# Patient Record
Sex: MALE | Race: BLACK OR AFRICAN AMERICAN | Employment: FULL TIME | URBAN - METROPOLITAN AREA
[De-identification: names, ages, dates, MRNs, and addresses within clinical notes are randomized per-mention and may not be internally consistent; named-entity substitution may affect disease eponyms.]

---

## 2021-05-07 ENCOUNTER — APPOINTMENT (OUTPATIENT)
Dept: GENERAL RADIOLOGY | Age: 56
End: 2021-05-07
Payer: MEDICAID

## 2021-05-07 ENCOUNTER — APPOINTMENT (OUTPATIENT)
Dept: CT IMAGING | Age: 56
End: 2021-05-07
Payer: MEDICAID

## 2021-05-07 ENCOUNTER — HOSPITAL ENCOUNTER (OUTPATIENT)
Age: 56
Setting detail: OBSERVATION
Discharge: HOME | End: 2021-05-09
Attending: EMERGENCY MEDICINE | Admitting: FAMILY MEDICINE
Payer: MEDICAID

## 2021-05-07 DIAGNOSIS — R55 NEAR SYNCOPE: ICD-10-CM

## 2021-05-07 DIAGNOSIS — R42 DIZZINESS: Primary | ICD-10-CM

## 2021-05-07 DIAGNOSIS — R26.2 DIFFICULTY WALKING: ICD-10-CM

## 2021-05-07 DIAGNOSIS — R42 LIGHTHEADEDNESS: ICD-10-CM

## 2021-05-07 LAB
ANION GAP SERPL CALCULATED.3IONS-SCNC: 12 MMOL/L (ref 7–16)
ANISOCYTOSIS: ABNORMAL
BASOPHILS ABSOLUTE: 0.07 E9/L (ref 0–0.2)
BASOPHILS RELATIVE PERCENT: 0.9 % (ref 0–2)
BUN BLDV-MCNC: 8 MG/DL (ref 6–20)
CALCIUM SERPL-MCNC: 9 MG/DL (ref 8.6–10.2)
CHLORIDE BLD-SCNC: 103 MMOL/L (ref 98–107)
CHP ED QC CHECK: YES
CO2: 22 MMOL/L (ref 22–29)
CREAT SERPL-MCNC: 1 MG/DL (ref 0.7–1.2)
EKG ATRIAL RATE: 97 BPM
EKG P AXIS: 27 DEGREES
EKG P-R INTERVAL: 130 MS
EKG Q-T INTERVAL: 406 MS
EKG QRS DURATION: 96 MS
EKG QTC CALCULATION (BAZETT): 515 MS
EKG R AXIS: -47 DEGREES
EKG T AXIS: 71 DEGREES
EKG VENTRICULAR RATE: 97 BPM
EOSINOPHILS ABSOLUTE: 0 E9/L (ref 0.05–0.5)
EOSINOPHILS RELATIVE PERCENT: 0.7 % (ref 0–6)
GFR AFRICAN AMERICAN: >60
GFR NON-AFRICAN AMERICAN: >60 ML/MIN/1.73
GLUCOSE BLD-MCNC: 112 MG/DL
GLUCOSE BLD-MCNC: 125 MG/DL (ref 74–99)
HCT VFR BLD CALC: 42.7 % (ref 37–54)
HEMOGLOBIN: 15 G/DL (ref 12.5–16.5)
LYMPHOCYTES ABSOLUTE: 0.51 E9/L (ref 1.5–4)
LYMPHOCYTES RELATIVE PERCENT: 6.9 % (ref 20–42)
MCH RBC QN AUTO: 31.3 PG (ref 26–35)
MCHC RBC AUTO-ENTMCNC: 35.1 % (ref 32–34.5)
MCV RBC AUTO: 89 FL (ref 80–99.9)
METER GLUCOSE: 112 MG/DL (ref 74–99)
MONOCYTES ABSOLUTE: 0.36 E9/L (ref 0.1–0.95)
MONOCYTES RELATIVE PERCENT: 5.2 % (ref 2–12)
NEUTROPHILS ABSOLUTE: 6.35 E9/L (ref 1.8–7.3)
NEUTROPHILS RELATIVE PERCENT: 87.1 % (ref 43–80)
NUCLEATED RED BLOOD CELLS: 0.9 /100 WBC
PDW BLD-RTO: 13.5 FL (ref 11.5–15)
PLATELET # BLD: 136 E9/L (ref 130–450)
PMV BLD AUTO: 9.7 FL (ref 7–12)
POTASSIUM REFLEX MAGNESIUM: 4.3 MMOL/L (ref 3.5–5)
RBC # BLD: 4.8 E12/L (ref 3.8–5.8)
SODIUM BLD-SCNC: 137 MMOL/L (ref 132–146)
TROPONIN: <0.01 NG/ML (ref 0–0.03)
WBC # BLD: 7.3 E9/L (ref 4.5–11.5)

## 2021-05-07 PROCEDURE — 71046 X-RAY EXAM CHEST 2 VIEWS: CPT

## 2021-05-07 PROCEDURE — G0378 HOSPITAL OBSERVATION PER HR: HCPCS

## 2021-05-07 PROCEDURE — 2580000003 HC RX 258: Performed by: STUDENT IN AN ORGANIZED HEALTH CARE EDUCATION/TRAINING PROGRAM

## 2021-05-07 PROCEDURE — 80048 BASIC METABOLIC PNL TOTAL CA: CPT

## 2021-05-07 PROCEDURE — 93010 ELECTROCARDIOGRAM REPORT: CPT | Performed by: INTERNAL MEDICINE

## 2021-05-07 PROCEDURE — 93005 ELECTROCARDIOGRAM TRACING: CPT | Performed by: STUDENT IN AN ORGANIZED HEALTH CARE EDUCATION/TRAINING PROGRAM

## 2021-05-07 PROCEDURE — 99285 EMERGENCY DEPT VISIT HI MDM: CPT

## 2021-05-07 PROCEDURE — 85025 COMPLETE CBC W/AUTO DIFF WBC: CPT

## 2021-05-07 PROCEDURE — 82962 GLUCOSE BLOOD TEST: CPT

## 2021-05-07 PROCEDURE — 84484 ASSAY OF TROPONIN QUANT: CPT

## 2021-05-07 PROCEDURE — 70450 CT HEAD/BRAIN W/O DYE: CPT

## 2021-05-07 PROCEDURE — 2580000003 HC RX 258: Performed by: FAMILY MEDICINE

## 2021-05-07 RX ORDER — SODIUM CHLORIDE 0.9 % (FLUSH) 0.9 %
5-40 SYRINGE (ML) INJECTION EVERY 12 HOURS SCHEDULED
Status: DISCONTINUED | OUTPATIENT
Start: 2021-05-07 | End: 2021-05-09 | Stop reason: HOSPADM

## 2021-05-07 RX ORDER — ONDANSETRON 2 MG/ML
4 INJECTION INTRAMUSCULAR; INTRAVENOUS EVERY 6 HOURS PRN
Status: DISCONTINUED | OUTPATIENT
Start: 2021-05-07 | End: 2021-05-09 | Stop reason: HOSPADM

## 2021-05-07 RX ORDER — ASPIRIN 300 MG/1
300 SUPPOSITORY RECTAL DAILY
Status: DISCONTINUED | OUTPATIENT
Start: 2021-05-08 | End: 2021-05-08

## 2021-05-07 RX ORDER — ACETAMINOPHEN 650 MG/1
650 SUPPOSITORY RECTAL EVERY 6 HOURS PRN
Status: DISCONTINUED | OUTPATIENT
Start: 2021-05-07 | End: 2021-05-09 | Stop reason: HOSPADM

## 2021-05-07 RX ORDER — SODIUM CHLORIDE 0.9 % (FLUSH) 0.9 %
5-40 SYRINGE (ML) INJECTION EVERY 12 HOURS SCHEDULED
Status: DISCONTINUED | OUTPATIENT
Start: 2021-05-08 | End: 2021-05-09 | Stop reason: HOSPADM

## 2021-05-07 RX ORDER — SODIUM CHLORIDE 9 MG/ML
25 INJECTION, SOLUTION INTRAVENOUS PRN
Status: DISCONTINUED | OUTPATIENT
Start: 2021-05-07 | End: 2021-05-09 | Stop reason: HOSPADM

## 2021-05-07 RX ORDER — PROMETHAZINE HYDROCHLORIDE 25 MG/1
12.5 TABLET ORAL EVERY 6 HOURS PRN
Status: DISCONTINUED | OUTPATIENT
Start: 2021-05-07 | End: 2021-05-09 | Stop reason: HOSPADM

## 2021-05-07 RX ORDER — ASPIRIN 81 MG/1
81 TABLET ORAL DAILY
Status: DISCONTINUED | OUTPATIENT
Start: 2021-05-08 | End: 2021-05-08

## 2021-05-07 RX ORDER — ACETAMINOPHEN 325 MG/1
650 TABLET ORAL EVERY 6 HOURS PRN
Status: DISCONTINUED | OUTPATIENT
Start: 2021-05-07 | End: 2021-05-09 | Stop reason: HOSPADM

## 2021-05-07 RX ORDER — ONDANSETRON 2 MG/ML
4 INJECTION INTRAMUSCULAR; INTRAVENOUS EVERY 6 HOURS PRN
Status: DISCONTINUED | OUTPATIENT
Start: 2021-05-07 | End: 2021-05-07 | Stop reason: ALTCHOICE

## 2021-05-07 RX ORDER — 0.9 % SODIUM CHLORIDE 0.9 %
1000 INTRAVENOUS SOLUTION INTRAVENOUS ONCE
Status: COMPLETED | OUTPATIENT
Start: 2021-05-07 | End: 2021-05-07

## 2021-05-07 RX ORDER — SODIUM CHLORIDE 0.9 % (FLUSH) 0.9 %
5-40 SYRINGE (ML) INJECTION PRN
Status: DISCONTINUED | OUTPATIENT
Start: 2021-05-07 | End: 2021-05-09 | Stop reason: HOSPADM

## 2021-05-07 RX ORDER — PROMETHAZINE HYDROCHLORIDE 25 MG/1
12.5 TABLET ORAL EVERY 6 HOURS PRN
Status: DISCONTINUED | OUTPATIENT
Start: 2021-05-07 | End: 2021-05-07 | Stop reason: ALTCHOICE

## 2021-05-07 RX ORDER — POLYETHYLENE GLYCOL 3350 17 G/17G
17 POWDER, FOR SOLUTION ORAL DAILY PRN
Status: DISCONTINUED | OUTPATIENT
Start: 2021-05-07 | End: 2021-05-09 | Stop reason: HOSPADM

## 2021-05-07 RX ADMIN — Medication 10 ML: at 21:24

## 2021-05-07 RX ADMIN — SODIUM CHLORIDE 1000 ML: 9 INJECTION, SOLUTION INTRAVENOUS at 13:50

## 2021-05-07 ASSESSMENT — PAIN SCALES - GENERAL: PAINLEVEL_OUTOF10: 0

## 2021-05-07 NOTE — ED PROVIDER NOTES
known allergies. I have reviewed the past medical history, past surgical history, social history, and family history    ---------------------------------------------------PHYSICAL EXAM--------------------------------------    Constitutional/General: Alert and oriented x3  Head: Normocephalic and atraumatic  Eyes:  EOMI, sclera non icteric  Mouth: Oropharynx clear, handling secretions, no trismus, no asymmetry of the posterior oropharynx or uvular edema  Neck: Supple, full ROM, no stridor, no meningeal signs  Respiratory: Lungs clear to auscultation bilaterally, no wheezes, rales, or rhonchi. Not in respiratory distress  Cardiovascular:  Regular rate. Regular rhythm. No murmurs, no aortic murmurs, no gallops, or rubs. Chest: No chest wall tenderness  Gastrointestinal:  Abdomen Soft, Non tender, Non distended. No rebound, guarding, or rigidity. No pulsatile masses. Musculoskeletal: Moves all extremities x 4. Warm and well perfused, no clubbing, no cyanosis, no edema. Capillary refill <3 seconds  Skin: skin warm and dry. No rashes. Neurologic: GCS 15, no focal deficits, symmetric strength 5/5 in the upper and lower extremities bilaterally, no ataxia, sensation intact and equal in the bilateral upper and lower extremities. Psychiatric: Normal Affect          -------------------------------------------------- RESULTS -------------------------------------------------  I have personally reviewed all laboratory and imaging results for this patient. Results are listed below.      LABS: (Lab results interpreted by me)  Results for orders placed or performed during the hospital encounter of 05/07/21   CBC auto differential   Result Value Ref Range    WBC 7.3 4.5 - 11.5 E9/L    RBC 4.80 3.80 - 5.80 E12/L    Hemoglobin 15.0 12.5 - 16.5 g/dL    Hematocrit 42.7 37.0 - 54.0 %    MCV 89.0 80.0 - 99.9 fL    MCH 31.3 26.0 - 35.0 pg    MCHC 35.1 (H) 32.0 - 34.5 %    RDW 13.5 11.5 - 15.0 fL    Platelets 984 493 - 034 E9/L    MPV 9.7 7.0 - 12.0 fL    Neutrophils % 87.1 (H) 43.0 - 80.0 %    Lymphocytes % 6.9 (L) 20.0 - 42.0 %    Monocytes % 5.2 2.0 - 12.0 %    Eosinophils % 0.7 0.0 - 6.0 %    Basophils % 0.9 0.0 - 2.0 %    Neutrophils Absolute 6.35 1.80 - 7.30 E9/L    Lymphocytes Absolute 0.51 (L) 1.50 - 4.00 E9/L    Monocytes Absolute 0.36 0.10 - 0.95 E9/L    Eosinophils Absolute 0.00 (L) 0.05 - 0.50 E9/L    Basophils Absolute 0.07 0.00 - 0.20 E9/L    nRBC 0.9 /100 WBC    Anisocytosis 2+    Basic Metabolic Panel w/ Reflex to MG   Result Value Ref Range    Sodium 137 132 - 146 mmol/L    Potassium reflex Magnesium 4.3 3.5 - 5.0 mmol/L    Chloride 103 98 - 107 mmol/L    CO2 22 22 - 29 mmol/L    Anion Gap 12 7 - 16 mmol/L    Glucose 125 (H) 74 - 99 mg/dL    BUN 8 6 - 20 mg/dL    CREATININE 1.0 0.7 - 1.2 mg/dL    GFR Non-African American >60 >=60 mL/min/1.73    GFR African American >60     Calcium 9.0 8.6 - 10.2 mg/dL   Troponin   Result Value Ref Range    Troponin <0.01 0.00 - 0.03 ng/mL   CBC   Result Value Ref Range    WBC 7.2 4.5 - 11.5 E9/L    RBC 4.71 3.80 - 5.80 E12/L    Hemoglobin 14.8 12.5 - 16.5 g/dL    Hematocrit 42.9 37.0 - 54.0 %    MCV 91.1 80.0 - 99.9 fL    MCH 31.4 26.0 - 35.0 pg    MCHC 34.5 32.0 - 34.5 %    RDW 13.5 11.5 - 15.0 fL    Platelets 271 081 - 156 E9/L    MPV 9.7 7.0 - 12.0 fL   Hemoglobin A1c   Result Value Ref Range    Hemoglobin A1C 5.7 (H) 4.0 - 5.6 %   Lipid panel - fasting   Result Value Ref Range    Cholesterol, Total 197 0 - 199 mg/dL    Triglycerides 91 0 - 149 mg/dL    HDL 48 >40 mg/dL    LDL Calculated 131 (H) 0 - 99 mg/dL    VLDL Cholesterol Calculated 18 mg/dL   Comprehensive Metabolic Panel w/ Reflex to MG   Result Value Ref Range    Sodium 139 132 - 146 mmol/L    Potassium reflex Magnesium 4.0 3.5 - 5.0 mmol/L    Chloride 105 98 - 107 mmol/L    CO2 23 22 - 29 mmol/L    Anion Gap 11 7 - 16 mmol/L    Glucose 133 (H) 74 - 99 mg/dL    BUN 9 6 - 20 mg/dL    CREATININE 1.0 0.7 - 1.2 mg/dL    GFR Non- American >60 >=60 mL/min/1.73    GFR African American >60     Calcium 9.0 8.6 - 10.2 mg/dL    Total Protein 8.0 6.4 - 8.3 g/dL    Albumin 3.5 3.5 - 5.2 g/dL    Total Bilirubin 3.1 (H) 0.0 - 1.2 mg/dL    Alkaline Phosphatase 112 40 - 129 U/L    ALT 47 (H) 0 - 40 U/L    AST 72 (H) 0 - 39 U/L   Troponin   Result Value Ref Range    Troponin <0.01 0.00 - 0.03 ng/mL   POCT glucose   Result Value Ref Range    Glucose 112 mg/dL    QC OK? yes    POCT Glucose   Result Value Ref Range    Meter Glucose 112 (H) 74 - 99 mg/dL   EKG 12 Lead   Result Value Ref Range    Ventricular Rate 97 BPM    Atrial Rate 97 BPM    P-R Interval 130 ms    QRS Duration 96 ms    Q-T Interval 406 ms    QTc Calculation (Bazett) 515 ms    P Axis 27 degrees    R Axis -47 degrees    T Axis 71 degrees   EKG 12 Lead   Result Value Ref Range    Ventricular Rate 86 BPM    Atrial Rate 86 BPM    P-R Interval 132 ms    QRS Duration 90 ms    Q-T Interval 432 ms    QTc Calculation (Bazett) 516 ms    P Axis 29 degrees    R Axis -17 degrees    T Axis -22 degrees   ,       RADIOLOGY:  Interpreted by Radiologist unless otherwise specified  US CAROTID ARTERY BILATERAL   Final Result   Atherosclerotic disease. No hemodynamically significant stenosis is   identified   Estimated stenosis by NASCET criteria in the proximal right carotid   artery is between 0% and 49%. Estimated stenosis by NASCET criteria in the proximal left carotid   artery is between 0% and 49%. CT HEAD WO CONTRAST   Final Result   No acute intracranial abnormality. Chronic maxillary and sphenoid sinusitis. XR CHEST (2 VW)   Final Result   No acute process. EKG Interpretation  Interpreted by Rick Adamson    EKG: This EKG is signed and interpreted by me.     Rate: 94  Rhythm: Sinus  Interpretation: Normal sinus rhythm, normal axis, LVH, prolonged QT, QTC is 537, nonspecific ST changes throughout  Comparison: stable as compared to patient's most recent EKG ------------------------- NURSING NOTES AND VITALS REVIEWED ---------------------------   The nursing notes within the ED encounter and vital signs as below have been reviewed by myself  BP (!) 159/88   Pulse 85   Temp 97.8 °F (36.6 °C) (Temporal)   Resp 18   Ht 6' 2\" (1.88 m)   Wt 261 lb 6.4 oz (118.6 kg)   SpO2 97%   BMI 33.56 kg/m²     Oxygen Saturation Interpretation: 97 % on room. Normal    The patients available past medical records and past encounters were reviewed. ------------------------------ ED COURSE/MEDICAL DECISION MAKING----------------------  Medications   0.9 % sodium chloride bolus (0 mLs Intravenous Stopped 5/7/21 1357)           The cardiac monitor revealed NSR with a heart rate in the 80s as interpreted by me. The cardiac monitor was ordered secondary to the patient's syncope and to monitor the patient for dysrhythmia. CPT [id number]           Medical Decision Making:     The patient was seen and evaluated by the Attending Emergency Medicine Physician Dr. Mirtha Vázquez. The patient is a 64year-old-male who presents to the ED complaining of dizziness. He is hemodynamically stable, non-toxic, and in no acute distress. Orthostatics negative. CT head negative. CXR negative. Prolonged QT on EKG, but no evidence of STEMI. Troponin negative. Labs are reassuring. However, when ambulating the patient states he feels lightheaded and dizzy. He was treated with IVF without any relief. Consulted with hospitalist who accepted for admission. Discussed results and plan with patient and agreed on admission. Re-Evaluations:  ED Course as of May 10 1054   Fri May 07, 2021   1744 Patient unable to ambulate due to increased weakness and lightheadedness. [KG]   [id number] Consulted with Dr. Herminio Martin, hospitalist, who accepted for admission.      [KG]      ED Course User Index  [KG] Tip Negus, DO           This patient's ED course included: a personal history and physicial examination, re-evaluation prior to disposition, multiple bedside re-evaluations, IV medications, cardiac monitoring, continuous pulse oximetry and complex medical decision making and emergency management    This patient has remained hemodynamically stable during their ED course. Consultations:  Spoke with Dr. Yoli Yan (Medicine). Discussed case. They will admit this patient. Counseling: The emergency provider has spoken with the patient and discussed todays results, in addition to providing specific details for the plan of care and counseling regarding the diagnosis and prognosis. Questions are answered at this time and they are agreeable with the plan.       --------------------------------- IMPRESSION AND DISPOSITION ---------------------------------    IMPRESSION  1. Dizziness    2. Lightheadedness    3. Near syncope    4. Difficulty walking        DISPOSITION  Disposition: Admit to telemetry  Patient condition is good        NOTE: This report was transcribed using voice recognition software.  Every effort was made to ensure accuracy; however, inadvertent computerized transcription errors may be present        Carmine Cooney DO  Resident  05/10/21 [id number]

## 2021-05-08 ENCOUNTER — APPOINTMENT (OUTPATIENT)
Dept: ULTRASOUND IMAGING | Age: 56
End: 2021-05-08
Payer: MEDICAID

## 2021-05-08 LAB
ALBUMIN SERPL-MCNC: 3.5 G/DL (ref 3.5–5.2)
ALP BLD-CCNC: 112 U/L (ref 40–129)
ALT SERPL-CCNC: 47 U/L (ref 0–40)
ANION GAP SERPL CALCULATED.3IONS-SCNC: 11 MMOL/L (ref 7–16)
AST SERPL-CCNC: 72 U/L (ref 0–39)
BILIRUB SERPL-MCNC: 3.1 MG/DL (ref 0–1.2)
BUN BLDV-MCNC: 9 MG/DL (ref 6–20)
CALCIUM SERPL-MCNC: 9 MG/DL (ref 8.6–10.2)
CHLORIDE BLD-SCNC: 105 MMOL/L (ref 98–107)
CHOLESTEROL, TOTAL: 197 MG/DL (ref 0–199)
CO2: 23 MMOL/L (ref 22–29)
CREAT SERPL-MCNC: 1 MG/DL (ref 0.7–1.2)
EKG ATRIAL RATE: 86 BPM
EKG P AXIS: 29 DEGREES
EKG P-R INTERVAL: 132 MS
EKG Q-T INTERVAL: 432 MS
EKG QRS DURATION: 90 MS
EKG QTC CALCULATION (BAZETT): 516 MS
EKG R AXIS: -17 DEGREES
EKG T AXIS: -22 DEGREES
EKG VENTRICULAR RATE: 86 BPM
GFR AFRICAN AMERICAN: >60
GFR NON-AFRICAN AMERICAN: >60 ML/MIN/1.73
GLUCOSE BLD-MCNC: 133 MG/DL (ref 74–99)
HBA1C MFR BLD: 5.7 % (ref 4–5.6)
HCT VFR BLD CALC: 42.9 % (ref 37–54)
HDLC SERPL-MCNC: 48 MG/DL
HEMOGLOBIN: 14.8 G/DL (ref 12.5–16.5)
LDL CHOLESTEROL CALCULATED: 131 MG/DL (ref 0–99)
MCH RBC QN AUTO: 31.4 PG (ref 26–35)
MCHC RBC AUTO-ENTMCNC: 34.5 % (ref 32–34.5)
MCV RBC AUTO: 91.1 FL (ref 80–99.9)
PDW BLD-RTO: 13.5 FL (ref 11.5–15)
PLATELET # BLD: 137 E9/L (ref 130–450)
PMV BLD AUTO: 9.7 FL (ref 7–12)
POTASSIUM REFLEX MAGNESIUM: 4 MMOL/L (ref 3.5–5)
RBC # BLD: 4.71 E12/L (ref 3.8–5.8)
SODIUM BLD-SCNC: 139 MMOL/L (ref 132–146)
TOTAL PROTEIN: 8 G/DL (ref 6.4–8.3)
TRIGL SERPL-MCNC: 91 MG/DL (ref 0–149)
TROPONIN: <0.01 NG/ML (ref 0–0.03)
VLDLC SERPL CALC-MCNC: 18 MG/DL
WBC # BLD: 7.2 E9/L (ref 4.5–11.5)

## 2021-05-08 PROCEDURE — 92523 SPEECH SOUND LANG COMPREHEN: CPT

## 2021-05-08 PROCEDURE — 96372 THER/PROPH/DIAG INJ SC/IM: CPT

## 2021-05-08 PROCEDURE — G0378 HOSPITAL OBSERVATION PER HR: HCPCS

## 2021-05-08 PROCEDURE — 80061 LIPID PANEL: CPT

## 2021-05-08 PROCEDURE — 6370000000 HC RX 637 (ALT 250 FOR IP): Performed by: FAMILY MEDICINE

## 2021-05-08 PROCEDURE — 2580000003 HC RX 258: Performed by: EMERGENCY MEDICINE

## 2021-05-08 PROCEDURE — 36415 COLL VENOUS BLD VENIPUNCTURE: CPT

## 2021-05-08 PROCEDURE — APPSS180 APP SPLIT SHARED TIME > 60 MINUTES: Performed by: NURSE PRACTITIONER

## 2021-05-08 PROCEDURE — 93880 EXTRACRANIAL BILAT STUDY: CPT | Performed by: RADIOLOGY

## 2021-05-08 PROCEDURE — 83036 HEMOGLOBIN GLYCOSYLATED A1C: CPT

## 2021-05-08 PROCEDURE — 99244 OFF/OP CNSLTJ NEW/EST MOD 40: CPT | Performed by: INTERNAL MEDICINE

## 2021-05-08 PROCEDURE — 84484 ASSAY OF TROPONIN QUANT: CPT

## 2021-05-08 PROCEDURE — 85027 COMPLETE CBC AUTOMATED: CPT

## 2021-05-08 PROCEDURE — 99221 1ST HOSP IP/OBS SF/LOW 40: CPT | Performed by: PSYCHIATRY & NEUROLOGY

## 2021-05-08 PROCEDURE — 93880 EXTRACRANIAL BILAT STUDY: CPT

## 2021-05-08 PROCEDURE — 80053 COMPREHEN METABOLIC PANEL: CPT

## 2021-05-08 PROCEDURE — 93010 ELECTROCARDIOGRAM REPORT: CPT | Performed by: INTERNAL MEDICINE

## 2021-05-08 PROCEDURE — 6370000000 HC RX 637 (ALT 250 FOR IP): Performed by: NURSE PRACTITIONER

## 2021-05-08 PROCEDURE — 97161 PT EVAL LOW COMPLEX 20 MIN: CPT

## 2021-05-08 PROCEDURE — 6360000002 HC RX W HCPCS: Performed by: FAMILY MEDICINE

## 2021-05-08 PROCEDURE — 93005 ELECTROCARDIOGRAM TRACING: CPT | Performed by: EMERGENCY MEDICINE

## 2021-05-08 RX ORDER — AMLODIPINE BESYLATE 5 MG/1
5 TABLET ORAL DAILY
Status: DISCONTINUED | OUTPATIENT
Start: 2021-05-08 | End: 2021-05-09

## 2021-05-08 RX ADMIN — SODIUM CHLORIDE, PRESERVATIVE FREE 10 ML: 5 INJECTION INTRAVENOUS at 08:50

## 2021-05-08 RX ADMIN — ENOXAPARIN SODIUM 40 MG: 40 INJECTION SUBCUTANEOUS at 08:48

## 2021-05-08 RX ADMIN — SODIUM CHLORIDE, PRESERVATIVE FREE 10 ML: 5 INJECTION INTRAVENOUS at 21:01

## 2021-05-08 RX ADMIN — AMLODIPINE BESYLATE 5 MG: 5 TABLET ORAL at 14:38

## 2021-05-08 RX ADMIN — ASPIRIN 81 MG: 81 TABLET, COATED ORAL at 08:48

## 2021-05-08 NOTE — CONSULTS
Keya Lima is a 64 y.o. male       Chief Complaint   Patient presents with    Dizziness     felt dizzy/lightheaded since this morning, denies fall, or syncopal episode       HPI:  80-year-old man with history of alcohol use presents with recurrent episodes of lightheadedness upon standing. He denies vertigo. No loss of consciousness. Vital signs of been stable. He does notice these episodes happen more after he has been drinking. No history of seizures or stroke. HPI  Prior to Visit Medications    Not on File     Social History     Tobacco Use    Smoking status: Not on file   Substance Use Topics    Alcohol use: Not on file    Drug use: Not on file     History reviewed. No pertinent family history. History reviewed. No pertinent surgical history. History reviewed. No pertinent past medical history. Review of Systems    As stated above all others negative         Objective:   BP (!) 154/95   Pulse 98   Temp 96.8 °F (36 °C)   Resp 22   Ht 6' 2\" (1.88 m)   Wt 290 lb (131.5 kg)   SpO2 98%   BMI 37.23 kg/m²     Physical Exam  HENT:      Head: Atraumatic. Mouth/Throat:      Mouth: Mucous membranes are moist.      Pharynx: Oropharynx is clear. Eyes:      General: Lids are normal.      Extraocular Movements: Extraocular movements intact. Conjunctiva/sclera: Conjunctivae normal.      Pupils: Pupils are equal, round, and reactive to light. Cardiovascular:      Pulses: Normal pulses. Heart sounds: Normal heart sounds. Pulmonary:      Breath sounds: Normal breath sounds. Musculoskeletal: Normal range of motion. Skin:     General: Skin is warm and dry. Neurological:      General: No focal deficit present. Mental Status: He is alert. Deep Tendon Reflexes: Strength normal.      Reflex Scores:       Tricep reflexes are 1+ on the right side and 1+ on the left side. Bicep reflexes are 1+ on the right side and 1+ on the left side.        Patellar reflexes are 1+ on the right side and 1+ on the left side. Achilles reflexes are 1+ on the right side and 1+ on the left side. Psychiatric:         Mood and Affect: Mood normal.         Speech: Speech normal.         Behavior: Behavior normal.                 Neurological Exam  Mental Status  Alert. Oriented to person, place, time and situation. Speech is normal. Language is fluent with no aphasia. Cranial Nerves  CN II: Visual fields full to confrontation. CN III, IV, VI: Extraocular movements intact bilaterally. Normal lids and orbits bilaterally. Pupils equal round and reactive to light bilaterally. CN V: Facial sensation is normal.  CN VII: Full and symmetric facial movement. CN VIII: Hearing is normal.  CN IX, X: Palate elevates symmetrically  CN XI: Shoulder shrug strength is normal.  CN XII: Tongue midline without atrophy or fasciculations. Motor  Normal muscle bulk throughout. Normal muscle tone. Strength is 5/5 throughout all four extremities. Sensory  Light touch is normal in upper and lower extremities. Pinprick is normal in upper and lower extremities.      Reflexes                                           Right                      Left  Biceps                                 1+                         1+  Triceps                                1+                         1+  Patellar                                1+                         1+  Achilles                                1+                         1+    Coordination  Right: Finger-to-nose normal. Heel-to-shin normal.  Left: Finger-to-nose normal. Heel-to-shin normal.      Laboratory/Radiology:     CBC with Differential:    Lab Results   Component Value Date    WBC 7.2 05/08/2021    RBC 4.71 05/08/2021    HGB 14.8 05/08/2021    HCT 42.9 05/08/2021     05/08/2021    MCV 91.1 05/08/2021    MCH 31.4 05/08/2021    MCHC 34.5 05/08/2021    RDW 13.5 05/08/2021    NRBC 0.9 05/07/2021    LYMPHOPCT 6.9 05/07/2021    MONOPCT 5.2 05/07/2021 BASOPCT 0.9 05/07/2021    MONOSABS 0.36 05/07/2021    LYMPHSABS 0.51 05/07/2021    EOSABS 0.00 05/07/2021    BASOSABS 0.07 05/07/2021     CMP:    Lab Results   Component Value Date     05/08/2021    K 4.0 05/08/2021     05/08/2021    CO2 23 05/08/2021    BUN 9 05/08/2021    CREATININE 1.0 05/08/2021    GFRAA >60 05/08/2021    LABGLOM >60 05/08/2021    GLUCOSE 133 05/08/2021    GLUCOSE 110 01/07/2011    PROT 8.0 05/08/2021    LABALBU 3.5 05/08/2021    LABALBU 4.1 01/07/2011    CALCIUM 9.0 05/08/2021    BILITOT 3.1 05/08/2021    ALKPHOS 112 05/08/2021    AST 72 05/08/2021    ALT 47 05/08/2021     Last 3 Troponin:    Lab Results   Component Value Date    TROPONINI <0.01 05/08/2021    TROPONINI <0.01 05/07/2021    TROPONINI <0.01 01/07/2011    TROPONINI <0.01 01/07/2011    TROPONINI <0.01 01/06/2011     HgBA1c:    Lab Results   Component Value Date    LABA1C 5.7 05/08/2021     FLP:    Lab Results   Component Value Date    TRIG 91 05/08/2021    HDL 48 05/08/2021    LDLCALC 131 05/08/2021    LABVLDL 18 05/08/2021       CT head:  Ct head negative  I independently reviewed the labs and imaging studies at today's appointment. Assessment:     Presyncopal episodes with orthostatic component. Plan:     No further work-up from neurology standpoint in-house. Follow-up orthostatic vital signs  Please call with further questions can follow-up with neurology as needed.     Dada Lemus  3:14 PM  5/8/2021

## 2021-05-08 NOTE — CONSULTS
Inpatient Cardiology Consultation      Reason for Consult:  Dizziness    Consulting Physician: Dr. Payam Delgado    Requesting Physician:  Dr. Monika Almanza    Date of Consultation: 5/8/2021    HISTORY OF PRESENT ILLNESS:   Mr. Lena Ornelas is a 64year old  male who is previously not known to [facility]) Cardiology Physicians in [facility]. His medical history includes HTN, HLD, medical noncompliance, remote tobacco abuse, ETOH, obesity, and reported mild cognitive deficit. Mr. Lena Ornelas presented to [facility] ED on 05/07/2021 with complaints of dizziness and chest pain. He states that prior to presentation, for \"a while\" he has been having intermittent midsternal chest \"pinching\". He states that this last several seconds and at random, not related to exertion. He states that infrequently for years he has ENG. He denies orthopnea and PND. He states that over the past week he has been having dizziness and lightheadedness with change of position. He states that he summoned EMS and upon their arrival and his standing from a sitting position on 05/07/2021 he fell. He denies injury or LOC. Of note, he states that over the past week he has had poor PO intake in addition to consuming 12- 24 ounce beers a day \"when I'm off\". Upon arrival to the ED his VS were 95.9-99-/91-96% on RA. EKG SR with LVH with nonspecific ST-T wave abnormalities. CT of the head with chronic maxillary and sphenoid sinusitis. CXR unremarkable. Troponin <0.02. He received a one liter NS bolus and was admitted to a telemetry monitored unit. Echocardiogram and a Neurology consult was ordered. Cardiology was consulted by Dr. Monika Almanza for management of dizziness. Of note, Mr. Lena Ornelas states that he has not taken any medication in the past 2 years. Please note: past medical records were reviewed per electronic medical record (EMR) - see detailed reports under Past Medical/ Surgical History.    Past Medical and Surgical History: 1. Lexiscan MPS 01/07/2011: Nonischemic. No WMA. The ejection fraction visually looks in the normal range perhaps 55% to 60%. 2. Morbid Obesity   3. Dyslipidemia  4. HTN  5. Medical noncompliance  6. Remote tobacco abuse  7. ETOH  8. Mild Cognitive Deficits         Medications Prior to admit:  Prior to Admission medications    Not on File       Current Medications:    Current Facility-Administered Medications: sodium chloride flush 0.9 % injection 5-40 mL, 5-40 mL, Intravenous, 2 times per day  sodium chloride flush 0.9 % injection 5-40 mL, 5-40 mL, Intravenous, PRN  0.9 % sodium chloride infusion, 25 mL, Intravenous, PRN  polyethylene glycol (GLYCOLAX) packet 17 g, 17 g, Oral, Daily PRN  enoxaparin (LOVENOX) injection 40 mg, 40 mg, Subcutaneous, Daily  aspirin EC tablet 81 mg, 81 mg, Oral, Daily **OR** aspirin suppository 300 mg, 300 mg, Rectal, Daily  trimethobenzamide (TIGAN) injection 200 mg, 200 mg, Intramuscular, Q6H PRN  sodium chloride flush 0.9 % injection 5-40 mL, 5-40 mL, Intravenous, 2 times per day  sodium chloride flush 0.9 % injection 5-40 mL, 5-40 mL, Intravenous, PRN  0.9 % sodium chloride infusion, 25 mL, Intravenous, PRN  promethazine (PHENERGAN) tablet 12.5 mg, 12.5 mg, Oral, Q6H PRN **OR** ondansetron (ZOFRAN) injection 4 mg, 4 mg, Intravenous, Q6H PRN  acetaminophen (TYLENOL) tablet 650 mg, 650 mg, Oral, Q6H PRN **OR** acetaminophen (TYLENOL) suppository 650 mg, 650 mg, Rectal, Q6H PRN  bisacodyl (DULCOLAX) EC tablet 5 mg, 5 mg, Oral, Daily PRN    Allergies:  Patient has no known allergies. Social History:    Quit smoking years ago. Prior to that smoked 1.5ppd for 10 years. Drinks alcohol daily. States that he consumes 12-24 ounce beers on the days he is off and drinks beer also on working days \"not as much\". Denies illicit drug use. Drinks one cup of coffee and soda a day. Family History:   He grew up in foster care.  He states that his birth mother had \"heart problems\", specifics are unclear as she  2 years after he found her. He does not know the medical history of his birth father or 2 brothers. REVIEW OF SYSTEMS:     · Constitutional: Denies fevers, chills, night sweats, and fatigue  · HEENT: Denies headaches, nose bleeds, and blurred vision,oral pain, abscess or lesion. · Musculoskeletal: Denies falls, pain to BLE with ambulation and edema to BLE. · Neurological: Complains of dizziness and lightheadedness, denies numbness and tingling  · Cardiovascular: Complains of chest pain. Denies palpitations, and feelings of heart racing. · Respiratory: Denies orthopnea and PND  · Gastrointestinal: Denies heartburn, nausea/vomiting, diarrhea and constipation, black/bloody, and tarry stools. · Genitourinary: Denies dysuria and hematuria  · Hematologic: Denies excessive bruising or bleeding  · Lymphatic: Denies lumps and bumps to neck, axilla, breast, and groin  · Endocrine: Denies excessive thirst. Denies intolerance to hot and cold  · Psychiatric: Complains of anxiety and depression. PHYSICAL EXAM:   BP (!) 154/95   Pulse 98   Temp 96.8 °F (36 °C)   Resp 22   Ht 6' 2\" (1.88 m)   Wt 290 lb (131.5 kg)   SpO2 98%   BMI 37.23 kg/m²   CONST:  Well developed, obese, middle aged male who appears stated age. Awake, alert, cooperative, no apparent distress  HEENT:   Head- Normocephalic, atraumatic   Eyes- Conjunctivae pink, anicteric  Throat- Oral mucosa pink and moist  Neck-  No stridor, trachea midline, no jugular venous distention. No adenopathy   CHEST: Chest symmetrical and non-tender to palpation. No accessory muscle use or intercostal retractions  RESPIRATORY: Lung sounds - clear throughout fields   CARDIOVASCULAR:     No carotid bruit  Heart Inspection- shows no noted pulsations  Heart Palpation- no heaves or thrills; PMI is non-displaced   Heart Ausculation- Regular rate and rhythm, no murmur. No s3, s4 or rub   PV: No lower extremity edema. No varicosities. Pedal pulses palpable, no clubbing or cyanosis   ABDOMEN: Soft, obese, non-tender to light palpation. Bowel sounds present. No palpable masses no organomegaly; no abdominal bruit  MS: Good muscle strength and tone. No atrophy or abnormal movements. : Deferred  SKIN: Warm and dry no statis dermatitis or ulcers   NEURO / PSYCH: Oriented to person, place and time. Speech clear and appropriate. Follows all commands. Pleasant affect     DATA:    ECG: As above  Tele strips: SR  Diagnostic:  Labs:   CBC:   Recent Labs     05/07/21  1354 05/08/21  0712   WBC 7.3 7.2   HGB 15.0 14.8   HCT 42.7 42.9    137     BMP:   Recent Labs     05/07/21  1354 05/08/21  0712    139   K 4.3 4.0   CO2 22 23   BUN 8 9   CREATININE 1.0 1.0   LABGLOM >60 >60   CALCIUM 9.0 9.0   HgA1c:   Lab Results   Component Value Date    LABA1C 5.7 (H) 05/08/2021   CARDIAC ENZYMES:  Recent Labs     05/07/21  1354 05/08/21  0712   TROPONINI <0.01 <0.01     FASTING LIPID PANEL:  Lab Results   Component Value Date    CHOL 197 05/08/2021    HDL 48 05/08/2021    LDLCALC 131 05/08/2021    TRIG 91 05/08/2021     LIVER PROFILE:  Recent Labs     05/08/21 0712   AST 72*   ALT 47*   LABALBU 3.5     05/07/2021 CXR:   The ejection fraction visually looks in the normal range perhaps 55% to 60%. 05/07/2021 CT Head:   No acute intracranial abnormality. Chronic maxillary and sphenoid sinusitis    IMPRESSION:   1. Noncardiac chest pain with no acute EKG changes and Troponin negative x 2  2. \"Dizziness\" without syncope due to orthostasis, heavy alcohol intake (12x24-ounce beers a day)  3. Obesity  4. HTN: Not well controlled  5. HLD, noncompliant with statin  6. Medical noncompliance  7. Heavy alcohol use as above  8. Remote tobacco abuse   9. Elevated LFTs      PLAN:  1. Monitor BP/HR; Start Norvasc 5mg daily given his hypertension and LVH on EKG. 2. Neurology consult per Primary Service  3.  Cancel echocardiogram-->May be done as an outpatient  4. Check urine tox screen   5. Discontinue ASA-not indicated  6. Consider statin, as LFTs allow  7. Recommend ETOH cessation   8. Follow up with Cardiology as an outpatient (office will call with appointment time)  9. Cardiology will sign off. Please call if needed. 10. Above as discussed with Dr. Rajesh Christian    Electronically signed by WINTER Hilton CNP on 5/8/2021 at 9:52 AM     PHYSICIAN ADDENDUM:  I independently interviewed and examined the patient. I have reviewed the above documentation completed by the JESSE. Please see my additional contributions to the HPI, physical exam, and assessment / medical decision making. I independently reviewed the HPI, ROS, PMH, PSH, [address], SH, and medications independently and agree with above documentation.     I discussed the assessment and plan with the patient/family at the bedside as well as the bedside nurse and the primary team.    Edi Hudson MD, [facility] [place]  Interventional Cardiology/Structural Heart Disease  Office: [phone]

## 2021-05-08 NOTE — PROGRESS NOTES
SPEECH/LANGUAGE PATHOLOGY  SPEECH/LANGUAGE/COGNITIVE EVALUATION      PATIENT NAME:  Vernon Potter      :  1965          TODAY'S DATE:  2021 [facility]OM:  [facility]      SPEECH PATHOLOGY DIAGNOSIS:    Mild cognitive deficits    PLAN OF CARE:     Speech Pathology intervention is recommended 3-5 times per week for LOS or when goals are met with emphasis on the following: To improve all areas of memory for functional activities to 75% with min cues  To improve orientation to date 100% of the time with use of external memory aids               MOTOR SPEECH       Oral Peripheral Examination   Oral motor strength and coordination were within functional limits    Parameters of Speech Production  Respiration:  Within normal limits  Articulation:  Within normal limits  Resonance:  Within normal limits  Quality:   Within normal limits  Pitch: Within normal limits  Intensity: Within normal limits  Fluency:  Within normal limits  Prosody Within normal limits      RECEPTIVE LANGUAGE    Comprehension of Yes/No Questions:  Within normal limits    Process  Simple Verbal Commands: Within normal limits  Process Intermediate Verbal Commands: Within normal limits  Process Complex Verbal Commands: Within normal limits     Comprehension of Conversation: Within normal limits       EXPRESSIVE LANGUAGE     Serials: Within normal limits    Imitation:  Words   Within normal limits  Sentences Within normal limits    Naming:  (Modality used: verbal)  Confrontation Naming  Within normal limits  Functional Description  Within normal limits}  Response Naming: Within normal limits    Conversation:   Within normal limits    COGNITION     Attention/Orientation  Attention: Sustained attention   Orientation:  Oriented to Person, Place, Reason for hospitalization  Memory   Biographical:  recalled Address, Birthdate and Family  Delayed recall:  recalled 0/3 words    Organization/Problem Solving/Reasoning   Verbal Sequencing: Functional  Problem solving (verbal): Functional     CLINICAL OBSERVATIONS NOTED DURING THE EVALUATION  Cueing was required                  Prognosis for improvements is fair +  This plan will be re-evaluated and revised in 1 week  if warranted. Patient stated goals: Agreed with above  Treatment goals discussed with Patient   The Patient understand(s) the diagnosis, prognosis and plan of care       CPT code:    31232  eval speech sound lang comprehension      The admitting diagnosis and active problem list, as listed below have been reviewed prior to initiation of this evaluation.      ADMITTING DIAGNOSIS: Lightheadedness [R42]     ACTIVE PROBLEM LIST:   Patient Active Problem List   Diagnosis    Lightheadedness

## 2021-05-08 NOTE — PROGRESS NOTES
Hospitalist Progress Note      PCP: No primary care provider on file. Date of Admission: 5/7/2021    Chief Complaint: [facility] Course: ** near syncope, for echo and carotid doppler, Neuro and card eval,   US of caroids unremarkable, he is othrostatic, in discussing his diet, he drinks primarily beer at home, rarely water, and this confirmed by his brother. Will recommend peer recovery program    Subjective: concerned about dizziness      Medications:  Reviewed    Infusion Medications    sodium chloride      sodium chloride       Scheduled Medications    amLODIPine  5 mg Oral Daily    sodium chloride flush  5-40 mL Intravenous 2 times per day    enoxaparin  40 mg Subcutaneous Daily    sodium chloride flush  5-40 mL Intravenous 2 times per day     PRN Meds: sodium chloride flush, sodium chloride, polyethylene glycol, trimethobenzamide, sodium chloride flush, sodium chloride, promethazine **OR** ondansetron, acetaminophen **OR** acetaminophen, bisacodyl      Intake/Output Summary (Last 24 hours) at 5/8/2021 1516  Last data filed at 5/8/2021 1406  Gross per 24 hour   Intake 480 ml   Output --   Net 480 ml       Exam:    BP (!) 154/95   Pulse 98   Temp 96.8 °F (36 °C)   Resp 22   Ht 6' 2\" (1.88 m)   Wt 290 lb (131.5 kg)   SpO2 98%   BMI 37.23 kg/m²         Gen: well developed  HEENT: NC/AT, moist mucous membranes, no oropharyngeal erythema or exudate  Neck: supple, trachea midline, no anterior cervical or SC LAD  Heart:  Normal s1/s2, RRR, no murmurs, gallops, or rubs. Lungs:  *cta bilaterally, *  Abd: bowel sounds present, soft, nontender, nondistended, no masses  Extrem:  No clubbing, cyanosis,  no** edema  Skin: no rashes or lesions  Psych: A & O x3  Neuro: grossly intact, moves all four extremities.     Capillary Refill: Brisk,< 3 seconds   Peripheral Pulses: +2 palpable, equal bilaterally                   Labs:   Recent Labs     05/07/21  1354 05/08/21  0712   WBC 7.3 7.2   HGB 15.0 14.8   HCT 42.7 42.9    137     Recent Labs     05/07/21  1354 05/08/21  0712    139   K 4.3 4.0    105   CO2 22 23   BUN 8 9   CREATININE 1.0 1.0   CALCIUM 9.0 9.0     Recent Labs     05/08/21  0712   AST 72*   ALT 47*   BILITOT 3.1*   ALKPHOS 112     No results for input(s): INR in the last 72 hours. Recent Labs     05/07/21  1354 05/08/21  0712   TROPONINI <0.01 <0.01     Recent Labs     05/08/21 0712   AST 72*   ALT 47*   BILITOT 3.1*   ALKPHOS 112     No results for input(s): LACTA in the last 72 hours.   No results found for: Jacquelyn June  No results found for: AMMONIA    Assessment:    Active Hospital Problems    Diagnosis Date Noted    Lightheadedness [R42] 05/07/2021   near syncope  HTN  HLD( liver enzymes mildly elevated)  prediabetes  Plan:  *CONT NORVAS   US OF CAROTIDS  ECHO  Orthostatics,  Dietary consult     DVT Prophylaxis: LOVENOX  Diet: DIET GENERAL;  Code Status: Full Code    PT/OT Eval Status: *ORDERED    Dispo - HOME      Electronically signed by Mariano Melendez DO on 5/8/2021 at 3:16 PM [facility]

## 2021-05-08 NOTE — H&P
INTERNAL MEDICINE HISTORY AND PHYSICAL EXAM     CHIEF COMPLAINT:   Chief Complaint   Patient presents with    Dizziness     felt dizzy/lightheaded since this morning, denies fall, or syncopal episode        HISTORY OF PRESENTING ILLNESS:   \64 years old male patient who presented to the emergency department the chief complaints of dizziness, patient reports that a day before presentation he was dizzy and he felt lightheaded to the extent that he felt that he was about to fall, did not lose consciousness no syncopal episode reported, he ignored this episode but then on the day of presentation he again had more severe symptoms, patient decided to come to emergency department. Patient remained stable hemodynamically, patient had initial lab work-up done which showed no major abnormalities on basic lab work-up on imaging, no acute abnormality on CT head patient did have chronic maxillary and sphenoid sinusitis, x-ray chest showing no acute process. Patient denies any numbness tingling any neurological significant weakness. Patient denies chest pain shortness of breath. PAST MEDICAL HISTORY  History reviewed. No pertinent past medical history. PAST SURGICAL HISTORY  History reviewed. No pertinent surgical history. FAMILY HISTORY  History reviewed. No pertinent family history. SOCIAL HISTORY         MEDICATIONS   No medications prior to admission.   Current Facility-Administered Medications   Medication Dose Route Frequency Provider Last Rate Last Admin    sodium chloride flush 0.9 % injection 5-40 mL  5-40 mL Intravenous 2 times per day Mathieu Luria, DO   10 mL at 05/07/21 2124    sodium chloride flush 0.9 % injection 5-40 mL  5-40 mL Intravenous PRN Mathieu Luria, DO        0.9 % sodium chloride infusion  25 mL Intravenous PRN Mathieu Luria, DO        polyethylene glycol (GLYCOLAX) packet 17 g  17 g Oral Daily PRN Mathieu Luria, DO        [START ON 5/8/2021] enoxaparin (LOVENOX) injection 40 mg  40 mg Subcutaneous Daily Zannie Brazoria, DO        [START ON 5/8/2021] aspirin EC tablet 81 mg  81 mg Oral Daily Zannie Alto, DO        Or    [START ON 5/8/2021] aspirin suppository 300 mg  300 mg Rectal Daily Zannie Alto, DO        trimethobenzamide Jony Malini) injection 200 mg  200 mg Intramuscular Q6H PRN Zannie Brazoria, DO         Prior to Admission medications    Not on File       ALLERGIES   Patient has no known allergies. REVIEW OF SYSTEMS:  12 point review of system was done in detail with the patient and is negative except as above in HPI.     PHYSICAL EXAM:  VS: BP (!) 151/70   Pulse 102   Temp 97.6 °F (36.4 °C) (Temporal)   Resp 18   Ht 6' 2\" (1.88 m)   Wt 290 lb (131.5 kg)   SpO2 97%   BMI 37.23 kg/m²   General Appearance: alert and oriented to person, place and time, in no acute distress  HEENT: normocephalic and atraumatic, pupils equal, round, and reactive to light, Ssupple and non-tender without mass, no thyromegaly   Cardiovascular: normal rate, regular rhythm, normal S1 and S2, no murmurs, rubs, clicks, or gallops, distal pulses intact, no carotid bruits, no JVD  Pulmonary/Chest: clear to auscultation bilaterally- no wheezes, rales or rhonchi, normal air movement, no respiratory distress  Abdomen: soft, non-tender, non-distended, normal bowel sounds, no masses   Extremities: no cyanosis, clubbing or edema, pulse   Musculoskeletal: normal range of motion, no joint swelling, deformity or tenderness  Neurological: alert, oriented, normal speech, no focal findings or movement disorder noted  Skin: warm and dry, no rash or erythema    LABS:  Recent Results (from the past 24 hour(s))   EKG 12 Lead    Collection Time: 05/07/21  1:40 PM   Result Value Ref Range    Ventricular Rate 97 BPM    Atrial Rate 97 BPM    P-R Interval 130 ms    QRS Duration 96 ms    Q-T Interval 406 ms    QTc Calculation (Bazett) 515 ms    P Axis 27 degrees    R Axis -47 degrees    T Axis 71 degrees   POCT Glucose    Collection Time: 05/07/21  1:42 PM   Result Value Ref Range    Meter Glucose 112 (H) 74 - 99 mg/dL   POCT glucose    Collection Time: 05/07/21  1:44 PM   Result Value Ref Range    Glucose 112 mg/dL    QC OK? yes    CBC auto differential    Collection Time: 05/07/21  1:54 PM   Result Value Ref Range    WBC 7.3 4.5 - 11.5 E9/L    RBC 4.80 3.80 - 5.80 E12/L    Hemoglobin 15.0 12.5 - 16.5 g/dL    Hematocrit 42.7 37.0 - 54.0 %    MCV 89.0 80.0 - 99.9 fL    MCH 31.3 26.0 - 35.0 pg    MCHC 35.1 (H) 32.0 - 34.5 %    RDW 13.5 11.5 - 15.0 fL    Platelets 005 691 - 535 E9/L    MPV 9.7 7.0 - 12.0 fL    Neutrophils % 87.1 (H) 43.0 - 80.0 %    Lymphocytes % 6.9 (L) 20.0 - 42.0 %    Monocytes % 5.2 2.0 - 12.0 %    Eosinophils % 0.7 0.0 - 6.0 %    Basophils % 0.9 0.0 - 2.0 %    Neutrophils Absolute 6.35 1.80 - 7.30 E9/L    Lymphocytes Absolute 0.51 (L) 1.50 - 4.00 E9/L    Monocytes Absolute 0.36 0.10 - 0.95 E9/L    Eosinophils Absolute 0.00 (L) 0.05 - 0.50 E9/L    Basophils Absolute 0.07 0.00 - 0.20 E9/L    nRBC 0.9 /100 WBC    Anisocytosis 2+    Basic Metabolic Panel w/ Reflex to MG    Collection Time: 05/07/21  1:54 PM   Result Value Ref Range    Sodium 137 132 - 146 mmol/L    Potassium reflex Magnesium 4.3 3.5 - 5.0 mmol/L    Chloride 103 98 - 107 mmol/L    CO2 22 22 - 29 mmol/L    Anion Gap 12 7 - 16 mmol/L    Glucose 125 (H) 74 - 99 mg/dL    BUN 8 6 - 20 mg/dL    CREATININE 1.0 0.7 - 1.2 mg/dL    GFR Non-African American >60 >=60 mL/min/1.73    GFR African American >60     Calcium 9.0 8.6 - 10.2 mg/dL   Troponin    Collection Time: 05/07/21  1:54 PM   Result Value Ref Range    Troponin <0.01 0.00 - 0.03 ng/mL       RADIOLOGY:  Xr Chest (2 Vw)    Result Date: 5/7/2021  EXAMINATION: TWO XRAY VIEWS OF THE CHEST 5/7/2021 3:13 pm COMPARISON: None.  HISTORY: ORDERING SYSTEM PROVIDED HISTORY: chest pain TECHNOLOGIST PROVIDED HISTORY: Reason for exam:->chest pain What reading provider will be dictating not have any more symptoms. No numbness no tingling, no chest pain or shortness of breath  2. Abnormal EKG, questionable anterior chest leads ST wave elevation, troponin negative, patient denies any chest pain or shortness of breath    Plan  · Exact etiology unclear, history not consistent with benign positional vertigo  · We will check carotid ultrasound and echocardiogram  · Neurology was consulted on admission  · Continue cardiac telemetry, repeat EKG and check troponin  · We will consult cardiology for evaluation    DVT prophylaxis: Subcutaneous heparin  CODE STATUS: Patient is a full code  Discharge plan: Patient can be discharged next 3 to 4 days to home with and without home health.,  Pending clinical improvement, pending work-up and clearance by consulting services. Please note that over 35 minutes was spent in evaluating the patient, review of records and results, discussion with staff/family, etc.    Leslie Rubin MD  [facility]   [phone]    NOTE: This report was transcribed using voice recognition software. Every effort was made to ensure accuracy; however, inadvertent computerized transcription errors may be present.

## 2021-05-08 NOTE — PROGRESS NOTES
Physical Therapy Initial Assessment     Name: Maribell Levi  : 1965  MRN: [id number]    Referring Provider:  Shereen Vasquez DO    Date of Service: 2021    Evaluating PT:  Rishi aGrcia DPT  [id number]    Room #:  3258/6816-X  Diagnosis:  Lightheadedness  Precautions:  Falls   Equipment Needs:  None anticipated     SUBJECTIVE:    Pt lives alone in a 1 story home with 3 stairs to enter and 1 rail. Bed is on 1st floor and bath is on 1st floor. Pt ambulated with no AD PTA. OBJECTIVE:   Initial Evaluation  Date: 2021 Treatment Short Term/ Long Term   Goals   AM-PAC 6 Clicks 69/15     Was pt agreeable to Eval/treatment? YES     Does pt have pain?  No complaints of pain      Bed Mobility  Rolling: NT  Supine to sit: NT  Sit to supine: NT  Scooting: NT  Rolling: independent   Supine to sit: independent   Sit to supine: independent   Scooting: independent    Transfers Sit to stand: CGA  Stand to sit: CGA  Stand pivot: CGA  Sit to stand: independent   Stand to sit: independent   Stand pivot: independent    Ambulation    30 feet with no AD and CGA  >150 feet with no AD at independent level    Stair negotiation: ascended and descended  TBA   3 steps with 1 rail modified independent level    ROM BUE:  WFL   BLE:  WFL     Strength BUE:  5/5   BLE:  5/5      Balance Sitting EOB:  Independent   Dynamic Standing:  CGA   Sitting EOB:  Independent   Dynamic Standing:  Independent      Pt is A & O x 3  Sensation:  Pt denies numbness and tingling to extremities    Vitals:  Not Tested during evaluation   Blood Pressure at rest  Blood Pressure post session    Heart Rate at rest Heart Rate post session    SPO2 at rest  SPO2 post session      Therapeutic Exercises:  NA- eval only     Patient education  Pt educated on role of PT while pt is in the hospital     Patient response to education:   Pt verbalized understanding Pt demonstrated skill Pt requires further education in this area   Yes  NA Yes ASSESSMENT:    Comments:  Pt reports that he is still having lightheadedness both while sitting and while standing/ ambulating. He states, \"every time I come to the hospital I feel dizzy\". Pt rates dizziness as \"10/10\". Pt was able to ambulate to door and back with no AD and CGA with no significant episodes of LOB. He does report that his legs feel \"weak and shaky\" during ambulation. Pt remained sitting up at edge of bed as found prior to eval with all needs met and call button in reach. Treatment:  Patient practiced and was instructed in the following treatment:     NA    Pt's/ family goals   1. Decreased dizziness and return home at independent level. Patient and or family understand(s) diagnosis, prognosis, and plan of care. Yes     PLAN:    PT care will be provided in accordance with the objectives noted above. Exercises and functional mobility practice will be used as well as appropriate assistive devices or modalities to obtain goals. Patient and family education will also be administered as needed. Frequency of treatments: 2-5x/week x 1-2 weeks. Time in  0850   Time out  0902     Total Treatment Time  12 minutes     Evaluation Time includes thorough review of current medical information, gathering information on past medical history/social history and prior level of function, completion of standardized testing/informal observation of tasks, assessment of data and education on plan of care and goals.     CPT codes:  [x] Low Complexity PT evaluation 93076  [] Moderate Complexity PT evaluation 74350  [] High Complexity PT evaluation 01564  [] PT Re-evaluation 99412  [] Gait training 14093 0 minutes  [] Manual therapy 96951 0 minutes  [] Therapeutic activities 60167 0 minutes  [] Therapeutic exercises 25534 0 minutes  [] Neuromuscular reeducation 78479 0 minutes     Hank Segura DPT  [id number]

## 2021-05-09 ENCOUNTER — HOSPITAL ENCOUNTER (EMERGENCY)
Age: 56
Discharge: HOME | DRG: 463 | End: 2021-05-09
Attending: EMERGENCY MEDICINE
Payer: MEDICAID

## 2021-05-09 ENCOUNTER — APPOINTMENT (OUTPATIENT)
Dept: GENERAL RADIOLOGY | Age: 56
DRG: 463 | End: 2021-05-09
Payer: MEDICAID

## 2021-05-09 VITALS
RESPIRATION RATE: 18 BRPM | WEIGHT: 261.4 LBS | SYSTOLIC BLOOD PRESSURE: 159 MMHG | HEART RATE: 85 BPM | TEMPERATURE: 97.8 F | DIASTOLIC BLOOD PRESSURE: 88 MMHG | BODY MASS INDEX: 33.55 KG/M2 | OXYGEN SATURATION: 97 % | HEIGHT: 74 IN

## 2021-05-09 VITALS
WEIGHT: 270 LBS | HEIGHT: 74 IN | HEART RATE: 100 BPM | DIASTOLIC BLOOD PRESSURE: 116 MMHG | BODY MASS INDEX: 34.65 KG/M2 | RESPIRATION RATE: 16 BRPM | SYSTOLIC BLOOD PRESSURE: 178 MMHG | TEMPERATURE: 96.2 F | OXYGEN SATURATION: 96 %

## 2021-05-09 DIAGNOSIS — R42 DIZZINESS: Primary | ICD-10-CM

## 2021-05-09 LAB
ANION GAP SERPL CALCULATED.3IONS-SCNC: 13 MMOL/L (ref 7–16)
BASOPHILS ABSOLUTE: 0.04 E9/L (ref 0–0.2)
BASOPHILS RELATIVE PERCENT: 0.5 % (ref 0–2)
BUN BLDV-MCNC: 9 MG/DL (ref 6–20)
CALCIUM SERPL-MCNC: 9.1 MG/DL (ref 8.6–10.2)
CHLORIDE BLD-SCNC: 99 MMOL/L (ref 98–107)
CO2: 23 MMOL/L (ref 22–29)
CREAT SERPL-MCNC: 1 MG/DL (ref 0.7–1.2)
EOSINOPHILS ABSOLUTE: 0.13 E9/L (ref 0.05–0.5)
EOSINOPHILS RELATIVE PERCENT: 1.6 % (ref 0–6)
GFR AFRICAN AMERICAN: >60
GFR NON-AFRICAN AMERICAN: >60 ML/MIN/1.73
GLUCOSE BLD-MCNC: 122 MG/DL (ref 74–99)
GLUCOSE BLD-MCNC: 131 MG/DL
HCT VFR BLD CALC: 42.9 % (ref 37–54)
HEMOGLOBIN: 14.5 G/DL (ref 12.5–16.5)
IMMATURE GRANULOCYTES #: 0.04 E9/L
IMMATURE GRANULOCYTES %: 0.5 % (ref 0–5)
LYMPHOCYTES ABSOLUTE: 0.76 E9/L (ref 1.5–4)
LYMPHOCYTES RELATIVE PERCENT: 9.1 % (ref 20–42)
MAGNESIUM: 2.2 MG/DL (ref 1.6–2.6)
MCH RBC QN AUTO: 31 PG (ref 26–35)
MCHC RBC AUTO-ENTMCNC: 33.8 % (ref 32–34.5)
MCV RBC AUTO: 91.7 FL (ref 80–99.9)
METER GLUCOSE: 131 MG/DL (ref 74–99)
MONOCYTES ABSOLUTE: 0.93 E9/L (ref 0.1–0.95)
MONOCYTES RELATIVE PERCENT: 11.2 % (ref 2–12)
NEUTROPHILS ABSOLUTE: 6.44 E9/L (ref 1.8–7.3)
NEUTROPHILS RELATIVE PERCENT: 77.1 % (ref 43–80)
PDW BLD-RTO: 13.6 FL (ref 11.5–15)
PLATELET # BLD: 144 E9/L (ref 130–450)
PMV BLD AUTO: 10.1 FL (ref 7–12)
POTASSIUM SERPL-SCNC: 3.9 MMOL/L (ref 3.5–5)
RBC # BLD: 4.68 E12/L (ref 3.8–5.8)
SODIUM BLD-SCNC: 135 MMOL/L (ref 132–146)
TROPONIN: <0.01 NG/ML (ref 0–0.03)
WBC # BLD: 8.3 E9/L (ref 4.5–11.5)

## 2021-05-09 PROCEDURE — 2580000003 HC RX 258: Performed by: EMERGENCY MEDICINE

## 2021-05-09 PROCEDURE — 85025 COMPLETE CBC W/AUTO DIFF WBC: CPT

## 2021-05-09 PROCEDURE — 6370000000 HC RX 637 (ALT 250 FOR IP): Performed by: INTERNAL MEDICINE

## 2021-05-09 PROCEDURE — 84484 ASSAY OF TROPONIN QUANT: CPT

## 2021-05-09 PROCEDURE — G0378 HOSPITAL OBSERVATION PER HR: HCPCS

## 2021-05-09 PROCEDURE — 93005 ELECTROCARDIOGRAM TRACING: CPT | Performed by: NURSE PRACTITIONER

## 2021-05-09 PROCEDURE — 82962 GLUCOSE BLOOD TEST: CPT

## 2021-05-09 PROCEDURE — 80048 BASIC METABOLIC PNL TOTAL CA: CPT

## 2021-05-09 PROCEDURE — 71046 X-RAY EXAM CHEST 2 VIEWS: CPT

## 2021-05-09 PROCEDURE — 99283 EMERGENCY DEPT VISIT LOW MDM: CPT

## 2021-05-09 PROCEDURE — 83735 ASSAY OF MAGNESIUM: CPT

## 2021-05-09 RX ORDER — AMLODIPINE BESYLATE 10 MG/1
10 TABLET ORAL DAILY
Qty: 30 TABLET | Refills: 3 | Status: SHIPPED | OUTPATIENT
Start: 2021-05-09 | End: 2021-05-12

## 2021-05-09 RX ORDER — AMLODIPINE BESYLATE 10 MG/1
10 TABLET ORAL DAILY
Status: DISCONTINUED | OUTPATIENT
Start: 2021-05-09 | End: 2021-05-09 | Stop reason: HOSPADM

## 2021-05-09 RX ORDER — 0.9 % SODIUM CHLORIDE 0.9 %
1000 INTRAVENOUS SOLUTION INTRAVENOUS ONCE
Status: COMPLETED | OUTPATIENT
Start: 2021-05-09 | End: 2021-05-09

## 2021-05-09 RX ORDER — AMLODIPINE BESYLATE 10 MG/1
10 TABLET ORAL DAILY
Qty: 30 TABLET | Refills: 3 | Status: SHIPPED | OUTPATIENT
Start: 2021-05-09 | End: 2021-05-09

## 2021-05-09 RX ADMIN — SODIUM CHLORIDE 1000 ML: 9 INJECTION, SOLUTION INTRAVENOUS at 16:00

## 2021-05-09 RX ADMIN — AMLODIPINE BESYLATE 10 MG: 10 TABLET ORAL at 09:04

## 2021-05-09 SDOH — HEALTH STABILITY: MENTAL HEALTH: HOW OFTEN DO YOU HAVE A DRINK CONTAINING ALCOHOL?: NEVER

## 2021-05-09 NOTE — ED NOTES
Unnamed male on the phone called the ED and stated, Roberta Jolly, I am looking for Purplu". The patient was not on our ED track board. Explained to the unnamed male on the phone that he is not on our board but I can look up the patient's chart to find out what happened. The patient was marked discharged. The unnamed male immediately starting yelling at this RN about losing the patient. Explained to the unnamed male that he is not lost but I can only see what I can from the computer. The unnamed male stated that the patient can not walk and he is confused and if he is discharged it means he is lost. Explained to the unnamed male on the phone that it does not mean he is lost but I can only tell him the information that is in front of me until I can get more information for him. He then states, \" I love lawsuits, so it is up to you whether or not there is one\". Told the unnamed male that if he wanted to file a lawsuit that it was his choice however it was unnecessary since not all information was found out without speaking to his nurse. The unnamed male continue to yell at this RN about losing the patient until was told to hold on while he answered another phone call. This RN hung up the phone and immediately spoke to the RN taking care of the patient who stated that the patient is in room 38 waiting for his ride to pick him up.       Charly Monaco RN  05/09/21 6285

## 2021-05-09 NOTE — PROGRESS NOTES
Hospitalist Progress Note      PCP: No primary care provider on file. Date of Admission: 5/7/2021    Chief Complaint: *dizzomess     Hospital Course: ** near syncope, for echo and carotid doppler, Neuro and card eval,   US of caroids unremarkable, he is othrostatic, in discussing his diet, he drinks primarily beer at home, rarely water, and this confirmed by his brother. Will recommend peer recovery program** to dc today        Subjective: ** needs a PMD      Medications:  Reviewed    Infusion Medications   Scheduled Medications   PRN Meds:       Intake/Output Summary (Last 24 hours) at 5/9/2021 1436  Last data filed at 5/9/2021 1104  Gross per 24 hour   Intake 420 ml   Output --   Net 420 ml       Exam:    BP (!) 159/88   Pulse 85   Temp 97.8 °F (36.6 °C) (Temporal)   Resp 18   Ht 6' 2\" (1.88 m)   Wt 261 lb 6.4 oz (118.6 kg)   SpO2 97%   BMI 33.56 kg/m²         Gen: well developed  HEENT: NC/AT, moist mucous membranes,   Neck: supple, trachea midline, no anterior cervical or SC LAD  Heart:  Normal s1/s2, RRR, no murmurs, gallops, or rubs. Lungs:  *cta bilaterally, *  Abd: bowel sounds present, soft, nontender, nondistended, no masses  Extrem:  No clubbing, cyanosis,  no** edema  Skin: no rashes or lesions  Psych: A & O x3  Neuro: grossly intact, moves all four extremities. Capillary Refill: Brisk,< 3 seconds   Peripheral Pulses: +2 palpable, equal bilaterally               Labs:   Recent Labs     05/07/21  1354 05/08/21  0712   WBC 7.3 7.2   HGB 15.0 14.8   HCT 42.7 42.9    137     Recent Labs     05/07/21  1354 05/08/21  0712    139   K 4.3 4.0    105   CO2 22 23   BUN 8 9   CREATININE 1.0 1.0   CALCIUM 9.0 9.0     Recent Labs     05/08/21  0712   AST 72*   ALT 47*   BILITOT 3.1*   ALKPHOS 112     No results for input(s): INR in the last 72 hours.   Recent Labs     05/07/21  1354 05/08/21  0712   TROPONINI <0.01 <0.01     Recent Labs     05/08/21  0712   AST 72*   ALT 47*

## 2021-05-09 NOTE — ED NOTES
[address] CONTACT ASSESSMENT NOTE        Department of Emergency Medicine            ED  First Provider Note            5/9/21  2:24 PM EDT    Chief Complaint: Dizziness (Was just discharged from the hospital, was getting out of the car and got lightheaded after standing up. )      History of Present Illness:    Natasha Tavarez is a 64 y.o. male who presents to the emergency department for dizziness and feeling \"lightheaded after I stood up. \"  Patient was discharged from the hospital today and physically off the property. Focused Screening Exam:  Constitutional:  Alert, appears stated age and is in no distress. Aspirations easy nonlabored. Skin pink warm and dry. Strong radial pulse. *ALLERGIES*     Patient has no known allergies.      ED Triage Vitals   BP Temp Temp src Pulse Resp SpO2 Height Weight   05/09/21 1417 05/09/21 1412 -- 05/09/21 1412 05/09/21 1422 05/09/21 1412 05/09/21 1417 05/09/21 1417   (!) 178/116 96.2 °F (35.7 °C)  100 16 96 % 6' 2\" (1.88 m) 270 lb (122.5 kg)        Initial Plan of Care:  Initiate Treatment-Testing, Proceed toTreatment Area When Bed Available for ED Attending/MLP to Continue Care    -----------------END OF FIRST PROVIDER CONTACT ASSESSMENT NOTE--------------  Electronically signed by WINTER James CNP   DD: 5/9/21       WINTER James CNP  05/09/21 1425

## 2021-05-09 NOTE — ED PROVIDER NOTES
Department of Emergency Medicine   ED  Provider Note  Admit Date/RoomTime: 5/9/2021  3:22 PM  ED Room: [facility]          History of Present Illness:  5/9/21, Time: 4:54 PM EDT  Chief Complaint   Patient presents with    Dizziness     Was just discharged from the hospital, was getting out of the car and got lightheaded after standing up. Barrett Cervantes is a 64 y.o. male presenting to the ED for dizziness. Patient states he was getting out of his car and when he stood up, became very lightheaded, dizzy, he did not pass out. Came on gradually, nothing made it better or worse, there is no associated pain. Denies any head injury or loss of conscious, he is on no anticoagulation. He had resolved after he rested for a bit. He states he was just discharged in the hospital this morning for the same thing. He said he was in the hospital for about 2 days. He is not sure what they diagnosed him with. He states he supposed to follow-up. He currently has no symptoms or complaints. Denies any chest pain, shortness of breath, cough, sputum, change in bowel or bladder, fever, chills, cough, back pain, or any other symptoms or complaints. Review of Systems:   Pertinent positives and negatives are stated within HPI, all other systems reviewed and are negative.        --------------------------------------------- PAST HISTORY ---------------------------------------------  Past Medical History:  has a past medical history of CAD (coronary artery disease). Past Surgical History:  has no past surgical history on file. Social History:  reports that he has never smoked. He has never used smokeless tobacco. He reports previous drug use. He reports that he does not drink alcohol. Family History: family history is not on file. . Unless otherwise noted, family history is non contributory    The patients home medications have been reviewed.     Allergies: Patient has no known allergies. ---------------------------------------------------PHYSICAL EXAM--------------------------------------    Constitutional/General: Alert and oriented x3  Head: Normocephalic and atraumatic  Eyes: PERRL, EOMI, sclera non icteric  Mouth: Oropharynx clear, handling secretions, no trismus, no asymmetry of the posterior oropharynx or uvular edema  Neck: Supple, full ROM, no stridor, no meningeal signs  Respiratory: Lungs clear to auscultation bilaterally, no wheezes, rales, or rhonchi. Not in respiratory distress  Cardiovascular:  Regular rate. Regular rhythm. 2+ distal pulses. Equal extremity pulses. Chest: No chest wall tenderness  GI:  Abdomen Soft, Non tender, Non distended. No rebound, guarding, or rigidity. No pulsatile masses. Musculoskeletal: Moves all extremities x 4. Warm and well perfused, no clubbing, cyanosis, or edema. Capillary refill <3 seconds  Integument: skin warm and dry. No rashes. Neurologic: GCS 15, no focal deficits, symmetric strength 5/5 in the upper and lower extremities bilaterally  Psychiatric: Normal Affect          -------------------------------------------------- RESULTS -------------------------------------------------  I have personally reviewed all laboratory and imaging results for this patient. Results are listed below.      LABS: (Lab results interpreted by me)  Results for orders placed or performed during the hospital encounter of 44/80/19   Basic Metabolic Panel   Result Value Ref Range    Sodium 135 132 - 146 mmol/L    Potassium 3.9 3.5 - 5.0 mmol/L    Chloride 99 98 - 107 mmol/L    CO2 23 22 - 29 mmol/L    Anion Gap 13 7 - 16 mmol/L    Glucose 122 (H) 74 - 99 mg/dL    BUN 9 6 - 20 mg/dL    CREATININE 1.0 0.7 - 1.2 mg/dL    GFR Non-African American >60 >=60 mL/min/1.73    GFR African American >60     Calcium 9.1 8.6 - 10.2 mg/dL   Magnesium   Result Value Ref Range    Magnesium 2.2 1.6 - 2.6 mg/dL   CBC Auto Differential   Result Value Ref Range    WBC 8.3 4.5 - 11.5 E9/L    RBC 4.68 3.80 - 5.80 E12/L    Hemoglobin 14.5 12.5 - 16.5 g/dL    Hematocrit 42.9 37.0 - 54.0 %    MCV 91.7 80.0 - 99.9 fL    MCH 31.0 26.0 - 35.0 pg    MCHC 33.8 32.0 - 34.5 %    RDW 13.6 11.5 - 15.0 fL    Platelets 200 347 - 219 E9/L    MPV 10.1 7.0 - 12.0 fL    Neutrophils % 77.1 43.0 - 80.0 %    Immature Granulocytes % 0.5 0.0 - 5.0 %    Lymphocytes % 9.1 (L) 20.0 - 42.0 %    Monocytes % 11.2 2.0 - 12.0 %    Eosinophils % 1.6 0.0 - 6.0 %    Basophils % 0.5 0.0 - 2.0 %    Neutrophils Absolute 6.44 1.80 - 7.30 E9/L    Immature Granulocytes # 0.04 E9/L    Lymphocytes Absolute 0.76 (L) 1.50 - 4.00 E9/L    Monocytes Absolute 0.93 0.10 - 0.95 E9/L    Eosinophils Absolute 0.13 0.05 - 0.50 E9/L    Basophils Absolute 0.04 0.00 - 0.20 E9/L   Troponin   Result Value Ref Range    Troponin <0.01 0.00 - 0.03 ng/mL   POCT glucose   Result Value Ref Range    Glucose 131 mg/dL   POCT Glucose   Result Value Ref Range    Meter Glucose 131 (H) 74 - 99 mg/dL   EKG 12 Lead   Result Value Ref Range    Ventricular Rate 94 BPM    Atrial Rate 94 BPM    P-R Interval 140 ms    QRS Duration 88 ms    Q-T Interval 430 ms    QTc Calculation (Bazett) 537 ms    P Axis 34 degrees    R Axis -14 degrees    T Axis 11 degrees   ,       RADIOLOGY:  Interpreted by Radiologist unless otherwise specified  XR CHEST (2 VW)   Final Result   No acute process. EKG Interpretation  Interpreted by emergency department physician, Dr. Nagi Dutton, rate 94, no STEMI        ------------------------- NURSING NOTES AND VITALS REVIEWED ---------------------------   The nursing notes within the ED encounter and vital signs as below have been reviewed by myself  BP (!) 178/116   Pulse 100   Temp 96.2 °F (35.7 °C)   Resp 16   Ht 6' 2\" (1.88 m)   Wt 270 lb (122.5 kg)   SpO2 96%   BMI 34.67 kg/m²     Oxygen Saturation Interpretation: Normal    The patients available past medical records and past encounters were reviewed. ------------------------------ ED COURSE/MEDICAL DECISION MAKING----------------------  Medications   0.9 % sodium chloride bolus (0 mLs Intravenous Stopped 5/9/21 1702)           The cardiac monitor revealed sinus with a heart rate in the 90s as interpreted by me. The cardiac monitor was ordered secondary to the patient's lightheaded and to monitor the patient for dysrhythmia. CPT 74766         Medical Decision Making:    Labs and imaging reviewed. Reevaluation, patient's resting comfortably. No symptoms or complaints. Imaging department without problem. Chart review shows the patient has had extensive work-up, including a cardiology and neurology consult while in the hospital yesterday. [place] the symptoms are secondary to orthostatic hypotension. Given this, did not feel that further emergent evaluation was indicated. However, I was called to bedside prior to discharge as the patient's friend was concerned about his discharge. He felt that we are discharging the patient because he does not have insurance, and he did not receive appropriate care. I explained to the patient that insurance is irrelevant, and the patient had an extensive work-up including ultrasound of his carotids, cardiology evaluation, and neurology evaluation in the hospital yesterday. He also had repeat labs which are unremarkable along with a repeat EKG which was unchanged from his previous at this time. He will need follow-up as initially told this morning upon his discharge. Patient voices understanding, patient was discharged. Counseling: The emergency provider has spoken with the patient and discussed todays results, in addition to providing specific details for the plan of care and counseling regarding the diagnosis and prognosis.   Questions are answered at this time and they are agreeable with the plan.       --------------------------------- IMPRESSION AND DISPOSITION

## 2021-05-09 NOTE — DISCHARGE INSTR - DIET
 Good nutrition is important when healing from an illness, injury, or surgery. Follow any nutrition recommendations given to you during your hospital stay.  If you were given an oral nutrition supplement while in the hospital, continue to take this supplement at home. You can take it with meals, in-between meals, and/or before bedtime. These supplements can be purchased at most local grocery stores, pharmacies, and chain super-stores.  If you have any questions about your diet or nutrition, call the hospital and ask for the dietitian. Foods with carbohydrates make your blood glucose level go up. Learning how to count carbohydrates can help you control your blood glucose levels. First, identify the foods you eat that contain carbohydrates. Then, using the Foods with Carbohydrates chart, determine about how much carbohydrates are in your meals and snacks. Make sure you are eating foods with fiber, protein, and healthy fat along with your carbohydrate foods. Foods with Carbohydrates  The following table shows carbohydrate foods that have about 15 grams of carbohydrate each. Using measuring cups, spoons, or a food scale when you first begin learning about carbohydrate counting can help you learn about the portion sizes you typically eat.       The following foods have 15 grams carbohydrate each:    Grains    1 slice bread (1 ounce)  1 small tortilla (6-inch size)  ¼ large bagel (1 ounce)  1/3 cup pasta or rice (cooked)  ½ hamburger or hot dog bun (¾ ounce)  ½ cup cooked cereal  ½ to ¾ cup ready-to-eat cereal  2 taco shells (5-inch size)  Fruit    1 small fresh fruit (¾ to 1 cup)  ½ medium banana  17 small grapes (3 ounces)  1 cup melon or berries  ½ cup canned or frozen fruit  2 tablespoons dried fruit (blueberries, cherries, cranberries, raisins)  ½ cup unsweetened fruit juice  Starchy Vegetables    ½ cup cooked beans, peas, corn, potatoes/sweet potatoes  ¼ large baked potato (3 ounces)  1 cup acorn or butternut squash  Snack Foods    3 to 6 crackers  8 potato chips or 13 tortilla chips (¾ ounce to 1 ounce)  3 cups popped popcorn  Dairy    3/4 cup (6 ounces) nonfat plain yogurt, or yogurt with sugar-free sweetener  1 cup milk  1 cup plain rice, soy, coconut or flavored almond milk  Sweets and Desserts    ½ cup ice cream or frozen yogurt  1 tablespoon jam, jelly, pancake syrup, table sugar, or honey  2 tablespoons light pancake syrup  1 inch square of frosted cake or 2 inch square of unfrosted cake  2 small cookies (2/3 ounce each) or ¼ large cookie  Sometimes youll have to estimate carbohydrate amounts if you dont know the exact recipe. One cup of mixed foods like soups can have 1 to 2 carbohydrate servings, while some casseroles might have 2 or more servings of carbohydrate. Foods that have less than 20 calories in each serving can be counted as free foods. Count 1 cup raw vegetables, or ½ cup cooked non-starchy vegetables as free foods. If you eat 3 or more servings at one meal, then count them as 1 carbohydrate serving. Foods without Carbohydrates  Not all foods contain carbohydrates. Meat, some dairy, fats, non-starchy vegetables, and many beverages dont contain carbohydrate. So when you count carbohydrates, you can generally exclude chicken, pork, beef, fish, seafood, eggs, tofu, cheese, butter, sour cream, avocado, nuts, seeds, olives, mayonnaise, water, black coffee, unsweetened tea, and zero-calorie drinks. Vegetables with no or low carbohydrate include green beans, cauliflower, tomatoes, and onions. How much carbohydrate should I eat at each meal?  Carbohydrate counting can help you plan your meals and manage your weight. Following are some starting points for carbohydrate intake at each meal. Work with your registered dietitian nutritionist to find the best range that works for your blood glucose and weight.          To Lose Weight    To Maintain Weight    Women    2 - 3 carb servings    3 carbohydrate are in a food you want to eat. Dont forget: sometimes serving sizes on the label arent the same as how much food you are going to eat, so you may need to calculate how much carbohydrate is in the food you are serving yourself.       Carbohydrate Counting for People with Diabetes Sample 1-Day Menu   Breakfast  ¾ cup yogurt, low fat, low sugar (1 carbohydrate serving)  ½ cup cereal, ready-to-eat, unsweetened (1 carbohydrate serving)  1 cup strawberries (1 carbohydrate serving)  ¼ cup almonds (½ carbohydrate serving)  Lunch  1, 5 ounce can chunk light tuna  2 ounces cheese, low fat cheddar  6 whole wheat crackers (1 carbohydrate serving)  1 small apple (1½ carbohydrate servings)  ½ cup carrots (½ carbohydrate serving)  ½ cup snap peas  1 cup 1% milk (1 carbohydrate serving)  Evening Meal  Stir beverly made with: 3 ounces chicken  1 cup brown rice (3 carbohydrate servings)  ½ cup broccoli (½ carbohydrate serving)  ½ cup green beans  ¼ cup onions  1 tablespoon olive oil  2 tablespoons teriyaki sauce (½ carbohydrate serving)  Evening Snack  1 extra small banana (1 carbohydrate serving)  1 tablespoon peanut butter  Daily Sum  Nutrient Unit Value  Macronutrients  Energy kcal 1723  Energy kJ 7221  Protein g 121  Total lipid (fat) g 58  Carbohydrate, by difference g 195  Fiber, total dietary g 30  Sugars, total g 80  Minerals  Calcium, Ca mg 1757  Iron, Fe mg 22  Sodium, Na mg 1679  Vitamins  Vitamin C, total ascorbic acid mg 227  Vitamin A, IU IU 17284  Vitamin D   Lipids  Fatty acids, total saturated g 12  Fatty acids, total monounsaturated g 31  Fatty acids, total polyunsaturated g 12  Cholesterol mg 169  Carbohydrate Counting for People with Diabetes Vegan Sample 1-Day Menu   Breakfast  1 cup cooked oatmeal (2 carbohydrate servings)  ½ cup blueberries (1 carbohydrate serving)  2 tablespoons flaxseeds  1 cup soymilk fortified with calcium and vitamin D  1 cup coffee  Lunch  2 slices whole wheat bread (2 carbohydrate servings)  ½ cup baked tofu  ¼ cup lettuce  2 slices tomato  2 slices avocado  ½ cup baby carrots (½ carbohydrate serving)  1 orange (1 carbohydrate serving)  1 cup soymilk fortified with calcium and vitamin D  Evening Meal  Burrito made with: 1 6-inch corn tortilla (1 carbohydrate serving)  1 cup refried vegetarian beans (2 carbohydrate servings)  ¼ cup chopped tomatoes  ¼ cup lettuce  ¼ cup salsa  1/3 cup brown rice (1 carbohydrate serving)  1 tablespoon olive oil for rice  ½ cup zucchini  Evening Snack  6 small whole grain crackers (1 carbohydrate serving)  2 apricots (½ carbohydrate serving)  ¼ cup unsalted peanuts (½ carbohydrate serving)  Daily Sum  Nutrient Unit Value  Macronutrients  Energy kcal 1679  Energy kJ 7019  Protein g 74  Total lipid (fat) g 68  Carbohydrate, by difference g 212  Fiber, total dietary g 47  Sugars, total g 43  Minerals  Calcium, Ca mg 1219  Iron, Fe mg 17  Sodium, Na mg 2285  Vitamins  Vitamin C, total ascorbic acid mg 115  Vitamin A, IU IU 92639  Vitamin D   Lipids  Fatty acids, total saturated g 10  Fatty acids, total monounsaturated g 30  Fatty acids, total polyunsaturated g 22  Cholesterol mg 0  Carbohydrate Counting for People with Diabetes Vegetarian (Lacto-Ovo) Sample 1-Day Menu   Breakfast  1 cup cooked oatmeal (2 carbohydrate servings)  ½ cup blueberries (1 carbohydrate serving)  2 tablespoons flaxseeds  1 egg  1 cup 1% milk (1 carbohydrate serving)  1 cup coffee  Lunch  2 slices whole wheat bread (2 carbohydrate servings)  2 ounces low-fat cheese  ¼ cup lettuce  2 slices tomato  2 slices avocado  ½ cup baby carrots (½ carbohydrate serving)  1 orange (1 carbohydrate serving)  1 cup unsweetened tea  Evening Meal  Burrito made with: 1 6-inch corn tortilla (1 carbohydrate serving)  ½ cup refried vegetarian beans (1 carbohydrate serving)  ¼ cup tomatoes  ¼ cup lettuce  ¼ cup salsa  1/3 cup brown rice (1 carbohydrate serving)  1 tablespoon olive oil for rice  ½ cup zucchini  1 cup 1% milk (1 carbohydrate serving)  Evening Snack  6 small whole grain crackers (1 carbohydrate serving)  2 apricots (½ carbohydrate serving)  ¼ cup unsalted peanuts (½ carbohydrate serving)  Daily Sum  Nutrient Unit Value  Macronutrients  Energy kcal 1716  Energy kJ 7180  Protein g 78  Total lipid (fat) g 69  Carbohydrate, by difference g 210  Fiber, total dietary g 38  Sugars, total g 66  Minerals  Calcium, Ca mg 1207  Iron, Fe mg 12  Sodium, Na mg 2375  Vitamins  Vitamin C, total ascorbic acid mg 115  Vitamin A, IU IU 48177  Vitamin D   Lipids  Fatty acids, total saturated g 16  Fatty acids, total monounsaturated g 31  Fatty acids, total polyunsaturated g 16  Cholesterol mg 205

## 2021-05-10 LAB
EKG ATRIAL RATE: 94 BPM
EKG P AXIS: 34 DEGREES
EKG P-R INTERVAL: 140 MS
EKG Q-T INTERVAL: 430 MS
EKG QRS DURATION: 88 MS
EKG QTC CALCULATION (BAZETT): 537 MS
EKG R AXIS: -14 DEGREES
EKG T AXIS: 11 DEGREES
EKG VENTRICULAR RATE: 94 BPM

## 2021-05-10 PROCEDURE — 93010 ELECTROCARDIOGRAM REPORT: CPT | Performed by: INTERNAL MEDICINE

## 2021-05-10 NOTE — DISCHARGE SUMMARY
Hospital Medicine Discharge Summary    Patient: Brenda Tsai     Gender: male  : 1965   Age: 64 y.o. MRN: [id number]    Code Status:  Full code    Primary Care Provider: No primary care provider on file. Admit Date: 2021   Discharge Date: 2021      Admitting Physician: Mian Lakhani DO  Discharge Physician: Kulwinder Lira DO     Discharge Diagnoses: Active Hospital Problems    Diagnosis Date Noted    Lightheadedness [R42] 2021       Hospital Course:   Dizziness was the presentation * near syncope, for echo and carotid doppler, Neuro and card eval,   US of caroids unremarkable, he is othrostatic, in discussing his diet, he drinks primarily beer at home, rarely water, and this confirmed by his brother. Will recommend peer recovery program** to [facility] today    Disposition:  Home    Exam:     BP (!) 159/88   Pulse 85   Temp 97.8 °F (36.6 °C) (Temporal)   Resp 18   Ht 6' 2\" (1.88 m)   Wt 261 lb 6.4 oz (118.6 kg)   SpO2 97%   BMI 33.56 kg/m²     General appearance:  No apparent distress, appears stated age and cooperative. HEENT:  Normal cephalic, atraumatic without obvious deformity. Pupils equal, round, and reactive to light. Extra ocular muscles intact. Conjunctivae/corneas clear. Neck: Supple, with full range of motion. No jugular venous distention. Trachea midline. Respiratory:  Normal respiratory effort. Clear to auscultation, bilaterally without Rales/Wheezes/Rhonchi. Cardiovascular:  Regular rate and rhythm with normal S1/S2 without murmurs, rubs or gallops. Abdomen: Soft, non-tender, non-distended with normal bowel sounds. Musculoskeletal:  No clubbing, cyanosis or edema bilaterally. Full range of motion without deformity. Skin: Skin color, texture, turgor normal.  No rashes or lesions. Neurologic:  Neurovascularly intact without any focal sensory/motor deficits.  Cranial nerves: II-XII intact, grossly non-focal.  Psychiatric:  Alert and oriented, thought content appropriate, normal insight    Consults:     IP CONSULT TO HOSPITALIST  IP CONSULT TO NEUROLOGY  IP CONSULT TO CARDIOLOGY  IP CONSULT TO DIETITIAN    Labs: For convenience and continuity at follow-up the following most recent labs are provided:    Lab Results   Component Value Date    WBC 8.3 05/09/2021    HGB 14.5 05/09/2021    HCT 42.9 05/09/2021    MCV 91.7 05/09/2021     05/09/2021     05/09/2021    K 3.9 05/09/2021    K 4.0 05/08/2021    CL 99 05/09/2021    CO2 23 05/09/2021    BUN 9 05/09/2021    CREATININE 1.0 05/09/2021    CALCIUM 9.1 05/09/2021    ALKPHOS 112 05/08/2021    ALT 47 05/08/2021    AST 72 05/08/2021    BILITOT 3.1 05/08/2021    LABALBU 3.5 05/08/2021    LABALBU 4.1 01/07/2011    [address] 131 05/08/2021    TRIG 91 05/08/2021     No results found for: INR    Radiology:  US CAROTID ARTERY BILATERAL   Final Result   Atherosclerotic disease. No hemodynamically significant stenosis is   identified   Estimated stenosis by NASCET criteria in the proximal right carotid   artery is between 0% and 49%. Estimated stenosis by NASCET criteria in the proximal left carotid   artery is between 0% and 49%. CT HEAD WO CONTRAST   Final Result   No acute intracranial abnormality. Chronic maxillary and sphenoid sinusitis. XR CHEST (2 VW)   Final Result   No acute process.              Discharge Medications:   Discharge Medication List as of 5/9/2021  9:20 AM        Discharge Medication List as of 5/9/2021  9:20 AM      CONTINUE these medications which have CHANGED    Details   amLODIPine (NORVASC) 10 MG tablet Take 1 tablet by mouth daily, Disp-30 tablet, R-3Normal           Discharge Medication List as of 5/9/2021  9:20 AM        Discharge Medication List as of 5/9/2021  9:20 AM          Follow-up appointments:  1 week    Provider Follow-up:    pmd    Condition at Discharge:  Stable    The patient was seen and examined on day of discharge and this discharge summary is in conjunction with any daily progress note from day of discharge. Time Spent on discharge is 45 minutes  in the examination, evaluation, counseling and review of medications and discharge plan. Signed:    MK Culver   5/10/2021      Thank you No primary care provider on file. for the opportunity to be involved in this patient's care.  If you have any questions or concerns please feel free to contact me at [phone]

## 2021-05-11 ENCOUNTER — APPOINTMENT (OUTPATIENT)
Dept: GENERAL RADIOLOGY | Age: 56
DRG: 463 | End: 2021-05-11
Payer: MEDICAID

## 2021-05-11 ENCOUNTER — APPOINTMENT (OUTPATIENT)
Dept: CT IMAGING | Age: 56
DRG: 463 | End: 2021-05-11
Payer: MEDICAID

## 2021-05-11 ENCOUNTER — HOSPITAL ENCOUNTER (INPATIENT)
Age: 56
LOS: 3 days | Discharge: HOME | DRG: 463 | End: 2021-05-14
Attending: EMERGENCY MEDICINE | Admitting: INTERNAL MEDICINE
Payer: MEDICAID

## 2021-05-11 DIAGNOSIS — R42 LIGHTHEADEDNESS: ICD-10-CM

## 2021-05-11 DIAGNOSIS — R41.0 ACUTE DELIRIUM: Primary | ICD-10-CM

## 2021-05-11 DIAGNOSIS — R62.7 FAILURE TO THRIVE IN ADULT: ICD-10-CM

## 2021-05-11 PROBLEM — N30.01 ACUTE CYSTITIS WITH HEMATURIA: Status: ACTIVE | Noted: 2021-05-11

## 2021-05-11 LAB
ACETAMINOPHEN LEVEL: <5 MCG/ML (ref 10–30)
ALBUMIN SERPL-MCNC: 3.5 G/DL (ref 3.5–5.2)
ALP BLD-CCNC: 92 U/L (ref 40–129)
ALT SERPL-CCNC: 42 U/L (ref 0–40)
AMMONIA: 32 UMOL/L (ref 16–60)
AMPHETAMINE SCREEN, URINE: NOT DETECTED
ANION GAP SERPL CALCULATED.3IONS-SCNC: 12 MMOL/L (ref 7–16)
AST SERPL-CCNC: 62 U/L (ref 0–39)
BACTERIA: ABNORMAL /HPF
BARBITURATE SCREEN URINE: NOT DETECTED
BASOPHILS ABSOLUTE: 0.02 E9/L (ref 0–0.2)
BASOPHILS RELATIVE PERCENT: 0.3 % (ref 0–2)
BENZODIAZEPINE SCREEN, URINE: NOT DETECTED
BILIRUB SERPL-MCNC: 2.6 MG/DL (ref 0–1.2)
BILIRUBIN URINE: ABNORMAL
BLOOD, URINE: ABNORMAL
BUN BLDV-MCNC: 8 MG/DL (ref 6–20)
CALCIUM SERPL-MCNC: 9.1 MG/DL (ref 8.6–10.2)
CANNABINOID SCREEN URINE: NOT DETECTED
CHLORIDE BLD-SCNC: 102 MMOL/L (ref 98–107)
CLARITY: CLEAR
CO2: 22 MMOL/L (ref 22–29)
COCAINE METABOLITE SCREEN URINE: NOT DETECTED
COLOR: ABNORMAL
CREAT SERPL-MCNC: 0.9 MG/DL (ref 0.7–1.2)
EOSINOPHILS ABSOLUTE: 0.08 E9/L (ref 0.05–0.5)
EOSINOPHILS RELATIVE PERCENT: 1.3 % (ref 0–6)
EPITHELIAL CELLS, UA: ABNORMAL /HPF
ETHANOL: <10 MG/DL (ref 0–0.08)
FENTANYL SCREEN, URINE: NOT DETECTED
GFR AFRICAN AMERICAN: >60
GFR NON-AFRICAN AMERICAN: >60 ML/MIN/1.73
GLUCOSE BLD-MCNC: 106 MG/DL (ref 74–99)
GLUCOSE BLD-MCNC: 133 MG/DL
GLUCOSE URINE: NEGATIVE MG/DL
HCT VFR BLD CALC: 39.3 % (ref 37–54)
HEMOGLOBIN: 13.6 G/DL (ref 12.5–16.5)
IMMATURE GRANULOCYTES #: 0.03 E9/L
IMMATURE GRANULOCYTES %: 0.5 % (ref 0–5)
KETONES, URINE: ABNORMAL MG/DL
LEUKOCYTE ESTERASE, URINE: ABNORMAL
LYMPHOCYTES ABSOLUTE: 0.46 E9/L (ref 1.5–4)
LYMPHOCYTES RELATIVE PERCENT: 7.4 % (ref 20–42)
Lab: NORMAL
MCH RBC QN AUTO: 31.2 PG (ref 26–35)
MCHC RBC AUTO-ENTMCNC: 34.6 % (ref 32–34.5)
MCV RBC AUTO: 90.1 FL (ref 80–99.9)
METHADONE SCREEN, URINE: NOT DETECTED
MONOCYTES ABSOLUTE: 0.74 E9/L (ref 0.1–0.95)
MONOCYTES RELATIVE PERCENT: 11.9 % (ref 2–12)
NEUTROPHILS ABSOLUTE: 4.88 E9/L (ref 1.8–7.3)
NEUTROPHILS RELATIVE PERCENT: 78.6 % (ref 43–80)
NITRITE, URINE: NEGATIVE
OPIATE SCREEN URINE: NOT DETECTED
OXYCODONE URINE: NOT DETECTED
PDW BLD-RTO: 13.2 FL (ref 11.5–15)
PH UA: 5.5 (ref 5–9)
PHENCYCLIDINE SCREEN URINE: NOT DETECTED
PLATELET # BLD: 143 E9/L (ref 130–450)
PMV BLD AUTO: 10 FL (ref 7–12)
POTASSIUM REFLEX MAGNESIUM: 4.1 MMOL/L (ref 3.5–5)
PROTEIN UA: NEGATIVE MG/DL
RBC # BLD: 4.36 E12/L (ref 3.8–5.8)
RBC # BLD: NORMAL 10*6/UL
RBC UA: ABNORMAL /HPF (ref 0–2)
SALICYLATE, SERUM: <0.3 MG/DL (ref 0–30)
SODIUM BLD-SCNC: 136 MMOL/L (ref 132–146)
SPECIFIC GRAVITY UA: 1.02 (ref 1–1.03)
TOTAL PROTEIN: 7.4 G/DL (ref 6.4–8.3)
TRICYCLIC ANTIDEPRESSANTS SCREEN SERUM: NEGATIVE NG/ML
TROPONIN: <0.01 NG/ML (ref 0–0.03)
UROBILINOGEN, URINE: >=8 E.U./DL
WBC # BLD: 6.2 E9/L (ref 4.5–11.5)
WBC UA: ABNORMAL /HPF (ref 0–5)

## 2021-05-11 PROCEDURE — 2060000000 HC ICU INTERMEDIATE R&B

## 2021-05-11 PROCEDURE — 93005 ELECTROCARDIOGRAM TRACING: CPT | Performed by: STUDENT IN AN ORGANIZED HEALTH CARE EDUCATION/TRAINING PROGRAM

## 2021-05-11 PROCEDURE — 71045 X-RAY EXAM CHEST 1 VIEW: CPT

## 2021-05-11 PROCEDURE — 82140 ASSAY OF AMMONIA: CPT

## 2021-05-11 PROCEDURE — 70450 CT HEAD/BRAIN W/O DYE: CPT

## 2021-05-11 PROCEDURE — 82077 ASSAY SPEC XCP UR&BREATH IA: CPT

## 2021-05-11 PROCEDURE — 80179 DRUG ASSAY SALICYLATE: CPT

## 2021-05-11 PROCEDURE — 85025 COMPLETE CBC W/AUTO DIFF WBC: CPT

## 2021-05-11 PROCEDURE — 80143 DRUG ASSAY ACETAMINOPHEN: CPT

## 2021-05-11 PROCEDURE — 84484 ASSAY OF TROPONIN QUANT: CPT

## 2021-05-11 PROCEDURE — 81001 URINALYSIS AUTO W/SCOPE: CPT

## 2021-05-11 PROCEDURE — 80053 COMPREHEN METABOLIC PANEL: CPT

## 2021-05-11 PROCEDURE — 99285 EMERGENCY DEPT VISIT HI MDM: CPT

## 2021-05-11 PROCEDURE — 2580000003 HC RX 258: Performed by: STUDENT IN AN ORGANIZED HEALTH CARE EDUCATION/TRAINING PROGRAM

## 2021-05-11 PROCEDURE — 80307 DRUG TEST PRSMV CHEM ANLYZR: CPT

## 2021-05-11 RX ORDER — 0.9 % SODIUM CHLORIDE 0.9 %
1000 INTRAVENOUS SOLUTION INTRAVENOUS ONCE
Status: COMPLETED | OUTPATIENT
Start: 2021-05-11 | End: 2021-05-11

## 2021-05-11 RX ADMIN — SODIUM CHLORIDE 1000 ML: 9 INJECTION, SOLUTION INTRAVENOUS at 19:07

## 2021-05-12 ENCOUNTER — APPOINTMENT (OUTPATIENT)
Dept: NUCLEAR MEDICINE | Age: 56
DRG: 463 | End: 2021-05-12
Payer: MEDICAID

## 2021-05-12 LAB
ANION GAP SERPL CALCULATED.3IONS-SCNC: 10 MMOL/L (ref 7–16)
BUN BLDV-MCNC: 8 MG/DL (ref 6–20)
CALCIUM SERPL-MCNC: 8.9 MG/DL (ref 8.6–10.2)
CHLORIDE BLD-SCNC: 104 MMOL/L (ref 98–107)
CO2: 24 MMOL/L (ref 22–29)
CREAT SERPL-MCNC: 0.9 MG/DL (ref 0.7–1.2)
EKG ATRIAL RATE: 82 BPM
EKG P AXIS: 38 DEGREES
EKG P-R INTERVAL: 136 MS
EKG Q-T INTERVAL: 408 MS
EKG QRS DURATION: 88 MS
EKG QTC CALCULATION (BAZETT): 476 MS
EKG R AXIS: -53 DEGREES
EKG T AXIS: 71 DEGREES
EKG VENTRICULAR RATE: 82 BPM
GFR AFRICAN AMERICAN: >60
GFR NON-AFRICAN AMERICAN: >60 ML/MIN/1.73
GLUCOSE BLD-MCNC: 123 MG/DL (ref 74–99)
HCT VFR BLD CALC: 39.7 % (ref 37–54)
HEMOGLOBIN: 13.6 G/DL (ref 12.5–16.5)
LV EF: 43 %
LVEF MODALITY: NORMAL
MCH RBC QN AUTO: 30.8 PG (ref 26–35)
MCHC RBC AUTO-ENTMCNC: 34.3 % (ref 32–34.5)
MCV RBC AUTO: 89.8 FL (ref 80–99.9)
PDW BLD-RTO: 13.3 FL (ref 11.5–15)
PLATELET # BLD: 135 E9/L (ref 130–450)
PMV BLD AUTO: 10.1 FL (ref 7–12)
POTASSIUM REFLEX MAGNESIUM: 4 MMOL/L (ref 3.5–5)
RBC # BLD: 4.42 E12/L (ref 3.8–5.8)
SODIUM BLD-SCNC: 138 MMOL/L (ref 132–146)
TROPONIN: <0.01 NG/ML (ref 0–0.03)
WBC # BLD: 6.3 E9/L (ref 4.5–11.5)

## 2021-05-12 PROCEDURE — 93018 CV STRESS TEST I&R ONLY: CPT | Performed by: INTERNAL MEDICINE

## 2021-05-12 PROCEDURE — 6360000002 HC RX W HCPCS: Performed by: INTERNAL MEDICINE

## 2021-05-12 PROCEDURE — 85027 COMPLETE CBC AUTOMATED: CPT

## 2021-05-12 PROCEDURE — 2580000003 HC RX 258: Performed by: INTERNAL MEDICINE

## 2021-05-12 PROCEDURE — 93010 ELECTROCARDIOGRAM REPORT: CPT | Performed by: INTERNAL MEDICINE

## 2021-05-12 PROCEDURE — 84484 ASSAY OF TROPONIN QUANT: CPT

## 2021-05-12 PROCEDURE — 78452 HT MUSCLE IMAGE SPECT MULT: CPT

## 2021-05-12 PROCEDURE — 80048 BASIC METABOLIC PNL TOTAL CA: CPT

## 2021-05-12 PROCEDURE — 6360000002 HC RX W HCPCS: Performed by: STUDENT IN AN ORGANIZED HEALTH CARE EDUCATION/TRAINING PROGRAM

## 2021-05-12 PROCEDURE — 2060000000 HC ICU INTERMEDIATE R&B

## 2021-05-12 PROCEDURE — 87088 URINE BACTERIA CULTURE: CPT

## 2021-05-12 PROCEDURE — A9500 TC99M SESTAMIBI: HCPCS | Performed by: RADIOLOGY

## 2021-05-12 PROCEDURE — 97166 OT EVAL MOD COMPLEX 45 MIN: CPT

## 2021-05-12 PROCEDURE — 78452 HT MUSCLE IMAGE SPECT MULT: CPT | Performed by: INTERNAL MEDICINE

## 2021-05-12 PROCEDURE — 93016 CV STRESS TEST SUPVJ ONLY: CPT | Performed by: INTERNAL MEDICINE

## 2021-05-12 PROCEDURE — 93017 CV STRESS TEST TRACING ONLY: CPT

## 2021-05-12 PROCEDURE — 3430000000 HC RX DIAGNOSTIC RADIOPHARMACEUTICAL: Performed by: RADIOLOGY

## 2021-05-12 PROCEDURE — 2580000003 HC RX 258: Performed by: STUDENT IN AN ORGANIZED HEALTH CARE EDUCATION/TRAINING PROGRAM

## 2021-05-12 PROCEDURE — APPSS60 APP SPLIT SHARED TIME 46-60 MINUTES: Performed by: PHYSICIAN ASSISTANT

## 2021-05-12 RX ORDER — SODIUM CHLORIDE 0.9 % (FLUSH) 0.9 %
5-40 SYRINGE (ML) INJECTION PRN
Status: DISCONTINUED | OUTPATIENT
Start: 2021-05-12 | End: 2021-05-14 | Stop reason: HOSPADM

## 2021-05-12 RX ORDER — SODIUM CHLORIDE 9 MG/ML
25 INJECTION, SOLUTION INTRAVENOUS PRN
Status: DISCONTINUED | OUTPATIENT
Start: 2021-05-12 | End: 2021-05-14 | Stop reason: HOSPADM

## 2021-05-12 RX ORDER — POTASSIUM CHLORIDE 20 MEQ/1
40 TABLET, EXTENDED RELEASE ORAL PRN
Status: DISCONTINUED | OUTPATIENT
Start: 2021-05-12 | End: 2021-05-14 | Stop reason: HOSPADM

## 2021-05-12 RX ORDER — MAGNESIUM SULFATE IN WATER 40 MG/ML
2000 INJECTION, SOLUTION INTRAVENOUS PRN
Status: DISCONTINUED | OUTPATIENT
Start: 2021-05-12 | End: 2021-05-14 | Stop reason: HOSPADM

## 2021-05-12 RX ORDER — POTASSIUM CHLORIDE 7.45 MG/ML
10 INJECTION INTRAVENOUS PRN
Status: DISCONTINUED | OUTPATIENT
Start: 2021-05-12 | End: 2021-05-14 | Stop reason: HOSPADM

## 2021-05-12 RX ORDER — SODIUM CHLORIDE 0.9 % (FLUSH) 0.9 %
5-40 SYRINGE (ML) INJECTION EVERY 12 HOURS SCHEDULED
Status: DISCONTINUED | OUTPATIENT
Start: 2021-05-12 | End: 2021-05-14 | Stop reason: HOSPADM

## 2021-05-12 RX ORDER — SODIUM CHLORIDE 9 MG/ML
INJECTION, SOLUTION INTRAVENOUS CONTINUOUS
Status: DISCONTINUED | OUTPATIENT
Start: 2021-05-12 | End: 2021-05-14 | Stop reason: HOSPADM

## 2021-05-12 RX ORDER — ACETAMINOPHEN 650 MG/1
650 SUPPOSITORY RECTAL EVERY 6 HOURS PRN
Status: DISCONTINUED | OUTPATIENT
Start: 2021-05-12 | End: 2021-05-14 | Stop reason: HOSPADM

## 2021-05-12 RX ORDER — ONDANSETRON 2 MG/ML
4 INJECTION INTRAMUSCULAR; INTRAVENOUS EVERY 6 HOURS PRN
Status: DISCONTINUED | OUTPATIENT
Start: 2021-05-12 | End: 2021-05-14 | Stop reason: HOSPADM

## 2021-05-12 RX ORDER — PROMETHAZINE HYDROCHLORIDE 25 MG/1
12.5 TABLET ORAL EVERY 6 HOURS PRN
Status: DISCONTINUED | OUTPATIENT
Start: 2021-05-12 | End: 2021-05-14 | Stop reason: HOSPADM

## 2021-05-12 RX ORDER — ACETAMINOPHEN 325 MG/1
650 TABLET ORAL EVERY 6 HOURS PRN
Status: DISCONTINUED | OUTPATIENT
Start: 2021-05-12 | End: 2021-05-14 | Stop reason: HOSPADM

## 2021-05-12 RX ORDER — AMLODIPINE BESYLATE 10 MG/1
10 TABLET ORAL DAILY
Status: DISCONTINUED | OUTPATIENT
Start: 2021-05-12 | End: 2021-05-14 | Stop reason: HOSPADM

## 2021-05-12 RX ORDER — POLYETHYLENE GLYCOL 3350 17 G/17G
17 POWDER, FOR SOLUTION ORAL DAILY PRN
Status: DISCONTINUED | OUTPATIENT
Start: 2021-05-12 | End: 2021-05-14 | Stop reason: HOSPADM

## 2021-05-12 RX ADMIN — Medication 10 ML: at 08:40

## 2021-05-12 RX ADMIN — Medication 10 ML: at 21:19

## 2021-05-12 RX ADMIN — SODIUM CHLORIDE: 9 INJECTION, SOLUTION INTRAVENOUS at 06:03

## 2021-05-12 RX ADMIN — Medication 35 MILLICURIE: at 12:11

## 2021-05-12 RX ADMIN — CEFTRIAXONE 1000 MG: 1 INJECTION, POWDER, FOR SOLUTION INTRAMUSCULAR; INTRAVENOUS at 00:21

## 2021-05-12 RX ADMIN — Medication 12 MILLICURIE: at 10:14

## 2021-05-12 RX ADMIN — REGADENOSON 0.4 MG: 0.08 INJECTION, SOLUTION INTRAVENOUS at 11:37

## 2021-05-12 RX ADMIN — ENOXAPARIN SODIUM 40 MG: 40 INJECTION SUBCUTANEOUS at 21:19

## 2021-05-12 ASSESSMENT — PAIN SCALES - GENERAL
PAINLEVEL_OUTOF10: 0
PAINLEVEL_OUTOF10: 0

## 2021-05-12 NOTE — ED PROVIDER NOTES
PAST HISTORY ---------------------------------------------  Past Medical History:  has a past medical history of CAD (coronary artery disease). Past Surgical History:  has no past surgical history on file. Social History:  reports that he has never smoked. He has never used smokeless tobacco. He reports previous drug use. He reports that he does not drink alcohol. Family History: family history is not on file. . Unless otherwise noted, family history is non contributory    The patients home medications have been reviewed. Allergies: Patient has no known allergies. I have reviewed the past medical history, past surgical history, social history, and family history    ---------------------------------------------------PHYSICAL EXAM--------------------------------------    Constitutional/General: Alert and oriented x3  Head: Normocephalic and atraumatic  Eyes:  EOMI, sclera non icteric  Mouth: Oropharynx clear, handling secretions, no trismus, no asymmetry of the posterior oropharynx or uvular edema  Neck: Supple, full ROM, no stridor, no meningeal signs  Respiratory: Lungs clear to auscultation bilaterally, no wheezes, rales, or rhonchi. Not in respiratory distress  Cardiovascular:  Regular rate. Regular rhythm. No murmurs, no aortic murmurs, no gallops, or rubs. 2+ distal pulses. Equal extremity pulses. Chest: No chest wall tenderness  Gastrointestinal:  Abdomen Soft, Non tender, Non distended. No rebound, guarding, or rigidity. No pulsatile masses. Musculoskeletal: Moves all extremities x 4. Warm and well perfused, no clubbing, no cyanosis, no edema. Capillary refill <3 seconds  Skin: skin warm and dry. No rashes.    Neurologic: GCS 15, no focal deficits, symmetric strength 5/5 in the upper and lower extremities bilaterally  Psychiatric: Normal Affect    -------------------------------------------------- RESULTS -------------------------------------------------  I have personally reviewed all laboratory and imaging results for this patient. Results are listed below.      LABS: (Lab results interpreted by me)  Results for orders placed or performed during the hospital encounter of 05/11/21   CBC auto differential   Result Value Ref Range    WBC 6.2 4.5 - 11.5 E9/L    RBC 4.36 3.80 - 5.80 E12/L    Hemoglobin 13.6 12.5 - 16.5 g/dL    Hematocrit 39.3 37.0 - 54.0 %    MCV 90.1 80.0 - 99.9 fL    MCH 31.2 26.0 - 35.0 pg    MCHC 34.6 (H) 32.0 - 34.5 %    RDW 13.2 11.5 - 15.0 fL    Platelets 077 983 - 766 E9/L    MPV 10.0 7.0 - 12.0 fL    Neutrophils % 78.6 43.0 - 80.0 %    Immature Granulocytes % 0.5 0.0 - 5.0 %    Lymphocytes % 7.4 (L) 20.0 - 42.0 %    Monocytes % 11.9 2.0 - 12.0 %    Eosinophils % 1.3 0.0 - 6.0 %    Basophils % 0.3 0.0 - 2.0 %    Neutrophils Absolute 4.88 1.80 - 7.30 E9/L    Immature Granulocytes # 0.03 E9/L    Lymphocytes Absolute 0.46 (L) 1.50 - 4.00 E9/L    Monocytes Absolute 0.74 0.10 - 0.95 E9/L    Eosinophils Absolute 0.08 0.05 - 0.50 E9/L    Basophils Absolute 0.02 0.00 - 0.20 E9/L    RBC Morphology Normal    Comprehensive Metabolic Panel w/ Reflex to MG   Result Value Ref Range    Sodium 136 132 - 146 mmol/L    Potassium reflex Magnesium 4.1 3.5 - 5.0 mmol/L    Chloride 102 98 - 107 mmol/L    CO2 22 22 - 29 mmol/L    Anion Gap 12 7 - 16 mmol/L    Glucose 106 (H) 74 - 99 mg/dL    BUN 8 6 - 20 mg/dL    CREATININE 0.9 0.7 - 1.2 mg/dL    GFR Non-African American >60 >=60 mL/min/1.73    GFR African American >60     Calcium 9.1 8.6 - 10.2 mg/dL    Total Protein 7.4 6.4 - 8.3 g/dL    Albumin 3.5 3.5 - 5.2 g/dL    Total Bilirubin 2.6 (H) 0.0 - 1.2 mg/dL    Alkaline Phosphatase 92 40 - 129 U/L    ALT 42 (H) 0 - 40 U/L    AST 62 (H) 0 - 39 U/L   Troponin   Result Value Ref Range    Troponin <0.01 0.00 - 0.03 ng/mL   Urinalysis with Microscopic   Result Value Ref Range    Color, UA DARK YELLOW (A) Straw/Yellow    Clarity, UA Clear Clear    Glucose, Ur Negative Negative mg/dL    Bilirubin Urine Interpretation  Interpreted by Emerson Monroe    EKG: This EKG is signed and interpreted by me. Rate: 82  Rhythm: Sinus  Interpretation: Normal sinus rhythm, normal axis, no acute ST elevations or depressions, intervals are within normal limits, QTC is 476  Comparison: stable as compared to patient's most recent EKG     ------------------------- NURSING NOTES AND VITALS REVIEWED ---------------------------   The nursing notes within the ED encounter and vital signs as below have been reviewed by myself  /83   Pulse 91   Temp 98 °F (36.7 °C)   Resp 20   Ht 6' 2\" (1.88 m)   Wt 270 lb (122.5 kg)   SpO2 96%   BMI 34.67 kg/m²     Oxygen Saturation Interpretation: 96 % on room air. Normal    The patients available past medical records and past encounters were reviewed. ------------------------------ ED COURSE/MEDICAL DECISION MAKING----------------------  Medications   0.9 % sodium chloride bolus (0 mLs Intravenous Stopped 5/11/21 2021)   cefTRIAXone (ROCEPHIN) 1,000 mg in sterile water 10 mL IV syringe (0 mg Intravenous Stopped 5/12/21 0029)           The cardiac monitor revealed NSr with a heart rate in the 90s as interpreted by me. The cardiac monitor was ordered secondary to the patient's altered mental status and to monitor the patient for dysrhythmia. CPT 41718           Medical Decision Making:     The patient was seen and evaluated by the Attending Emergency Medicine Physician Dr. Caesar An. The patient is a 49-year-old male presents to the emergency department complaining of altered mental status. Patient is hemodynamically stable, nontoxic appearance, in no acute distress. Labs are within normal limits, drug screens were negative, troponin negative, EKG is reassuring. Unable to obtain orthostatics because the patient is not able to stand. He states that he feels too weak to stand up on his own. He was treated with IV fluids.   Urinalysis shows small leukocytes and questionable urinary tract infection with small leukocytes and many bacteria. Culture was sent but did treat with Rocephin initially. Consulted with hospitalist who accepted the patient for admission. Discussed results and plan of care with patient and family at bedside they verbalized understanding and agreement to treatment plan admission. Re-Evaluations:  ED Course as of May 12 0258   Tue May 11, 2021   2242 Consulted with Dr. Angelito Otero, hospitalist, who accepted for admission. [KG]      ED Course User Index  [KG] Alison Alexander DO     This patient's ED course included: a personal history and physicial examination, re-evaluation prior to disposition, multiple bedside re-evaluations, IV medications, cardiac monitoring, continuous pulse oximetry and complex medical decision making and emergency management    This patient has remained hemodynamically stable during their ED course. Consultations:  Spoke with Dr. Angelito Otero (Medicine). Discussed case. They will admit this patient. Counseling: The emergency provider has spoken with the patient and discussed todays results, in addition to providing specific details for the plan of care and counseling regarding the diagnosis and prognosis. Questions are answered at this time and they are agreeable with the plan.       --------------------------------- IMPRESSION AND DISPOSITION ---------------------------------    IMPRESSION  1. Acute delirium    2. Lightheadedness    3. Failure to thrive in adult        DISPOSITION  Disposition: Admit to telemetry  Patient condition is stable        NOTE: This report was transcribed using voice recognition software.  Every effort was made to ensure accuracy; however, inadvertent computerized transcription errors may be present        Alison Alexander DO  Resident  05/14/21 5124

## 2021-05-12 NOTE — ED NOTES
Pt refusing to stand for orthostatic Bps, Dr. Carline Jordan notified     Jones Chin RN  05/11/21 3041

## 2021-05-12 NOTE — CARE COORDINATION
Social Work/ Discharge Planning:     Pt presents to the ED secondary to altered mental status with chest pains and light headedness    SW met with Pt while he was laying in bed, aware and answering questions. Pt states he lives in a 2nd story apartment, alone with approximately 6-8 stairs to enter. Pt reports that he is currently employed and he does currently drive. Pt states that he has no DME at home and doesn't need any. Pt reports that he does not smoke or use drugs but does drink around 3 beers every other day. Pt does not have a PCP, SW offered to help get Pt established and Pt agreed. Pt states he does have medical  insurance but he can't find the card. SW spoke with Pts brother Katia [address] [phone]) on the phone, who states that the Pt has frequently repeated questions and scenarios such as calling off work several times in the same day, or forgetting to [employer]. Pts brother reports that it is intermittent, he says that one minuet he is normal then he forgets the conversation and will start it over. Pts brother reports that the Pt is close to losing his job, and being evicted. Pts brother also reports that the Pt drinks on average 5 tall boys (16-24oz can of beer) daily and will start drinking first thing in the morning some days. The Pts brother also reports that their mother and grandmother had drinking problems. Pts brother states that he does not think it is safe for his brother to return home alone. SW spoke with Pts brother in person, Pt was away at stress test, Pts brother is concerned about missing insurance information and is going to attempt to find the card/information. Pts brother also states he thinks he can talk the Pt into getting help outside his home, he states the Pt has told him he \"doesn't want to go home,its a mess and the same thing every day there\".        MARIYA/HARMEET to follow up with Pts and Pts brother, Pt will need established with a PCP and verification of his insurance.      JOHN Dixon Student  Reviewed by EDWARD Solares

## 2021-05-12 NOTE — PROGRESS NOTES
Occupational Therapy  OCCUPATIONAL THERAPY INITIAL EVALUATION      Date:2021  Patient Name: Ivan Ni  MRN: [id number]  : 1965  Room: [address] [place] [address]    Referring Provider: Con Denny MD    Evaluating OT: Marvin Mackay OTR/L #[id number]    AM-PAC Daily Activity Raw Score:     Recommended Adaptive Equipment: TBD     Diagnosis: Acute cystitis with hematuria [N30.01]    Patient presented to ED for lightheadedness and chest pain    Surgery/Procedure:  Stress test   Pertinent Medical History: CAD      Precautions:  Falls, bed alarm, cognition     Home Living: Pt is a questionable historian. Pt reports he lives alone in a 2nd story apartment w/ 4 +1 JAKE(B handrails); bed/bathroom on main floor   Bathroom setup: tub/shower  Equipment owned: none    Prior Level of Function: Independent with ADLs , Independent with IADLs; ambulated w/ no AD  Driving: Yes  Occupation: works at [employer]    Pain Level: Pt c/o upset stomach this session; educated pt on pain management    Cognition: A&O: (oriented to self, place, year and month with cues); Follows 1 step directions w/ increased cueing for comprehension/initiation of task. Pt requires encouragement to participate. Memory:  fair    Sequencing:  fair -   Problem solving: poor   Judgement/safety: poor      Functional Assessment:   Initial Eval Status  Date: 21 Treatment Status  Date: STGs/LTGs  Treatment frequency: 1-4x/wk   Feeding Independent       Grooming Minimal Assist (seated for safety)   supervision   UB Dressing Minimal Assist   Supervision    LB Dressing Minimal Assist to don/doff socks  Supervision    Bathing Minimal Assist (simulated)  Supervision    Toileting Minimal Assist (simulated)  Supervision    Bed Mobility  Supine to sit: Stand by Assist   Sit to supine: Stand by Assist   Supine to sit:  Independent   Sit to supine: Independent    Functional Transfers CGA    Supervision    Functional Mobility CGA w/ no AD (short during completion of ADL/functional transfers/mobility tasks. Pt demonstrating fair- understanding of education/techniques, requiring additional training / education. Pt would benefit from continued skilled OT to increase functional independence and quality of life.     Eval Complexity: Low    Assessment of current deficits   Functional mobility [x]  ADLs [x] Strength [x]  Cognition [x]  Functional transfers  [x] IADLs [x] Safety Awareness [x]  Endurance [x]  Fine Motor Coordination [] Balance [x] Vision/perception [] Sensation []   Gross Motor Coordination [] ROM [] Delirium [x]                  Motor Control []    Plan of Care: 1-3 days/week for 1-2 weeks PRN   Instruction/training on adapted ADL techniques and AE recommendations to increase functional independence within precautions  Training on energy conservation strategies/techniques to improve independence/tolerance for self-care routine  Functional transfer/mobility training/DME recommendations for increased independence, safety, and fall prevention  Patient/Family education to increase follow through with safety techniques and functional independence  Recommendation of environmental modifications for increased safety with functional transfers/mobility and ADLs  Cognitive retraining/development of therapeutic activities to improve problem solving, judgement, memory, and attention for increased safety/participation in ADL/IADL tasks  Sensory re-education to improve body/limb awareness, maintain/improve skin integrity, and improve hand/UE motor function  Visual-perceptual training to improve environmental scanning, visual attention/focus, and oculomotor skills for increased safety/independence with functional transfers/mobility and ADLs  Splinting/positioning for increased function, prevention of contractures, and improve skin integrity  Therapeutic exercise to improve motor endurance, ROM, and functional strength for ADLs/functional transfers  Therapeutic activities to facilitate/challenge dynamic balance, stand tolerance, fine motor dexterity/in-hand manipulation for increased independence with ADLs  Neuro-muscular re-education: facilitation of righting/equilibrium reactions, midline orientation, scapular stability/mobility, normalization of muscle tone, and facilitation of volitional active controled movement  Delirium prevention/treatment  Positioning to improve functional independence      Rehab Potential:  Good for established goals     Patient / Family Goal: not stated      Patient and/or family were instructed on functional diagnosis, prognosis/goals and OT plan of care. Demonstrated fair- understanding. Time In: 2:35  Time Out: 2:50  Total Treatment Time: 15 minutes    Min Units   OT Eval Low 14127       OT Eval Medium 97166  x 1   OT Eval High 99351       OT Re-Eval [id number]       Therapeutic Ex 57042       Therapeutic Activities 51083       ADL/Self Care 11230       Orthotic Management 16596       Neuro Re-Ed 47947       Non-Billable Time          Evaluation Time includes thorough review of current medical information, gathering information on past medical history/social history and prior level of function, completion of standardized testing/informal observation of tasks, assessment of data and education on plan of care and goals.     Saintclair Poles, OTR/L #[id number]

## 2021-05-12 NOTE — PROGRESS NOTES
Hospitalist Progress Note      PCP: No primary care provider on file. Date of Admission: 5/11/2021    Chief Complaint:LIGHT HEADED    Hospital Course: 64 y.o. male Hx CAD who presented with chest pain and lightheadness. He has been evaluated in ER for this prior and was admitted this month for same complaints. He states he panics when he has chest pain. He describes it as pinch like pain. He denies accompanying dyspnea. No cough or fever. Patient's brother has been concerned that he cannot take care of himself. He lives alone. Brother has been concerned about confusion. Patient is not able to ambulate at bedside due to lightheadedness. In ER, patient was normotensive. Lab work was only pertinent for mild elevated LFTs, ethanol negative. Urine drug tox screen was negative. UA suggest UTI. Chest x-ray was nonacute. CT head nonacute. Patient is admitted for PT/OT and and evaluation of lightheadedness.     Subjective: Patient was unable to remember that he had a stress test today    Nuclear stress test showed fixed defect in the inferior wall suggestive of a prior MI    Medications:  Reviewed    Infusion Medications    sodium chloride      sodium chloride 75 mL/hr at 05/12/21 0603     Scheduled Medications    amLODIPine  10 mg Oral Daily    sodium chloride flush  5-40 mL Intravenous 2 times per day    enoxaparin  40 mg Subcutaneous Daily    [START ON 5/13/2021] cefTRIAXone (ROCEPHIN) IV  1,000 mg Intravenous Q24H     PRN Meds: sodium chloride flush, sodium chloride, promethazine **OR** ondansetron, polyethylene glycol, acetaminophen **OR** acetaminophen, perflutren lipid microspheres, potassium chloride **OR** potassium alternative oral replacement **OR** potassium chloride, magnesium sulfate    No intake or output data in the 24 hours ending 05/12/21 1956    Exam:    BP (!) 155/94   Pulse 86   Temp 97.8 °F (36.6 °C) (Oral)   Resp 16   Ht 6' 2\" (1.88 m)   Wt 270 lb (122.5 kg)   SpO2 97%   BMI 34.67 kg/m²     General appearance: No apparent distress, appears stated age and cooperative. HEENT: Pupils equal, round, and reactive to light. Conjunctivae/corneas clear. Neck: Supple, with full range of motion. No jugular venous distention. Trachea midline. Respiratory:  Normal respiratory effort. Clear to auscultation, bilaterally without Rales/Wheezes/Rhonchi. Cardiovascular: Regular rate and rhythm with normal S1/S2 without murmurs, rubs or gallops. Abdomen: Soft, non-tender, non-distended with normal bowel sounds. Musculoskeletal: No clubbing, cyanosis or edema bilaterally. Full range of motion without deformity. Skin: Skin color, texture, turgor normal.  No rashes or lesions. Neurologic:  Neurovascularly intact without any focal sensory/motor deficits. Cranial nerves: II-XII intact, grossly non-focal.  Psychiatric: Alert and oriented, thought content appropriate, normal insight    Labs:   Recent Labs     05/11/21 1827 05/12/21  0538   WBC 6.2 6.3   HGB 13.6 13.6   HCT 39.3 39.7    135     Recent Labs     05/11/21 1827 05/12/21  0538    138   K 4.1 4.0    104   CO2 22 24   BUN 8 8   CREATININE 0.9 0.9   CALCIUM 9.1 8.9     Recent Labs     05/11/21 1827   AST 62*   ALT 42*   BILITOT 2.6*   ALKPHOS 92     No results for input(s): INR in the last 72 hours. Recent Labs     05/11/21 1827 05/12/21  0538   TROPONINI <0.01 <0.01       Assessment/Plan:Dizziness  Abnormal cardiac nuclear stress test  Elevated liver enzymes with history of alcohol abuse    Active Hospital Problems    Diagnosis Date Noted    Acute cystitis with hematuria [N30.01] 05/11/2021     Repeat LFTs tomorrow  Hepatitis C and B screening  2D echo in a.m. Cardiology follow-up    DVT Prophylaxis: Lovenox   diet: DIET CARDIAC;   Diet NPO, After Midnight  Code Status: Full Code    PT/OT Eval Status: As needed    Dispo -pending  Maury Herring MD

## 2021-05-12 NOTE — CONSULTS
Inpatient Cardiology Consultation      Reason for Consult: chest pain and lightheadness    Consulting Physician: Norman Mann MD    Requesting Physician:  Juan Raza MD    Date of Consultation: 5/12/2021    HISTORY OF PRESENT ILLNESS OF Matthew Norwood located in  room 14B/14B-14:     Matthew Norwood is a 64 y.o. male  unknown to [facility] cardiology   Has past medical history of HTN, HLD, MARLEE (does not use CPAP since seen it comfortable), obesity. He presented to ED of [facility] on 5/11/2021 at about 11: 19 p.m. after an episode of chest pain. Upon admission his troponin x3 were negative. During the exam: patient was resting comfortable without any signs of distress; was cooperative;   presenting no complaint at the moment of exam;  denied CP, shortness of breath, palpitations, lightheadedness. Yesterday when he was sitting he had an episode of left-sided chest stabbing for about couple seconds which repeated within a short interval.  There was no radiation, the intensity was 4/10. There was a self-preservation episode. He decided to come to check out at hospital.  He had before such episodes which mostly happen when he is sitting or laying. Few years ago he had the chest pain work-up but he does not remember the date or the place. He denies having any acute coronary episode or coronary interventions. Please note: past medical records were reviewed per electronic medical record (EMR) - see detailed reports under Past Medical/ Surgical History. Past Medical History:    Past Medical History:   Diagnosis Date    CAD (coronary artery disease)        Past Surgical History:    History reviewed. No pertinent surgical history.     Medications Prior to admit:  Prior to Admission medications    Not on File       Current Medications:    Current Facility-Administered Medications: amLODIPine (NORVASC) tablet 10 mg, 10 mg, Oral, Daily  sodium chloride flush 0.9 % injection 5-40 mL, 5-40 Hematologic:Denies history of easy bruising, prolonged bleeding,  of blood clots in legs  Lymphatic: Denies lumps and bumps to neck, axilla, breast, and groin  Endocrine: Denies excessive thirst. Denies intolerance to heat or cold  Musculoskeletal: Denies falls, pain to BLE with ambulation and edema to BLE. Psychiatric: Denies anxiety and depression. PHYSICAL EXAM:   /89   Pulse 85   Temp 98.2 °F (36.8 °C) (Temporal)   Resp 16   Ht 6' 2\" (1.88 m)   Wt 270 lb (122.5 kg)   SpO2 94%   BMI 34.67 kg/m²   CONST: Obese. Awake, alert, cooperative, no apparent distress  HEENT:   Head- Normocephalic, atraumatic   Eyes- Conjunctivae pink, anicteric  Throat- Oral mucosa pink and moist  Neck-  No stridor, trachea midline, no jugular venous distention. No adenopathy   CHEST: Chest symmetrical and non-tender to palpation. No accessory muscle use or intercostal retractions  RESPIRATORY:  Lung sounds - clear throughout fields;  CARDIOVASCULAR:     No carotid bruits  Heart Inspection- shows no noted pulsations  Heart Ausculation- Regular rate and rhythm, no murmur. No s3, s4 or rub   PV: No lower extremity edema. No varicosities. Pedal pulses palpable, no clubbing or cyanosis   ABDOMEN: Soft, non-tender to light palpation. Bowel sounds present. MS: Moves all extremities. Good muscle strength and tone. No atrophy or abnormal movements. : Deferred  SKIN: Warm and dry,  no stasis dermatitis or ulcers on legs  NEURO / PSYCH: Oriented to person, place and time. Speech clear and appropriate. Follows all commands.  Pleasant affect     DATA:    ECG reviewed with Dr. Sarah Mercado strips: SR  Diagnostic:      Ct Head Wo Contrast    Result Date: 5/11/2021  EXAMINATION: CT OF THE HEAD WITHOUT CONTRAST  5/11/2021 7:37 pm TECHNIQUE: CT of the head was performed without the administration of intravenous contrast. Dose modulation, iterative reconstruction, and/or weight based adjustment of the mA/kV was utilized to reduce the radiation dose to as low as reasonably achievable. COMPARISON: 05/07/2021 HISTORY: ORDERING SYSTEM PROVIDED HISTORY: altered TECHNOLOGIST PROVIDED HISTORY: Has a \"code stroke\" or \"stroke alert\" been called? ->No Reason for exam:->altered Decision Support Exception - unselect if not a suspected or confirmed emergency medical condition->Emergency Medical Condition (MA) What reading provider will be dictating this exam?->CRC FINDINGS: BRAIN/VENTRICLES: There is no acute intracranial hemorrhage, mass effect or midline shift. No abnormal extra-axial fluid collection. The gray-white differentiation is maintained without evidence of an acute infarct. There is no evidence of hydrocephalus. ORBITS: The visualized portion of the orbits demonstrate no acute abnormality. SINUSES: The visualized paranasal sinuses and mastoid air cells demonstrate no acute abnormality. SOFT TISSUES/SKULL:  No acute abnormality of the visualized skull or soft tissues. No acute intracranial abnormality. Xr Chest Portable    Result Date: 5/11/2021  EXAMINATION: ONE XRAY VIEW OF THE CHEST 5/11/2021 6:13 pm COMPARISON: May 9 HISTORY: ORDERING SYSTEM PROVIDED HISTORY: Shortness of breath TECHNOLOGIST PROVIDED HISTORY: Reason for exam:->Shortness of breath What reading provider will be dictating this exam?->CRC FINDINGS: The lungs are without acute focal process. There is no effusion or pneumothorax. The cardiomediastinal silhouette is without acute process. The osseous structures are without acute process. No acute process. Xr Chest (2 Vw)    Result Date: 5/9/2021  EXAMINATION: TWO XRAY VIEWS OF THE CHEST 5/9/2021 3:15 pm COMPARISON: None. HISTORY: ORDERING SYSTEM PROVIDED HISTORY: dizziness TECHNOLOGIST PROVIDED HISTORY: If in ER room at the time of exam, OK to change to portable 1 view Reason for exam:->dizziness What reading provider will be dictating this exam?->CRC FINDINGS: The lungs are without acute focal process.   There is no effusion or pneumothorax. The cardiomediastinal silhouette is without acute process. The osseous structures are without acute process. No acute process. Xr Chest (2 Vw)    Result Date: 5/7/2021  EXAMINATION: TWO XRAY VIEWS OF THE CHEST 5/7/2021 3:13 pm COMPARISON: None. HISTORY: ORDERING SYSTEM PROVIDED HISTORY: chest pain TECHNOLOGIST PROVIDED HISTORY: Reason for exam:->chest pain What reading provider will be dictating this exam?->CRC FINDINGS: The lungs are without acute focal process. There is no effusion or pneumothorax. The cardiomediastinal silhouette is without acute process. The osseous structures are without acute process. No acute process. Ct Head Wo Contrast    Result Date: 5/11/2021  EXAMINATION: CT OF THE HEAD WITHOUT CONTRAST  5/11/2021 7:37 pm TECHNIQUE: CT of the head was performed without the administration of intravenous contrast. Dose modulation, iterative reconstruction, and/or weight based adjustment of the mA/kV was utilized to reduce the radiation dose to as low as reasonably achievable. COMPARISON: 05/07/2021 HISTORY: ORDERING SYSTEM PROVIDED HISTORY: altered TECHNOLOGIST PROVIDED HISTORY: Has a \"code stroke\" or \"stroke alert\" been called? ->No Reason for exam:->altered Decision Support Exception - unselect if not a suspected or confirmed emergency medical condition->Emergency Medical Condition (MA) What reading provider will be dictating this exam?->CRC FINDINGS: BRAIN/VENTRICLES: There is no acute intracranial hemorrhage, mass effect or midline shift. No abnormal extra-axial fluid collection. The gray-white differentiation is maintained without evidence of an acute infarct. There is no evidence of hydrocephalus. ORBITS: The visualized portion of the orbits demonstrate no acute abnormality. SINUSES: The visualized paranasal sinuses and mastoid air cells demonstrate no acute abnormality.  SOFT TISSUES/SKULL:  No acute abnormality of the visualized skull or soft tissues. No acute intracranial abnormality. Ct Head Wo Contrast    Result Date: 5/7/2021  EXAMINATION: CT OF THE HEAD WITHOUT CONTRAST  5/7/2021 3:18 pm TECHNIQUE: CT of the head was performed without the administration of intravenous contrast. Dose modulation, iterative reconstruction, and/or weight based adjustment of the mA/kV was utilized to reduce the radiation dose to as low as reasonably achievable. COMPARISON: None. HISTORY: ORDERING SYSTEM PROVIDED HISTORY: lightheaded near syncope TECHNOLOGIST PROVIDED HISTORY: Has a \"code stroke\" or \"stroke alert\" been called? ->No Reason for exam:->lightheaded near syncope Decision Support Exception - unselect if not a suspected or confirmed emergency medical condition->Emergency Medical Condition (MA) What reading provider will be dictating this exam?->CRC FINDINGS: BRAIN/VENTRICLES: There is no acute intracranial hemorrhage, mass effect or midline shift. No abnormal extra-axial fluid collection. The gray-white differentiation is maintained without evidence of an acute infarct. There is no evidence of hydrocephalus. ORBITS: The visualized portion of the orbits demonstrate no acute abnormality. SINUSES: The visualized paranasal sinuses and mastoid air cells demonstrate no acute abnormality. Mucosal thickening in the maxillary sinuses bilaterally and left sphenoid sinus. SOFT TISSUES/SKULL:  No acute abnormality of the visualized skull or soft tissues. No acute intracranial abnormality. Chronic maxillary and sphenoid sinusitis. Xr Chest Portable    Result Date: 5/11/2021  EXAMINATION: ONE XRAY VIEW OF THE CHEST 5/11/2021 6:13 pm COMPARISON: May 9 HISTORY: ORDERING SYSTEM PROVIDED HISTORY: Shortness of breath TECHNOLOGIST PROVIDED HISTORY: Reason for exam:->Shortness of breath What reading provider will be dictating this exam?->CRC FINDINGS: The lungs are without acute focal process. There is no effusion or pneumothorax.  The cardiomediastinal silhouette

## 2021-05-12 NOTE — PROCEDURES
[facility] Nuclear Stress Test:    Cardiologist: Dr. Rola Voss EKG: NSR, LAFB, LVH, prolonged QT interval, anterior MI age undetermined, abnormal EKG. Indications for study: Chest pain    1. No chest pain  2. No new arrhythmias  3. No EKG changes suggestive of stress induced ischemia  4. Nuclear images pending    Susy Lopez MD., [facility] [place]    [facility]) Cardiology

## 2021-05-12 NOTE — H&P
Hospital Medicine History & Physical      PCP: No primary care provider on file. Date of Admission: 5/11/2021    Date of Service: Pt seen/examined on 5/11/2021 and Admitted to Inpatient with expected LOS greater than two midnights due to medical therapy. Chief Complaint: Lightheadedness and chest pain      History Of Present Illness:      64 y.o. male Hx CAD who presented with chest pain and lightheadness. He has been evaluated in ER for this prior and was admitted this month for same complaints. He states he panics when he has chest pain. He describes it as pinch like pain. He denies accompanying dyspnea. No cough or fever. Patient's brother has been concerned that he cannot take care of himself. He lives alone. Brother has been concerned about confusion. Patient is not able to ambulate at bedside due to lightheadedness. In ER, patient was normotensive. Lab work was only pertinent for mild elevated LFTs, ethanol negative. Urine drug tox screen was negative. UA suggest UTI. Chest x-ray was nonacute. CT head nonacute. Patient is admitted for PT/OT and and evaluation of lightheadedness. Past Medical History:          Diagnosis Date    CAD (coronary artery disease)        Past Surgical History:      History reviewed. No pertinent surgical history. Medications Prior to Admission:      Prior to Admission medications    Medication Sig Start Date End Date Taking? Authorizing Provider   amLODIPine (NORVASC) 10 MG tablet Take 1 tablet by mouth daily 5/9/21   Nitish Lara DO       Allergies:  Patient has no known allergies. Social History:      The patient currently lives at home alone    TOBACCO:   reports that he has never smoked. He has never used smokeless tobacco.  ETOH:   reports no history of alcohol use.   E-Cigarettes/Vaping Use     Questions Responses    E-Cigarette/Vaping Use     Start Date     Passive Exposure     Quit Date     Counseling Given     Comments             Family History:      Reviewed in detail and negative for DM, CAD, Cancer, CVA. Positive as follows:    History reviewed. No pertinent family history. REVIEW OF SYSTEMS:   Pertinent positives as noted in the HPI. All other systems reviewed and negative. PHYSICAL EXAM PERFORMED:    /83   Pulse 91   Temp 98 °F (36.7 °C)   Resp 20   Ht 6' 2\" (1.88 m)   Wt 270 lb (122.5 kg)   SpO2 96%   BMI 34.67 kg/m²     General appearance:  No apparent distress, appears stated age and cooperative. HEENT:  Normal cephalic, atraumatic without obvious deformity. Pupils equal, round, and reactive to light. Extra ocular muscles intact. Conjunctivae/corneas clear. Neck: Supple, with full range of motion. No jugular venous distention. Trachea midline. Respiratory:  Normal respiratory effort. Clear to auscultation, bilaterally without Rales/Wheezes/Rhonchi. Cardiovascular:  Regular rate and rhythm with normal S1/S2 without murmurs, rubs or gallops. Abdomen: Soft, non-tender, non-distended with normal bowel sounds. Musculoskeletal:  No clubbing, cyanosis or edema bilaterally. Full range of motion without deformity. Skin: Skin color, texture, turgor normal.  No rashes or lesions. Neurologic:  Neurovascularly intact without any focal sensory/motor deficits. Cranial nerves: II-XII intact, grossly non-focal.  Psychiatric:  Alert and oriented, thought content appropriate, normal insight    Labs:     Recent Labs     05/09/21  1437 05/11/21 1827   WBC 8.3 6.2   HGB 14.5 13.6   HCT 42.9 39.3    143     Recent Labs     05/09/21  1437 05/11/21  1827    136   K 3.9 4.1   CL 99 102   CO2 23 22   BUN 9 8   CREATININE 1.0 0.9   CALCIUM 9.1 9.1     Recent Labs     05/11/21  1827   AST 62*   ALT 42*   BILITOT 2.6*   ALKPHOS 92     No results for input(s): INR in the last 72 hours.   Recent Labs     05/09/21  1437 05/11/21 1827   TROPONINI <0.01 <0.01       Urinalysis:      Lab Results   Component Value Date    NITRU

## 2021-05-12 NOTE — ED NOTES
Lab to add on trop at this time.  Ordered per request of nuclear med       Nico Ramsay RN  05/12/21 0745

## 2021-05-13 LAB
AMMONIA: 25 UMOL/L (ref 16–60)
HAV IGM SER IA-ACNC: NORMAL
HEPATITIS B CORE IGM ANTIBODY: NORMAL
HEPATITIS B SURFACE ANTIGEN INTERPRETATION: NORMAL
HEPATITIS C ANTIBODY INTERPRETATION: NORMAL
PROCALCITONIN: 0.32 NG/ML (ref 0–0.08)

## 2021-05-13 PROCEDURE — 6370000000 HC RX 637 (ALT 250 FOR IP): Performed by: INTERNAL MEDICINE

## 2021-05-13 PROCEDURE — 99231 SBSQ HOSP IP/OBS SF/LOW 25: CPT | Performed by: INTERNAL MEDICINE

## 2021-05-13 PROCEDURE — 6360000002 HC RX W HCPCS: Performed by: INTERNAL MEDICINE

## 2021-05-13 PROCEDURE — 2060000000 HC ICU INTERMEDIATE R&B

## 2021-05-13 PROCEDURE — 82140 ASSAY OF AMMONIA: CPT

## 2021-05-13 PROCEDURE — 84145 PROCALCITONIN (PCT): CPT

## 2021-05-13 PROCEDURE — 97530 THERAPEUTIC ACTIVITIES: CPT

## 2021-05-13 PROCEDURE — 36415 COLL VENOUS BLD VENIPUNCTURE: CPT

## 2021-05-13 PROCEDURE — 97162 PT EVAL MOD COMPLEX 30 MIN: CPT

## 2021-05-13 PROCEDURE — 2580000003 HC RX 258: Performed by: INTERNAL MEDICINE

## 2021-05-13 PROCEDURE — 80074 ACUTE HEPATITIS PANEL: CPT

## 2021-05-13 RX ORDER — SODIUM CHLORIDE 0.9 % (FLUSH) 0.9 %
5-40 SYRINGE (ML) INJECTION PRN
Status: DISCONTINUED | OUTPATIENT
Start: 2021-05-13 | End: 2021-05-14 | Stop reason: HOSPADM

## 2021-05-13 RX ORDER — SODIUM CHLORIDE 9 MG/ML
25 INJECTION, SOLUTION INTRAVENOUS PRN
Status: DISCONTINUED | OUTPATIENT
Start: 2021-05-13 | End: 2021-05-14 | Stop reason: HOSPADM

## 2021-05-13 RX ORDER — LORAZEPAM 2 MG/ML
4 INJECTION INTRAMUSCULAR
Status: DISCONTINUED | OUTPATIENT
Start: 2021-05-13 | End: 2021-05-14 | Stop reason: HOSPADM

## 2021-05-13 RX ORDER — LORAZEPAM 1 MG/1
4 TABLET ORAL
Status: DISCONTINUED | OUTPATIENT
Start: 2021-05-13 | End: 2021-05-14 | Stop reason: HOSPADM

## 2021-05-13 RX ORDER — LORAZEPAM 1 MG/1
3 TABLET ORAL
Status: DISCONTINUED | OUTPATIENT
Start: 2021-05-13 | End: 2021-05-14 | Stop reason: HOSPADM

## 2021-05-13 RX ORDER — LORAZEPAM 1 MG/1
1 TABLET ORAL
Status: DISCONTINUED | OUTPATIENT
Start: 2021-05-13 | End: 2021-05-14 | Stop reason: HOSPADM

## 2021-05-13 RX ORDER — LORAZEPAM 2 MG/ML
2 INJECTION INTRAMUSCULAR
Status: DISCONTINUED | OUTPATIENT
Start: 2021-05-13 | End: 2021-05-14 | Stop reason: HOSPADM

## 2021-05-13 RX ORDER — GAUZE BANDAGE 2" X 2"
100 BANDAGE TOPICAL DAILY
Status: DISCONTINUED | OUTPATIENT
Start: 2021-05-13 | End: 2021-05-14 | Stop reason: HOSPADM

## 2021-05-13 RX ORDER — LORAZEPAM 2 MG/ML
3 INJECTION INTRAMUSCULAR
Status: DISCONTINUED | OUTPATIENT
Start: 2021-05-13 | End: 2021-05-14 | Stop reason: HOSPADM

## 2021-05-13 RX ORDER — LORAZEPAM 2 MG/ML
1 INJECTION INTRAMUSCULAR
Status: DISCONTINUED | OUTPATIENT
Start: 2021-05-13 | End: 2021-05-14 | Stop reason: HOSPADM

## 2021-05-13 RX ORDER — SODIUM CHLORIDE 0.9 % (FLUSH) 0.9 %
5-40 SYRINGE (ML) INJECTION EVERY 12 HOURS SCHEDULED
Status: DISCONTINUED | OUTPATIENT
Start: 2021-05-13 | End: 2021-05-14 | Stop reason: HOSPADM

## 2021-05-13 RX ORDER — LORAZEPAM 1 MG/1
2 TABLET ORAL
Status: DISCONTINUED | OUTPATIENT
Start: 2021-05-13 | End: 2021-05-14 | Stop reason: HOSPADM

## 2021-05-13 RX ADMIN — SODIUM CHLORIDE: 9 INJECTION, SOLUTION INTRAVENOUS at 08:10

## 2021-05-13 RX ADMIN — ENOXAPARIN SODIUM 40 MG: 40 INJECTION SUBCUTANEOUS at 09:54

## 2021-05-13 RX ADMIN — CEFTRIAXONE 1000 MG: 1 INJECTION, POWDER, FOR SOLUTION INTRAMUSCULAR; INTRAVENOUS at 00:07

## 2021-05-13 RX ADMIN — Medication 100 MG: at 16:11

## 2021-05-13 RX ADMIN — AMLODIPINE BESYLATE 10 MG: 10 TABLET ORAL at 09:54

## 2021-05-13 ASSESSMENT — PAIN SCALES - GENERAL
PAINLEVEL_OUTOF10: 0

## 2021-05-13 NOTE — PROGRESS NOTES
Physical Therapy    Physical Therapy Initial Assessment     Name: Johan Hernández  : 1965  MRN: [id number]    Referring Provider:  No Friedman MD    Date of Service: 2021    Evaluating PT:  Frank Campo PT, DPT [id number]     Room #:  5370/0165-Q  Diagnosis:  Acute cystitis with hematuria   PMHx/PSHx:  CAD  Procedure/Surgery:  Cardiac stress test   Precautions:  Falls  Equipment Needs:  TBD    SUBJECTIVE:    Pt reports that he lives alone in a 2nd floor apartment with 2 steps and no rail to enter. Reports ambulating with no device and independence. OBJECTIVE:   Initial Evaluation  Date: 21 Treatment Short Term/ Long Term   Goals   AM-PAC 6 Clicks 84/40     Was pt agreeable to Eval/treatment? Yes     Does pt have pain? No c/o pain      Bed Mobility  Rolling: SBA  Supine to sit: SBA  Sit to supine: NT  Scooting: SBA  Modified Independent     Transfers Sit to stand: Min A  Stand to sit: Min A  Stand pivot: Min A no device  Modified Independent     Ambulation    30 feet with no AD Min A  >200 feet with AAD Modified Independent     Stair negotiation: ascended and descended  NT  >4 steps with 1 rail Modified Independent     ROM BUE:  Per OT  BLE:  WFL     Strength BUE:  Per OT  BLE:       Balance Sitting EOB:  SBA  Dynamic Standing:  Min A  Sitting EOB:  Independent   Dynamic Standing:  Modified Independent       Pt is A & O x 4  Sensation:  Pt denies numbness and tingling to extremities  Edema:  Unremarkable    Vitals:  HR 95 bpm;  SpO2 96%    Therapeutic Exercises:     BLE ROM    Patient education  Pt educated on safety during functional mobility, PT role     Patient response to education:   Pt verbalized understanding Pt demonstrated skill Pt requires further education in this area   Yes somewhat  Yes with cues  yes     ASSESSMENT:    Comments:  Pt received supine and agreeable to PT evaluation. Vitals monitored during session.   Pt is pleasant but cognition appears impaired at times. Pt dodges questions and masks with humor. Requires a lot of encouragement d/t light headedness. Assisted with donning pants at EOB. Pt c/o ongoing lt headedness but was able to ambulate. Balance was poor and pt was reaching for environment to steady himself. He declined using [employer] stating Aliya Yuan would be cheating. \"  Pt sat at EOB after ambulating. Encouraged to stand and transfer to chair to sit up OOB for a little. Pt agreeable. Pt left seated in chair. with call button in reach, lines attached, and needs met. Treatment:  Patient practiced and was instructed in the following treatment:     Bed mobility training - pt given verbal and tactile cues to facilitate proper sequencing and safety during rolling and supine>sit as well as provided with physical assistance to complete task    STS and pivot transfer training - pt educated on proper hand and foot placement, safety and sequencing, and use of no device to safely complete sit<>stand and pivot transfers with hands on assistance to complete task safely     Gait training- pt was given verbal and tactile cues to facilitate safety/balance during ambulation as well as provided with physical assistance to complete task. Pt's/ family goals   1. Home     Patient and or family understand(s) diagnosis, prognosis, and plan of care. yes    PLAN:    Current Treatment Recommendations     [x] Strengthening     [x] ROM   [x] Balance Training   [x] Endurance Training   [x] Transfer Training   [x] Gait Training   [x] Stair Training   [x] Positioning   [x] Safety and Education Training   [x] Patient/Caregiver Education   [x] HEP  [] Other     Frequency of treatments: 2-5x/week x 1-2 weeks.     Time in  0830  Time out  0900    Total Treatment Time  15 minutes     Evaluation Time includes thorough review of current medical information, gathering information on past medical history/social history and prior level of function, completion of standardized

## 2021-05-13 NOTE — PLAN OF CARE
Problem: Coping:  Goal: Ability to remain calm will improve  Description: Ability to remain calm will improve  Outcome: Met This Shift  Goal: Family's ability to cope with current situation will improve  Description: Family's ability to cope with current situation will improve  Outcome: Met This Shift     Problem: Safety:  Goal: Ability to remain free from injury will improve  Description: Ability to remain free from injury will improve  Outcome: Met This Shift     Problem: Self-Care:  Goal: Ability to participate in self-care as condition permits will improve  Description: Ability to participate in self-care as condition permits will improve  Outcome: Met This Shift     Problem: Health Behavior:  Goal: Ability to identify and utilize available support systems will improve  Description: Ability to identify and utilize available support systems will improve  Outcome: Met This Shift     Problem: Discharge Planning:  Goal: Discharged to appropriate level of care  Description: Discharged to appropriate level of care  Outcome: Met This Shift     Problem: Fluid Volume - Deficit:  Goal: Absence of fluid volume deficit signs and symptoms  Description: Absence of fluid volume deficit signs and symptoms  Outcome: Met This Shift     Problem: Nutrition Deficit:  Goal: Ability to achieve adequate nutritional intake will improve  Description: Ability to achieve adequate nutritional intake will improve  Outcome: Met This Shift     Problem: Sleep Pattern Disturbance:  Goal: Appears well-rested  Description: Appears well-rested  Outcome: Met This Shift     Problem: Violence - Risk of, Self/Other-Directed:  Goal: Knowledge of developmental care interventions  Description: Absence of violence  Outcome: Met This Shift

## 2021-05-13 NOTE — PROGRESS NOTES
Hospitalist Progress Note      PCP: No primary care provider on file. Date of Admission: 5/11/2021    Chief Complaint:LIGHT HEADED    Hospital Course: 64 y.o. male Hx CAD who presented with chest pain and lightheadness. He has been evaluated in ER for this prior and was admitted this month for same complaints. He states he panics when he has chest pain. He describes it as pinch like pain. He denies accompanying dyspnea. No cough or fever. Patient's brother has been concerned that he cannot take care of himself. He lives alone. Brother has been concerned about confusion. Patient is not able to ambulate at bedside due to lightheadedness. In ER, patient was normotensive. Lab work was only pertinent for mild elevated LFTs, ethanol negative. Urine drug tox screen was negative. UA suggest UTI. Chest x-ray was nonacute. CT head nonacute. Patient is admitted for PT/OT and and evaluation of lightheadedness.     Subjective: seems a little confused  No chest pain    Medications:  Reviewed    Infusion Medications    sodium chloride      sodium chloride 75 mL/hr at 05/13/21 0810     Scheduled Medications    amLODIPine  10 mg Oral Daily    sodium chloride flush  5-40 mL Intravenous 2 times per day    enoxaparin  40 mg Subcutaneous Daily    cefTRIAXone (ROCEPHIN) IV  1,000 mg Intravenous Q24H     PRN Meds: sodium chloride flush, sodium chloride, promethazine **OR** ondansetron, polyethylene glycol, acetaminophen **OR** acetaminophen, perflutren lipid microspheres, potassium chloride **OR** potassium alternative oral replacement **OR** potassium chloride, magnesium sulfate      Intake/Output Summary (Last 24 hours) at 5/13/2021 1119  Last data filed at 5/13/2021 0536  Gross per 24 hour   Intake 743 ml   Output --   Net 743 ml       Exam:    BP (!) 145/92   Pulse 88   Temp 97.5 °F (36.4 °C) (Oral)   Resp 16   Ht 6' 2\" (1.88 m)   Wt 270 lb (122.5 kg)   SpO2 97%   BMI 34.67 kg/m²     General appearance: No apparent distress, appears stated age and cooperative. HEENT: Pupils equal, round, and reactive to light. Conjunctivae/corneas clear. Neck: Supple, with full range of motion. No jugular venous distention. Trachea midline. Respiratory:  Normal respiratory effort. Clear to auscultation, bilaterally without Rales/Wheezes/Rhonchi. Cardiovascular: Regular rate and rhythm with normal S1/S2 without murmurs, rubs or gallops. Abdomen: Soft, non-tender, non-distended with normal bowel sounds. Musculoskeletal: No clubbing, cyanosis or edema bilaterally. Full range of motion without deformity. Skin: Skin color, texture, turgor normal.  No rashes or lesions. Neurologic:  Neurovascularly intact without any focal sensory/motor deficits. Cranial nerves: II-XII intact, grossly non-focal.  Psychiatric: Alert and oriented, thought content appropriate, normal insight    Labs:   Recent Labs     05/11/21 1827 05/12/21  0538   WBC 6.2 6.3   HGB 13.6 13.6   HCT 39.3 39.7    135     Recent Labs     05/11/21 1827 05/12/21  0538    138   K 4.1 4.0    104   CO2 22 24   BUN 8 8   CREATININE 0.9 0.9   CALCIUM 9.1 8.9     Recent Labs     05/11/21 1827   AST 62*   ALT 42*   BILITOT 2.6*   ALKPHOS 92     No results for input(s): INR in the last 72 hours. Recent Labs     05/11/21 1827 05/12/21  0538   TROPONINI <0.01 <0.01       Assessment/Plan:Dizziness  Abnormal cardiac nuclear stress test  Elevated liver enzymes with history of alcohol abuse  Confusion ?etiology   ? etoh withdrawal      Stress -fixed defect  No prior history of CAD  Ct head NAP  Thiamine  CIWA  Await urine culture  Pending  Recheck lfts    Active Hospital Problems    Diagnosis Date Noted    Acute cystitis with hematuria [N30.01] 05/11/2021         DVT Prophylaxis: Lovenox   diet: Diet NPO, After Midnight  Code Status: Full Code    PT/OT Eval Status: As needed    Dispo -pending  Alyx Ramirez MD

## 2021-05-13 NOTE — CARE COORDINATION
Spoke with patient. He tells me he lives home alone, independent, does not use assistive devices in the home. I discussed with him that his brother is concerned about him returning home and does not feel it is safe. Patient stated, \"I don't know why you would talk to him about my life, he doesn't have a say. \" Asked patient to participate with therapy so we could discuss with brother that he is strong enough to return home. Offered alcohol treatment and patient stated, \"I don't need that. \" Evasive to answering most questions. He plans to return home, has a family physician but can't remember the name. He states that he does not have have insurance. [facility] attempted to screen patient on 5/11 and he refused to participate in screening. Will not be eligible for help with meds at discharge. Will have to call after discharge to see if he is HFA eligible since he refused screening. Discussed transport home and patient tells me he will call a friend. For questions I can be reached at [phone].  Dee Johnson [place]

## 2021-05-13 NOTE — PROGRESS NOTES
Occupational Therapy  OT SESSION ATTEMPT     Date:2021  Patient Name: Vernon Potter  MRN: [id number]  : 1965  Room: [address] [place], [place] [address]     Attempted OT session this date:    [] unavailable due to other medical staff currently with pt   [] on hold per nursing staff   [] on hold per nursing staff secondary to lab / radiology results    [x] politely declined treatment this date due to not having energy this date. Patient sitting up in the chair refusing to participate in any ADLs or transfers. [] off unit    [] Other:     Will reattempt OT session at a later time.       Meeta Cortés 46, [address]

## 2021-05-13 NOTE — PROGRESS NOTES
Patient is seen in follow-up for chest pain    Subjective:     Mr. Selvin Dockery feels okay, denies any chest pain or shortness of breath  Sitting up in chair no apparent distress    ROS:  CONSTITUTIONAL:  negative for  fevers, chills  HEENT:  negative for earaches, nasal congestion and epistaxis  RESPIRATORY:  negative for  dry cough, cough with sputum,wheezing and hemoptysis  GASTROINTESTINAL:  negative for nausea, vomiting  MUSCULOSKELETAL:  negative for  myalgias, arthralgias  NEUROLOGICAL:  negative for visual disturbance, dysphagia    Medication side effects: none    Scheduled Meds:   amLODIPine  10 mg Oral Daily    sodium chloride flush  5-40 mL Intravenous 2 times per day    enoxaparin  40 mg Subcutaneous Daily    cefTRIAXone (ROCEPHIN) IV  1,000 mg Intravenous Q24H     Continuous Infusions:   sodium chloride      sodium chloride 75 mL/hr at 05/13/21 0810     PRN Meds:sodium chloride flush, sodium chloride, promethazine **OR** ondansetron, polyethylene glycol, acetaminophen **OR** acetaminophen, perflutren lipid microspheres, potassium chloride **OR** potassium alternative oral replacement **OR** potassium chloride, magnesium sulfate    I/O last 3 completed shifts: In: 885 [I.V.:743]  Out: -   No intake/output data recorded.       Objective:      Physical Exam:   BP (!) 145/92   Pulse 88   Temp 97.5 °F (36.4 °C) (Oral)   Resp 16   Ht 6' 2\" (1.88 m)   Wt 270 lb (122.5 kg)   SpO2 97%   BMI 34.67 kg/m²   CONSTITUTIONAL:  awake, alert, cooperative, no apparent distress, and appears stated age  HEAD:  normocepalic, without obvious abnormality, atraumatic  NECK:  Supple, symmetrical, trachea midline, no adenopathy, thyroid symmetric, not enlarged and no tenderness, skin normal  LUNGS:  No increased work of breathing, No accessory muscle use or intercostal retractions, good air exchange, clear to auscultation bilaterally, no crackles or wheezing  CARDIOVASCULAR:  Normal apical impulse, regular rate and rhythm, normal S1 and S2, no S3 or S4, and no murmur noted, no edema, no JVD, no carotid bruit. ABDOMEN:  Soft, nontender, no masses, no hepatomegaly, no splenomegaly, BS+  MUSCULOSKELETAL:  No clubbing no cyanosis. there is no redness, warmth, or swelling of the joints  full range of motion noted  NEUROLOGIC:  Alert, awake,oriented x3  SKIN:  no bruising or bleeding, normal skin color, texture, turgor and no redness, warmth, or swelling      Cardiographics  I personally reviewed the telemetry monitor strips with the following interpretation: Sinus rhythm    Echocardiogram: not done    Imaging  NM Cardiac Stress Test Nuclear Imaging   Final Result   1. ECG during the infusion did not change. 2.  The myocardial perfusion imaging was abnormal.     3.  The abnormality was a small area of fixed perfusion defect   involving the mid to basal inferior wall suggestive of prior infarct. 4.  Overall left ventricular systolic function was abnormal with   regional wall motion abnormalities. 5.  No transient ischemic dilatation. 6.  Intermediate risk general pharmacologic stress test.      Thank you for sending your patient to this [employer]. Lynne Lan MD, [facility], [address] [place] [address] cardiology. CT HEAD WO CONTRAST   Final Result   No acute intracranial abnormality. XR CHEST PORTABLE   Final Result   No acute process. Lab Review   Lab Results   Component Value Date     05/12/2021    K 4.0 05/12/2021     05/12/2021    CO2 24 05/12/2021    BUN 8 05/12/2021    CREATININE 0.9 05/12/2021    GLUCOSE 123 05/12/2021    GLUCOSE 110 01/07/2011    CALCIUM 8.9 05/12/2021     Lab Results   Component Value Date    WBC 6.3 05/12/2021    HGB 13.6 05/12/2021    HCT 39.7 05/12/2021    MCV 89.8 05/12/2021     05/12/2021     I have personally reviewed the laboratory, cardiac diagnostic and radiographic testing as outlined above:    Assessment:     1. Chest pain: Atypical, last for a few seconds, sharp in nature, negative cardiac enzymes, no EKG changes, stress test is negative for reversible defect,  2. Hypertension: Controlled  3. Hyperlipidemia      Recommendations:     1. Continue current treatment  2. Can be discharged from cardiac standpoint    Discussed with patient  Discussed with nursing staff  Electronically signed by Carol Ann Burden MD on 5/13/2021 at 10:49 AM  NOTE: This report was transcribed using voice recognition software.  Every effort was made to ensure accuracy; however, inadvertent computerized transcription errors may be present

## 2021-05-14 VITALS
HEIGHT: 74 IN | OXYGEN SATURATION: 96 % | RESPIRATION RATE: 20 BRPM | WEIGHT: 270 LBS | DIASTOLIC BLOOD PRESSURE: 96 MMHG | HEART RATE: 91 BPM | TEMPERATURE: 97.8 F | SYSTOLIC BLOOD PRESSURE: 146 MMHG | BODY MASS INDEX: 34.65 KG/M2

## 2021-05-14 PROBLEM — R07.9 CHEST PAIN: Status: ACTIVE | Noted: 2021-05-14

## 2021-05-14 LAB
ALBUMIN SERPL-MCNC: 3.5 G/DL (ref 3.5–5.2)
ALP BLD-CCNC: 93 U/L (ref 40–129)
ALT SERPL-CCNC: 44 U/L (ref 0–40)
ANION GAP SERPL CALCULATED.3IONS-SCNC: 13 MMOL/L (ref 7–16)
AST SERPL-CCNC: 45 U/L (ref 0–39)
BILIRUB SERPL-MCNC: 1.6 MG/DL (ref 0–1.2)
BUN BLDV-MCNC: 7 MG/DL (ref 6–20)
CALCIUM SERPL-MCNC: 8.7 MG/DL (ref 8.6–10.2)
CHLORIDE BLD-SCNC: 100 MMOL/L (ref 98–107)
CO2: 23 MMOL/L (ref 22–29)
CREAT SERPL-MCNC: 0.9 MG/DL (ref 0.7–1.2)
GFR AFRICAN AMERICAN: >60
GFR NON-AFRICAN AMERICAN: >60 ML/MIN/1.73
GLUCOSE BLD-MCNC: 113 MG/DL (ref 74–99)
POTASSIUM SERPL-SCNC: 3.7 MMOL/L (ref 3.5–5)
SODIUM BLD-SCNC: 136 MMOL/L (ref 132–146)
TOTAL PROTEIN: 7.2 G/DL (ref 6.4–8.3)
URINE CULTURE, ROUTINE: NORMAL

## 2021-05-14 PROCEDURE — 2580000003 HC RX 258: Performed by: INTERNAL MEDICINE

## 2021-05-14 PROCEDURE — 6370000000 HC RX 637 (ALT 250 FOR IP): Performed by: INTERNAL MEDICINE

## 2021-05-14 PROCEDURE — 36415 COLL VENOUS BLD VENIPUNCTURE: CPT

## 2021-05-14 PROCEDURE — 6360000002 HC RX W HCPCS: Performed by: INTERNAL MEDICINE

## 2021-05-14 PROCEDURE — 80053 COMPREHEN METABOLIC PANEL: CPT

## 2021-05-14 RX ORDER — AMLODIPINE BESYLATE 10 MG/1
10 TABLET ORAL DAILY
Qty: 30 TABLET | Refills: 1 | Status: ON HOLD | OUTPATIENT
Start: 2021-05-14 | End: 2021-10-16 | Stop reason: HOSPADM

## 2021-05-14 RX ORDER — THIAMINE MONONITRATE (VIT B1) 100 MG
100 TABLET ORAL DAILY
Qty: 90 TABLET | Refills: 0 | Status: SHIPPED | OUTPATIENT
Start: 2021-05-15

## 2021-05-14 RX ADMIN — ENOXAPARIN SODIUM 40 MG: 40 INJECTION SUBCUTANEOUS at 10:03

## 2021-05-14 RX ADMIN — AMLODIPINE BESYLATE 10 MG: 10 TABLET ORAL at 10:04

## 2021-05-14 RX ADMIN — Medication 100 MG: at 10:03

## 2021-05-14 RX ADMIN — CEFTRIAXONE 1000 MG: 1 INJECTION, POWDER, FOR SOLUTION INTRAMUSCULAR; INTRAVENOUS at 02:12

## 2021-05-14 ASSESSMENT — PAIN SCALES - GENERAL
PAINLEVEL_OUTOF10: 0

## 2021-05-14 NOTE — PROGRESS NOTES
CLINICAL PHARMACY NOTE: MEDS TO BEDS    Total # of Prescriptions Filled: 2   The following medications were delivered to the patient:  · Amlodipine besylate 10 mg  · Vitamin B-1 100 mg    Additional Documentation:

## 2021-05-14 NOTE — DISCHARGE SUMMARY
Hospitalist Discharge Summary    Patient ID: Mechele Dose   Patient : 1965  Patient's PCP: No primary care provider on file. Admit Date: 2021   Admitting Physician: Lani Egan MD    Discharge Date:  2021   Discharge Physician: Suzanne Mckeon MD   Discharge Condition: Stable  Discharge Disposition: [facility] course in brief:56 y.o. male Hx CAD who presented with chest pain and lightheadness. He has been evaluated in ER for this prior and was admitted this month for same complaints. He states he panics when he has chest pain. He describes it as pinch like pain. He denies accompanying dyspnea. No cough or fever. Patient's brother has been concerned that he cannot take care of himself. He lives alone. Brother has been concerned about confusion. Patient is not able to ambulate at bedside due to lightheadedness. In ER, patient was normotensive. Lab work was only pertinent for mild elevated LFTs, ethanol negative. Urine drug tox screen was negative. UA suggest UTI. Chest x-ray was nonacute. CT head nonacute. Patient is admitted for PT/OT and and evaluation of lightheadedness. .  Patient was put on telemetry bed. Cardiac enzymes negative stress test with nuclear imaging done showed fixed area of defect cardiology consult requested. 2D echo ordered  Patient remained chest pain-free. UTI was treated with IV ceftriaxone , urine culture showed mixed gram-positive organisms  PT evaluated the patient and and followed him. Patient seems to be forgetful he declined options provided to him by social service refused to have any family input  Patient refused to enroll in an alcohol rehab program stated that he was not needed. Patient was discharged in a stable condition to remaining prescribed medications to have a 2D echo scheduled as an outpatient.   Patient to follow-up as an outpatient with the PCP        Consults:   IP CONSULT TO HOSPITALIST  IP CONSULT TO CARDIOLOGY  IP CONSULT TO SOCIAL WORK  IP CONSULT TO SOCIAL WORK    Discharge Diagnoses:  Lightheadedness  Chest pain  Hypertension  UTI resolved        Discharge Instructions / Follow up:    Future Appointments   Date Time Provider Makeda Cisnerosi   7/26/2021  9:00 AM Otilia Galindo MD [facility]       Continued appropriate risk factor modification of blood pressure, diabetes and serum lipids will remain essential to reducing risk of future atherosclerotic development    Activity: activity as tolerated    Significant labs:  CBC:   Recent Labs     05/11/21 1827 05/12/21  0538   WBC 6.2 6.3   RBC 4.36 4.42   HGB 13.6 13.6   HCT 39.3 39.7   MCV 90.1 89.8   RDW 13.2 13.3    135     BMP:   Recent Labs     05/11/21 1827 05/12/21  0538 05/14/21  0435    138 136   K 4.1 4.0 3.7    104 100   CO2 22 24 23   BUN 8 8 7   CREATININE 0.9 0.9 0.9     LFT:  Recent Labs     05/11/21 1827 05/14/21  0435   PROT 7.4 7.2   ALKPHOS 92 93   ALT 42* 44*   AST 62* 45*   BILITOT 2.6* 1.6*     PT/INR: No results for input(s): INR, APTT in the last 72 hours. BNP: No results for input(s): BNP in the last 72 hours. Hgb A1C:   Lab Results   Component Value Date    LABA1C 5.7 (H) 05/08/2021     Folate and B12: No results found for: Nataliia Aponte, No results found for: FOLATE  Thyroid Studies:   Lab Results   Component Value Date    TSH 1.333 01/07/2011       Urinalysis:    Lab Results   Component Value Date    NITRU Negative 05/11/2021    WBCUA 5-10 05/11/2021    BACTERIA MANY 05/11/2021    RBCUA 2-5 05/11/2021    BLOODU SMALL 05/11/2021    SPECGRAV 1.020 05/11/2021    GLUCOSEU Negative 05/11/2021       Imaging:  Xr Chest (2 Vw)    Result Date: 5/9/2021  EXAMINATION: TWO XRAY VIEWS OF THE CHEST 5/9/2021 3:15 pm COMPARISON: None.  HISTORY: ORDERING SYSTEM PROVIDED HISTORY: dizziness TECHNOLOGIST PROVIDED HISTORY: If in ER room at the time of exam, OK to change to portable 1 view Reason for exam:->dizziness What reading provider will be dictating this exam?->CRC FINDINGS: The lungs are without acute focal process. There is no effusion or pneumothorax. The cardiomediastinal silhouette is without acute process. The osseous structures are without acute process. No acute process. Xr Chest (2 Vw)    Result Date: 5/7/2021  EXAMINATION: TWO XRAY VIEWS OF THE CHEST 5/7/2021 3:13 pm COMPARISON: None. HISTORY: ORDERING SYSTEM PROVIDED HISTORY: chest pain TECHNOLOGIST PROVIDED HISTORY: Reason for exam:->chest pain What reading provider will be dictating this exam?->CRC FINDINGS: The lungs are without acute focal process. There is no effusion or pneumothorax. The cardiomediastinal silhouette is without acute process. The osseous structures are without acute process. No acute process. Ct Head Wo Contrast    Result Date: 5/11/2021  EXAMINATION: CT OF THE HEAD WITHOUT CONTRAST  5/11/2021 7:37 pm TECHNIQUE: CT of the head was performed without the administration of intravenous contrast. Dose modulation, iterative reconstruction, and/or weight based adjustment of the mA/kV was utilized to reduce the radiation dose to as low as reasonably achievable. COMPARISON: 05/07/2021 HISTORY: ORDERING SYSTEM PROVIDED HISTORY: altered TECHNOLOGIST PROVIDED HISTORY: Has a \"code stroke\" or \"stroke alert\" been called? ->No Reason for exam:->altered Decision Support Exception - unselect if not a suspected or confirmed emergency medical condition->Emergency Medical Condition (MA) What reading provider will be dictating this exam?->CRC FINDINGS: BRAIN/VENTRICLES: There is no acute intracranial hemorrhage, mass effect or midline shift. No abnormal extra-axial fluid collection. The gray-white differentiation is maintained without evidence of an acute infarct. There is no evidence of hydrocephalus. ORBITS: The visualized portion of the orbits demonstrate no acute abnormality. SINUSES: The visualized paranasal sinuses and mastoid air cells demonstrate no acute abnormality.  SOFT TISSUES/SKULL:  No acute abnormality of the visualized skull or soft tissues. No acute intracranial abnormality. Ct Head Wo Contrast    Result Date: 5/7/2021  EXAMINATION: CT OF THE HEAD WITHOUT CONTRAST  5/7/2021 3:18 pm TECHNIQUE: CT of the head was performed without the administration of intravenous contrast. Dose modulation, iterative reconstruction, and/or weight based adjustment of the mA/kV was utilized to reduce the radiation dose to as low as reasonably achievable. COMPARISON: None. HISTORY: ORDERING SYSTEM PROVIDED HISTORY: lightheaded near syncope TECHNOLOGIST PROVIDED HISTORY: Has a \"code stroke\" or \"stroke alert\" been called? ->No Reason for exam:->lightheaded near syncope Decision Support Exception - unselect if not a suspected or confirmed emergency medical condition->Emergency Medical Condition (MA) What reading provider will be dictating this exam?->CRC FINDINGS: BRAIN/VENTRICLES: There is no acute intracranial hemorrhage, mass effect or midline shift. No abnormal extra-axial fluid collection. The gray-white differentiation is maintained without evidence of an acute infarct. There is no evidence of hydrocephalus. ORBITS: The visualized portion of the orbits demonstrate no acute abnormality. SINUSES: The visualized paranasal sinuses and mastoid air cells demonstrate no acute abnormality. Mucosal thickening in the maxillary sinuses bilaterally and left sphenoid sinus. SOFT TISSUES/SKULL:  No acute abnormality of the visualized skull or soft tissues. No acute intracranial abnormality. Chronic maxillary and sphenoid sinusitis. Xr Chest Portable    Result Date: 5/11/2021  EXAMINATION: ONE XRAY VIEW OF THE CHEST 5/11/2021 6:13 pm COMPARISON: May 9 HISTORY: ORDERING SYSTEM PROVIDED HISTORY: Shortness of breath TECHNOLOGIST PROVIDED HISTORY: Reason for exam:->Shortness of breath What reading provider will be dictating this exam?->CRC FINDINGS: The lungs are without acute focal process. There is no effusion or pneumothorax. The cardiomediastinal silhouette is without acute process. The osseous structures are without acute process. No acute process. Nm Cardiac Stress Test Nuclear Imaging    Result Date: 5/12/2021  Indication:  Chest Pain Clinical History:   Patient has known history of coronary artery disease. Procedure:  Pharmacologic stress testing was performed with Regadenoson 0.4 mg for 15 seconds. IMAGING: Myocardial perfusion imaging was performed at rest 30-35 minutes following the intravenous injection of 12 mCi of (Tc-Sestamibi) followed by 10 ml of Normal Saline. As per infusion protocol, the patient was injected intravenously with 35 mCi of (Tc-Sestamibi) followed by 10 ml of Normal Saline. Gated post-stress tomographic imaging was performed 20-25 minutes after stress. FINDINGS: The overall quality of the study was good. Left ventricular cavity size was noted to be normal. Rotational analog analysis demonstrated no patient motion or extracardiac activity. There is soft tissue diaphragmatic attenuation artifact. The gated SPECT stress imaging in the short, vertical long, and horizontal long axis demonstrated abnormal tracer distribution in the myocardium. A  mild defect was present in the mid to basal inferior wall(s) that was  small sized by quantification with hypokinesis in the basal inferior wall suggestive of prior infarct. The resting images show no change. Gated SPECT left ventricular ejection fraction was calculated to be 43% with moderate global hypokinesis TID ratio 0.94.     1.  ECG during the infusion did not change. 2.  The myocardial perfusion imaging was abnormal.  3.  The abnormality was a small area of fixed perfusion defect involving the mid to basal inferior wall suggestive of prior infarct. 4.  Overall left ventricular systolic function was abnormal with regional wall motion abnormalities. 5.  No transient ischemic dilatation.  6.  Intermediate risk general pharmacologic stress test. Thank you for sending your patient to this [employer]. Jolynn Arguello MD, [facility], [address] cardiology. Us Carotid Artery Bilateral    Result Date: 2021  Patient MRN:  [id number] : 1965 Age: 64 years Gender: Male Order Date:  2021 10:24 AM EXAM: US CAROTID ARTERY BILATERAL NUMBER OF IMAGES:  48 INDICATION:  Dizziness Dizziness What reading provider will be dictating this exam?->[facility] COMPARISON: None FINDINGS: Velocities are within normal limits ICA\CCA ratios are  within normal limits The right vertebral artery doppler images demonstrate  antegrade flow. The left vertebral artery doppler images demonstrate  antegrade flow. Grey scale images demonstrate mild plaque identified in the right and left carotid arteries. Atherosclerotic disease. No hemodynamically significant stenosis is identified Estimated stenosis by NASCET criteria in the proximal right carotid artery is between 0% and 49%. Estimated stenosis by NASCET criteria in the proximal left carotid artery is between 0% and 49%. Discharge Medications:      Medication List      START taking these medications    vitamin B-1 100 MG tablet  Commonly known as: THIAMINE  Take 1 tablet by mouth daily  Start taking on:  May 15, 2021        CONTINUE taking these medications    amLODIPine 10 MG tablet  Commonly known as: NORVASC  Take 1 tablet by mouth daily           Where to Get Your Medications      These medications were sent to [facility] [address] [address]    Phone: [phone]   · amLODIPine 10 MG tablet  · vitamin B-1 100 MG tablet         Time Spent on discharge is more than 45 minutes in the examination, evaluation, counseling and review of medications and discharge plan.    +++++++++++++++++++++++++++++++++++++++++++++++++  Monika Sen MD  [facility] Phys[facility] - [address] [place], [place]  +++++++++++++++++++++++++++++++++++++++++++++++++  NOTE: This report was transcribed using voice recognition software. Every effort was made to ensure accuracy; however, inadvertent computerized transcription errors may be present.

## 2021-05-27 ENCOUNTER — APPOINTMENT (OUTPATIENT)
Dept: CT IMAGING | Age: 56
End: 2021-05-27
Payer: MEDICAID

## 2021-05-27 ENCOUNTER — APPOINTMENT (OUTPATIENT)
Dept: GENERAL RADIOLOGY | Age: 56
End: 2021-05-27
Payer: MEDICAID

## 2021-05-27 ENCOUNTER — HOSPITAL ENCOUNTER (EMERGENCY)
Age: 56
Discharge: HOME | End: 2021-05-27
Attending: EMERGENCY MEDICINE
Payer: MEDICAID

## 2021-05-27 VITALS
BODY MASS INDEX: 34.65 KG/M2 | SYSTOLIC BLOOD PRESSURE: 134 MMHG | DIASTOLIC BLOOD PRESSURE: 91 MMHG | TEMPERATURE: 97.1 F | RESPIRATION RATE: 20 BRPM | WEIGHT: 270 LBS | OXYGEN SATURATION: 99 % | HEIGHT: 74 IN | HEART RATE: 86 BPM

## 2021-05-27 DIAGNOSIS — Z00.8 PATIENT NEEDS PSYCHIATRIC HOLD FOR EVALUATION: Primary | ICD-10-CM

## 2021-05-27 LAB
ACETAMINOPHEN LEVEL: <5 MCG/ML (ref 10–30)
ALBUMIN SERPL-MCNC: 4 G/DL (ref 3.5–5.2)
ALP BLD-CCNC: 97 U/L (ref 40–129)
ALT SERPL-CCNC: 28 U/L (ref 0–40)
AMPHETAMINE SCREEN, URINE: NOT DETECTED
ANION GAP SERPL CALCULATED.3IONS-SCNC: 13 MMOL/L (ref 7–16)
AST SERPL-CCNC: 28 U/L (ref 0–39)
BACTERIA: ABNORMAL /HPF
BARBITURATE SCREEN URINE: NOT DETECTED
BASOPHILS ABSOLUTE: 0.07 E9/L (ref 0–0.2)
BASOPHILS RELATIVE PERCENT: 0.9 % (ref 0–2)
BENZODIAZEPINE SCREEN, URINE: NOT DETECTED
BILIRUB SERPL-MCNC: 1.7 MG/DL (ref 0–1.2)
BILIRUBIN URINE: NEGATIVE
BLOOD, URINE: NEGATIVE
BUN BLDV-MCNC: 8 MG/DL (ref 6–20)
CALCIUM SERPL-MCNC: 9.5 MG/DL (ref 8.6–10.2)
CANNABINOID SCREEN URINE: NOT DETECTED
CHLORIDE BLD-SCNC: 102 MMOL/L (ref 98–107)
CLARITY: CLEAR
CO2: 25 MMOL/L (ref 22–29)
COCAINE METABOLITE SCREEN URINE: NOT DETECTED
COLOR: YELLOW
CREAT SERPL-MCNC: 1.3 MG/DL (ref 0.7–1.2)
EKG ATRIAL RATE: 82 BPM
EKG P AXIS: 32 DEGREES
EKG P-R INTERVAL: 134 MS
EKG Q-T INTERVAL: 420 MS
EKG QRS DURATION: 102 MS
EKG QTC CALCULATION (BAZETT): 490 MS
EKG R AXIS: -43 DEGREES
EKG T AXIS: 77 DEGREES
EKG VENTRICULAR RATE: 82 BPM
EOSINOPHILS ABSOLUTE: 0.07 E9/L (ref 0.05–0.5)
EOSINOPHILS RELATIVE PERCENT: 0.9 % (ref 0–6)
ETHANOL: <10 MG/DL (ref 0–0.08)
FENTANYL SCREEN, URINE: NOT DETECTED
GFR AFRICAN AMERICAN: >60
GFR NON-AFRICAN AMERICAN: >60 ML/MIN/1.73
GLUCOSE BLD-MCNC: 118 MG/DL (ref 74–99)
GLUCOSE URINE: NEGATIVE MG/DL
HCT VFR BLD CALC: 40.9 % (ref 37–54)
HEMOGLOBIN: 14 G/DL (ref 12.5–16.5)
HYALINE CASTS: ABNORMAL /LPF (ref 0–2)
KETONES, URINE: ABNORMAL MG/DL
LEUKOCYTE ESTERASE, URINE: ABNORMAL
LYMPHOCYTES ABSOLUTE: 0.41 E9/L (ref 1.5–4)
LYMPHOCYTES RELATIVE PERCENT: 5.3 % (ref 20–42)
Lab: NORMAL
MCH RBC QN AUTO: 30 PG (ref 26–35)
MCHC RBC AUTO-ENTMCNC: 34.2 % (ref 32–34.5)
MCV RBC AUTO: 87.6 FL (ref 80–99.9)
METHADONE SCREEN, URINE: NOT DETECTED
MONOCYTES ABSOLUTE: 0.33 E9/L (ref 0.1–0.95)
MONOCYTES RELATIVE PERCENT: 4.4 % (ref 2–12)
NEUTROPHILS ABSOLUTE: 7.3 E9/L (ref 1.8–7.3)
NEUTROPHILS RELATIVE PERCENT: 88.6 % (ref 43–80)
NITRITE, URINE: NEGATIVE
OPIATE SCREEN URINE: NOT DETECTED
OXYCODONE URINE: NOT DETECTED
PDW BLD-RTO: 12.9 FL (ref 11.5–15)
PH UA: 5.5 (ref 5–9)
PHENCYCLIDINE SCREEN URINE: NOT DETECTED
PLATELET # BLD: 199 E9/L (ref 130–450)
PMV BLD AUTO: 9.9 FL (ref 7–12)
POIKILOCYTES: ABNORMAL
POLYCHROMASIA: ABNORMAL
POTASSIUM SERPL-SCNC: 3.8 MMOL/L (ref 3.5–5)
PROTEIN UA: NEGATIVE MG/DL
RBC # BLD: 4.67 E12/L (ref 3.8–5.8)
RBC UA: ABNORMAL /HPF (ref 0–2)
SALICYLATE, SERUM: <0.3 MG/DL (ref 0–30)
SODIUM BLD-SCNC: 140 MMOL/L (ref 132–146)
SPECIFIC GRAVITY UA: 1.02 (ref 1–1.03)
TEAR DROP CELLS: ABNORMAL
TOTAL PROTEIN: 8.1 G/DL (ref 6.4–8.3)
TRICYCLIC ANTIDEPRESSANTS SCREEN SERUM: NEGATIVE NG/ML
TROPONIN, HIGH SENSITIVITY: 25 NG/L (ref 0–11)
TROPONIN, HIGH SENSITIVITY: 26 NG/L (ref 0–11)
UROBILINOGEN, URINE: 2 E.U./DL
WBC # BLD: 8.2 E9/L (ref 4.5–11.5)
WBC UA: ABNORMAL /HPF (ref 0–5)

## 2021-05-27 PROCEDURE — 85025 COMPLETE CBC W/AUTO DIFF WBC: CPT

## 2021-05-27 PROCEDURE — 82077 ASSAY SPEC XCP UR&BREATH IA: CPT

## 2021-05-27 PROCEDURE — 93010 ELECTROCARDIOGRAM REPORT: CPT | Performed by: INTERNAL MEDICINE

## 2021-05-27 PROCEDURE — 80307 DRUG TEST PRSMV CHEM ANLYZR: CPT

## 2021-05-27 PROCEDURE — 99284 EMERGENCY DEPT VISIT MOD MDM: CPT

## 2021-05-27 PROCEDURE — 93005 ELECTROCARDIOGRAM TRACING: CPT | Performed by: EMERGENCY MEDICINE

## 2021-05-27 PROCEDURE — 80143 DRUG ASSAY ACETAMINOPHEN: CPT

## 2021-05-27 PROCEDURE — 80179 DRUG ASSAY SALICYLATE: CPT

## 2021-05-27 PROCEDURE — 71045 X-RAY EXAM CHEST 1 VIEW: CPT

## 2021-05-27 PROCEDURE — 81001 URINALYSIS AUTO W/SCOPE: CPT

## 2021-05-27 PROCEDURE — 80053 COMPREHEN METABOLIC PANEL: CPT

## 2021-05-27 PROCEDURE — 84484 ASSAY OF TROPONIN QUANT: CPT

## 2021-05-27 PROCEDURE — 70450 CT HEAD/BRAIN W/O DYE: CPT

## 2021-05-27 NOTE — ED PROVIDER NOTES
HPI:  5/27/21, Time: 2:57 AM EDT         Juan Manuel Gambino is a 64 y.o. male presenting to the ED for psychiatric evaluation, beginning 1 day ago. The complaint has been persistent, moderate in severity, and worsened by nothing. Patient was brought in by EMS. Per report from police patient was calling because someone was at his door. When police arrived there was no one in his door. Patient reports there was some peaking through his people. Patient was confused per report from EMS. Patient here is awake alert oriented to person and place but not to time. Patient reporting no chest pain no abdominal pain no vomiting or diarrhea. Patient reporting no homicidal suicidal thoughts he reports no fever chills or cough. There is no headache. There is no history of trauma or injury. ROS:   Pertinent positives and negatives are stated within HPI, all other systems reviewed and are negative.  --------------------------------------------- PAST HISTORY ---------------------------------------------  Past Medical History:  has a past medical history of CAD (coronary artery disease). Past Surgical History:  has no past surgical history on file. Social History:  reports that he has never smoked. He has never used smokeless tobacco. He reports previous drug use. He reports that he does not drink alcohol. Family History: family history is not on file. The patients home medications have been reviewed. Allergies: Patient has no known allergies.     ---------------------------------------------------PHYSICAL EXAM--------------------------------------    Constitutional/General: Alert and oriented to person and place but not time, well appearing, non toxic in NAD  Head: Normocephalic and atraumatic  Eyes: PERRL, EOMI  Mouth: Oropharynx clear, handling secretions, no trismus  Neck: Supple, full ROM, non tender to palpation in the midline, no stridor, no crepitus, no meningeal signs  Pulmonary: Lungs clear to Normal    The patients available past medical records and past encounters were reviewed. ------------------------------ ED COURSE/MEDICAL DECISION MAKING----------------------  Medications - No data to display          Medical Decision Making:   Patient presenting here because of thinking there is people looking into his room/house. Patient has been here before and was recently admitted to the hospital. Patient labs noted reviewed as well as EKG as well as CT head. Plan will be for  to evaluate. Re-Evaluations:             Re-evaluation. Patients symptoms show no change  Patient reevaluated no distress vital signs stable. Consultations:                 Critical Care: This patient's ED course included: a personal history and physicial eaxmination    This patient has been closely monitored during their ED course. Counseling: The emergency provider has spoken with the patient and discussed todays results, in addition to providing specific details for the plan of care and counseling regarding the diagnosis and prognosis. Questions are answered at this time and they are agreeable with the plan.       --------------------------------- IMPRESSION AND DISPOSITION ---------------------------------    IMPRESSION  1. Patient needs psychiatric hold for evaluation        DISPOSITION  Disposition:  to evaluate  Patient condition is stable        NOTE: This report was transcribed using voice recognition software.  Every effort was made to ensure accuracy; however, inadvertent computerized transcription errors may be present          Steffi Fernando MD  05/27/21 0872       Steffi Fernando MD  05/27/21 7876

## 2021-05-27 NOTE — ED NOTES
SW attempted to assess pt. Pt is sleeping at this time and would not wake up for assessment. SW will attempt again later.       Galileo Camara  05/27/21 4023

## 2021-05-27 NOTE — ED NOTES
Pt sts,\"I saw my new neighbor looking in my window and was at my door. \"      Georgi Win RN  05/27/21 6820

## 2021-05-27 NOTE — ED NOTES
Emergency Department [facility] AN [facility]FFILI[facility] OF [facility] Biopsychosocial Assessment Note    Chief Complaint: Pt is a 64year old male that presents to the ED with paranoia due to his neighbor \"looking in his peep hole on his door. When police arrived pt was not answering questions appropriately. \"    MSE: Pt is alert and oriented x4. Pt's mood is anxious, affect is congruent. Eye contact is average, speech is clear, rate and volume is normal. Pt is calm and cooperative during assessment. Pt was sleeping before assessment was complete so he was groggy. Pt denies SI/HI AVH. Insight and judgement is fair. Clinical Summary/History: Pt reports that he was brought to the ED due to chest pain and lightheadedness. Pt was at his apartment with friends and he is unsure how the ambulance got to his house, he doesn't remember if he called 911 or if his friends called for him. When confronted about his neighbor he stated that his neighbor was standing outside of his apartment door, and he was unsure why. Pt lives in apartment alone. Pt's main support is his friends and his family. Pt denies a mental health history. Pt has no previous psychiatric admissions. Pt's sleep has been minimal because of has sleep apena and he reports sleeping in 2 hour intervals and waking up periodically. Pt stated that his appetite is average, \"not always hungry, eats when he feels hungry. \" Pt stated that he drinks alcohol every now and then, but this is not a problem for him. Per previous documentation on 5/12/2021 reported by pt's brother,  \"Pt drinks on average 5 tall boys (16-24oz can of beer) daily and will start drinking first thing in the morning some days. \" Pt's current labs show no alcohol in pt's sytem. Pt denies the use of drugs. Gender  [x] Male [] Female [] Transgender  [] Other    Sexual Orientation    [x] Heterosexual [] Homosexual [] Bisexual [] Other    Suicidal Behavioral: CSSR-S Complete.   [] Reports:    [] Past [] Present   [x] Denies    Homicidal/ Violent Behavior  [] Reports:   [] Past [] Present   [x] Denies     Hallucinations/Delusions   [] Reports:   [x] Denies     Substance Use/Alcohol Use/Addiction: SBIRT Screen Complete. [x] Reports: \"some alcohol every now and then\"  [] Denies     Trauma History  [] Reports:  [x] Denies     Collateral Information:    none collected at this time. Level of Care/Disposition Plan  [x] Home:   [] Outpatient Provider:   [] Crisis Unit:   [] Inpatient Psychiatric Unit:  [] Other:     Pt is not presenting with psychiatric symptoms that meet criteria for inpatient admission.       Phil Urena  05/27/21 1002

## 2021-06-04 ENCOUNTER — HOSPITAL ENCOUNTER (INPATIENT)
Age: 56
LOS: 8 days | Discharge: HOME | DRG: 751 | End: 2021-06-15
Attending: EMERGENCY MEDICINE | Admitting: PSYCHIATRY & NEUROLOGY
Payer: MEDICARE

## 2021-06-04 ENCOUNTER — APPOINTMENT (OUTPATIENT)
Dept: GENERAL RADIOLOGY | Age: 56
DRG: 751 | End: 2021-06-04
Payer: MEDICARE

## 2021-06-04 DIAGNOSIS — F22 PARANOIA (HCC): Primary | ICD-10-CM

## 2021-06-04 LAB
ACETAMINOPHEN LEVEL: <5 MCG/ML (ref 10–30)
ALBUMIN SERPL-MCNC: 4.2 G/DL (ref 3.5–5.2)
ALP BLD-CCNC: 95 U/L (ref 40–129)
ALT SERPL-CCNC: 24 U/L (ref 0–40)
AMPHETAMINE SCREEN, URINE: NOT DETECTED
ANION GAP SERPL CALCULATED.3IONS-SCNC: 14 MMOL/L (ref 7–16)
AST SERPL-CCNC: 28 U/L (ref 0–39)
BACTERIA: ABNORMAL /HPF
BARBITURATE SCREEN URINE: NOT DETECTED
BASOPHILS ABSOLUTE: 0.04 E9/L (ref 0–0.2)
BASOPHILS RELATIVE PERCENT: 0.4 % (ref 0–2)
BENZODIAZEPINE SCREEN, URINE: NOT DETECTED
BILIRUB SERPL-MCNC: 1.9 MG/DL (ref 0–1.2)
BILIRUBIN URINE: ABNORMAL
BLOOD, URINE: NEGATIVE
BUN BLDV-MCNC: 9 MG/DL (ref 6–20)
CALCIUM SERPL-MCNC: 9.8 MG/DL (ref 8.6–10.2)
CANNABINOID SCREEN URINE: NOT DETECTED
CHLORIDE BLD-SCNC: 104 MMOL/L (ref 98–107)
CLARITY: CLEAR
CO2: 23 MMOL/L (ref 22–29)
COCAINE METABOLITE SCREEN URINE: NOT DETECTED
COLOR: YELLOW
CREAT SERPL-MCNC: 1.5 MG/DL (ref 0.7–1.2)
CRYSTALS, UA: ABNORMAL /HPF
EKG ATRIAL RATE: 88 BPM
EKG P AXIS: 23 DEGREES
EKG P-R INTERVAL: 130 MS
EKG Q-T INTERVAL: 434 MS
EKG QRS DURATION: 102 MS
EKG QTC CALCULATION (BAZETT): 525 MS
EKG R AXIS: -44 DEGREES
EKG T AXIS: 75 DEGREES
EKG VENTRICULAR RATE: 88 BPM
EOSINOPHILS ABSOLUTE: 0.02 E9/L (ref 0.05–0.5)
EOSINOPHILS RELATIVE PERCENT: 0.2 % (ref 0–6)
ETHANOL: <10 MG/DL (ref 0–0.08)
FENTANYL SCREEN, URINE: NOT DETECTED
GFR AFRICAN AMERICAN: 59
GFR NON-AFRICAN AMERICAN: 59 ML/MIN/1.73
GLUCOSE BLD-MCNC: 155 MG/DL (ref 74–99)
GLUCOSE URINE: NEGATIVE MG/DL
HCT VFR BLD CALC: 43.3 % (ref 37–54)
HEMOGLOBIN: 14.9 G/DL (ref 12.5–16.5)
IMMATURE GRANULOCYTES #: 0.07 E9/L
IMMATURE GRANULOCYTES %: 0.7 % (ref 0–5)
KETONES, URINE: NEGATIVE MG/DL
LEUKOCYTE ESTERASE, URINE: ABNORMAL
LIPASE: 49 U/L (ref 13–60)
LYMPHOCYTES ABSOLUTE: 0.7 E9/L (ref 1.5–4)
LYMPHOCYTES RELATIVE PERCENT: 7.1 % (ref 20–42)
Lab: NORMAL
MCH RBC QN AUTO: 29.9 PG (ref 26–35)
MCHC RBC AUTO-ENTMCNC: 34.4 % (ref 32–34.5)
MCV RBC AUTO: 86.9 FL (ref 80–99.9)
METHADONE SCREEN, URINE: NOT DETECTED
MONOCYTES ABSOLUTE: 0.83 E9/L (ref 0.1–0.95)
MONOCYTES RELATIVE PERCENT: 8.4 % (ref 2–12)
NEUTROPHILS ABSOLUTE: 8.21 E9/L (ref 1.8–7.3)
NEUTROPHILS RELATIVE PERCENT: 83.2 % (ref 43–80)
NITRITE, URINE: NEGATIVE
OPIATE SCREEN URINE: NOT DETECTED
OXYCODONE URINE: NOT DETECTED
PDW BLD-RTO: 13.2 FL (ref 11.5–15)
PH UA: 5.5 (ref 5–9)
PHENCYCLIDINE SCREEN URINE: NOT DETECTED
PLATELET # BLD: 179 E9/L (ref 130–450)
PMV BLD AUTO: 9.7 FL (ref 7–12)
POTASSIUM REFLEX MAGNESIUM: 3.7 MMOL/L (ref 3.5–5)
PROTEIN UA: NEGATIVE MG/DL
RBC # BLD: 4.98 E12/L (ref 3.8–5.8)
RBC UA: ABNORMAL /HPF (ref 0–2)
SALICYLATE, SERUM: <0.3 MG/DL (ref 0–30)
SODIUM BLD-SCNC: 141 MMOL/L (ref 132–146)
SPECIFIC GRAVITY UA: 1.02 (ref 1–1.03)
TOTAL PROTEIN: 8.4 G/DL (ref 6.4–8.3)
TRICYCLIC ANTIDEPRESSANTS SCREEN SERUM: NEGATIVE NG/ML
TROPONIN, HIGH SENSITIVITY: 31 NG/L (ref 0–11)
TROPONIN, HIGH SENSITIVITY: 36 NG/L (ref 0–11)
UROBILINOGEN, URINE: 4 E.U./DL
WBC # BLD: 9.9 E9/L (ref 4.5–11.5)
WBC UA: ABNORMAL /HPF (ref 0–5)

## 2021-06-04 PROCEDURE — 99285 EMERGENCY DEPT VISIT HI MDM: CPT

## 2021-06-04 PROCEDURE — 83690 ASSAY OF LIPASE: CPT

## 2021-06-04 PROCEDURE — 80307 DRUG TEST PRSMV CHEM ANLYZR: CPT

## 2021-06-04 PROCEDURE — 80179 DRUG ASSAY SALICYLATE: CPT

## 2021-06-04 PROCEDURE — 93005 ELECTROCARDIOGRAM TRACING: CPT | Performed by: STUDENT IN AN ORGANIZED HEALTH CARE EDUCATION/TRAINING PROGRAM

## 2021-06-04 PROCEDURE — 2580000003 HC RX 258: Performed by: STUDENT IN AN ORGANIZED HEALTH CARE EDUCATION/TRAINING PROGRAM

## 2021-06-04 PROCEDURE — 81001 URINALYSIS AUTO W/SCOPE: CPT

## 2021-06-04 PROCEDURE — 80143 DRUG ASSAY ACETAMINOPHEN: CPT

## 2021-06-04 PROCEDURE — 80053 COMPREHEN METABOLIC PANEL: CPT

## 2021-06-04 PROCEDURE — 71045 X-RAY EXAM CHEST 1 VIEW: CPT

## 2021-06-04 PROCEDURE — 84484 ASSAY OF TROPONIN QUANT: CPT

## 2021-06-04 PROCEDURE — 85025 COMPLETE CBC W/AUTO DIFF WBC: CPT

## 2021-06-04 PROCEDURE — 82077 ASSAY SPEC XCP UR&BREATH IA: CPT

## 2021-06-04 RX ORDER — 0.9 % SODIUM CHLORIDE 0.9 %
1000 INTRAVENOUS SOLUTION INTRAVENOUS ONCE
Status: COMPLETED | OUTPATIENT
Start: 2021-06-04 | End: 2021-06-04

## 2021-06-04 RX ADMIN — SODIUM CHLORIDE 1000 ML: 9 INJECTION, SOLUTION INTRAVENOUS at 07:20

## 2021-06-04 ASSESSMENT — ENCOUNTER SYMPTOMS
DIARRHEA: 0
SHORTNESS OF BREATH: 0
CHEST TIGHTNESS: 0
CONSTIPATION: 0
RHINORRHEA: 0
NAUSEA: 0
COUGH: 0
EYE REDNESS: 0
PHOTOPHOBIA: 0
VOMITING: 0
EYE PAIN: 0
SORE THROAT: 0
TROUBLE SWALLOWING: 0
ABDOMINAL PAIN: 0
WHEEZING: 0
BACK PAIN: 0
APNEA: 0

## 2021-06-04 NOTE — ED PROVIDER NOTES
[address]      Pt Name: Cortes Man  MRN: [id number]  [place] 1965  Date of evaluation: 6/4/2021      CHIEF COMPLAINT       Chief Complaint   Patient presents with    Other     pt called PD because he thought someone was \"peeping through his peephole\". PD wanted pt evaluated because when they arrived, pt was \"sweaty\". pt has no complaints at this time        HPI  Cortes Man is a 64 y.o. male with history of hypertension who presents to the emergency department by EMS after please were concerned. Clinic please were called because patient thought \"someone was beating through his people\". Police apparently arrived and thought that the patient looked diaphoretic. He then called EMS. Patient has no other complaints. He denies any lightheadedness, headache, chest pain, shortness of breath, fevers or chills. Denies any anxiety, SI or HI. He regularly uses alcohol per chart review. Symptoms are intermittent, mild, nothing makes them better or worse. Except as noted above the remainder of the review of systems was reviewed and negative. Review of Systems   Constitutional: Positive for diaphoresis. Negative for activity change, chills, fatigue and fever. HENT: Negative for rhinorrhea, sore throat and trouble swallowing. Eyes: Negative for photophobia, pain and redness. Respiratory: Negative for apnea, cough, chest tightness, shortness of breath and wheezing. Cardiovascular: Negative for chest pain, palpitations and leg swelling. Gastrointestinal: Negative for abdominal pain, constipation, diarrhea, nausea and vomiting. Endocrine: Negative for polyuria. Genitourinary: Negative for difficulty urinating and dysuria. Musculoskeletal: Negative for back pain, neck pain and neck stiffness. Skin: Negative for pallor and rash.    Neurological: Negative for dizziness, syncope, weakness, presents to the emergency department after concerns of people \"looking in side of his people\". EMS brought patient after they have been called. In the emergency department the patient is awake and alert, hemodynamically stable, afebrile and in no respiratory distress. Somewhat anxious however no hallucinations actively no SI or HI. Review of chart shows multiple similar presentations. Lab work shows mild elevation creatinine 1.5 fluids were administered like secondary to mild dehydration. Initial troponin indeterminate however delta improving EKG showed no ischemic changes patient not having chest pain and less likely acute cardiac etiology of her symptoms. Alcohol and drug screen were negative. Electrolytes were normal.  Social work consult placed and patient medically cleared for further evaluation. ED Course as of Jun 07 0955 Fri Jun 04, 2021   9875 Patient is medically clear.     [LM]      ED Course User Index  [LM] Cleo Sher DO       --------------------------------------------- PAST HISTORY ---------------------------------------------  Past Medical History:  has a past medical history of CAD (coronary artery disease). Past Surgical History:  has no past surgical history on file. Social History:  reports that he has never smoked. He has never used smokeless tobacco. He reports previous drug use. He reports that he does not drink alcohol. Family History: family history is not on file. The patients home medications have been reviewed. Allergies: Patient has no known allergies.     -------------------------------------------------- RESULTS -------------------------------------------------    LABS:  Results for orders placed or performed during the hospital encounter of 06/04/21   COVID-19 & Influenza Combo    Specimen: Nasopharyngeal Swab   Result Value Ref Range    SARS-CoV-2 RNA, RT PCR NOT DETECTED NOT DETECTED    INFLUENZA A NOT DETECTED NOT DETECTED    INFLUENZA B NOT DETECTED NOT DETECTED   CBC Auto Differential   Result Value Ref Range    WBC 9.9 4.5 - 11.5 E9/L    RBC 4.98 3.80 - 5.80 E12/L    Hemoglobin 14.9 12.5 - 16.5 g/dL    Hematocrit 43.3 37.0 - 54.0 %    MCV 86.9 80.0 - 99.9 fL    MCH 29.9 26.0 - 35.0 pg    MCHC 34.4 32.0 - 34.5 %    RDW 13.2 11.5 - 15.0 fL    Platelets 836 346 - 417 E9/L    MPV 9.7 7.0 - 12.0 fL    Neutrophils % 83.2 (H) 43.0 - 80.0 %    Immature Granulocytes % 0.7 0.0 - 5.0 %    Lymphocytes % 7.1 (L) 20.0 - 42.0 %    Monocytes % 8.4 2.0 - 12.0 %    Eosinophils % 0.2 0.0 - 6.0 %    Basophils % 0.4 0.0 - 2.0 %    Neutrophils Absolute 8.21 (H) 1.80 - 7.30 E9/L    Immature Granulocytes # 0.07 E9/L    Lymphocytes Absolute 0.70 (L) 1.50 - 4.00 E9/L    Monocytes Absolute 0.83 0.10 - 0.95 E9/L    Eosinophils Absolute 0.02 (L) 0.05 - 0.50 E9/L    Basophils Absolute 0.04 0.00 - 0.20 E9/L   Comprehensive Metabolic Panel w/ Reflex to MG   Result Value Ref Range    Sodium 141 132 - 146 mmol/L    Potassium reflex Magnesium 3.7 3.5 - 5.0 mmol/L    Chloride 104 98 - 107 mmol/L    CO2 23 22 - 29 mmol/L    Anion Gap 14 7 - 16 mmol/L    Glucose 155 (H) 74 - 99 mg/dL    BUN 9 6 - 20 mg/dL    CREATININE 1.5 (H) 0.7 - 1.2 mg/dL    GFR Non-African American 59 >=60 mL/min/1.73    GFR African American 59     Calcium 9.8 8.6 - 10.2 mg/dL    Total Protein 8.4 (H) 6.4 - 8.3 g/dL    Albumin 4.2 3.5 - 5.2 g/dL    Total Bilirubin 1.9 (H) 0.0 - 1.2 mg/dL    Alkaline Phosphatase 95 40 - 129 U/L    ALT 24 0 - 40 U/L    AST 28 0 - 39 U/L   Lipase   Result Value Ref Range    Lipase 49 13 - 60 U/L   Troponin   Result Value Ref Range    Troponin, High Sensitivity 36 (H) 0 - 11 ng/L   Urinalysis, reflex to microscopic   Result Value Ref Range    Color, UA Yellow Straw/Yellow    Clarity, UA Clear Clear    Glucose, Ur Negative Negative mg/dL    Bilirubin Urine SMALL (A) Negative    Ketones, Urine Negative Negative mg/dL    Specific Gravity, UA 1.020 1.005 - 1.030    Blood, Urine Negative Negative    pH, UA 5.5 5.0 - 9.0    Protein, UA Negative Negative mg/dL    Urobilinogen, Urine 4.0 (A) <2.0 E.U./dL    Nitrite, Urine Negative Negative    Leukocyte Esterase, Urine SMALL (A) Negative   URINE DRUG SCREEN   Result Value Ref Range    Amphetamine Screen, Urine NOT DETECTED Negative <1000 ng/mL    Barbiturate Screen, Ur NOT DETECTED Negative < 200 ng/mL    Benzodiazepine Screen, Urine NOT DETECTED Negative < 200 ng/mL    Cannabinoid Scrn, Ur NOT DETECTED Negative < 50ng/mL    Cocaine Metabolite Screen, Urine NOT DETECTED Negative < 300 ng/mL    Opiate Scrn, Ur NOT DETECTED Negative < 300ng/mL    PCP Screen, Urine NOT DETECTED Negative < 25 ng/mL    Methadone Screen, Urine NOT DETECTED Negative <300 ng/mL    Oxycodone Urine NOT DETECTED Negative <100 ng/mL    FENTANYL SCREEN, URINE NOT DETECTED Negative <1 ng/mL    Drug Screen Comment: see below    Serum Drug Screen   Result Value Ref Range    Ethanol Lvl <10 mg/dL    Acetaminophen Level <5.0 (L) 10.0 - 06.8 mcg/mL    Salicylate, Serum <5.5 0.0 - 30.0 mg/dL    TCA Scrn NEGATIVE Cutoff:300 ng/mL   Troponin   Result Value Ref Range    Troponin, High Sensitivity 31 (H) 0 - 11 ng/L   Microscopic Urinalysis   Result Value Ref Range    WBC, UA 5-10 (A) 0 - 5 /HPF    RBC, UA NONE 0 - 2 /HPF    Bacteria, UA MODERATE (A) None Seen /HPF    Crystals, UA Few (A) None Seen /HPF   EKG 12 Lead   Result Value Ref Range    Ventricular Rate 88 BPM    Atrial Rate 88 BPM    P-R Interval 130 ms    QRS Duration 102 ms    Q-T Interval 434 ms    QTc Calculation (Bazett) 525 ms    P Axis 23 degrees    R Axis -44 degrees    T Axis 75 degrees   EKG 12 Lead   Result Value Ref Range    Ventricular Rate 91 BPM    Atrial Rate 91 BPM    P-R Interval 130 ms    QRS Duration 98 ms    Q-T Interval 424 ms    QTc Calculation (Bazett) 521 ms    P Axis 36 degrees    R Axis -42 degrees    T Axis 73 degrees   EKG 12 Lead   Result Value Ref Range    Ventricular Rate 87 BPM    Atrial Rate 87 BPM P-R Interval 136 ms    QRS Duration 100 ms    Q-T Interval 414 ms    QTc Calculation (Bazett) 498 ms    P Axis 34 degrees    R Axis 104 degrees    T Axis 0 degrees       RADIOLOGY:  XR CHEST PORTABLE   Final Result      There is no acute abnormality seen. ------------------------- NURSING NOTES AND VITALS REVIEWED ---------------------------  Date / Time Roomed:  6/4/2021  5:50 AM  ED Bed Assignment:  [facility]    The nursing notes within the ED encounter and vital signs as below have been reviewed. No data found. Oxygen Saturation Interpretation: Normal    ------------------------------------------ PROGRESS NOTES ------------------------------------------    Counseling:  I have spoken with the patient and discussed todays results, in addition to providing specific details for the plan of care and counseling regarding the diagnosis and prognosis. Their questions are answered at this time and they are agreeable with the plan of admission.    --------------------------------- ADDITIONAL PROVIDER NOTES ---------------------------------    This patient has remained hemodynamically stable during their ED course. Diagnosis:  1. Paranoia ([facility] [address]        Disposition:  Patient's disposition: Admit to mental health unit - medically cleared for admission  Patient's condition is stable.          Molly Garvin,   Resident  06/07/21 5099

## 2021-06-04 NOTE — ED PROVIDER NOTES
ATTENDING PROVIDER ATTESTATION:     Tigist Steiner presented to the emergency department for evaluation of Other (pt called PD because he thought someone was \"peeping through his peephole\". PD wanted pt evaluated because when they arrived, pt was \"sweaty\". pt has no complaints at this time)   and was initially evaluated by the Medical Resident. See Original ED Note for H&P and ED course above. I have reviewed and discussed the case, including pertinent history (medical, surgical, family and social) and exam findings with the Medical Resident assigned to Tigist Steiner. I have personally performed and/or participated in the history, exam, medical decision making, and procedures and agree with all pertinent clinical information and any additional changes or corrections are noted below in my assessment and plan. I have discussed this patient in detail with the resident, and provided the instruction and education,       I have reviewed my findings and recommendations with the assigned Medical Resident, Tigist Steiner and members of family present at the time of disposition. I have performed a history and physical examination of this patient and directly supervised the resident caring for this patient      History of Present Illness:    Presents to the ED for concerns for someone peering in his peephole in his door, beginning yesterday. The complaint has been intermittent, mild in severity, and worsened by nothing. Patient reports that although yesterday his neighbors were appearing through the peephole in his door. He says that this made him feel uncomfortable. He said that the police were there and they wanted him to get checked out because he looks sweaty. He denies any complaints. He denies any pain. No chest pain or shortness of breath. He denies abdominal pain. No suicidal or homicidal thoughts. He says he feels fine otherwise.         Review of Systems:   A complete review of systems was performed and pertinent positives and negatives are stated within HPI, all other systems reviewed and are negative.    --------------------------------------------- PAST HISTORY ---------------------------------------------  Past Medical History:  has a past medical history of CAD (coronary artery disease). Past Surgical History: denies history    Social History:  reports that he has never smoked. He has never used smokeless tobacco. He reports previous drug use. He reports that he does not drink alcohol. Family History: family history is not on file. Unless otherwise noted, family history is non contributory    The patients home medications have been reviewed. Allergies: Patient has no known allergies. Physical Exam:  Constitutional/General: Alert and oriented x3  Head: Normocephalic and atraumatic  Eyes: PERRL, EOMI, sclera non icteric  ENT: Oropharynx clear, handling secretions  Neck: Supple, full ROM, no stridor, no meningeal signs  Respiratory: Lungs clear to auscultation bilaterally, no wheezes, rales, or rhonchi. Not in respiratory distress  Cardiovascular:  Regular rate. Regular rhythm. No murmurs, no gallops, no rubs. 2+ distal pulses. Equal extremity pulses. GI:  Abdomen Soft, Non tender, Non distended. No rebound, guarding, or rigidity. No pulsatile masses. Musculoskeletal: Moves all extremities x 4. Warm and well perfused,  no clubbing, no cyanosis, no edema. Palpable peripheral pulses  Integument: skin warm and dry. No rashes. Neurologic: GCS 15, no focal deficits  Psychiatric: Normal Affect      I directly supervised any procedures performed by the resident and was present for the procedure including all critical portions of the procedure             I, Dr. Marliss Hamman, am the primary provider of record    My Medical Decision Making:         Paranoia  Medically stable for BHI evaluation  No SI or HI        1.  [facility])           Melissa Hawk MD  06/04/21 3110

## 2021-06-05 LAB
INFLUENZA A: NOT DETECTED
INFLUENZA B: NOT DETECTED
SARS-COV-2 RNA, RT PCR: NOT DETECTED

## 2021-06-05 PROCEDURE — 87636 SARSCOV2 & INF A&B AMP PRB: CPT

## 2021-06-05 PROCEDURE — 93005 ELECTROCARDIOGRAM TRACING: CPT | Performed by: EMERGENCY MEDICINE

## 2021-06-05 NOTE — ED NOTES
Per Bonnie Cabezas, previous SW, ED Doc is ok with Pt remaining here for the duration of the night and being reassessed in the morning.     ED Doc is requesting Pt to be moved back to [facility] AN [facility] as soon as a bed is available in section G    Charge RN made aware     Rosella Crigler, BARBIE, LSW  06/04/21 7661

## 2021-06-05 NOTE — ED NOTES
Pt resting comfortably. Calm. Pt on phone at this time.  Per MARIYA notes, pt awaiting dispo in AM     Eric Mercado RN  06/05/21 0104

## 2021-06-05 NOTE — ED NOTES
Patient resting comfortably in room at this time. Drink and warm blanket provided.       Zach Baldwin RN  06/04/21 3869

## 2021-06-05 NOTE — ED NOTES
Pt has no complaints other than being \"a little hungry\". VS obtained. Pt denies SI/HI and is calm and cooperative.  Pt awaiting dispo from social work     [facility]  06/05/21 8974

## 2021-06-05 NOTE — ED NOTES
Pt observed talking to self at times remains pleasent cont to deny si/hi/ hallucintions     Maryellen Fox RN  06/05/21 2033

## 2021-06-05 NOTE — ED NOTES
Received return phone call from Herbert. Patient case reviewed with the doctor. He expressed concerns regarding patient prolonged QTC, Requested repeat EKG and possible consult from cardiologist for recommendation on how to administer psychotropic medications.       Kennedy Boateng, MSW, LSW  06/05/21 0863

## 2021-06-05 NOTE — ED NOTES
Patient is a [employer]. No beds anticipated on [facility] 7S/7W today.  SW placed phone call to [employer], obtained bed day approval for referral to [facility], [place], [place]  06/05/21 9632

## 2021-06-05 NOTE — ED NOTES
Emergency Department [facility] Biopsychosocial Assessment Note    Chief Complaint: pt called PD because he thought someone was \"peeping through his peephole\".  PD wanted pt evaluated because when they arrived, pt was \"sweaty\".  pt has no complaints at this time    MSE:  Pt is stable, shared good eye contact, has clear speech, thoughts are lucid, has good insight and judgement, alert, oriented x's 3. Clinical Summary/History:   I met with pt, who reports that he came to the er with chest pains and feeling dizzy. He went on about his medical complaints. I asked pt about his experience with the peephole. He explained that for the past 3 days, he has seen shadows near his door and feels as if someone is looking in his peephole. He said that he has not looked out the door to see anyone in particular, but he \"knows who it is\". Pt said that he called the police a few times regarding this matter and has spoken to the landlord. Pt wants to return home, but he rather go home during the daytime, so that he can deal with this matter then. Pt asked to stay in the ER for the evening. Pt denies SI, HI and denies hallucinations. He denies having a MH hx and has no previous admissions. Pt works at [employer]. He plans to move if this problem continues. Pt wants to return home. However, I would recommend a quick re-assessment before discharging to make sure that he remains stable. Gender  [x] Male [] Female [] Transgender  [] Other    Sexual Orientation    [x] Heterosexual [] Homosexual [] Bisexual [] Other    Suicidal Behavioral: CSSR-S Complete. [] Reports:    [] Past [] Present   [x] Denies    Homicidal/ Violent Behavior  [] Reports:   [] Past [] Present   [x] Denies     Hallucinations/Delusions   [] Reports:   [x] Denies     Substance Use/Alcohol Use/Addiction: SBIRT Screen Complete.    [] Reports:   [x] Denies     Trauma History  [] Reports:  [x] Denies     Collateral Information:       Level of Care/Disposition Plan  [x] Home:   [] Outpatient Provider:   [] Crisis Unit:   [] Inpatient Psychiatric Unit:  [] Other:        Rigo Spears, EDWARD  06/04/21 3563

## 2021-06-05 NOTE — ED NOTES
Emergency Department [facility] OF [facility] Biopsychosocial Assessment Note    Chief Complaint:     Patient is a 64year old, male presenting to ED for increased paranoia and A/V hallucinations. Patient has been seen in the ED for the same chief complaint in the last 30 days. Patient has called the police several times after \"seeing people looking inside of his peephole\". In this visit and previous visits, patient did not know why he was brought to the emergency department. Patient admitted to seeing shadows and had reported to the midnight SW that he has been hearing things open and close. Patient has a documented hx of family concerns with patient hx of alcohol abuse. Patient has reported that he does not drink regularly - in this presentation and the past presentation, patient's alcohol level and UDS were both negative. Patient reportedly has been living in an unkept environment and not fully caring for himself. Per patient brother, the patient's home is not in good condition, and he does not buy groceries/food; or care for himself. Patient coworkers reportedly has been concerned about patient's increased confusion - repetitiveness and forgetful. Patient has been residing alone since the passing of his wife last year. No reports of suicidal or homicidal ideations. MSE: Patient is alert & oriented. Patient mood is in control, affect anxious. Patient thought process contains paranoia, speech clear. Patient denies A/V hallucinations - however this is extremely questionable due to multiple presentations regarding seeing shadows and people outside of his door. Patient observed to be internally stimulated and talking to unseen others at times. Patient laughing inappropriately at times. Clinical Summary/History:     Patient has no mental health hx, no current/past mental health treatment, and no known hx of psychiatric hospitalizations.     Patient reports that he does not sleep well and wakes up multiple times throughout the night in reported 2 hour increments, appetite is ok. No reports of hopelessness/helplessness. No hx of suicide attempts or self injurious behaviors. No reports of substance abuse. Hx of alcohol consumption every 3 days. Gender  [x] Male [] Female [] Transgender  [] Other    Sexual Orientation    [x] Heterosexual [] Homosexual [] Bisexual [] Other    Suicidal Behavioral: CSSR-S Complete. [] Reports:    [] Past [] Present   [x] Denies    Homicidal/ Violent Behavior  [] Reports:   [] Past [] Present   [x] Denies     Hallucinations/Delusions   [] Reports:   [x] Denies (See chief complaint)    Substance Use/Alcohol Use/Addiction: SBIRT Screen Complete. [x] Reports: Hx of alcohol abuse  [] Denies     Trauma History  [] Reports:  [x] Denies     Collateral Information:       Level of Care/Disposition Plan  [] Home:   [] Outpatient Provider:   [] Crisis Unit:   [x] Inpatient Psychiatric Unit:  [] Other:     Due increasing concerns of possible untreated psychosis. SW will pursue inpatient admission for safety/stabilization.      BARBIE Saleh, EDWARD  06/05/21 39 BARBIE Wells, EDWARD  06/05/21 9114

## 2021-06-05 NOTE — ED NOTES
SW placed phone call to [facility] [phone] and spoke with central nursing intake [place]. Patient case currently under review for admission.      Yue Reese, MSW, LSW  06/05/21 5478

## 2021-06-05 NOTE — ED NOTES
This rn called to verify with [facility] pt's meds however per [facility] pt has filled there since 2006.   Pt then tells this rn that [facility] [place] always fills hs prescriptions reports that he believes the use some pharmacy by the [facility] yin Bautista RN  06/05/21 2006

## 2021-06-05 NOTE — ED NOTES
Faxed clinical information to [facility] [phone]. Will send COVID result shortly.      BARBIE Nails, LSW  06/05/21 6885

## 2021-06-06 LAB
EKG ATRIAL RATE: 87 BPM
EKG ATRIAL RATE: 91 BPM
EKG P AXIS: 34 DEGREES
EKG P AXIS: 36 DEGREES
EKG P-R INTERVAL: 130 MS
EKG P-R INTERVAL: 136 MS
EKG Q-T INTERVAL: 414 MS
EKG Q-T INTERVAL: 424 MS
EKG QRS DURATION: 100 MS
EKG QRS DURATION: 98 MS
EKG QTC CALCULATION (BAZETT): 498 MS
EKG QTC CALCULATION (BAZETT): 521 MS
EKG R AXIS: -42 DEGREES
EKG R AXIS: 104 DEGREES
EKG T AXIS: 0 DEGREES
EKG T AXIS: 73 DEGREES
EKG VENTRICULAR RATE: 87 BPM
EKG VENTRICULAR RATE: 91 BPM

## 2021-06-06 PROCEDURE — 93010 ELECTROCARDIOGRAM REPORT: CPT | Performed by: INTERNAL MEDICINE

## 2021-06-06 NOTE — ED NOTES
MARIYA called [facility] to determine if pt is accepted, MARIYA [facility].       Rosenda Eller  06/05/21 2041

## 2021-06-07 PROBLEM — F23 ACUTE PSYCHOSIS (HCC): Status: ACTIVE | Noted: 2021-06-07

## 2021-06-07 PROCEDURE — 1240000000 HC EMOTIONAL WELLNESS R&B

## 2021-06-07 RX ORDER — ACETAMINOPHEN 325 MG/1
650 TABLET ORAL EVERY 6 HOURS PRN
Status: DISCONTINUED | OUTPATIENT
Start: 2021-06-07 | End: 2021-06-15 | Stop reason: HOSPADM

## 2021-06-07 RX ORDER — MAGNESIUM HYDROXIDE/ALUMINUM HYDROXICE/SIMETHICONE 120; 1200; 1200 MG/30ML; MG/30ML; MG/30ML
30 SUSPENSION ORAL PRN
Status: DISCONTINUED | OUTPATIENT
Start: 2021-06-07 | End: 2021-06-15 | Stop reason: HOSPADM

## 2021-06-07 RX ORDER — LORAZEPAM 2 MG/ML
2 INJECTION INTRAMUSCULAR EVERY 6 HOURS PRN
Status: DISCONTINUED | OUTPATIENT
Start: 2021-06-07 | End: 2021-06-15 | Stop reason: HOSPADM

## 2021-06-07 ASSESSMENT — SLEEP AND FATIGUE QUESTIONNAIRES
DIFFICULTY STAYING ASLEEP: YES
DO YOU USE A SLEEP AID: YES
RESTFUL SLEEP: NO
SLEEP PATTERN: DIFFICULTY FALLING ASLEEP;DISTURBED/INTERRUPTED SLEEP;RESTLESSNESS
DO YOU HAVE DIFFICULTY SLEEPING: YES
AVERAGE NUMBER OF SLEEP HOURS: 4
DIFFICULTY ARISING: NO
DIFFICULTY FALLING ASLEEP: YES

## 2021-06-07 ASSESSMENT — LIFESTYLE VARIABLES: HISTORY_ALCOHOL_USE: YES

## 2021-06-07 ASSESSMENT — PAIN SCALES - GENERAL: PAINLEVEL_OUTOF10: 0

## 2021-06-07 NOTE — ED NOTES
Attempt made to call nurse to nurse to [address] . States they are unable to take report at this time due to shift change.  Will attempt again      Fahad Stapleton, CELESTINE  06/07/21 2087

## 2021-06-07 NOTE — ED NOTES
Patient case reviewed with NP Tony Arana. Patient has been accepted for admission to [address] [place] [address] by Dr. Franco Ann. Patient to go to room #7304B. Called admitting and notified Tyler Gorman.     N2N: Ext [phone]     BARBIE Roblero, [facility]  06/07/21 Mallika 5454, BARBIE, [place]  06/07/21 1559

## 2021-06-07 NOTE — PLAN OF CARE
Problem: Altered Mood, Psychotic Behavior:  Goal: Able to demonstrate trust by eating, participating in treatment and following staff's direction  Description: Able to demonstrate trust by eating, participating in treatment and following staff's direction  Outcome: Met This Shift  Goal: Absence of self-harm  Description: Absence of self-harm  Outcome: Met This Shift  Goal: Ability to interact with others will improve  Description: Ability to interact with others will improve  Outcome: Met This Shift     Problem: Substance Abuse:  Goal: Absence of drug withdrawal signs and symptoms  Description: Absence of drug withdrawal signs and symptoms  Outcome: Met This Shift     Problem: Altered Mood, Psychotic Behavior:  Goal: Able to verbalize reality based thinking  Description: Able to verbalize reality based thinking  Outcome: Not Met This Shift

## 2021-06-07 NOTE — ED NOTES
Received notification from [facility], patient is a [employer]. SW to obtain bed day approval from [address]. MARIYA placed phone call [facility] [phone] and left vm with clinician Sethcarlton Zepeda., requesting call back for bed day approval for [facility] or [facility]. Waiting on return call.      Mary Moya, BARBIE, LSW  06/07/21 7349

## 2021-06-07 NOTE — ED NOTES
Spoke with Nasreen Bentley for patient to be admitted to [facility]  06/07/21 0100

## 2021-06-07 NOTE — ED NOTES
Received phone call from [employer] [facility]., obtained bed day approval for patient to be referred to [facility] or [facility], whichever has bed availability first.     MARIYA faxed clinical information [facility], [facility], [facility]  06/07/21 8844

## 2021-06-07 NOTE — ED NOTES
MARIYA placed phone call to [facility] [phone] and spoke with nursing supervisor Charlotte, there are only 3 beds anticipated today and that have 3 patients in their ER. Unable to accept the referral at this time. MARIYA placed phone call to [facility] [phone] and spoke with central nursing intake Pamela Maher. No beds anticipated today. MARIYA placed phone call to [facility] and spoke with Stephanie OLIVERA, obtained bed day approval for admission to [address] [place] [address].      BARBIE Kaur, LSW  06/07/21 1156

## 2021-06-07 NOTE — PROGRESS NOTES
[facility]  Admission Note     [facility]. Patient is alert and oriented x4 but is forgetful. He reports that for a few days prior to admission his neighbor has been looking into his peephole. He states \"I could see my neighbor walking by and looking into the peephole because I could see his shadow. \" He reports that he had told his  about this. He states that he called the police to tell them what his neighbor was doing and they brought him into the ED. He denies SI, HI, and hallucinations. He denies a history of trauma or abuse. He reports drinking alcohol \"almost daily\" to help with sleep. He states that he drinks up to 5 cans of 24oz malt liquor. He denies history of alcohol withdrawal or inpatient or outpatient treatment. He denies drug use. Patient is pleasant and cooperative with admission. Admission Type:   Admission Type: Involuntary    Reason for admission:  Reason for Admission: \"My neighbor was doing some weird stuff. He was peeping through my peephole. \"    PATIENT STRENGTHS:  Strengths: Communication, Employment, Social Skills, Positive Support    Patient Strengths and Limitations:  Limitations: Tendency to isolate self    Addictive Behavior:   Addictive Behavior  In the past 3 months, have you felt or has someone told you that you have a problem with:  : None  Do you have a history of Chemical Use?: No  Do you have a history of Alcohol Use?: Yes  Do you have a history of Street Drug Abuse?: No  Histroy of Prescripton Drug Abuse?: No    Medical Problems:   Past Medical History:   Diagnosis Date    CAD (coronary artery disease)     Hypertension     Sleep apnea        Status EXAM:  Status and Exam  Normal: No  Facial Expression: Exaggerated  Affect: Congruent  Level of Consciousness: Alert  Mood:Normal: No  Mood: Labile  Motor Activity:Normal: Yes  Interview Behavior: Cooperative  Preception: [place] to Person, [place] to Time, [place] to Place, [place] to Situation  Attention:Normal: No  Attention: Distractible  Thought Processes: Flt.of Ideas  Thought Content:Normal: No  Thought Content: Paranoia, Preoccupations  Hallucinations: None  Delusions: Yes  Delusions: Persecution  Memory:Normal: No  Memory: Poor Recent, Poor Remote  Insight and Judgment: No  Insight and Judgment: Poor Judgment, Poor Insight  Present Suicidal Ideation: No  Present Homicidal Ideation: No    Tobacco Screening:  Practical Counseling, on admission, salomón X, if applicable and completed (first 3 are required if patient doesn't refuse):            ( )  Recognizing danger situations (included triggers and roadblocks)                    ( )  Coping skills (new ways to manage stress, exercise, relaxation techniques, changing routine, distraction)                                                           ( )  Basic information about quitting (benefits of quitting, techniques in how to quit, available resources  ( ) Referral for counseling faxed to [facility]                                           ( ) Patient refused counseling  (X) Patient has not smoked in the last 30 days    Metabolic Screening:    Lab Results   Component Value Date    LABA1C 5.7 (H) 05/08/2021       Lab Results   Component Value Date    CHOL 197 05/08/2021     Lab Results   Component Value Date    TRIG 91 05/08/2021     Lab Results   Component Value Date    HDL 48 05/08/2021     No components found for: [facility] [place]  Lab Results   Component Value Date    LABVLDL 18 05/08/2021         Body mass index is 37.23 kg/m².     BP Readings from Last 2 Encounters:   06/07/21 99/69   05/27/21 (!) 134/91           Pt admitted with followings belongings:  Dentures: None  Vision - Corrective Lenses: Glasses  Hearing Aid: None  Jewelry: None  Body Piercings Removed: N/A  Clothing: Pants, Socks, Shirt, Footwear (,HOODIE, , PANTS ,SHIRT,SOCKS, ,PHONE ,,Rudine Meredith)  Were All Patient Medications Collected?: Not Applicable  Other Valuables: Money (Comment) (130 DOLLARS,,, I.D. [employer] CARD)     Valuables sent home with n/a. Valuables placed in safe in security envelope, number:  [id number]. Patient's home medications were n/a. Patient oriented to surroundings and program expectations and copy of patient rights given. Received admission packet:  yes. Consents reviewed, signed yes. Refused n/a. Patient verbalize understanding:  yes.     Patient education on precautions: yes                   Sylvester Rodriguez RN

## 2021-06-08 PROBLEM — F09 COGNITIVE DISORDER: Status: ACTIVE | Noted: 2021-06-08

## 2021-06-08 LAB
ALBUMIN SERPL-MCNC: 3.7 G/DL (ref 3.5–5.2)
ALP BLD-CCNC: 78 U/L (ref 40–129)
ALT SERPL-CCNC: 19 U/L (ref 0–40)
ANION GAP SERPL CALCULATED.3IONS-SCNC: 10 MMOL/L (ref 7–16)
AST SERPL-CCNC: 22 U/L (ref 0–39)
BILIRUB SERPL-MCNC: 2.2 MG/DL (ref 0–1.2)
BUN BLDV-MCNC: 9 MG/DL (ref 6–20)
CALCIUM SERPL-MCNC: 9.8 MG/DL (ref 8.6–10.2)
CHLORIDE BLD-SCNC: 105 MMOL/L (ref 98–107)
CO2: 27 MMOL/L (ref 22–29)
CREAT SERPL-MCNC: 1 MG/DL (ref 0.7–1.2)
EKG ATRIAL RATE: 88 BPM
EKG P AXIS: 26 DEGREES
EKG P-R INTERVAL: 134 MS
EKG Q-T INTERVAL: 404 MS
EKG QRS DURATION: 90 MS
EKG QTC CALCULATION (BAZETT): 488 MS
EKG R AXIS: -39 DEGREES
EKG T AXIS: 56 DEGREES
EKG VENTRICULAR RATE: 88 BPM
GFR AFRICAN AMERICAN: >60
GFR NON-AFRICAN AMERICAN: >60 ML/MIN/1.73
GLUCOSE BLD-MCNC: 115 MG/DL (ref 74–99)
POTASSIUM SERPL-SCNC: 4.5 MMOL/L (ref 3.5–5)
SODIUM BLD-SCNC: 142 MMOL/L (ref 132–146)
TOTAL PROTEIN: 7.7 G/DL (ref 6.4–8.3)

## 2021-06-08 PROCEDURE — 6370000000 HC RX 637 (ALT 250 FOR IP): Performed by: NURSE PRACTITIONER

## 2021-06-08 PROCEDURE — 36415 COLL VENOUS BLD VENIPUNCTURE: CPT

## 2021-06-08 PROCEDURE — 80053 COMPREHEN METABOLIC PANEL: CPT

## 2021-06-08 PROCEDURE — 1240000000 HC EMOTIONAL WELLNESS R&B

## 2021-06-08 PROCEDURE — 99222 1ST HOSP IP/OBS MODERATE 55: CPT | Performed by: NURSE PRACTITIONER

## 2021-06-08 PROCEDURE — 93005 ELECTROCARDIOGRAM TRACING: CPT | Performed by: NURSE PRACTITIONER

## 2021-06-08 PROCEDURE — 93010 ELECTROCARDIOGRAM REPORT: CPT | Performed by: INTERNAL MEDICINE

## 2021-06-08 RX ORDER — GAUZE BANDAGE 2" X 2"
100 BANDAGE TOPICAL DAILY
Status: DISCONTINUED | OUTPATIENT
Start: 2021-06-08 | End: 2021-06-08 | Stop reason: SDUPTHER

## 2021-06-08 RX ORDER — CHLORDIAZEPOXIDE HYDROCHLORIDE 25 MG/1
25 CAPSULE, GELATIN COATED ORAL EVERY 6 HOURS PRN
Status: DISCONTINUED | OUTPATIENT
Start: 2021-06-08 | End: 2021-06-15 | Stop reason: HOSPADM

## 2021-06-08 RX ORDER — FOLIC ACID 1 MG/1
1 TABLET ORAL DAILY
Status: DISCONTINUED | OUTPATIENT
Start: 2021-06-08 | End: 2021-06-15 | Stop reason: HOSPADM

## 2021-06-08 RX ORDER — DIVALPROEX SODIUM 250 MG/1
250 TABLET, DELAYED RELEASE ORAL EVERY 12 HOURS SCHEDULED
Status: DISCONTINUED | OUTPATIENT
Start: 2021-06-08 | End: 2021-06-15 | Stop reason: HOSPADM

## 2021-06-08 RX ORDER — AMLODIPINE BESYLATE 10 MG/1
10 TABLET ORAL DAILY
Status: DISCONTINUED | OUTPATIENT
Start: 2021-06-08 | End: 2021-06-15 | Stop reason: HOSPADM

## 2021-06-08 RX ORDER — LANOLIN ALCOHOL/MO/W.PET/CERES
6 CREAM (GRAM) TOPICAL DAILY
Status: DISCONTINUED | OUTPATIENT
Start: 2021-06-08 | End: 2021-06-15 | Stop reason: HOSPADM

## 2021-06-08 RX ORDER — GAUZE BANDAGE 2" X 2"
100 BANDAGE TOPICAL DAILY
Status: DISCONTINUED | OUTPATIENT
Start: 2021-06-08 | End: 2021-06-15 | Stop reason: HOSPADM

## 2021-06-08 RX ADMIN — Medication 100 MG: at 13:39

## 2021-06-08 RX ADMIN — AMLODIPINE BESYLATE 10 MG: 10 TABLET ORAL at 12:11

## 2021-06-08 RX ADMIN — FOLIC ACID 1 MG: 1 TABLET ORAL at 09:38

## 2021-06-08 RX ADMIN — DIVALPROEX SODIUM 250 MG: 250 TABLET, DELAYED RELEASE ORAL at 21:03

## 2021-06-08 RX ADMIN — Medication 6 MG: at 17:44

## 2021-06-08 ASSESSMENT — LIFESTYLE VARIABLES: HISTORY_ALCOHOL_USE: NO

## 2021-06-08 ASSESSMENT — SLEEP AND FATIGUE QUESTIONNAIRES
DIFFICULTY ARISING: NO
DIFFICULTY FALLING ASLEEP: YES
DO YOU USE A SLEEP AID: YES
RESTFUL SLEEP: NO
DIFFICULTY STAYING ASLEEP: YES
DO YOU HAVE DIFFICULTY SLEEPING: YES
SLEEP PATTERN: DIFFICULTY FALLING ASLEEP
AVERAGE NUMBER OF SLEEP HOURS: 4

## 2021-06-08 ASSESSMENT — PAIN SCALES - GENERAL: PAINLEVEL_OUTOF10: 0

## 2021-06-08 NOTE — CARE COORDINATION
Biopsychosocial Assessment Note    Social work met with patient to complete the biopsychosocial assessment and CSSR-S. Mental Status Exam: Pt alert and oriented x 4. Pt was sarcastic, but cooperative throughout assessment. Pt's thought process was disorganized, speech was clear. Chief Complaint: \"pt called PD because he thought someone was \"peeping through his peephole\".  PD wanted pt evaluated because when they arrived, pt was \"sweaty\".  pt has no complaints at this time\"    Patient Report: Pt states that this is his first psychiatric admission. Pt states that his neighbor has been looking through his peep hole. Pt reported this to the police, which was when he was taken to the hospital. Pt denied suicide attempt history. Pt is currently denying SI/ HI/ hallucinations/ delusions. Pt denied substance use. Pt denied trauma history. Gender  [x] Male [] Female [] Transgender  [] Other    Sexual Orientation    [x] Heterosexual [] Homosexual [] Bisexual [] Other    Suicidal Ideation  [] Past [] Present [x] Denies     Homicidal Ideation  [] Past [] Present [x] Denies     Hallucinations/Delusions (Specify type)  [] Reports [x] Denies     Substance Use/Alcohol Use/Addiction  [] Reports [x] Denies     Tobacco Use (within the last 6 months)  [x] Reports [] Denies     Trauma History  [] Reports [x] Denies     Collateral Contact (ALBERTO signed)  Name:   Relationship:  Number:     Collateral Information: Pt states he is unsure, he will have to get numbers out of his phone.        Access to Weapons per Collateral Contact: [] Reports [x] Denies       Follow up provider preference: Not currently active      Plan for discharge  Location (where do they plan on discharging to?): Home    Transportation (who will pick them up at discharge?) Patient    Medications (will they have money for copays at discharge?): Patient

## 2021-06-08 NOTE — PLAN OF CARE
[facility]  Initial Interdisciplinary Treatment Plan NOTE    Review Date & Time: 6/8/2021 0900    Patient was in treatment team    Admission Type:   Admission Type: Involuntary    Reason for admission:  Reason for Admission: \"My neighbor was doing some weird stuff. He was peeping through my peephole. \"      Estimated Length of Stay Update:  3-5 days  Estimated Discharge Date Update: 6/11/2021    PATIENT STRENGTHS:  Patient Strengths Strengths: Communication, Employment, Social Skills  Patient Strengths and Limitations:Limitations: Multiple barriers to leisure interests, Difficulty problem solving/relies on others to help solve problems  Addictive Behavior:Addictive Behavior  In the past 3 months, have you felt or has someone told you that you have a problem with:  : None  Do you have a history of Chemical Use?: No  Do you have a history of Alcohol Use?: No  Do you have a history of Street Drug Abuse?: No  Histroy of Prescripton Drug Abuse?: No  Medical Problems:  Past Medical History:   Diagnosis Date    CAD (coronary artery disease)     Hypertension     Sleep apnea        EDUCATION:   Learner Progress Toward Treatment Goals: Reviewed results and recommendations of this team    Method: Small group    Outcome: Verbalized understanding    PATIENT GOALS: No goal    PLAN/TREATMENT RECOMMENDATIONS UPDATE: Encourage group participation and continue to provide emotional support    GOALS UPDATE:   Time frame for Short-Term Goals: 1-3 days    Serafin Mike RN

## 2021-06-08 NOTE — FLOWSHEET NOTE
Spiritual Support Group Note    Number of Participants in Group:                        Time:     Goal: Relief from isolation and loneliness             Iacha Sharing             Self-understanding and gain insight              Acceptance and belonging            Recognize they are not alone                Socialization             Empowerment       Encouragement    Topic:  [x] Spiritual Wellness and Self Care                  [] Hope                     [] Connecting with Divine/Others        [] Thankfulness and Gratitude               []  Meaningfulness and Purpose               [] Forgiveness               [] Peace               [] Connect to [employer]      [] Other    Participation Level:   [x] Active Listener   [] Minimal   [] Monopolizing   [x] Interactive   [] No Participation   []  Other:     Attention:   [x] Alert   [] Distractible   [] Drowsy   [] Poor   [] Other:    Manner:   [x] Cooperative   [] Suspicious   [] Withdrawn   [] Guarded   [] Irritable   [] Inhospitable   [] Other:     Others Comments from Group:

## 2021-06-08 NOTE — H&P
Department of Psychiatry  History and Physical - Adult     CHIEF COMPLAINT: \"People were looking through the people. \"  Patient was seen after discussing with the treatment team and reviewing the chart    CIRCUMSTANCES OF ADMISSION:    Vernon Potter is a 64 y.o. male with history of hypertension who presents to the emergency department by EMS after policee were called. The patient called the police because patient thought \"someone was peeping through his peephole\". Police apparently arrived and thought that the patient looked diaphoretic. He then called EMS. Patient has no other complaints. He denies any lightheadedness, headache, chest pain, shortness of breath, fevers or chills. Denies any anxiety, SI or HI. He regularly uses alcohol per chart review. Symptoms are intermittent, mild, nothing makes them better or worse. HISTORY OF PRESENT ILLNESS:      The patient is a 64 y.o. , employed, , lives alone -American male with significant past history of hypertension. Who has no past psychiatric treatment or inpatient psychiatric hospitalizations presented to [facility] emergency department via EMS after  the patient called the police because patient thought \"someone was peeping through his peephole\". Police apparently arrived and thought that the patient looked diaphoretic. He then called EMS. Patient has no other complaints. He denies any lightheadedness, headache, chest pain, shortness of breath, fevers or chills. Denies any anxiety, SI or HI. He regularly uses alcohol per chart review. UDS in ED was negative, EtOH in ED was negative, QTC was 525 on admission 6 /4/21 and with repeat EKG is resolving down to 498. He was medically cleared in the emergency department and admitted to [facility] for psychiatric evaluation and stabilization. Upon assessment today the patient is polite and appropriate during assessment, he does have underlying anxiety.   He is guarded and somewhat suspicious during assessment. The patient presents with incongruence, and on additional assessment today his presentation is entirely different than initial presentation. He presents as somewhat childlike, he laughs and smiles inappropriately he is forgetful. He is unable to tell me the month, he is unable to tell me what year it is. He does not know who the president is. 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 He owns his own apartment, and states that he drinks 5, 24 ounce beers nightly after work. He states that he began drinking around the age of 13. He does not endorse any other illicit substance abuse. Patient denies auditory or visual hallucinations however he does seem somewhat paranoid and guarded. Circumstances surrounding his hospitalization include the perception of paranoia when the police were called twice to his home when he was reporting that people were looking through the people of his apartment door. He states that it is the same person each time and that the individual lives down the hallway from him. On the second call the police called EMS and brought the patient to the emergency department for evaluation due to concerns of his mental status and that he was diaphoretic at the time. He states that he does not have a mental health history, that he has never been hospitalized for psychiatric reasons. He states that his only health problem is hypertension. He did have a hospital admission in May 2021 for delirium which is related to a UTI. He states that he has no children, he states that he was  twice and has been  both times he was born and raised in [place] [address], and raised in foster care. Denies physical sexual abuse history however endorses emotional abuse from his foster family. He is devoid of suicidal or homicidal ideation intent or plan.     Past psychiatric history: Patient denies that he has a psychiatric history, states that he has never had psychiatric treatment or any inpatient psychiatric hospitalizations. Family psychiatric history: Does not endorse any family psychiatric history he states that he was in foster care throughout his life, and does not know his bio family. Legal history: Denies legal history    Substance abuse history:  Patient denies illicit substance abuse however endorses using alcohol since the age of 13. He states that now he drinks 5, 24 ounce cans of beer nightly after work. He states that he has never gone through any withdrawals or DTs it is questionable if he is ever subsided from alcohol use. He states that he is used nearly daily for the last 5 years. States that he did try marijuana during his teenage years. Personal family social history:  Patient states that he grew up in foster care had the same foster care family throughout his childhood. He states that he grew up in the [facility]. 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 He has been  and  2 times. He currently lives by himself. He has an apartment. He has no children. 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 past Medical History:        Diagnosis Date    CAD (coronary artery disease)     Hypertension     Sleep apnea        Medications Prior to Admission:   Medications Prior to Admission: thiamine mononitrate (THIAMINE) 100 MG tablet, Take 1 tablet by mouth daily  amLODIPine (NORVASC) 10 MG tablet, Take 1 tablet by mouth daily    Past Surgical History:    History reviewed. No pertinent surgical history. Allergies:   Patient has no known allergies. Family History  History reviewed. No pertinent family history. EXAMINATION:    REVIEW OF SYSTEMS:    ROS:  [x] All negative/unchanged except if checked.  Explain positive(checked items) below:  [] Constitutional  [] Eyes  [] Ear/Nose/Mouth/Throat  [] Respiratory  [] CV  [] GI  []   [] Musculoskeletal  [] Skin/Breast  [] Neurological  [] Endocrine  [] Heme/Lymph  [] Allergic/Immunologic    Explanation:     Vitals:  BP (!) 145/92   Pulse 84   Temp 98.3 °F (36.8 °C) (Temporal)   Resp 16   Ht 6' 2\" (1.88 m)   Wt 290 lb (131.5 kg)   SpO2 95%   BMI 37.23 kg/m²      Physical Examination:   Head: x  Atraumatic: x normocephalic  Skin and Mucosa        Moist x  Dry   Pale  x Normal   Neck:  Thyroid  Palpable   x  Not palpable   venus distention   adenopathy   Chest: x Clear   Rhonchi     Wheezing   CV:  xS1   xS2    xNo murmer   Abdomen:  x  Soft    Tender    Viceromegaly   Extremities:  x No Edema     Edema     Cranial Nerves Examination:   CN II:   xPupils are reactive to light  Pupils are non reactive to light  CN III, IV, VI:  xNo eye deviation    No diplopia or ptosis   CN V:    xFacial Sensation is intact     Facial Sensation is not intact   CN IIIV:   x Hearing is normal to rubbing fingers   CN IX, X:     xNormal gag reflex and phonation   CN XI:   xShoulder shrug and neck rotation is normal  CNXII:    xTongue is midline no deviation or atrophy    Mental Status Examination:    Mental status examination reveals a 66-year-old  male who is somewhat disheveled in appearance, and appears considerably older than stated age, is polite and appropriate for assessment, although somewhat paranoid and guarded. Psychomotor reveals no retardation or agitation, there is no abnormal posture he does appear to have some abnormal involuntary movements that do not seem to be causing him any distress. Speech is irregular in rate volume and tone, he is able to answer questions with relevance and there does seem to be a delayed latency of response. Eye contact is evasive during assessment. Mood is \"good. \"  Affect is relaxed, euthymic and congruent with stated mood with underlying anxiousness.   Thought process is concrete, pleasantly confused. Thought content is unclear at this time however he does seem to be somewhat internally stimulated and paranoid. He is devoid of suicidal or homicidal ideation intent or plan. Memory seems impaired through general conversation. Cognitive function appears to be below baseline due to illness. Insight judgment impulse control are poor. He is alert to self and place however not alert to date year or time. 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 No results for input(s): NA, K, CL, CO2, BUN, CREATININE, GLUCOSE in the last 72 hours. No results for input(s): BILITOT, ALKPHOS, AST, ALT in the last 72 hours. Lab Results   Component Value Date    LABAMPH NOT DETECTED 06/04/2021    BARBSCNU NOT DETECTED 06/04/2021    LABBENZ NOT DETECTED 06/04/2021    LABMETH NOT DETECTED 06/04/2021    OPIATESCREENURINE NOT DETECTED 06/04/2021    PHENCYCLIDINESCREENURINE NOT DETECTED 06/04/2021    ETOH <10 06/04/2021     Lab Results   Component Value Date    TSH 1.333 01/07/2011     No results found for: LITHIUM  No results found for: VALPROATE, CBMZ  No results found for: LITHIUM, VALPROATE      Radiology XR CHEST (2 VW)    Result Date: 5/9/2021  EXAMINATION: TWO XRAY VIEWS OF THE CHEST 5/9/2021 3:15 pm COMPARISON: None. HISTORY: ORDERING SYSTEM PROVIDED HISTORY: dizziness TECHNOLOGIST PROVIDED HISTORY: If in ER room at the time of exam, OK to change to portable 1 view Reason for exam:->dizziness What reading provider will be dictating this exam?->CRC FINDINGS: The lungs are without acute focal process. There is no effusion or pneumothorax. The cardiomediastinal silhouette is without acute process. The osseous structures are without acute process. No acute process.      CT HEAD WO CONTRAST    Result Date: 5/27/2021  EXAMINATION: CT OF THE HEAD WITHOUT CONTRAST  5/27/2021 3:11 am TECHNIQUE: CT of the head was performed without the administration of intravenous contrast. Dose modulation, iterative reconstruction, and/or weight based adjustment of the mA/kV was utilized to reduce the radiation dose to as low as reasonably achievable. COMPARISON: 05/11/2021 HISTORY: ORDERING SYSTEM PROVIDED HISTORY: Evaluate intracranial abnormality TECHNOLOGIST PROVIDED HISTORY: Has a \"code stroke\" or \"stroke alert\" been called? ->No Reason for exam:->Evaluate intracranial abnormality Decision Support Exception - unselect if not a suspected or confirmed emergency medical condition->Emergency Medical Condition (MA) What reading provider will be dictating this exam?->CRC Recent history of altered mental status. No other provided history. FINDINGS: BRAIN/VENTRICLES: There is no acute intracranial hemorrhage, mass effect or midline shift. No abnormal extra-axial fluid collection. The gray-white differentiation is maintained without evidence of an acute infarct. There is no evidence of hydrocephalus. Large calcifications of the anterior falx are unchanged. ORBITS: The visualized portion of the orbits demonstrate no acute abnormality. SINUSES: Tiny retention cyst or polyp in the right maxillary sinus. Apparent old fracture deformities of the left maxillary sinus with moderate opacification and hyperostosis unchanged from previous. Minimal mucosal thickening in the left ethmoid sinus. SOFT TISSUES/SKULL:  No acute abnormality of the visualized skull or soft tissues. There are numerous metallic foreign bodies again identified lateral to the inferior right temporal bone, temporomandibular joint and zygomatic arch likely due to previous shrapnel injury. No acute intracranial abnormality or significant change.      CT HEAD WO CONTRAST    Result Date: 5/11/2021  EXAMINATION: CT OF THE HEAD WITHOUT CONTRAST  5/11/2021 7:37 pm TECHNIQUE: CT of the head was performed without the administration of intravenous contrast. Dose modulation, iterative reconstruction, and/or weight based adjustment of the mA/kV was utilized to reduce the radiation dose to as low as reasonably achievable. COMPARISON: 05/07/2021 HISTORY: ORDERING SYSTEM PROVIDED HISTORY: altered TECHNOLOGIST PROVIDED HISTORY: Has a \"code stroke\" or \"stroke alert\" been called? ->No Reason for exam:->altered Decision Support Exception - unselect if not a suspected or confirmed emergency medical condition->Emergency Medical Condition (MA) What reading provider will be dictating this exam?->CRC FINDINGS: BRAIN/VENTRICLES: There is no acute intracranial hemorrhage, mass effect or midline shift. No abnormal extra-axial fluid collection. The gray-white differentiation is maintained without evidence of an acute infarct. There is no evidence of hydrocephalus. ORBITS: The visualized portion of the orbits demonstrate no acute abnormality. SINUSES: The visualized paranasal sinuses and mastoid air cells demonstrate no acute abnormality. SOFT TISSUES/SKULL:  No acute abnormality of the visualized skull or soft tissues. No acute intracranial abnormality. XR CHEST PORTABLE    Result Date: 6/4/2021  EXAMINATION: ONE XRAY VIEW OF THE CHEST 6/4/2021 6:17 am COMPARISON: 05/27/2021 HISTORY: ORDERING SYSTEM PROVIDED HISTORY: weakness TECHNOLOGIST PROVIDED HISTORY: Reason for exam:->weakness What reading provider will be dictating this exam?->CRC FINDINGS: Heart size is normal. Pulmonary vasculature is not congested. Mediastinal and hilar contours are acceptable. The lungs are clear. The pleural spaces are clear. There is no pneumothorax. There is no acute abnormality seen.      XR CHEST PORTABLE    Result Date: 5/27/2021  EXAMINATION: ONE XRAY VIEW OF THE CHEST 5/27/2021 3:52 am COMPARISON: 05/11/2021 HISTORY: ORDERING SYSTEM PROVIDED HISTORY: ams TECHNOLOGIST PROVIDED HISTORY: Reason for exam:->ams What reading provider will be dictating this exam?->CRC FINDINGS: Heart size is normal. Degenerative changes are scattered in the spine. No pneumothorax. No distinct consolidation or effusion. No acute process or significant change. PA and lateral views would be useful for further assessment, if symptoms persist.     XR CHEST PORTABLE    Result Date: 5/11/2021  EXAMINATION: ONE XRAY VIEW OF THE CHEST 5/11/2021 6:13 pm COMPARISON: May 9 HISTORY: ORDERING SYSTEM PROVIDED HISTORY: Shortness of breath TECHNOLOGIST PROVIDED HISTORY: Reason for exam:->Shortness of breath What reading provider will be dictating this exam?->CRC FINDINGS: The lungs are without acute focal process. There is no effusion or pneumothorax. The cardiomediastinal silhouette is without acute process. The osseous structures are without acute process. No acute process. NM Cardiac Stress Test Nuclear Imaging    Result Date: 5/12/2021  Indication:  Chest Pain Clinical History:   Patient has known history of coronary artery disease. Procedure:  Pharmacologic stress testing was performed with Regadenoson 0.4 mg for 15 seconds. IMAGING: Myocardial perfusion imaging was performed at rest 30-35 minutes following the intravenous injection of 12 mCi of (Tc-Sestamibi) followed by 10 ml of Normal Saline. As per infusion protocol, the patient was injected intravenously with 35 mCi of (Tc-Sestamibi) followed by 10 ml of Normal Saline. Gated post-stress tomographic imaging was performed 20-25 minutes after stress. FINDINGS: The overall quality of the study was good. Left ventricular cavity size was noted to be normal. Rotational analog analysis demonstrated no patient motion or extracardiac activity. There is soft tissue diaphragmatic attenuation artifact. The gated SPECT stress imaging in the short, vertical long, and horizontal long axis demonstrated abnormal tracer distribution in the myocardium.  A  mild defect was present in the mid to basal inferior wall(s) that was  small sized by quantification with hypokinesis in the basal inferior wall suggestive of prior infarct. The resting images show no change. Gated SPECT left ventricular ejection fraction was calculated to be 43% with moderate global hypokinesis TID ratio 0.94.     1.  ECG during the infusion did not change. 2.  The myocardial perfusion imaging was abnormal.  3.  The abnormality was a small area of fixed perfusion defect involving the mid to basal inferior wall suggestive of prior infarct. 4.  Overall left ventricular systolic function was abnormal with regional wall motion abnormalities. 5.  No transient ischemic dilatation. 6.  Intermediate risk general pharmacologic stress test. Thank you for sending your patient to this [employer]. Sherman Rich MD, [facility] [place] [address] cardiology. TREATMENT PLAN:  The patient's diagnosis, treatment plan, medication management were formulated after patient was seen directly by the attending physician and myself and all relevant documentation was reviewed. Risk Management: Based on the diagnosis and assessment biopsychosocial treatment model was presented to the patient and was given the opportunity to ask any question. The patient was agreeable to the plan and all the patient's questions were answered to the patient's satisfaction. I discussed with the patient the risk, benefit, alternative and common side effects for the proposed medication treatment. The patient is consenting to this treatment. The patient was referred to outpatient/inpatient substance abuse rehabilitation programming. He was educated multiple times during the hospitalization that if he chooses to continue to use drugs or alcohol, he may potentially act out impulsively, resulting in serious harm to self or others, even though unintentional.  He was also educated that mental health treatment cannot be optimized with ongoing use of drugs. He demonstrated understanding has the capacity to understand that.       Collateral Information:  Will obtain collateral information from the family or friends. Will obtain medical records as appropriate from out patient providers  Will consult the hospitalist for a physical exam to rule out any co-morbid physical condition. Home medication Reconciled   Continued as indicated    New Medications started during this admission :    Obtain MoCA  Begin Depakote 250 mg twice daily for mood stabilization and to reduce confusion  Melatonin 6 mg daily at 1800 to reset sleep-wake cycle and reduce confusion  Will consider the use of an antipsychotic once a MoCA score is obtained      Prn Haldol 5mg and Vistaril 50mg q6hr for extreme agitation. Trazodone as ordered for insomnia  Vistaril as ordered for anxiety      Psychotherapy:   Encourage participation in milieu and group therapy  Individual therapy as needed      NOTE: This report was transcribed using voice recognition software. Every effort was made to ensure accuracy; however, inadvertent computerized transcription errors may be present. Behavioral Services  Medicare Certification Upon Admission    I certify that this patient's inpatient psychiatric hospital admission is medically necessary for:    [x] (1) Treatment which could reasonably be expected to improve this patient's condition,       [x] (2) Or for diagnostic study;     AND     [x](2) The inpatient psychiatric services are provided while the individual is under the care of a physician and are included in the individualized plan of care.     Estimated length of stay/service 5 to 7 days based on stability    Plan for post-hospital care follow-up with OP provider    Electronically signed by WINTER Taylor CNP on 6/8/2021 at 12:29 PM

## 2021-06-08 NOTE — BH NOTE
Pt denies SI HI and hallucinations. Pt is childlike and refers to himself in the third person and also states \"who is Nitesh Lara left. Andres isn;t here right now\". Pt is forgetful as he came out to speak with the doctor and forgot what room he was in. Pt is otherwise pleasant and cooperative. Pt is eating provided meals. Med consult for BP was made. No voiced depression or anxiety. No voiced paranoia. No aggression or behaviors. We will continue to provide support and comfort for the patient.

## 2021-06-08 NOTE — PROGRESS NOTES
Hospitalist Progress Note      Chart reviewed. Consult placed for HTN management. Recommend resuming patients home amlodipine. Full consult not warranted at this time. Please reconsult if patient remains hypertensive despite resumption of home medications.  Non-billable consultation.     +++++++++++++++++++++++++++++++++++++++++++++++++  Jennifer Proctor,  Roma Khan [address]

## 2021-06-08 NOTE — GROUP NOTE
Group Therapy Note    Date: 6/8/2021    Group Start Time: 1500  Group End Time: 9091  Group Topic: Cognitive Skills    [facility] 7[facility] I    TYESHA Newsome        Group Therapy Note    Attendees: 9         Patient's Goal:  Pt will be able to discuss importance of belonging. Pt will be able to discuss ways to increase connectedness and need to forgive self and others. Notes: Pt active participant in class discussion. Status After Intervention:  Improved    Participation Level:  Active Listener and Interactive    Participation Quality:Appropriate, Attentive, Sharing, and Supportive    Speech:  Normal    Thought Process/Content: Logical      Affective Functioning: Blunted       Mood: depressed      Level of consciousness: Alert and oriented x 4, attentive      Response to Learning: Able to verbalize current knowledge/experience, Able to verbalize new learning, and Progressing to goal      Endings: None Reported    Modes of Intervention: Education, Support, Socialization and Exploration      Discipline Responsible: /Counselor      Signature:  TYESHA Newsome

## 2021-06-08 NOTE — GROUP NOTE
Group Therapy Note    Date: 6/7/2021    Group Start Time: 2000  Group End Time: 7666  Group Topic: Relaxation    [facility]W ACUTE [place], RN        Group Therapy Note    Attendees: 8/12         Signature:  Carlos Mejia RN

## 2021-06-09 PROCEDURE — 6370000000 HC RX 637 (ALT 250 FOR IP): Performed by: NURSE PRACTITIONER

## 2021-06-09 PROCEDURE — 99231 SBSQ HOSP IP/OBS SF/LOW 25: CPT | Performed by: NURSE PRACTITIONER

## 2021-06-09 PROCEDURE — 1240000000 HC EMOTIONAL WELLNESS R&B

## 2021-06-09 RX ADMIN — AMLODIPINE BESYLATE 10 MG: 10 TABLET ORAL at 10:58

## 2021-06-09 RX ADMIN — FOLIC ACID 1 MG: 1 TABLET ORAL at 10:59

## 2021-06-09 RX ADMIN — DIVALPROEX SODIUM 250 MG: 250 TABLET, DELAYED RELEASE ORAL at 10:59

## 2021-06-09 RX ADMIN — Medication 100 MG: at 10:59

## 2021-06-09 RX ADMIN — Medication 6 MG: at 18:08

## 2021-06-09 ASSESSMENT — PAIN SCALES - GENERAL
PAINLEVEL_OUTOF10: 0
PAINLEVEL_OUTOF10: 0

## 2021-06-09 NOTE — PLAN OF CARE
Problem: Altered Mood, Psychotic Behavior:  Goal: Able to demonstrate trust by eating, participating in treatment and following staff's direction  Description: Able to demonstrate trust by eating, participating in treatment and following staff's direction  Outcome: Ongoing  Goal: Able to verbalize decrease in frequency and intensity of hallucinations  Description: Able to verbalize decrease in frequency and intensity of hallucinations  Outcome: Ongoing  Goal: Able to verbalize reality based thinking  Description: Able to verbalize reality based thinking  Outcome: Ongoing  Goal: Absence of self-harm  Description: Absence of self-harm  Outcome: Ongoing  Goal: Ability to achieve adequate nutritional intake will improve  Description: Ability to achieve adequate nutritional intake will improve  Outcome: Ongoing  Goal: Ability to interact with others will improve  Description: Ability to interact with others will improve  Outcome: Ongoing  Goal: Compliance with prescribed medication regimen will improve  Description: Compliance with prescribed medication regimen will improve  Outcome: Ongoing     Problem: Substance Abuse:  Goal: Absence of drug withdrawal signs and symptoms  Description: Absence of drug withdrawal signs and symptoms  Outcome: Ongoing     Patient's affect is brightened. Patient jokes often with staff and peers. Patient denies suicidal ideations, homicidal ideations, and hallucinations. He does not make any overt statement of paranoia. He is medication compliant and in control of his behavior.

## 2021-06-09 NOTE — GROUP NOTE
Group Therapy Note    Date: 6/9/2021    Group Start Time: 1400  Group End Time: 1430  Group Topic: Cognitive Skills    [facility]    BARBIE Lozano LSW        Group Therapy Note    Attendees: 9         Patient's Goal:  Pt will be able to verbalize understanding of self- care, and the importance it has on both mental and physical health. Notes:  Pt made connections and participated in group    Status After Intervention:  Improved    Participation Level:  Active Listener    Participation Quality: Appropriate, Attentive, Sharing and Supportive      Speech:  normal      Thought Process/Content: Logical      Affective Functioning: Congruent      Mood: depressed      Level of consciousness:  Alert and Oriented x4      Response to Learning: Able to verbalize current knowledge/experience      Endings: None Reported    Modes of Intervention: Education, Support, Socialization, Exploration, Clarifying, Problem-solving and Activity      Discipline Responsible: /Counselor      Signature:  BARBIE Lozano LSW

## 2021-06-09 NOTE — PROGRESS NOTES
BEHAVIORAL HEALTH FOLLOW-UP NOTE     6/9/2021     Patient was seen and examined in person, Chart reviewed   Patient's case discussed with staff/team    Chief Complaint: \"I am doing okay. \"    Interim History:     Patient is observed up on the unit. He is demonstrating a good sense of humor. He is somewhat childlike in his demeanor. He is taking his medications, he is participating in group and unit activities. There are no behavioral disturbances. He denies SI/HI intent or plan. Denies AVH. States that he is not paranoid. Noticed on assessment today that the patient is very jaundice. Will repeat CMP and ammonia level. Patient has long history of ETOH dependence, he additionally has a prolonged QTc caution used with pharmacologic selection. QTc is trending down repeat still prolonged at 488. MoCA 19/30 indicating mild cognitive impairment. Appetite:   [x] Normal/Unchanged  [] Increased  [] Decreased      Sleep:       [x] Normal/Unchanged  [] Fair       [] Poor              Energy:    [x] Normal/Unchanged  [] Increased  [] Decreased        SI [] Present  [x] Absent    HI  []Present  [x] Absent     Aggression:  [] yes  [x] no    Patient is [x] able  [] unable to CONTRACT FOR SAFETY     PAST MEDICAL/PSYCHIATRIC HISTORY:   Past Medical History:   Diagnosis Date    CAD (coronary artery disease)     Hypertension     Sleep apnea        FAMILY/SOCIAL HISTORY:  History reviewed. No pertinent family history. Social History     Socioeconomic History    Marital status: Legally      Spouse name: Not on file    Number of children: Not on file    Years of education: Not on file    Highest education level: Not on file   Occupational History    Not on file   Tobacco Use    Smoking status: Former Smoker    Smokeless tobacco: Never Used   Vaping Use    Vaping Use: Never used   Substance and Sexual Activity    Alcohol use:  Yes     Alcohol/week: 5.0 standard drinks     Types: 5 Cans of beer per week    Drug use: Not Currently    Sexual activity: Not Currently   Other Topics Concern    Not on file   Social History Narrative    Not on file     Social Determinants of Health     Financial Resource Strain:     Difficulty of Paying Living Expenses:    Food Insecurity:     Worried About Running Out of Food in the Last Year:     [address] in the Last Year:    Transportation Needs:     Lack of Transportation (Medical):  Lack of Transportation (Non-Medical):    Physical Activity:     Days of Exercise per Week:     Minutes of Exercise per Session:    Stress:     Feeling of Stress :    Social Connections:     Frequency of Communication with Friends and Family:     Frequency of Social Gatherings with Friends and Family:     Attends Mandae[employer] Services:     Active Member of Clubs or Organizations:     Attends Club or Organization Meetings:     Marital Status:    Intimate Partner Violence:     Fear of Current or Ex-Partner:     Emotionally Abused:     Physically Abused:     Sexually Abused:            ROS:  [x] All negative/unchanged except if checked.  Explain positive(checked items) below:  [] Constitutional  [] Eyes  [] Ear/Nose/Mouth/Throat  [] Respiratory  [] CV  [] GI  []   [] Musculoskeletal  [] Skin/Breast  [] Neurological  [] Endocrine  [] Heme/Lymph  [] Allergic/Immunologic    Explanation:     MEDICATIONS:    Current Facility-Administered Medications:     chlordiazePOXIDE (LIBRIUM) capsule 25 mg, 25 mg, Oral, Q6H PRN, Merrick Hightower, APRN - CNP    folic acid (FOLVITE) tablet 1 mg, 1 mg, Oral, Daily, Everrett Campbell, APRN - CNP, 1 mg at 06/09/21 1059    thiamine mononitrate tablet 100 mg, 100 mg, Oral, Daily, Benjamin Nickel, APRN - NP, 100 mg at 06/09/21 1059    amLODIPine (NORVASC) tablet 10 mg, 10 mg, Oral, Daily, Benjamin Nickel, APRN - NP, 10 mg at 06/09/21 1058    divalproex (DEPAKOTE) DR tablet 250 mg, 250 mg, Oral, 2 times per day, Merrick Hightower APRN - CNP, 250 mg at 06/09/21 1059    melatonin tablet 6 mg, 6 mg, Oral, Daily, Jeaneth Dobelény, APRN - CNP, 6 mg at 06/08/21 1744    acetaminophen (TYLENOL) tablet 650 mg, 650 mg, Oral, Q6H PRN, Jeaneth Doheny, APRN - CNP    magnesium hydroxide (MILK OF MAGNESIA) 400 MG/5ML suspension 30 mL, 30 mL, Oral, Daily PRN, Jeaneth Dobelény, APRN - CNP    aluminum & magnesium hydroxide-simethicone (MAALOX) 200-200-20 MG/5ML suspension 30 mL, 30 mL, Oral, PRN, Jeaneth Doheny, APRN - CNP    LORazepam (ATIVAN) injection 2 mg, 2 mg, Intramuscular, Q6H PRN, Jeaneth Dobelény, APRN - CNP      Examination:  BP (!) 124/92   Pulse 93   Temp 98.1 °F (36.7 °C) (Temporal)   Resp 18   Ht 6' 2\" (1.88 m)   Wt 290 lb (131.5 kg)   SpO2 96%   BMI 37.23 kg/m²   Gait - steady  Medication side effects(SE):   Mental Status Examination:    Level of consciousness:  within normal limits   Appearance:  fair grooming and fair hygiene  Behavior/Motor:  no abnormalities noted  Attitude toward examiner:  cooperative  Speech:  normal rate and normal volume   Mood: euthymic  Affect:  mood congruent  Thought processes:  [employer]  Thought content: The patient is devoid of suicidal or homicidal ideation intent or plan. Devoid of auditory or visual hallucinations or other perceptual disturbances, there are no overt or covert signs of psychosis or paranoia. There are no neurovegetative signs of depression. Cognition:  oriented to person, place, and time   Concentration intact  Insight poor   Judgement fair     ASSESSMENT:   Patient symptoms are:  [] Well controlled  [] Improving  [] Worsening  [x] No change      Diagnosis:   Principal Problem:    [facility]  Active Problems:    Cognitive disorder  Resolved Problems:    * No resolved hospital problems. *      LABS:    No results for input(s): WBC, HGB, PLT in the last 72 hours.   Recent Labs     06/08/21  1214      K 4.5      CO2 27   BUN 9   CREATININE 1.0   GLUCOSE 115*     Recent Labs     06/08/21  1214 BILITOT 2.2*   ALKPHOS 78   AST 22   ALT 19     Lab Results   Component Value Date    LABAMPH NOT DETECTED 06/04/2021    BARBSCNU NOT DETECTED 06/04/2021    LABBENZ NOT DETECTED 06/04/2021    LABMETH NOT DETECTED 06/04/2021    OPIATESCREENURINE NOT DETECTED 06/04/2021    PHENCYCLIDINESCREENURINE NOT DETECTED 06/04/2021    ETOH <10 06/04/2021     Lab Results   Component Value Date    TSH 1.333 01/07/2011     No results found for: LITHIUM  No results found for: VALPROATE, CBMZ        Treatment Plan:  The patient's diagnosis, treatment plan, medication management were formulated after patient was seen directly by the attending physician and myself and all relevant documentation was reviewed. Reviewed current Medications with the patient. Risk, benefit, side effects, possible outcomes of the medication and alternatives discussed with the patient and the patient demonstrated understanding. The patient was also educated that the outcome of treatment will depend on the medication compliance as directed by the prescribers along with regular follow-up, compliance with the labs and other work-up, as clinically indicated. Obtain CMP  Obtain an ammonia level        Collateral information: Followed by social work   CD evaluation  Encourage patient to attend group and other milieu activities.   Discharge planning discussed with the patient and treatment team.    PSYCHOTHERAPY/COUNSELING:  [x] Therapeutic interview  [x] Supportive  [] CBT  [] Ongoing  [] Other    [x] Patient continues to need, on a daily basis, active treatment furnished directly by or requiring the supervision of inpatient psychiatric personnel      Anticipated Length of stay: 3 - 5 days based on stability            Electronically signed by Ronney Dance, APRN - CNP on 6/9/2021 at 3:02 PM

## 2021-06-09 NOTE — PROGRESS NOTES
Occupational Therapy    OT order received. Chart reviewed. Per speaking with RN, pt completes ADLs/functional mobility/transfers Mod I, demo'ing good balance/safety awareness. Pt demonstrates no OT needs at this time. Please re-consult if there is a change in status.      Marcelino Jones, [address] [place] [address], OTR/L [id number]

## 2021-06-09 NOTE — PLAN OF CARE
Problem: Altered Mood, Psychotic Behavior:  Goal: Able to demonstrate trust by eating, participating in treatment and following staff's direction  Description: Able to demonstrate trust by eating, participating in treatment and following staff's direction  Outcome: Met This Shift     Problem: Altered Mood, Psychotic Behavior:  Goal: Ability to interact with others will improve  Description: Ability to interact with others will improve  Outcome: Met This Shift     Problem: Altered Mood, Psychotic Behavior:  Goal: Absence of self-harm  Description: Absence of self-harm  6/8/2021 2150 by Alton Cullen RN  Outcome: Met This Shift    Patient denies SI/HI, hallucinations, anxiety, and depression. Patient is bright, social, pleasant, and cooperative with peers and staff. Patient can be forgetful at times. Patient takes prescribed medications without issue. Will continue to offer support and comfort to patient.

## 2021-06-09 NOTE — CARE COORDINATION
Pt continues to be sarcastic. Pt can be confused at times. Pt is medication compliant. Pt is attending groups. Pt's sleep is good, pt slept 7.5 hours last night. Pt will return home where he resides alone at discharge. Pt unable to give this  phone numbers to contact family or friends at this time. Appointments scheduled at [address] [facility].  Discharge date remains unknown per treatment team.

## 2021-06-10 LAB
ALBUMIN SERPL-MCNC: 3.8 G/DL (ref 3.5–5.2)
ALP BLD-CCNC: 79 U/L (ref 40–129)
ALT SERPL-CCNC: 18 U/L (ref 0–40)
AMMONIA: 50 UMOL/L (ref 16–60)
ANION GAP SERPL CALCULATED.3IONS-SCNC: 9 MMOL/L (ref 7–16)
AST SERPL-CCNC: 18 U/L (ref 0–39)
BILIRUB SERPL-MCNC: 2 MG/DL (ref 0–1.2)
BUN BLDV-MCNC: 9 MG/DL (ref 6–20)
CALCIUM SERPL-MCNC: 9.2 MG/DL (ref 8.6–10.2)
CHLORIDE BLD-SCNC: 104 MMOL/L (ref 98–107)
CO2: 28 MMOL/L (ref 22–29)
CREAT SERPL-MCNC: 1 MG/DL (ref 0.7–1.2)
GFR AFRICAN AMERICAN: >60
GFR NON-AFRICAN AMERICAN: >60 ML/MIN/1.73
GLUCOSE BLD-MCNC: 112 MG/DL (ref 74–99)
POTASSIUM SERPL-SCNC: 4.3 MMOL/L (ref 3.5–5)
SODIUM BLD-SCNC: 141 MMOL/L (ref 132–146)
TOTAL PROTEIN: 7.4 G/DL (ref 6.4–8.3)

## 2021-06-10 PROCEDURE — 6370000000 HC RX 637 (ALT 250 FOR IP): Performed by: NURSE PRACTITIONER

## 2021-06-10 PROCEDURE — 99231 SBSQ HOSP IP/OBS SF/LOW 25: CPT | Performed by: NURSE PRACTITIONER

## 2021-06-10 PROCEDURE — 36415 COLL VENOUS BLD VENIPUNCTURE: CPT

## 2021-06-10 PROCEDURE — 82140 ASSAY OF AMMONIA: CPT

## 2021-06-10 PROCEDURE — 1240000000 HC EMOTIONAL WELLNESS R&B

## 2021-06-10 PROCEDURE — 80053 COMPREHEN METABOLIC PANEL: CPT

## 2021-06-10 RX ADMIN — Medication 100 MG: at 08:52

## 2021-06-10 RX ADMIN — Medication 6 MG: at 17:14

## 2021-06-10 RX ADMIN — DIVALPROEX SODIUM 250 MG: 250 TABLET, DELAYED RELEASE ORAL at 21:19

## 2021-06-10 RX ADMIN — DIVALPROEX SODIUM 250 MG: 250 TABLET, DELAYED RELEASE ORAL at 08:52

## 2021-06-10 RX ADMIN — FOLIC ACID 1 MG: 1 TABLET ORAL at 08:53

## 2021-06-10 RX ADMIN — AMLODIPINE BESYLATE 10 MG: 10 TABLET ORAL at 08:52

## 2021-06-10 ASSESSMENT — PAIN SCALES - GENERAL
PAINLEVEL_OUTOF10: 0
PAINLEVEL_OUTOF10: 0

## 2021-06-10 NOTE — CARE COORDINATION
Rhea contacted Cheikh Kenny in therapy (ext [phone]) and notified her that this pt required a PT eval so that this  can complete a PASRR. PT orders are in.

## 2021-06-10 NOTE — PLAN OF CARE
Problem: Altered Mood, Psychotic Behavior:  Goal: Able to demonstrate trust by eating, participating in treatment and following staff's direction  Description: Able to demonstrate trust by eating, participating in treatment and following staff's direction  6/9/2021 2020 by Talat Irwin RN  Outcome: Met This Shift     Problem: Altered Mood, Psychotic Behavior:  Goal: Absence of self-harm  Description: Absence of self-harm  6/9/2021 2020 by Talat Irwin RN  Outcome: Met This Shift     Problem: Altered Mood, Psychotic Behavior:  Goal: Ability to achieve adequate nutritional intake will improve  Description: Ability to achieve adequate nutritional intake will improve  6/9/2021 2020 by Talat Irwin RN  Outcome: Met This Shift     Problem: Altered Mood, Psychotic Behavior:  Goal: Ability to interact with others will improve  Description: Ability to interact with others will improve  6/9/2021 2020 by Talat Irwin RN  Outcome: Met This Shift     Problem: Altered Mood, Psychotic Behavior:  Goal: Compliance with prescribed medication regimen will improve  Description: Compliance with prescribed medication regimen will improve  6/9/2021 2020 by Talat Irwin RN  Outcome: Met This Shift    Patient denies SI/HI, hallucinations, anxiety, and depression. Patient is bright, pleasant, and cooperative. Patient is often sarcastic and jokes with staff and peers. Patient is isolative to room. Patient takes prescribed medications without issue. Will continue to offer support and comfort to patient.

## 2021-06-10 NOTE — PROGRESS NOTES
Physical Therapy    PT orders received and case discussed with RN. Per nursing pt has been able to amb unit independently without issues and no skilled needs required. PT will discontinue at this time. PT screen complete. Thank you.     Gilma Deutsch, PT, DPT  [id number]

## 2021-06-10 NOTE — PROGRESS NOTES
Group Therapy Note    Patient attended goals group and stated daily goal as to relax my mind. Group Therapy Note    Date: 6/10/2021  Start Time: 10:00  End Time:  10:30  Number of Participants: 12    Type of Group: Psychoeducation    Wellness Binder Information  Module Name:  Coping Skills  Session Number:  NA    Patient's Goal: To id positive coping skills to use on a daily basis. Notes: Attended group and was an active listener. Status After Intervention:  Improved    Participation Level:  Active Listener    Participation Quality: Attentive      Speech:  hesitant      Thought Process/Content: Linear      Affective Functioning: Flat      Mood: depressed      Level of consciousness:  Alert      Response to Learning: Progressing to goal      Endings: None Reported    Modes of Intervention: Education      Discipline Responsible: Psychoeducational Specialist      Signature:  EDWARD Rockwell

## 2021-06-10 NOTE — PROGRESS NOTES
BEHAVIORAL HEALTH FOLLOW-UP NOTE     6/10/2021     Patient was seen and examined in person, Chart reviewed   Patient's case discussed with staff/team    Chief Complaint: \"I am doing okay. \"    Interim History:     Patient is observed up on the unit. He is demonstrating a good sense of humor. He is somewhat childlike in his demeanor. He is taking his medications, he is participating in group and unit activities. There are no behavioral disturbances. He denies SI/HI intent or plan. Denies AVH. States that he is not paranoid. Noticed on assessment today that the patient is very jaundice. Will repeat CMP and ammonia level. Patient has long history of ETOH dependence, he additionally has a prolonged QTc caution used with pharmacologic selection. QTc is trending down repeat still prolonged at 488. MoCA 19/30 indicating mild cognitive impairment. Appetite:   [x] Normal/Unchanged  [] Increased  [] Decreased      Sleep:       [x] Normal/Unchanged  [] Fair       [] Poor              Energy:    [x] Normal/Unchanged  [] Increased  [] Decreased        SI [] Present  [x] Absent    HI  []Present  [x] Absent     Aggression:  [] yes  [x] no    Patient is [x] able  [] unable to CONTRACT FOR SAFETY     PAST MEDICAL/PSYCHIATRIC HISTORY:   Past Medical History:   Diagnosis Date    CAD (coronary artery disease)     Hypertension     Sleep apnea        FAMILY/SOCIAL HISTORY:  History reviewed. No pertinent family history. Social History     Socioeconomic History    Marital status: Legally      Spouse name: Not on file    Number of children: Not on file    Years of education: Not on file    Highest education level: Not on file   Occupational History    Not on file   Tobacco Use    Smoking status: Former Smoker    Smokeless tobacco: Never Used   Vaping Use    Vaping Use: Never used   Substance and Sexual Activity    Alcohol use:  Yes     Alcohol/week: 5.0 standard drinks     Types: 5 Cans of beer per week    Drug use: Not Currently    Sexual activity: Not Currently   Other Topics Concern    Not on file   Social History Narrative    Not on file     Social Determinants of Health     Financial Resource Strain:     Difficulty of Paying Living Expenses:    Food Insecurity:     Worried About Running Out of Food in the Last Year:     [address] in the Last Year:    Transportation Needs:     Lack of Transportation (Medical):  Lack of Transportation (Non-Medical):    Physical Activity:     Days of Exercise per Week:     Minutes of Exercise per Session:    Stress:     Feeling of Stress :    Social Connections:     Frequency of Communication with Friends and Family:     Frequency of Social Gatherings with Friends and Family:     Attends Faith Services:     Active Member of Clubs or Organizations:     Attends Club or Organization Meetings:     Marital Status:    Intimate Partner Violence:     Fear of Current or Ex-Partner:     Emotionally Abused:     Physically Abused:     Sexually Abused:            ROS:  [x] All negative/unchanged except if checked.  Explain positive(checked items) below:  [] Constitutional  [] Eyes  [] Ear/Nose/Mouth/Throat  [] Respiratory  [] CV  [] GI  []   [] Musculoskeletal  [] Skin/Breast  [] Neurological  [] Endocrine  [] Heme/Lymph  [] Allergic/Immunologic    Explanation:     MEDICATIONS:    Current Facility-Administered Medications:     chlordiazePOXIDE (LIBRIUM) capsule 25 mg, 25 mg, Oral, Q6H PRN, WINTER Rashid CNP    folic acid (FOLVITE) tablet 1 mg, 1 mg, Oral, Daily, WINTER Rashid CNP, 1 mg at 06/10/21 5918    thiamine mononitrate tablet 100 mg, 100 mg, Oral, Daily, WINTER Kessler NP, 100 mg at 06/10/21 0852    amLODIPine (NORVASC) tablet 10 mg, 10 mg, Oral, Daily, WINTER Kessler NP, 10 mg at 06/10/21 0852    divalproex (DEPAKOTE) DR tablet 250 mg, 250 mg, Oral, 2 times per day, WINTER Rashid CNP, 250 mg at 06/10/21 2847    melatonin tablet 6 mg, 6 mg, Oral, Daily, Winifred Carrillo, APRN - CNP, 6 mg at 06/09/21 1808    acetaminophen (TYLENOL) tablet 650 mg, 650 mg, Oral, Q6H PRN, Winifred Carrillo, APRN - CNP    magnesium hydroxide (MILK OF MAGNESIA) 400 MG/5ML suspension 30 mL, 30 mL, Oral, Daily PRN, Winifred Carrillo, APRN - CNP    aluminum & magnesium hydroxide-simethicone (MAALOX) 200-200-20 MG/5ML suspension 30 mL, 30 mL, Oral, PRN, Winifred Carrillo, APRN - CNP    LORazepam (ATIVAN) injection 2 mg, 2 mg, Intramuscular, Q6H PRN, Winifred Carrillo, APRN - CNP      Examination:  /71   Pulse 97   Temp 97.2 °F (36.2 °C) (Temporal)   Resp 16   Ht 6' 2\" (1.88 m)   Wt 290 lb (131.5 kg)   SpO2 97%   BMI 37.23 kg/m²   Gait - steady  Medication side effects(SE):   Mental Status Examination:    Level of consciousness:  within normal limits   Appearance:  fair grooming and fair hygiene  Behavior/Motor:  no abnormalities noted  Attitude toward examiner:  cooperative  Speech:  normal rate and normal volume   Mood: euthymic  Affect:  mood congruent  Thought processes:  [employer]  Thought content: The patient is devoid of suicidal or homicidal ideation intent or plan. Devoid of auditory or visual hallucinations or other perceptual disturbances, there are no overt or covert signs of psychosis or paranoia. There are no neurovegetative signs of depression. Cognition:  oriented to person, place, and time   Concentration intact  Insight poor   Judgement fair     ASSESSMENT:   Patient symptoms are:  [] Well controlled  [] Improving  [] Worsening  [x] No change      Diagnosis:   Principal Problem:    Acute psychosis ([facility]  Active Problems:    Cognitive disorder  Resolved Problems:    * No resolved hospital problems. *      LABS:    No results for input(s): WBC, HGB, PLT in the last 72 hours.   Recent Labs     06/08/21  1214 06/10/21  0646    141   K 4.5 4.3    104   CO2 27 28   BUN 9 9   CREATININE 1.0 1.0   GLUCOSE 115* 112*

## 2021-06-11 PROCEDURE — 1240000000 HC EMOTIONAL WELLNESS R&B

## 2021-06-11 PROCEDURE — 6370000000 HC RX 637 (ALT 250 FOR IP): Performed by: NURSE PRACTITIONER

## 2021-06-11 PROCEDURE — 99231 SBSQ HOSP IP/OBS SF/LOW 25: CPT | Performed by: NURSE PRACTITIONER

## 2021-06-11 RX ADMIN — AMLODIPINE BESYLATE 10 MG: 10 TABLET ORAL at 09:42

## 2021-06-11 RX ADMIN — FOLIC ACID 1 MG: 1 TABLET ORAL at 09:42

## 2021-06-11 RX ADMIN — Medication 6 MG: at 18:17

## 2021-06-11 RX ADMIN — DIVALPROEX SODIUM 250 MG: 250 TABLET, DELAYED RELEASE ORAL at 09:42

## 2021-06-11 RX ADMIN — DIVALPROEX SODIUM 250 MG: 250 TABLET, DELAYED RELEASE ORAL at 21:14

## 2021-06-11 RX ADMIN — Medication 100 MG: at 09:42

## 2021-06-11 ASSESSMENT — PAIN SCALES - GENERAL: PAINLEVEL_OUTOF10: 0

## 2021-06-11 NOTE — PLAN OF CARE
Denies SI, HI and hallucinations. Patient remains isolative to room. Complaint with medications. No concerns or complaints verbalized. Will continue to monitor and offer emotional support.   Problem: Altered Mood, Psychotic Behavior:  Goal: Absence of self-harm  Description: Absence of self-harm  Outcome: Met This Shift     Problem: Altered Mood, Psychotic Behavior:  Goal: Ability to interact with others will improve  Description: Ability to interact with others will improve  Outcome: Ongoing   Electronically signed by Jeremy Yao RN on 6/11/2021 at 1:49 PM

## 2021-06-11 NOTE — PROGRESS NOTES
BEHAVIORAL HEALTH FOLLOW-UP NOTE     6/11/2021     Patient was seen and examined in person, Chart reviewed   Patient's case discussed with staff/team    Chief Complaint: \"I am doing okay. \"    Interim History:     Patient is observed in his room this morning. He is demonstrating a good sense of humor, however his sense of humor makes it difficult to get an accurate assessment. Sarah Zabala He is somewhat childlike in his demeanor. He is taking his medications, he is participating in group and unit activities. There are no behavioral disturbances. He denies SI/HI intent or plan. Denies AVH. States that he is not paranoid. Noticed on assessment today that the patient is very jaundice. Patient has long history of ETOH dependence, he additionally has a prolonged QTc caution used with pharmacologic selection. QTc is trending down repeat still prolonged at 488. MoCA 19/30 indicating mild cognitive impairment, will repeat. Appetite:   [x] Normal/Unchanged  [] Increased  [] Decreased      Sleep:       [x] Normal/Unchanged  [] Fair       [] Poor              Energy:    [x] Normal/Unchanged  [] Increased  [] Decreased        SI [] Present  [x] Absent    HI  []Present  [x] Absent     Aggression:  [] yes  [x] no    Patient is [x] able  [] unable to CONTRACT FOR SAFETY     PAST MEDICAL/PSYCHIATRIC HISTORY:   Past Medical History:   Diagnosis Date    CAD (coronary artery disease)     Hypertension     Sleep apnea        FAMILY/SOCIAL HISTORY:  History reviewed. No pertinent family history. Social History     Socioeconomic History    Marital status: Legally      Spouse name: Not on file    Number of children: Not on file    Years of education: Not on file    Highest education level: Not on file   Occupational History    Not on file   Tobacco Use    Smoking status: Former Smoker    Smokeless tobacco: Never Used   Vaping Use    Vaping Use: Never used   Substance and Sexual Activity    Alcohol use:  Yes Alcohol/week: 5.0 standard drinks     Types: 5 Cans of beer per week    Drug use: Not Currently    Sexual activity: Not Currently   Other Topics Concern    Not on file   Social History Narrative    Not on file     Social Determinants of Health     Financial Resource Strain:     Difficulty of Paying Living Expenses:    Food Insecurity:     Worried About Running Out of Food in the Last Year:     Ran Out of Food in the Last Year:    Transportation Needs:     Lack of Transportation (Medical):  Lack of Transportation (Non-Medical):    Physical Activity:     Days of Exercise per Week:     Minutes of Exercise per Session:    Stress:     Feeling of Stress :    Social Connections:     Frequency of Communication with Friends and Family:     Frequency of Social Gatherings with Friends and Family:     Attends Rastafari Services:     Active Member of Clubs or Organizations:     Attends Club or Organization Meetings:     Marital Status:    Intimate Partner Violence:     Fear of Current or Ex-Partner:     Emotionally Abused:     Physically Abused:     Sexually Abused:            ROS:  [x] All negative/unchanged except if checked.  Explain positive(checked items) below:  [] Constitutional  [] Eyes  [] Ear/Nose/Mouth/Throat  [] Respiratory  [] CV  [] GI  []   [] Musculoskeletal  [] Skin/Breast  [] Neurological  [] Endocrine  [] Heme/Lymph  [] Allergic/Immunologic    Explanation:     MEDICATIONS:    Current Facility-Administered Medications:     chlordiazePOXIDE (LIBRIUM) capsule 25 mg, 25 mg, Oral, Q6H PRN, WINTER Woodruff CNP    folic acid (FOLVITE) tablet 1 mg, 1 mg, Oral, Daily, WINTER Woodruff CNP, 1 mg at 06/11/21 0409    thiamine mononitrate tablet 100 mg, 100 mg, Oral, Daily, WINTER Bellamy NP, 100 mg at 06/11/21 0942    amLODIPine (NORVASC) tablet 10 mg, 10 mg, Oral, Daily, WINTER Bellamy NP, 10 mg at 06/11/21 0942    divalproex (DEPAKOTE) DR tablet 250 mg, 250 mg, Oral, 2 times per day, WINTER Prince CNP, 250 mg at 06/11/21 0942    melatonin tablet 6 mg, 6 mg, Oral, Daily, WINTER Prince CNP, 6 mg at 06/10/21 1714    acetaminophen (TYLENOL) tablet 650 mg, 650 mg, Oral, Q6H PRN, WINTER Prince CNP    magnesium hydroxide (MILK OF MAGNESIA) 400 MG/5ML suspension 30 mL, 30 mL, Oral, Daily PRN, WINTER Prince CNP    aluminum & magnesium hydroxide-simethicone (MAALOX) 200-200-20 MG/5ML suspension 30 mL, 30 mL, Oral, PRN, WINTER Prince CNP    LORazepam (ATIVAN) injection 2 mg, 2 mg, Intramuscular, Q6H PRN, WINTER Prince CNP      Examination:  /70   Pulse 85   Temp 97.9 °F (36.6 °C) (Temporal)   Resp 16   Ht 6' 2\" (1.88 m)   Wt 290 lb (131.5 kg)   SpO2 96%   BMI 37.23 kg/m²   Gait - steady  Medication side effects(SE):   Mental Status Examination:    Level of consciousness:  within normal limits   Appearance:  fair grooming and fair hygiene  Behavior/Motor:  no abnormalities noted  Attitude toward examiner:  cooperative  Speech:  normal rate and normal volume   Mood: euthymic  Affect:  mood congruent  Thought processes:  [place]  Thought content: The patient is devoid of suicidal or homicidal ideation intent or plan. Devoid of auditory or visual hallucinations or other perceptual disturbances, there are no overt or covert signs of psychosis or paranoia. There are no neurovegetative signs of depression. Cognition:  oriented to person, place, and time   Concentration intact  Insight poor   Judgement fair     ASSESSMENT:   Patient symptoms are:  [] Well controlled  [] Improving  [] Worsening  [x] No change      Diagnosis:   Principal Problem:    Acute psychosis ([facility] [address]  Active Problems:    Cognitive disorder  Resolved Problems:    * No resolved hospital problems. *      LABS:    No results for input(s): WBC, HGB, PLT in the last 72 hours.   Recent Labs     06/08/21  1214 06/10/21  0646    141   K 4.5 4.3   CL 105 104   CO2 27 28   BUN 9 9   CREATININE 1.0 1.0   GLUCOSE 115* 112*     Recent Labs     06/08/21  1214 06/10/21  0646   BILITOT 2.2* 2.0*   ALKPHOS 78 79   AST 22 18   ALT 19 18     Lab Results   Component Value Date    LABAMPH NOT DETECTED 06/04/2021    BARBSCNU NOT DETECTED 06/04/2021    LABBENZ NOT DETECTED 06/04/2021    LABMETH NOT DETECTED 06/04/2021    OPIATESCREENURINE NOT DETECTED 06/04/2021    PHENCYCLIDINESCREENURINE NOT DETECTED 06/04/2021    ETOH <10 06/04/2021     Lab Results   Component Value Date    TSH 1.333 01/07/2011     No results found for: LITHIUM  No results found for: VALPROATE, CBMZ        Treatment Plan:  The patient's diagnosis, treatment plan, medication management were formulated after patient was seen directly by the attending physician and myself and all relevant documentation was reviewed. Reviewed current Medications with the patient. Risk, benefit, side effects, possible outcomes of the medication and alternatives discussed with the patient and the patient demonstrated understanding. The patient was also educated that the outcome of treatment will depend on the medication compliance as directed by the prescribers along with regular follow-up, compliance with the labs and other work-up, as clinically indicated. Obtain CMP  Obtain an ammonia level  Repeat MoCA  Depakote 250 mg twice daily for mood stabilization  Melatonin 6 mg daily at 1800 to reset sleep-wake cycle       Collateral information: Followed by social work   CD evaluation  Encourage patient to attend group and other milieu activities.   Discharge planning discussed with the patient and treatment team.    PSYCHOTHERAPY/COUNSELING:  [x] Therapeutic interview  [x] Supportive  [] CBT  [] Ongoing  [] Other    [x] Patient continues to need, on a daily basis, active treatment furnished directly by or requiring the supervision of inpatient psychiatric personnel      Anticipated Length of stay: 3 - 5 days based on stability    NOTE: This report was transcribed using voice recognition software.  Every effort was made to ensure accuracy; however, inadvertent computerized transcription errors may be present.       Electronically signed by WINTER Montoya CNP on 6/11/2021 at 11:39 AM

## 2021-06-11 NOTE — PLAN OF CARE
Problem: Altered Mood, Psychotic Behavior:  Goal: Able to verbalize decrease in frequency and intensity of hallucinations  Description: Able to verbalize decrease in frequency and intensity of hallucinations  Outcome: Met This Shift     Problem: Altered Mood, Psychotic Behavior:  Goal: Absence of self-harm  Description: Absence of self-harm  6/10/2021 2134 by Valentine Moise RN  Outcome: Met This Shift     Problem: Altered Mood, Psychotic Behavior:  Goal: Able to demonstrate trust by eating, participating in treatment and following staff's direction  Description: Able to demonstrate trust by eating, participating in treatment and following staff's direction  Outcome: Ongoing     Problem: Altered Mood, Psychotic Behavior:  Goal: Ability to interact with others will improve  Description: Ability to interact with others will improve  6/10/2021 2134 by Valentine Moise RN  Outcome: Ongoing    Patient denies SI/HI and hallucinations. When asked how this patient is feeling, patient states \"I don't have no feelings at all. \" Patient was blunt and evasive. Patient is isolative to room. Patient takes prescribed medications without issue. Will continue to offer support and comfort to patient.

## 2021-06-11 NOTE — PROGRESS NOTES
[facility] [place]  Day 3 Interdisciplinary Treatment Plan NOTE    Review Date & Time: 6/11/21 0900    Patient was in treatment team    Admission Type:   Admission Type: Involuntary    Reason for admission:  Reason for Admission: \"My neighbor was doing some weird stuff. He was peeping through my peephole. \"  Estimated Length of Stay Update:  3-5 dats  Estimated Discharge Date Update: 6/13/21    PATIENT STRENGTHS:  Patient Strengths Strengths: Communication, Employment, Social Skills  Patient Strengths and Limitations:Limitations: Multiple barriers to leisure interests, Difficulty problem solving/relies on others to help solve problems  Addictive Behavior:Addictive Behavior  In the past 3 months, have you felt or has someone told you that you have a problem with:  : None  Do you have a history of Chemical Use?: No  Do you have a history of Alcohol Use?: No  Do you have a history of Street Drug Abuse?: No  Histroy of Prescripton Drug Abuse?: No  Medical Problems:  Past Medical History:   Diagnosis Date    CAD (coronary artery disease)     Hypertension     Sleep apnea        Risk:  Fall RiskTotal: 55  Ben Scale Ben Scale Score: 22  BVC Total: 0  Change in scores No. Changes to plan of Care No    Status EXAM:   Status and Exam  Normal: No  Facial Expression: Avoids Gaze, Flat  Affect: Blunt  Level of Consciousness: Alert  Mood:Normal: No  Mood: Anxious  Motor Activity:Normal: No  Motor Activity: Decreased  Interview Behavior: Cooperative  Preception: [place] to Person, [place] to Place, [place] to Time, [place] to Situation  Attention:Normal: No  Attention: Distractible  Thought Processes: Flt.of Ideas  Thought Content:Normal: No  Thought Content: Preoccupations  Hallucinations: None  Delusions: No  Delusions: Persecution  Memory:Normal: No  Memory: Poor Recent  Insight and Judgment: No  Insight and Judgment: Poor Judgment, Poor Insight  Present Suicidal Ideation: No  Present Homicidal Ideation: No    Daily Assessment Last Entry:   Daily Sleep (WDL): Within Defined Limits         Patient Currently in Pain: Denies  Daily Nutrition (WDL): Within Defined Limits    Patient Monitoring:  Frequency of Checks: 4 times per hour, close    Psychiatric Symptoms:   Depression Symptoms  Depression Symptoms: Feelings of helplessness, Impaired concentration  Anxiety Symptoms  Anxiety Symptoms: Generalized  Angella Symptoms  Angella Symptoms: No problems reported or observed. Psychosis Symptoms  Delusion Type: No problems reported or observed. Suicide Risk CSSR-S:  1) Within the past month, have you wished you were dead or wished you could go to sleep and not wake up? : No  2) Have you actually had any thoughts of killing yourself? : No  6) Have you ever done anything, started to do anything, or prepared to do anything to end your life?: No  Change in Result No Change in Plan of care No      EDUCATION:   Learner Progress Toward Treatment Goals: Reviewed results and recommendations of this team and Reviewed goals and plan of care    Method: Small group    Outcome: Verbalized understanding    PATIENT GOALS: None     PLAN/TREATMENT RECOMMENDATIONS UPDATE: Take prescribed medications, attend/participate in groups. Continue to provide emotional support to patient.      GOALS UPDATE:   Time frame for Short-Term Goals: Prior to discharge      Lady Tejeda RN

## 2021-06-12 PROCEDURE — 99232 SBSQ HOSP IP/OBS MODERATE 35: CPT | Performed by: NURSE PRACTITIONER

## 2021-06-12 PROCEDURE — 6370000000 HC RX 637 (ALT 250 FOR IP): Performed by: NURSE PRACTITIONER

## 2021-06-12 PROCEDURE — 1240000000 HC EMOTIONAL WELLNESS R&B

## 2021-06-12 RX ADMIN — DIVALPROEX SODIUM 250 MG: 250 TABLET, DELAYED RELEASE ORAL at 09:42

## 2021-06-12 RX ADMIN — DIVALPROEX SODIUM 250 MG: 250 TABLET, DELAYED RELEASE ORAL at 21:30

## 2021-06-12 RX ADMIN — Medication 100 MG: at 09:42

## 2021-06-12 RX ADMIN — Medication 6 MG: at 17:57

## 2021-06-12 RX ADMIN — FOLIC ACID 1 MG: 1 TABLET ORAL at 09:42

## 2021-06-12 RX ADMIN — AMLODIPINE BESYLATE 10 MG: 10 TABLET ORAL at 09:42

## 2021-06-12 ASSESSMENT — PAIN SCALES - GENERAL: PAINLEVEL_OUTOF10: 0

## 2021-06-12 NOTE — PLAN OF CARE
Problem: Altered Mood, Psychotic Behavior:  Goal: Able to verbalize reality based thinking  Description: Able to verbalize reality based thinking  Outcome: Met This Shift     Problem: Altered Mood, Psychotic Behavior:  Goal: Absence of self-harm  Description: Absence of self-harm  Outcome: Met This Shift     Problem: Altered Mood, Psychotic Behavior:  Goal: Ability to achieve adequate nutritional intake will improve  Description: Ability to achieve adequate nutritional intake will improve  Outcome: Met This Shift     Problem: Substance Abuse:  Goal: Absence of drug withdrawal signs and symptoms  Description: Absence of drug withdrawal signs and symptoms  Outcome: Met This Shift

## 2021-06-12 NOTE — PLAN OF CARE
Problem: Altered Mood, Psychotic Behavior:  Goal: Able to demonstrate trust by eating, participating in treatment and following staff's direction  Description: Able to demonstrate trust by eating, participating in treatment and following staff's direction  Outcome: Met This Shift  Goal: Able to verbalize reality based thinking  Description: Able to verbalize reality based thinking  Outcome: Met This Shift  Goal: Absence of self-harm  Description: Absence of self-harm  8/36/1522 0584 by Leodis Goltz, RN  Outcome: Met This Shift  6/11/2021 1340 by Milad Main RN  Outcome: Met This Shift  Goal: Ability to achieve adequate nutritional intake will improve  Description: Ability to achieve adequate nutritional intake will improve  Outcome: Met This Shift     Problem: Altered Mood, Psychotic Behavior:  Goal: Ability to interact with others will improve  Description: Ability to interact with others will improve  2/53/4406 [id number] by Leodis Goltz, RN  Outcome: Ongoing  6/11/2021 1340 by Milad Main RN  Outcome: Ongoing   Patient is in room at this time. Patient denies SI,HI and hallucinations. Patient denies depression and anxiety. Patient is brightened , has a good sense of humor when asked what brought him here \" the ambulance , everyone keeps asking me that but its true that's how I got here. I remember calling the ambulance because I felt dizzy and I also told them that someone was looking through my peep hole and they brought me here and I was thinking why do I have to go to the hospital they should be put in retirement\" told patient to inform staff when he needs to urinate \" well  I cant remember to do that I will be sleep. \" Patient voices no questions or concerns at this time.

## 2021-06-12 NOTE — BH NOTE
Pt denies SI HI and hallucinations. Pt continues to be sarcastic, blunt, evasive, poor insight. Pt is isolative to his room. No voiced paranoia. Pt refers to himself in the third person at times. No voiced depression or anxiety. Pt stated \"I'm bored and ready to go home. There is nothing to do here\". Pt is medication compliant. Pt is eating provided meals. No aggression or behaviors. We will continue to provide support and comfort for the patient.

## 2021-06-12 NOTE — PROGRESS NOTES
BEHAVIORAL HEALTH FOLLOW-UP NOTE     6/12/2021     Patient was seen and examined in person, Chart reviewed   Patient's case discussed with staff/team    Chief Complaint: \"I am doing okay. \"    Interim History:     Patient seen in his room he continues to be sarcastic referring to himself in the third person at times. He is isolate to his room not attending groups not socializing with peers. She has very poor insight and judgment into hospitalization need for treatment. He is bizarre at times no behavioral disturbances on the unit. Denies auditory visualizations denies SI/HI intent or plan      MoCA 19/30 indicating mild cognitive impairment, will repeat. Appetite:   [x] Normal/Unchanged  [] Increased  [] Decreased      Sleep:       [x] Normal/Unchanged  [] Fair       [] Poor              Energy:    [x] Normal/Unchanged  [] Increased  [] Decreased        SI [] Present  [x] Absent    HI  []Present  [x] Absent     Aggression:  [] yes  [x] no    Patient is [x] able  [] unable to CONTRACT FOR SAFETY     PAST MEDICAL/PSYCHIATRIC HISTORY:   Past Medical History:   Diagnosis Date    CAD (coronary artery disease)     Hypertension     Sleep apnea        FAMILY/SOCIAL HISTORY:  History reviewed. No pertinent family history. Social History     Socioeconomic History    Marital status: Legally      Spouse name: Not on file    Number of children: Not on file    Years of education: Not on file    Highest education level: Not on file   Occupational History    Not on file   Tobacco Use    Smoking status: Former Smoker    Smokeless tobacco: Never Used   Vaping Use    Vaping Use: Never used   Substance and Sexual Activity    Alcohol use:  Yes     Alcohol/week: 5.0 standard drinks     Types: 5 Cans of beer per week    Drug use: Not Currently    Sexual activity: Not Currently   Other Topics Concern    Not on file   Social History Narrative    Not on file     Social Determinants of Health     Financial Resource Strain:     Difficulty of Paying Living Expenses:    Food Insecurity:     Worried About Running Out of Food in the Last Year:     [address] in the Last Year:    Transportation Needs:     Lack of Transportation (Medical):  Lack of Transportation (Non-Medical):    Physical Activity:     Days of Exercise per Week:     Minutes of Exercise per Session:    Stress:     Feeling of Stress :    Social Connections:     Frequency of Communication with Friends and Family:     Frequency of Social Gatherings with Friends and Family:     Attends Mor[employer] Services:     Active Member of Clubs or Organizations:     Attends Club or Organization Meetings:     Marital Status:    Intimate Partner Violence:     Fear of Current or Ex-Partner:     Emotionally Abused:     Physically Abused:     Sexually Abused:            ROS:  [x] All negative/unchanged except if checked.  Explain positive(checked items) below:  [] Constitutional  [] Eyes  [] Ear/Nose/Mouth/Throat  [] Respiratory  [] CV  [] GI  []   [] Musculoskeletal  [] Skin/Breast  [] Neurological  [] Endocrine  [] Heme/Lymph  [] Allergic/Immunologic    Explanation:     MEDICATIONS:    Current Facility-Administered Medications:     chlordiazePOXIDE (LIBRIUM) capsule 25 mg, 25 mg, Oral, Q6H PRN, Adriel Setting, APRN - CNP    folic acid (FOLVITE) tablet 1 mg, 1 mg, Oral, Daily, WINTER Barajas CNP, 1 mg at 06/12/21 3668    thiamine mononitrate tablet 100 mg, 100 mg, Oral, Daily, WINTER Gardner - NP, 100 mg at 06/12/21 0942    amLODIPine (NORVASC) tablet 10 mg, 10 mg, Oral, Daily, WINTER Gardner - NP, 10 mg at 06/12/21 0942    divalproex (DEPAKOTE) DR tablet 250 mg, 250 mg, Oral, 2 times per day, Adriel Setting APRCARLOS - CNP, 250 mg at 06/12/21 0942    melatonin tablet 6 mg, 6 mg, Oral, Daily, Adriel Gupta APRN - CNP, 6 mg at 06/11/21 1817    acetaminophen (TYLENOL) tablet 650 mg, 650 mg, Oral, Q6H PRN, Adriel Setting, APRN - CNP    magnesium hydroxide (MILK OF MAGNESIA) 400 MG/5ML suspension 30 mL, 30 mL, Oral, Daily PRN, WINTER Prince - CNP    aluminum & magnesium hydroxide-simethicone (MAALOX) 200-200-20 MG/5ML suspension 30 mL, 30 mL, Oral, PRN, WINTER Prince - CNP    LORazepam (ATIVAN) injection 2 mg, 2 mg, Intramuscular, Q6H PRN, WINTER Prince - SERGEY      Examination:  /64   Pulse 87   Temp 98.5 °F (36.9 °C) (Oral)   Resp 16   Ht 6' 2\" (1.88 m)   Wt 290 lb (131.5 kg)   SpO2 98%   BMI 37.23 kg/m²   Gait - steady  Medication side effects(SE):   Mental Status Examination:    Level of consciousness:  within normal limits   Appearance:  fair grooming and fair hygiene  Behavior/Motor:  no abnormalities noted  Attitude toward examiner:  cooperative  Speech:  normal rate and normal volume   Mood: euthymic  Affect:  mood congruent  Thought processes:  [place]  Thought content: The patient is devoid of suicidal or homicidal ideation intent or plan. Devoid of auditory or visual hallucinations or other perceptual disturbances, there are no overt or covert signs of psychosis or paranoia. There are no neurovegetative signs of depression. Cognition:  oriented to person, place, and time   Concentration intact  Insight poor   Judgement fair     ASSESSMENT:   Patient symptoms are:  [] Well controlled  [] Improving  [] Worsening  [x] No change      Diagnosis:   Principal Problem:    Acute psychosis ([facility] [address]  Active Problems:    Cognitive disorder  Resolved Problems:    * No resolved hospital problems. *      LABS:    No results for input(s): WBC, HGB, PLT in the last 72 hours.   Recent Labs     06/10/21  0646      K 4.3      CO2 28   BUN 9   CREATININE 1.0   GLUCOSE 112*     Recent Labs     06/10/21  0646   BILITOT 2.0*   ALKPHOS 79   AST 18   ALT 18     Lab Results   Component Value Date    LABAMPH NOT DETECTED 06/04/2021    BARBSCNU NOT DETECTED 06/04/2021    LABBENZ NOT DETECTED 06/04/2021    LABMETH NOT DETECTED 06/04/2021    OPIATESCREENURINE NOT DETECTED 06/04/2021    PHENCYCLIDINESCREENURINE NOT DETECTED 06/04/2021    ETOH <10 06/04/2021     Lab Results   Component Value Date    TSH 1.333 01/07/2011     No results found for: LITHIUM  No results found for: VALPROATE, CBMZ        Treatment Plan:  The patient's diagnosis, treatment plan, medication management were formulated after patient was seen directly by the attending physician and myself and all relevant documentation was reviewed. Reviewed current Medications with the patient. Risk, benefit, side effects, possible outcomes of the medication and alternatives discussed with the patient and the patient demonstrated understanding. The patient was also educated that the outcome of treatment will depend on the medication compliance as directed by the prescribers along with regular follow-up, compliance with the labs and other work-up, as clinically indicated. Obtain CMP  Obtain an ammonia level  Repeat MoCA  Depakote 250 mg twice daily for mood stabilization  Melatonin 6 mg daily at 1800 to reset sleep-wake cycle       Collateral information: Followed by social work   CD evaluation  Encourage patient to attend group and other milieu activities. Discharge planning discussed with the patient and treatment team.    PSYCHOTHERAPY/COUNSELING:  [x] Therapeutic interview  [x] Supportive  [] CBT  [] Ongoing  [] Other    [x] Patient continues to need, on a daily basis, active treatment furnished directly by or requiring the supervision of inpatient psychiatric personnel      Anticipated Length of stay: 3 - 5 days based on stability    NOTE: This report was transcribed using voice recognition software.  Every effort was made to ensure accuracy; however, inadvertent computerized transcription errors may be present.       Electronically signed by WINTER Arauz CNP on 3/51/5011 at 11:48 AM

## 2021-06-13 PROCEDURE — 6370000000 HC RX 637 (ALT 250 FOR IP): Performed by: NURSE PRACTITIONER

## 2021-06-13 PROCEDURE — 1240000000 HC EMOTIONAL WELLNESS R&B

## 2021-06-13 PROCEDURE — 99232 SBSQ HOSP IP/OBS MODERATE 35: CPT | Performed by: PSYCHIATRY & NEUROLOGY

## 2021-06-13 RX ADMIN — FOLIC ACID 1 MG: 1 TABLET ORAL at 09:34

## 2021-06-13 RX ADMIN — DIVALPROEX SODIUM 250 MG: 250 TABLET, DELAYED RELEASE ORAL at 21:38

## 2021-06-13 RX ADMIN — Medication 100 MG: at 09:34

## 2021-06-13 RX ADMIN — DIVALPROEX SODIUM 250 MG: 250 TABLET, DELAYED RELEASE ORAL at 09:34

## 2021-06-13 RX ADMIN — AMLODIPINE BESYLATE 10 MG: 10 TABLET ORAL at 09:34

## 2021-06-13 RX ADMIN — Medication 6 MG: at 17:50

## 2021-06-13 ASSESSMENT — PAIN SCALES - GENERAL: PAINLEVEL_OUTOF10: 0

## 2021-06-13 NOTE — PLAN OF CARE
Problem: Altered Mood, Psychotic Behavior:  Goal: Able to demonstrate trust by eating, participating in treatment and following staff's direction  Description: Able to demonstrate trust by eating, participating in treatment and following staff's direction  Outcome: Met This Shift  Goal: Absence of self-harm  Description: Absence of self-harm  4/89/[id number] by Miriam Obando RN  Outcome: Met This Shift  6/12/2021 1632 by Lyssa Howard RN  Outcome: Met This Shift  Goal: Ability to achieve adequate nutritional intake will improve  Description: Ability to achieve adequate nutritional intake will improve  6/12/2021 1632 by Lyssa Howard RN  Outcome: Met This Shift  Goal: Compliance with prescribed medication regimen will improve  Description: Compliance with prescribed medication regimen will improve  Outcome: Met This Shift     Problem: Substance Abuse:  Goal: Absence of drug withdrawal signs and symptoms  Description: Absence of drug withdrawal signs and symptoms  6/12/2021 1632 by Lyssa Howard RN  Outcome: Met This Shift     Problem: Altered Mood, Psychotic Behavior:  Goal: Ability to interact with others will improve  Description: Ability to interact with others will improve  Outcome: Not Met This Shift   Patient in room at this time resting with eyes closed. Patient denies SI,HI and hallucinations. Patient denies depression or anxiety. Patient is blunted this evening , poor eye contact. Patient voices no questions or concerns. Patient continues to refuse to give urine specimen.  Will continue to monitor and observe

## 2021-06-13 NOTE — PROGRESS NOTES
BEHAVIORAL HEALTH FOLLOW-UP NOTE     6/13/2021     Patient was seen via telepsychiatry from home by video, Chart reviewed   Patient's case discussed with staff/team    Chief Complaint: \"I just want to sleep\"    Interim History:   Patient seen this morning lying in bed. Patient says he is sleeping. Per nursing report he is seclusive isolative and does not attend groups. Patient says that he does not want to go to groups. Somewhat irritable on examination. Denies suicidal homicidal thoughts however        MoCA 19/30 indicating mild cognitive impairment        Appetite:   [x] Normal/Unchanged  [] Increased  [] Decreased      Sleep:       [x] Normal/Unchanged  [] Fair       [] Poor              Energy:    [x] Normal/Unchanged  [] Increased  [] Decreased        SI [] Present  [x] Absent    HI  []Present  [x] Absent     Aggression:  [] yes  [x] no    Patient is [x] able  [] unable to CONTRACT FOR SAFETY     PAST MEDICAL/PSYCHIATRIC HISTORY:   Past Medical History:   Diagnosis Date    CAD (coronary artery disease)     Hypertension     Sleep apnea        FAMILY/SOCIAL HISTORY:  History reviewed. No pertinent family history. Social History     Socioeconomic History    Marital status: Legally      Spouse name: Not on file    Number of children: Not on file    Years of education: Not on file    Highest education level: Not on file   Occupational History    Not on file   Tobacco Use    Smoking status: Former Smoker    Smokeless tobacco: Never Used   Vaping Use    Vaping Use: Never used   Substance and Sexual Activity    Alcohol use:  Yes     Alcohol/week: 5.0 standard drinks     Types: 5 Cans of beer per week    Drug use: Not Currently    Sexual activity: Not Currently   Other Topics Concern    Not on file   Social History Narrative    Not on file     Social Determinants of Health     Financial Resource Strain:     Difficulty of Paying Living Expenses:    Food Insecurity:     Worried About Running Out of Food in the Last Year:    [address] in the Last Year:    Transportation Needs:     Lack of Transportation (Medical):  Lack of Transportation (Non-Medical):    Physical Activity:     Days of Exercise per Week:     Minutes of Exercise per Session:    Stress:     Feeling of Stress :    Social Connections:     Frequency of Communication with Friends and Family:     Frequency of Social Gatherings with Friends and Family:     Attends Ca[employer] Services:     Active Member of Clubs or Organizations:     Attends Club or Organization Meetings:     Marital Status:    Intimate Partner Violence:     Fear of Current or Ex-Partner:     Emotionally Abused:     Physically Abused:     Sexually Abused:            ROS:  [x] All negative/unchanged except if checked.  Explain positive(checked items) below:  [] Constitutional  [] Eyes  [] Ear/Nose/Mouth/Throat  [] Respiratory  [] CV  [] GI  []   [] Musculoskeletal  [] Skin/Breast  [] Neurological  [] Endocrine  [] Heme/Lymph  [] Allergic/Immunologic    Explanation:     MEDICATIONS:    Current Facility-Administered Medications:     chlordiazePOXIDE (LIBRIUM) capsule 25 mg, 25 mg, Oral, Q6H PRN, WINTER Cruz CNP    folic acid (FOLVITE) tablet 1 mg, 1 mg, Oral, Daily, WINTER Cruz - CNP, 1 mg at 06/13/21 8484    thiamine mononitrate tablet 100 mg, 100 mg, Oral, Daily, WINTER Rinaldi - NP, 100 mg at 06/13/21 0934    amLODIPine (NORVASC) tablet 10 mg, 10 mg, Oral, Daily, WINTER Rinaldi - NP, 10 mg at 06/13/21 0934    divalproex (DEPAKOTE) DR tablet 250 mg, 250 mg, Oral, 2 times per day, WINTER Cruz CNP, 250 mg at 06/13/21 0934    melatonin tablet 6 mg, 6 mg, Oral, Daily, WINTER Cruz - CNP, 6 mg at 06/12/21 1757    acetaminophen (TYLENOL) tablet 650 mg, 650 mg, Oral, Q6H PRN, WINTER Cruz CNP    magnesium hydroxide (MILK OF MAGNESIA) 400 MG/5ML suspension 30 mL, 30 mL, Oral, Daily PRN, Ronney Dance, APRN - CNP    aluminum & magnesium hydroxide-simethicone (MAALOX) 200-200-20 MG/5ML suspension 30 mL, 30 mL, Oral, PRN, Ronney Dance, APRN - CNP    LORazepam (ATIVAN) injection 2 mg, 2 mg, Intramuscular, Q6H PRN, Ronney Dance, APRN - CNP      Examination:  /70   Pulse 85   Temp 98 °F (36.7 °C) (Temporal)   Resp 16   Ht 6' 2\" (1.88 m)   Wt 290 lb (131.5 kg)   SpO2 97%   BMI 37.23 kg/m²   Gait - steady  Medication side effects(SE): no  Mental Status Examination:    Level of consciousness:  within normal limits   Appearance:  fair grooming and fair hygiene  Behavior/Motor:  no abnormalities noted  Attitude toward examiner:  cooperative  Speech:  normal rate and normal volume   Mood: euthymic  Affect: Constricted, blunted   Thought processes:  [place]  Thought content: The patient is devoid of suicidal or homicidal ideation intent or plan. Devoid of auditory or visual hallucinations or other perceptual disturbances, there are no overt or covert signs of psychosis or paranoia. There are no neurovegetative signs of depression. Cognition:  oriented to person, place, and time   Concentration intact  Insight poor   Judgement fair     ASSESSMENT:   Patient symptoms are:  [] Well controlled  [] Improving  [] Worsening  [x] No change      Diagnosis:   Principal Problem:    Acute psychosis ([facility] [address]  Active Problems:    Cognitive disorder  Resolved Problems:    * No resolved hospital problems. *      LABS:    No results for input(s): WBC, HGB, PLT in the last 72 hours. No results for input(s): NA, K, CL, CO2, BUN, CREATININE, GLUCOSE in the last 72 hours. No results for input(s): BILITOT, ALKPHOS, AST, ALT in the last 72 hours.   Lab Results   Component Value Date    LABAMPH NOT DETECTED 06/04/2021    BARBSCNU NOT DETECTED 06/04/2021    LABBENZ NOT DETECTED 06/04/2021    LABMETH NOT DETECTED 06/04/2021    OPIATESCREENURINE NOT DETECTED 06/04/2021    PHENCYCLIDINESCREENURINE NOT DETECTED

## 2021-06-14 PROCEDURE — 6370000000 HC RX 637 (ALT 250 FOR IP): Performed by: NURSE PRACTITIONER

## 2021-06-14 PROCEDURE — 1240000000 HC EMOTIONAL WELLNESS R&B

## 2021-06-14 RX ORDER — DIVALPROEX SODIUM 250 MG/1
250 TABLET, DELAYED RELEASE ORAL EVERY 12 HOURS SCHEDULED
Qty: 60 TABLET | Refills: 0 | Status: SHIPPED | OUTPATIENT
Start: 2021-06-14 | End: 2021-10-14

## 2021-06-14 RX ORDER — FOLIC ACID 1 MG/1
1 TABLET ORAL DAILY
Qty: 30 TABLET | Refills: 0 | Status: SHIPPED | OUTPATIENT
Start: 2021-06-15 | End: 2021-10-14

## 2021-06-14 RX ORDER — LANOLIN ALCOHOL/MO/W.PET/CERES
6 CREAM (GRAM) TOPICAL DAILY
Qty: 60 TABLET | Refills: 0 | Status: SHIPPED | OUTPATIENT
Start: 2021-06-14 | End: 2021-10-14

## 2021-06-14 RX ADMIN — FOLIC ACID 1 MG: 1 TABLET ORAL at 08:52

## 2021-06-14 RX ADMIN — DIVALPROEX SODIUM 250 MG: 250 TABLET, DELAYED RELEASE ORAL at 21:15

## 2021-06-14 RX ADMIN — AMLODIPINE BESYLATE 10 MG: 10 TABLET ORAL at 08:51

## 2021-06-14 RX ADMIN — DIVALPROEX SODIUM 250 MG: 250 TABLET, DELAYED RELEASE ORAL at 08:52

## 2021-06-14 RX ADMIN — Medication 100 MG: at 08:51

## 2021-06-14 RX ADMIN — Medication 6 MG: at 18:45

## 2021-06-14 NOTE — CARE COORDINATION
In order to ensure appropriate transition and discharge planning is in place, the following documents have been transmitted to [facility], as the new outpatient provider:     · The d/c diagnosis was transmitted to the next care provider  · The reason for hospitalization was transmitted to the next care provider  · The d/c medications (dosage and indication) were transmitted to the next care provider   · The continuing care plan was transmitted to the next care provider

## 2021-06-14 NOTE — DISCHARGE SUMMARY
file   Social History Narrative    Not on file     Social Determinants of Health     Financial Resource Strain:     Difficulty of Paying Living Expenses:    Food Insecurity:     Worried About Running Out of Food in the Last Year:     [address] in the Last Year:    Transportation Needs:     Lack of Transportation (Medical):      Lack of Transportation (Non-Medical):    Physical Activity:     Days of Exercise per Week:     Minutes of Exercise per Session:    Stress:     Feeling of Stress :    Social Connections:     Frequency of Communication with Friends and Family:     Frequency of Social Gatherings with Friends and Family:     Attends Rastafarian Services:     Active Member of Clubs or Organizations:     Attends Club or Organization Meetings:     Marital Status:    Intimate Partner Violence:     Fear of Current or Ex-Partner:     Emotionally Abused:     Physically Abused:     Sexually Abused:        MEDICATIONS:    Current Facility-Administered Medications:     chlordiazePOXIDE (LIBRIUM) capsule 25 mg, 25 mg, Oral, Q6H PRN, Elnoria Kehr, APRN - CNP    folic acid (FOLVITE) tablet 1 mg, 1 mg, Oral, Daily, Elnoria Kehr, APRN - CNP, 1 mg at 06/14/21 5427    thiamine mononitrate tablet 100 mg, 100 mg, Oral, Daily, [place] WINTER Park - NP, 100 mg at 06/14/21 0851    amLODIPine (NORVASC) tablet 10 mg, 10 mg, Oral, Daily, [place] WINTER [place] - NP, 10 mg at 06/14/21 0851    divalproex (DEPAKOTE) DR tablet 250 mg, 250 mg, Oral, 2 times per day, Elnoria Kehr, APRN - CNP, 250 mg at 06/14/21 0852    melatonin tablet 6 mg, 6 mg, Oral, Daily, Elnoria Kehr, APRN - CNP, 6 mg at 06/13/21 1750    acetaminophen (TYLENOL) tablet 650 mg, 650 mg, Oral, Q6H PRN, Elnoria Kehr, APRN - CNP    magnesium hydroxide (MILK OF MAGNESIA) 400 MG/5ML suspension 30 mL, 30 mL, Oral, Daily PRN, Elnoria Kehr, APRN - CNP    aluminum & magnesium hydroxide-simethicone (MAALOX) 562-392-04 MG/5ML suspension 30 mL, 30 mL, Oral, PRN, Juju Mckeon, APRN - CNP    LORazepam (ATIVAN) injection 2 mg, 2 mg, Intramuscular, Q6H PRN, WINTER Kennedy - CNP    Examination:  /74   Pulse 97   Temp 97.7 °F (36.5 °C) (Temporal)   Resp 17   Ht 6' 2\" (1.88 m)   Wt 290 lb (131.5 kg)   SpO2 94%   BMI 37.23 kg/m²   Gait - steady    HOSPITAL COURSE[de-identified]  Following admission to the hospital, patient had a complete physical exam and blood work up, which he was medically cleared and admitted to [facility] for psychiatric evaluation and stabilization. The patient was monitored closely with suicide and appropriate precautions. He was started on Depakote 250 mg twice daily to reduce confusion stabilize mood he was encouraged to participate in group and other milieu activity and started to feel better with this combination of treatment. There has been significant progress in the improvement of symptoms since admission. The patient has been an active participant in his treatment, and discharge planning. Patient was no longer suicidal, homicidal, manic or psychotic. He received the required treatment with medication, participated in group milieu, remained engaged in unit activities, learned appropriate coping skills. He was seen to be watching television socializing with peers using the phone. There were no mention or gestures of self-harm or harm to others. His mental status has returned to baseline. The treatment team believes the patient obtain maximum benefit out of this hospitalization and does not meet the criteria for inpatient hospitalization anymore. However he will continue to benefit from outpatient follow-up and treatment to maintain stability. Collateral information has been obtained and reconciled and there are no concerns about his safety. He has no access to guns or weapons. He appreciates the help that he received here. This patient no longer meets criteria for inpatient hospitalization.  He was discharged home if symptoms are not manageable. Patient's family member was contacted prior to the discharge, the patient has been discharged home in psychiatrically stable condition. Medication List      ASK your doctor about these medications    amLODIPine 10 MG tablet  Commonly known as: NORVASC  Take 1 tablet by mouth daily     vitamin B-1 100 MG tablet  Commonly known as: THIAMINE  Take 1 tablet by mouth daily          NOTE: This report was transcribed using voice recognition software. Every effort was made to ensure accuracy; however, inadvertent computerized transcription errors may be present.     TIME SPEND - 35 MINUTES TO COMPLETE THE EVALUATION, DISCHARGE SUMMARY, MEDICATION RECONCILIATION AND FOLLOW UP CARE     Signed:  WINTER Wagner - CNP  6/14/2021  1:46 PM

## 2021-06-15 VITALS
BODY MASS INDEX: 37.22 KG/M2 | DIASTOLIC BLOOD PRESSURE: 80 MMHG | SYSTOLIC BLOOD PRESSURE: 123 MMHG | RESPIRATION RATE: 16 BRPM | WEIGHT: 290 LBS | HEIGHT: 74 IN | HEART RATE: 90 BPM | OXYGEN SATURATION: 94 % | TEMPERATURE: 97.4 F

## 2021-06-15 PROCEDURE — 6370000000 HC RX 637 (ALT 250 FOR IP): Performed by: NURSE PRACTITIONER

## 2021-06-15 PROCEDURE — 99239 HOSP IP/OBS DSCHRG MGMT >30: CPT | Performed by: NURSE PRACTITIONER

## 2021-06-15 RX ADMIN — AMLODIPINE BESYLATE 10 MG: 10 TABLET ORAL at 08:59

## 2021-06-15 RX ADMIN — DIVALPROEX SODIUM 250 MG: 250 TABLET, DELAYED RELEASE ORAL at 08:59

## 2021-06-15 RX ADMIN — Medication 100 MG: at 08:59

## 2021-06-15 RX ADMIN — FOLIC ACID 1 MG: 1 TABLET ORAL at 08:59

## 2021-06-15 NOTE — PROGRESS NOTES
BEHAVIORAL HEALTH FOLLOW-UP NOTE     6/15/2021     Patient was seen and examined in person, Chart reviewed   Patient's case discussed with staff/team    Chief Complaint: \"I am doing okay. \"    Interim History:     Patient seen in his room he continues to be sarcastic referring to himself in the third person at times. He is isolate to his room not attending groups not socializing with peers. She has very poor insight and judgment into hospitalization need for treatment. He is bizarre at times no behavioral disturbances on the unit. Denies auditory visualizations denies SI/HI intent or plan. Repeat MoCA 21 out of 30        Appetite:   [x] Normal/Unchanged  [] Increased  [] Decreased      Sleep:       [x] Normal/Unchanged  [] Fair       [] Poor              Energy:    [x] Normal/Unchanged  [] Increased  [] Decreased        SI [] Present  [x] Absent    HI  []Present  [x] Absent     Aggression:  [] yes  [x] no    Patient is [x] able  [] unable to CONTRACT FOR SAFETY     PAST MEDICAL/PSYCHIATRIC HISTORY:   Past Medical History:   Diagnosis Date    CAD (coronary artery disease)     Hypertension     Sleep apnea        FAMILY/SOCIAL HISTORY:  History reviewed. No pertinent family history. Social History     Socioeconomic History    Marital status: Legally      Spouse name: Not on file    Number of children: Not on file    Years of education: Not on file    Highest education level: Not on file   Occupational History    Not on file   Tobacco Use    Smoking status: Former Smoker    Smokeless tobacco: Never Used   Vaping Use    Vaping Use: Never used   Substance and Sexual Activity    Alcohol use:  Yes     Alcohol/week: 5.0 standard drinks     Types: 5 Cans of beer per week    Drug use: Not Currently    Sexual activity: Not Currently   Other Topics Concern    Not on file   Social History Narrative    Not on file     Social Determinants of Health     Financial Resource Strain:     Difficulty of Paying Living Expenses:    Food Insecurity:     Worried About [facility] in the Last Year:     [facility] in the Last Year:    Transportation Needs:     Lack of Transportation (Medical):  Lack of Transportation (Non-Medical):    Physical Activity:     Days of Exercise per Week:     Minutes of Exercise per Session:    Stress:     Feeling of Stress :    Social Connections:     Frequency of Communication with Friends and Family:     Frequency of Social Gatherings with Friends and Family:     Attends [employer] Services:     Active Member of Clubs or Organizations:     Attends Club or Organization Meetings:     Marital Status:    Intimate Partner Violence:     Fear of Current or Ex-Partner:     Emotionally Abused:     Physically Abused:     Sexually Abused:            ROS:  [x] All negative/unchanged except if checked.  Explain positive(checked items) below:  [] Constitutional  [] Eyes  [] Ear/Nose/Mouth/Throat  [] Respiratory  [] CV  [] GI  []   [] Musculoskeletal  [] Skin/Breast  [] Neurological  [] Endocrine  [] Heme/Lymph  [] Allergic/Immunologic    Explanation:     MEDICATIONS:    Current Facility-Administered Medications:     chlordiazePOXIDE (LIBRIUM) capsule 25 mg, 25 mg, Oral, Q6H PRN, WINTER Ruiz CNP    folic acid (FOLVITE) tablet 1 mg, 1 mg, Oral, Daily, WINTER Ruiz CNP, 1 mg at 06/15/21 0176    thiamine mononitrate tablet 100 mg, 100 mg, Oral, Daily, WINTER Caputo NP, 100 mg at 06/15/21 0859    amLODIPine (NORVASC) tablet 10 mg, 10 mg, Oral, Daily, WINTER Caputo NP, 10 mg at 06/15/21 0859    divalproex (DEPAKOTE) DR tablet 250 mg, 250 mg, Oral, 2 times per day, WINTER Ruiz CNP, 250 mg at 06/15/21 0859    melatonin tablet 6 mg, 6 mg, Oral, Daily, WINTER Ruiz CNP, 6 mg at 06/14/21 1845    acetaminophen (TYLENOL) tablet 650 mg, 650 mg, Oral, Q6H PRN, WINTER Ruiz CNP    magnesium hydroxide (MILK OF MAGNESIA) 400 MG/5ML suspension 30 mL, 30 mL, Oral, Daily PRN, Etienne Ricardoce, APRN - CNP    aluminum & magnesium hydroxide-simethicone (MAALOX) 200-200-20 MG/5ML suspension 30 mL, 30 mL, Oral, PRN, Etienne Dance, APRN - CNP    LORazepam (ATIVAN) injection 2 mg, 2 mg, Intramuscular, Q6H PRN, Etienne Ricardoce, APRN - CNP      Examination:  /80   Pulse 90   Temp 97.4 °F (36.3 °C) (Temporal)   Resp 16   Ht 6' 2\" (1.88 m)   Wt 290 lb (131.5 kg)   SpO2 94%   BMI 37.23 kg/m²   Gait - steady  Medication side effects(SE):   Mental Status Examination:    Level of consciousness:  within normal limits   Appearance:  fair grooming and fair hygiene  Behavior/Motor:  no abnormalities noted  Attitude toward examiner:  cooperative  Speech:  normal rate and normal volume   Mood: euthymic  Affect:  mood congruent  Thought processes:  [place]  Thought content: The patient is devoid of suicidal or homicidal ideation intent or plan. Devoid of auditory or visual hallucinations or other perceptual disturbances, there are no overt or covert signs of psychosis or paranoia. There are no neurovegetative signs of depression. Cognition:  oriented to person, place, and time   Concentration intact  Insight poor   Judgement fair     ASSESSMENT:   Patient symptoms are:  [] Well controlled  [] Improving  [] Worsening  [x] No change      Diagnosis:   Principal Problem:    Acute psychosis ([facility] [address]  Active Problems:    Cognitive disorder  Resolved Problems:    * No resolved hospital problems. *      LABS:    No results for input(s): WBC, HGB, PLT in the last 72 hours. No results for input(s): NA, K, CL, CO2, BUN, CREATININE, GLUCOSE in the last 72 hours. No results for input(s): BILITOT, ALKPHOS, AST, ALT in the last 72 hours.   Lab Results   Component Value Date    LABAMPH NOT DETECTED 06/04/2021    BARBSCNU NOT DETECTED 06/04/2021    LABBENZ NOT DETECTED 06/04/2021    LABMETH NOT DETECTED 06/04/2021    OPIATESCREENURINE NOT DETECTED 06/04/2021    PHENCYCLIDINESCREENURINE NOT DETECTED 06/04/2021    ETOH <10 06/04/2021     Lab Results   Component Value Date    TSH 1.333 01/07/2011     No results found for: LITHIUM  No results found for: VALPROATE, CBMZ        Treatment Plan:  The patient's diagnosis, treatment plan, medication management were formulated after patient was seen directly by the attending physician and myself and all relevant documentation was reviewed. Reviewed current Medications with the patient. Risk, benefit, side effects, possible outcomes of the medication and alternatives discussed with the patient and the patient demonstrated understanding. The patient was also educated that the outcome of treatment will depend on the medication compliance as directed by the prescribers along with regular follow-up, compliance with the labs and other work-up, as clinically indicated. Obtain CMP  Obtain an ammonia level  Repeat MoCA  Depakote 250 mg twice daily for mood stabilization  Melatonin 6 mg daily at 1800 to reset sleep-wake cycle       Collateral information: Followed by social work   CD evaluation  Encourage patient to attend group and other milieu activities. Discharge planning discussed with the patient and treatment team.    PSYCHOTHERAPY/COUNSELING:  [x] Therapeutic interview  [x] Supportive  [] CBT  [] Ongoing  [] Other    [x] Patient continues to need, on a daily basis, active treatment furnished directly by or requiring the supervision of inpatient psychiatric personnel      Anticipated Length of stay: 3 - 5 days based on stability    NOTE: This report was transcribed using voice recognition software.  Every effort was made to ensure accuracy; however, inadvertent computerized transcription errors may be present.       Electronically signed by WINTER Wagner CNP on 6/15/2021 at 10:58 AM

## 2021-06-15 NOTE — PLAN OF CARE
Pt denies SI, HI and hallucinations. Denies feeling depressed or anxious. Bright when interacting with staff and peers. Pt has been out on unit all evening, social with select peers. No voiced concerns. Close observations continue.    Problem: Altered Mood, Psychotic Behavior:  Goal: Able to verbalize decrease in frequency and intensity of hallucinations  Description: Able to verbalize decrease in frequency and intensity of hallucinations  Outcome: Met This Shift     Problem: Altered Mood, Psychotic Behavior:  Goal: Able to verbalize reality based thinking  Description: Able to verbalize reality based thinking  6/14/2021 2103 by Ai Maxwell  Outcome: Met This Shift     Problem: Altered Mood, Psychotic Behavior:  Goal: Absence of self-harm  Description: Absence of self-harm  Outcome: Met This Shift     Problem: Altered Mood, Psychotic Behavior:  Goal: Ability to interact with others will improve  Description: Ability to interact with others will improve  Outcome: Met This Shift

## 2021-06-15 NOTE — PROGRESS NOTES
CLINICAL PHARMACY NOTE: MEDS TO BEDS    Total # of Prescriptions Filled: 3   The following medications were delivered to the patient:  · Divalproex   · Melatonin 3  · Folic Acid 1    Additional Documentation:

## 2021-06-15 NOTE — PROGRESS NOTES
[facility]  Discharge Note    Pt discharged with followings belongings:   Dentures: None  Vision - Corrective Lenses: Glasses  Hearing Aid: None  Jewelry: None  Body Piercings Removed: N/A  Clothing: Pants, Socks, Shirt, Footwear (,HOODIE, , PANTS ,SHIRT,SOCKS, ,PHONE ,,Dustin Ohs)  Were All Patient Medications Collected?: Not Applicable  Other Valuables: Money (Comment) (130 DOLLARS,,, I.D. [employer] CARD)   Valuables sent home with Yes. Valuables retrieved from safe, Security envelope number: [id number] and returned to patient. Patient education on aftercare instructions: Yes Patient verbalize understanding of AVS: Yes.     Status EXAM upon discharge:  Status and Exam  Normal: No  Facial Expression: Flat  Affect: Congruent  Level of Consciousness: Alert  Mood:Normal: No  Mood: Sad  Motor Activity:Normal: Yes  Motor Activity: Decreased  Interview Behavior: Cooperative  Preception: [place] to Person, [place]  to Time, [place] to Place, [place] to Situation  Attention:Normal: No  Attention: Distractible  Thought Processes: Circumstantial  Thought Content:Normal: No  Thought Content: Preoccupations  Hallucinations: None  Delusions: No  Delusions: Persecution  Memory:Normal: No  Memory: Poor Recent  Insight and Judgment: No  Insight and Judgment: Poor Judgment, Poor Insight  Present Suicidal Ideation: No  Present Homicidal Ideation: No      Metabolic Screening:    Lab Results   Component Value Date    LABA1C 5.7 (H) 05/08/2021       Lab Results   Component Value Date    CHOL 197 05/08/2021     Lab Results   Component Value Date    TRIG 91 05/08/2021     Lab Results   Component Value Date    HDL 48 05/08/2021     No components found for: [facility] EVALUATION AND TREATMENT [place]  Lab Results   Component Value Date    LABVLDL 18 05/08/2021       Reyes Manifold, RN

## 2021-06-15 NOTE — PROGRESS NOTES
Group Therapy Note    Patient attended goals group and stated daily goal as to relax. Group Therapy Note    Date: 6/15/2021  Start Time: 9:45  End Time:  10:15  Number of Participants: 12    Type of Group: Psychoeducation    Wellness Binder Information  Module Name: Self-Care  Session Number:  NA    Patient's Goal:  To id positive ways to take care of oneself. Notes: Attended group and was able to participate as a listener. Status After Intervention:  Unchanged    Participation Level:  Active Listener    Participation Quality: Attentive      Speech:  hesitant      Thought Process/Content: Linear      Affective Functioning: Flat      Mood: depressed      Level of consciousness:  Alert      Response to Learning: Progressing to goal      Endings: None Reported    Modes of Intervention: Education      Discipline Responsible: Psychoeducational Specialist      Signature:  EDWARD Pan

## 2021-06-15 NOTE — CARE COORDINATION
In order to ensure appropriate transition and discharge planning is in place, the following documents have been transmitted to [facility], as the new outpatient provider:     · The d/c diagnosis was transmitted to the next care provider  · The reason for hospitalization was transmitted to the next care provider  · The d/c medications (dosage and indication) were transmitted to the next care provider   · The continuing care plan was transmitted to the next care provider

## 2021-06-15 NOTE — PLAN OF CARE
Denies SI, HI and hallucinations. Out on unit for provided meals. Complaint with medications. No concerns or complaints verbalized. Will continue to monitor and offer emotional support.   Problem: Altered Mood, Psychotic Behavior:  Goal: Absence of self-harm  Description: Absence of self-harm  6/15/2021 0915 by Deepti Jackson RN  Outcome: Met This Shift  Problem: Altered Mood, Psychotic Behavior:  Goal: Compliance with prescribed medication regimen will improve  Description: Compliance with prescribed medication regimen will improve  Outcome: Met This Shift   Electronically signed by Cleopatra Patel RN on 6/15/2021 at 9:18 AM

## 2021-06-15 NOTE — CARE COORDINATION
Rhea scheduled discharge ride through independent taxi. Taxi will call nurses station upon arrival. Pt has adequate funds to pay for this taxi ride.

## 2021-06-19 ENCOUNTER — APPOINTMENT (OUTPATIENT)
Dept: CT IMAGING | Age: 56
End: 2021-06-19
Payer: MEDICARE

## 2021-06-19 ENCOUNTER — APPOINTMENT (OUTPATIENT)
Dept: GENERAL RADIOLOGY | Age: 56
End: 2021-06-19
Payer: MEDICARE

## 2021-06-19 ENCOUNTER — HOSPITAL ENCOUNTER (EMERGENCY)
Age: 56
Discharge: HOME | End: 2021-06-20
Attending: EMERGENCY MEDICINE
Payer: MEDICARE

## 2021-06-19 DIAGNOSIS — F10.920 ACUTE ALCOHOLIC INTOXICATION WITHOUT COMPLICATION (HCC): Primary | ICD-10-CM

## 2021-06-19 LAB
ACETAMINOPHEN LEVEL: <5 MCG/ML (ref 10–30)
ALBUMIN SERPL-MCNC: 4 G/DL (ref 3.5–5.2)
ALP BLD-CCNC: 81 U/L (ref 40–129)
ALT SERPL-CCNC: 25 U/L (ref 0–40)
AMPHETAMINE SCREEN, URINE: NOT DETECTED
ANION GAP SERPL CALCULATED.3IONS-SCNC: 13 MMOL/L (ref 7–16)
AST SERPL-CCNC: 29 U/L (ref 0–39)
BARBITURATE SCREEN URINE: NOT DETECTED
BASOPHILS ABSOLUTE: 0.07 E9/L (ref 0–0.2)
BASOPHILS RELATIVE PERCENT: 1 % (ref 0–2)
BENZODIAZEPINE SCREEN, URINE: NOT DETECTED
BILIRUB SERPL-MCNC: 1 MG/DL (ref 0–1.2)
BILIRUBIN URINE: NEGATIVE
BLOOD, URINE: NEGATIVE
BUN BLDV-MCNC: 4 MG/DL (ref 6–20)
CALCIUM SERPL-MCNC: 8.8 MG/DL (ref 8.6–10.2)
CANNABINOID SCREEN URINE: NOT DETECTED
CHLORIDE BLD-SCNC: 99 MMOL/L (ref 98–107)
CLARITY: CLEAR
CO2: 24 MMOL/L (ref 22–29)
COCAINE METABOLITE SCREEN URINE: NOT DETECTED
COLOR: YELLOW
CREAT SERPL-MCNC: 0.9 MG/DL (ref 0.7–1.2)
EOSINOPHILS ABSOLUTE: 0.11 E9/L (ref 0.05–0.5)
EOSINOPHILS RELATIVE PERCENT: 1.6 % (ref 0–6)
ETHANOL: 325 MG/DL (ref 0–0.08)
FENTANYL SCREEN, URINE: NOT DETECTED
GFR AFRICAN AMERICAN: >60
GFR NON-AFRICAN AMERICAN: >60 ML/MIN/1.73
GLUCOSE BLD-MCNC: 102 MG/DL (ref 74–99)
GLUCOSE URINE: NEGATIVE MG/DL
HCT VFR BLD CALC: 40.9 % (ref 37–54)
HEMOGLOBIN: 14.1 G/DL (ref 12.5–16.5)
IMMATURE GRANULOCYTES #: 0.06 E9/L
IMMATURE GRANULOCYTES %: 0.9 % (ref 0–5)
KETONES, URINE: NEGATIVE MG/DL
LEUKOCYTE ESTERASE, URINE: NEGATIVE
LYMPHOCYTES ABSOLUTE: 1.9 E9/L (ref 1.5–4)
LYMPHOCYTES RELATIVE PERCENT: 27 % (ref 20–42)
Lab: NORMAL
MCH RBC QN AUTO: 29.6 PG (ref 26–35)
MCHC RBC AUTO-ENTMCNC: 34.5 % (ref 32–34.5)
MCV RBC AUTO: 85.9 FL (ref 80–99.9)
METER GLUCOSE: 107 MG/DL (ref 74–99)
METHADONE SCREEN, URINE: NOT DETECTED
MONOCYTES ABSOLUTE: 0.58 E9/L (ref 0.1–0.95)
MONOCYTES RELATIVE PERCENT: 8.2 % (ref 2–12)
NEUTROPHILS ABSOLUTE: 4.32 E9/L (ref 1.8–7.3)
NEUTROPHILS RELATIVE PERCENT: 61.3 % (ref 43–80)
NITRITE, URINE: NEGATIVE
OPIATE SCREEN URINE: NOT DETECTED
OXYCODONE URINE: NOT DETECTED
PDW BLD-RTO: 12.4 FL (ref 11.5–15)
PH UA: 5.5 (ref 5–9)
PHENCYCLIDINE SCREEN URINE: NOT DETECTED
PLATELET # BLD: 231 E9/L (ref 130–450)
PMV BLD AUTO: 10 FL (ref 7–12)
POTASSIUM REFLEX MAGNESIUM: 4.2 MMOL/L (ref 3.5–5)
PROTEIN UA: NEGATIVE MG/DL
RBC # BLD: 4.76 E12/L (ref 3.8–5.8)
SALICYLATE, SERUM: <0.3 MG/DL (ref 0–30)
SODIUM BLD-SCNC: 136 MMOL/L (ref 132–146)
SPECIFIC GRAVITY UA: <=1.005 (ref 1–1.03)
TOTAL PROTEIN: 7.9 G/DL (ref 6.4–8.3)
TRICYCLIC ANTIDEPRESSANTS SCREEN SERUM: NEGATIVE NG/ML
TROPONIN, HIGH SENSITIVITY: 22 NG/L (ref 0–11)
UROBILINOGEN, URINE: 0.2 E.U./DL
WBC # BLD: 7 E9/L (ref 4.5–11.5)

## 2021-06-19 PROCEDURE — 80179 DRUG ASSAY SALICYLATE: CPT

## 2021-06-19 PROCEDURE — 99284 EMERGENCY DEPT VISIT MOD MDM: CPT

## 2021-06-19 PROCEDURE — 82077 ASSAY SPEC XCP UR&BREATH IA: CPT

## 2021-06-19 PROCEDURE — 71045 X-RAY EXAM CHEST 1 VIEW: CPT

## 2021-06-19 PROCEDURE — 82962 GLUCOSE BLOOD TEST: CPT

## 2021-06-19 PROCEDURE — 80053 COMPREHEN METABOLIC PANEL: CPT

## 2021-06-19 PROCEDURE — 93005 ELECTROCARDIOGRAM TRACING: CPT | Performed by: STUDENT IN AN ORGANIZED HEALTH CARE EDUCATION/TRAINING PROGRAM

## 2021-06-19 PROCEDURE — 81003 URINALYSIS AUTO W/O SCOPE: CPT

## 2021-06-19 PROCEDURE — 85025 COMPLETE CBC W/AUTO DIFF WBC: CPT

## 2021-06-19 PROCEDURE — 80143 DRUG ASSAY ACETAMINOPHEN: CPT

## 2021-06-19 PROCEDURE — 84484 ASSAY OF TROPONIN QUANT: CPT

## 2021-06-19 PROCEDURE — 80307 DRUG TEST PRSMV CHEM ANLYZR: CPT

## 2021-06-19 PROCEDURE — 70450 CT HEAD/BRAIN W/O DYE: CPT

## 2021-06-20 VITALS
TEMPERATURE: 97.3 F | HEIGHT: 74 IN | HEART RATE: 84 BPM | DIASTOLIC BLOOD PRESSURE: 85 MMHG | WEIGHT: 270 LBS | OXYGEN SATURATION: 95 % | SYSTOLIC BLOOD PRESSURE: 132 MMHG | BODY MASS INDEX: 34.65 KG/M2 | RESPIRATION RATE: 16 BRPM

## 2021-06-20 LAB
EKG ATRIAL RATE: 81 BPM
EKG P AXIS: 36 DEGREES
EKG P-R INTERVAL: 138 MS
EKG Q-T INTERVAL: 420 MS
EKG QRS DURATION: 110 MS
EKG QTC CALCULATION (BAZETT): 487 MS
EKG R AXIS: -42 DEGREES
EKG T AXIS: 59 DEGREES
EKG VENTRICULAR RATE: 81 BPM
TROPONIN, HIGH SENSITIVITY: 20 NG/L (ref 0–11)

## 2021-06-20 PROCEDURE — 84484 ASSAY OF TROPONIN QUANT: CPT

## 2021-06-20 PROCEDURE — 93010 ELECTROCARDIOGRAM REPORT: CPT | Performed by: INTERNAL MEDICINE

## 2021-06-20 NOTE — ED NOTES
Patient admits to drinking alcohol when asked how much he stated he cant answer that because he doesn't know how many beers he had     Calista Oliva RN  06/19/21 5916

## 2021-06-20 NOTE — ED NOTES
Bed: 20  Expected date:   Expected time:   Means of arrival:   Comments:   [facility] Dilan, RN  06/19/21 2053

## 2021-06-20 NOTE — ED PROVIDER NOTES
the radiology section of [facility]: reviewed  Decide to obtain previous medical records or to obtain history from someone other than the patient: yes                    --------------------------------------------- PAST HISTORY ---------------------------------------------  Past Medical History:  has a past medical history of CAD (coronary artery disease), Hypertension, and Sleep apnea. Past Surgical History:  has no past surgical history on file. Social History:  reports that he has quit smoking. He has never used smokeless tobacco. He reports current alcohol use of about 5.0 standard drinks of alcohol per week. He reports previous drug use. Family History: family history is not on file. The patients home medications have been reviewed. Allergies: Patient has no known allergies.     -------------------------------------------------- RESULTS -------------------------------------------------  Labs:  Results for orders placed or performed during the hospital encounter of 06/19/21   CBC Auto Differential   Result Value Ref Range    WBC 7.0 4.5 - 11.5 E9/L    RBC 4.76 3.80 - 5.80 E12/L    Hemoglobin 14.1 12.5 - 16.5 g/dL    Hematocrit 40.9 37.0 - 54.0 %    MCV 85.9 80.0 - 99.9 fL    MCH 29.6 26.0 - 35.0 pg    MCHC 34.5 32.0 - 34.5 %    RDW 12.4 11.5 - 15.0 fL    Platelets 126 556 - 644 E9/L    MPV 10.0 7.0 - 12.0 fL    Neutrophils % 61.3 43.0 - 80.0 %    Immature Granulocytes % 0.9 0.0 - 5.0 %    Lymphocytes % 27.0 20.0 - 42.0 %    Monocytes % 8.2 2.0 - 12.0 %    Eosinophils % 1.6 0.0 - 6.0 %    Basophils % 1.0 0.0 - 2.0 %    Neutrophils Absolute 4.32 1.80 - 7.30 E9/L    Immature Granulocytes # 0.06 E9/L    Lymphocytes Absolute 1.90 1.50 - 4.00 E9/L    Monocytes Absolute 0.58 0.10 - 0.95 E9/L    Eosinophils Absolute 0.11 0.05 - 0.50 E9/L    Basophils Absolute 0.07 0.00 - 0.20 E9/L   Comprehensive Metabolic Panel w/ Reflex to MG   Result Value Ref Range    Sodium 136 132 - 146 mmol/L    Potassium reflex Magnesium 4.2 3.5 - 5.0 mmol/L    Chloride 99 98 - 107 mmol/L    CO2 24 22 - 29 mmol/L    Anion Gap 13 7 - 16 mmol/L    Glucose 102 (H) 74 - 99 mg/dL    BUN 4 (L) 6 - 20 mg/dL    CREATININE 0.9 0.7 - 1.2 mg/dL    GFR Non-African American >60 >=60 mL/min/1.73    GFR African American >60     Calcium 8.8 8.6 - 10.2 mg/dL    Total Protein 7.9 6.4 - 8.3 g/dL    Albumin 4.0 3.5 - 5.2 g/dL    Total Bilirubin 1.0 0.0 - 1.2 mg/dL    Alkaline Phosphatase 81 40 - 129 U/L    ALT 25 0 - 40 U/L    AST 29 0 - 39 U/L   Troponin   Result Value Ref Range    Troponin, High Sensitivity 22 (H) 0 - 11 ng/L   Urinalysis, reflex to microscopic   Result Value Ref Range    Color, UA Yellow Straw/Yellow    Clarity, UA Clear Clear    Glucose, Ur Negative Negative mg/dL    Bilirubin Urine Negative Negative    Ketones, Urine Negative Negative mg/dL    Specific Gravity, UA <=1.005 1.005 - 1.030    Blood, Urine Negative Negative    pH, UA 5.5 5.0 - 9.0    Protein, UA Negative Negative mg/dL    Urobilinogen, Urine 0.2 <2.0 E.U./dL    Nitrite, Urine Negative Negative    Leukocyte Esterase, Urine Negative Negative   Serum Drug Screen   Result Value Ref Range    Ethanol Lvl 325 mg/dL    Acetaminophen Level <5.0 (L) 10.0 - 84.3 mcg/mL    Salicylate, Serum <7.6 0.0 - 30.0 mg/dL    TCA Scrn NEGATIVE Cutoff:300 ng/mL   URINE DRUG SCREEN   Result Value Ref Range    Amphetamine Screen, Urine NOT DETECTED Negative <1000 ng/mL    Barbiturate Screen, Ur NOT DETECTED Negative < 200 ng/mL    Benzodiazepine Screen, Urine NOT DETECTED Negative < 200 ng/mL    Cannabinoid Scrn, Ur NOT DETECTED Negative < 50ng/mL    Cocaine Metabolite Screen, Urine NOT DETECTED Negative < 300 ng/mL    Opiate Scrn, Ur NOT DETECTED Negative < 300ng/mL    PCP Screen, Urine NOT DETECTED Negative < 25 ng/mL    Methadone Screen, Urine NOT DETECTED Negative <300 ng/mL    Oxycodone Urine NOT DETECTED Negative <100 ng/mL    FENTANYL SCREEN, URINE NOT DETECTED Negative <1 ng/mL    Drug Screen management. They have remained hemodynamically stable throughout their entire ED visit and are stable for discharge with outpatient follow-up. The plan has been discussed in detail and they are aware of the specific conditions for emergent return, as well as the importance of follow-up. Discharge Medication List as of 6/20/2021  7:43 AM          Diagnosis:  1. Acute alcoholic intoxication without complication ([facility] [address]        Disposition:  Patient's disposition: Discharge to home  Patient's condition is stable.            Joellen Bowden MD  Resident  06/21/21 2112

## 2021-06-20 NOTE — ED NOTES
Pt awake and alert and oriented x 4. Pt called a ride. Pt getting changed now.      Barry Odom RN  06/20/21 1100

## 2021-06-20 NOTE — ED NOTES
Called emergency contacts in patients chart with no answer.  I asked patient if there was someone else we can call for a ride home he stated no definetly not at this hour and that maybe he can call someone in the morning     Yakelin Harris RN  06/20/21 6860

## 2021-06-20 NOTE — ED NOTES
Pt ride in parking lot. This RN walked pt out to brother in parking lot.       Jose Angel Salazar RN  06/20/21 0050

## 2021-07-14 ENCOUNTER — APPOINTMENT (OUTPATIENT)
Dept: GENERAL RADIOLOGY | Age: 56
End: 2021-07-14
Payer: MEDICARE

## 2021-07-14 ENCOUNTER — HOSPITAL ENCOUNTER (OUTPATIENT)
Age: 56
Setting detail: OBSERVATION
Discharge: HOME | End: 2021-07-16
Attending: EMERGENCY MEDICINE | Admitting: FAMILY MEDICINE
Payer: MEDICARE

## 2021-07-14 DIAGNOSIS — R07.9 CHEST PAIN, UNSPECIFIED TYPE: Primary | ICD-10-CM

## 2021-07-14 LAB
ALBUMIN SERPL-MCNC: 3.9 G/DL (ref 3.5–5.2)
ALP BLD-CCNC: 147 U/L (ref 40–129)
ALT SERPL-CCNC: 19 U/L (ref 0–40)
ANION GAP SERPL CALCULATED.3IONS-SCNC: 11 MMOL/L (ref 7–16)
AST SERPL-CCNC: 25 U/L (ref 0–39)
BASOPHILS ABSOLUTE: 0.03 E9/L (ref 0–0.2)
BASOPHILS RELATIVE PERCENT: 0.6 % (ref 0–2)
BILIRUB SERPL-MCNC: 1.9 MG/DL (ref 0–1.2)
BUN BLDV-MCNC: 6 MG/DL (ref 6–20)
CALCIUM SERPL-MCNC: 8.8 MG/DL (ref 8.6–10.2)
CHLORIDE BLD-SCNC: 102 MMOL/L (ref 98–107)
CO2: 23 MMOL/L (ref 22–29)
CREAT SERPL-MCNC: 1.1 MG/DL (ref 0.7–1.2)
EOSINOPHILS ABSOLUTE: 0.03 E9/L (ref 0.05–0.5)
EOSINOPHILS RELATIVE PERCENT: 0.6 % (ref 0–6)
GFR AFRICAN AMERICAN: >60
GFR NON-AFRICAN AMERICAN: >60 ML/MIN/1.73
GLUCOSE BLD-MCNC: 119 MG/DL (ref 74–99)
HCT VFR BLD CALC: 45.9 % (ref 37–54)
HEMOGLOBIN: 15.6 G/DL (ref 12.5–16.5)
IMMATURE GRANULOCYTES #: 0.03 E9/L
IMMATURE GRANULOCYTES %: 0.6 % (ref 0–5)
LYMPHOCYTES ABSOLUTE: 0.6 E9/L (ref 1.5–4)
LYMPHOCYTES RELATIVE PERCENT: 11.1 % (ref 20–42)
MCH RBC QN AUTO: 30.1 PG (ref 26–35)
MCHC RBC AUTO-ENTMCNC: 34 % (ref 32–34.5)
MCV RBC AUTO: 88.4 FL (ref 80–99.9)
MONOCYTES ABSOLUTE: 0.49 E9/L (ref 0.1–0.95)
MONOCYTES RELATIVE PERCENT: 9.1 % (ref 2–12)
NEUTROPHILS ABSOLUTE: 4.22 E9/L (ref 1.8–7.3)
NEUTROPHILS RELATIVE PERCENT: 78 % (ref 43–80)
PDW BLD-RTO: 16.5 FL (ref 11.5–15)
PLATELET # BLD: 114 E9/L (ref 130–450)
PMV BLD AUTO: 10.9 FL (ref 7–12)
POTASSIUM SERPL-SCNC: 4.5 MMOL/L (ref 3.5–5)
RBC # BLD: 5.19 E12/L (ref 3.8–5.8)
SODIUM BLD-SCNC: 136 MMOL/L (ref 132–146)
TOTAL PROTEIN: 7.8 G/DL (ref 6.4–8.3)
TROPONIN, HIGH SENSITIVITY: 26 NG/L (ref 0–11)
VALPROIC ACID LEVEL: 3 MCG/ML (ref 50–100)
WBC # BLD: 5.4 E9/L (ref 4.5–11.5)

## 2021-07-14 PROCEDURE — 71045 X-RAY EXAM CHEST 1 VIEW: CPT

## 2021-07-14 PROCEDURE — 80164 ASSAY DIPROPYLACETIC ACD TOT: CPT

## 2021-07-14 PROCEDURE — 80053 COMPREHEN METABOLIC PANEL: CPT

## 2021-07-14 PROCEDURE — 93005 ELECTROCARDIOGRAM TRACING: CPT | Performed by: EMERGENCY MEDICINE

## 2021-07-14 PROCEDURE — 84484 ASSAY OF TROPONIN QUANT: CPT

## 2021-07-14 PROCEDURE — 6370000000 HC RX 637 (ALT 250 FOR IP): Performed by: EMERGENCY MEDICINE

## 2021-07-14 PROCEDURE — 85025 COMPLETE CBC W/AUTO DIFF WBC: CPT

## 2021-07-14 PROCEDURE — 99285 EMERGENCY DEPT VISIT HI MDM: CPT

## 2021-07-14 RX ORDER — ASPIRIN 81 MG/1
324 TABLET, CHEWABLE ORAL ONCE
Status: COMPLETED | OUTPATIENT
Start: 2021-07-14 | End: 2021-07-14

## 2021-07-14 RX ADMIN — ASPIRIN 324 MG: 81 TABLET, CHEWABLE ORAL at 22:26

## 2021-07-15 PROBLEM — R94.39 ABNORMAL STRESS TEST: Status: ACTIVE | Noted: 2021-07-15

## 2021-07-15 PROBLEM — R77.8 ELEVATED TROPONIN: Status: ACTIVE | Noted: 2021-07-15

## 2021-07-15 PROBLEM — D69.6 THROMBOCYTOPENIA (HCC): Status: ACTIVE | Noted: 2021-07-15

## 2021-07-15 PROBLEM — G47.33 OSA (OBSTRUCTIVE SLEEP APNEA): Status: ACTIVE | Noted: 2021-07-15

## 2021-07-15 PROBLEM — E66.9 CLASS 1 OBESITY IN ADULT: Status: ACTIVE | Noted: 2021-07-15

## 2021-07-15 PROBLEM — E66.811 CLASS 1 OBESITY IN ADULT: Status: ACTIVE | Noted: 2021-07-15

## 2021-07-15 PROBLEM — R79.89 ELEVATED TROPONIN: Status: ACTIVE | Noted: 2021-07-15

## 2021-07-15 PROBLEM — E78.5 HLD (HYPERLIPIDEMIA): Status: ACTIVE | Noted: 2021-07-15

## 2021-07-15 PROBLEM — E80.6 HYPERBILIRUBINEMIA: Status: ACTIVE | Noted: 2021-07-15

## 2021-07-15 PROBLEM — I10 ESSENTIAL HYPERTENSION: Status: ACTIVE | Noted: 2021-07-15

## 2021-07-15 PROBLEM — I51.9 LV DYSFUNCTION: Status: ACTIVE | Noted: 2021-07-15

## 2021-07-15 PROBLEM — I25.10 CAD (CORONARY ARTERY DISEASE): Status: ACTIVE | Noted: 2021-07-15

## 2021-07-15 PROBLEM — I16.0 HYPERTENSIVE URGENCY: Status: ACTIVE | Noted: 2021-07-15

## 2021-07-15 PROBLEM — F10.10 ALCOHOL ABUSE: Status: ACTIVE | Noted: 2021-07-15

## 2021-07-15 LAB
AMPHETAMINE SCREEN, URINE: NOT DETECTED
ANION GAP SERPL CALCULATED.3IONS-SCNC: 11 MMOL/L (ref 7–16)
BARBITURATE SCREEN URINE: NOT DETECTED
BENZODIAZEPINE SCREEN, URINE: NOT DETECTED
BUN BLDV-MCNC: 7 MG/DL (ref 6–20)
CALCIUM SERPL-MCNC: 8.4 MG/DL (ref 8.6–10.2)
CANNABINOID SCREEN URINE: NOT DETECTED
CHLORIDE BLD-SCNC: 105 MMOL/L (ref 98–107)
CHOLESTEROL, TOTAL: 162 MG/DL (ref 0–199)
CO2: 24 MMOL/L (ref 22–29)
COCAINE METABOLITE SCREEN URINE: NOT DETECTED
CREAT SERPL-MCNC: 1 MG/DL (ref 0.7–1.2)
D DIMER: 380 NG/ML DDU
EKG ATRIAL RATE: 76 BPM
EKG ATRIAL RATE: 78 BPM
EKG P AXIS: 21 DEGREES
EKG P AXIS: 51 DEGREES
EKG P-R INTERVAL: 148 MS
EKG P-R INTERVAL: 156 MS
EKG Q-T INTERVAL: 422 MS
EKG Q-T INTERVAL: 422 MS
EKG QRS DURATION: 80 MS
EKG QRS DURATION: 80 MS
EKG QTC CALCULATION (BAZETT): 474 MS
EKG QTC CALCULATION (BAZETT): 481 MS
EKG R AXIS: -18 DEGREES
EKG R AXIS: -47 DEGREES
EKG T AXIS: 17 DEGREES
EKG T AXIS: 60 DEGREES
EKG VENTRICULAR RATE: 76 BPM
EKG VENTRICULAR RATE: 78 BPM
ETHANOL: <10 MG/DL (ref 0–0.08)
FENTANYL SCREEN, URINE: NOT DETECTED
GFR AFRICAN AMERICAN: >60
GFR NON-AFRICAN AMERICAN: >60 ML/MIN/1.73
GLUCOSE BLD-MCNC: 109 MG/DL (ref 74–99)
HCT VFR BLD CALC: 41.2 % (ref 37–54)
HDLC SERPL-MCNC: 46 MG/DL
HEMOGLOBIN: 13.9 G/DL (ref 12.5–16.5)
INR BLD: 1.3
LDL CHOLESTEROL CALCULATED: 98 MG/DL (ref 0–99)
LIPASE: 36 U/L (ref 13–60)
LV EF: 55 %
LVEF MODALITY: NORMAL
Lab: NORMAL
MCH RBC QN AUTO: 30.1 PG (ref 26–35)
MCHC RBC AUTO-ENTMCNC: 33.7 % (ref 32–34.5)
MCV RBC AUTO: 89.2 FL (ref 80–99.9)
METHADONE SCREEN, URINE: NOT DETECTED
OPIATE SCREEN URINE: NOT DETECTED
OXYCODONE URINE: NOT DETECTED
PDW BLD-RTO: 16.2 FL (ref 11.5–15)
PHENCYCLIDINE SCREEN URINE: NOT DETECTED
PLATELET # BLD: 95 E9/L (ref 130–450)
PLATELET CONFIRMATION: NORMAL
PMV BLD AUTO: 9.5 FL (ref 7–12)
POTASSIUM REFLEX MAGNESIUM: 4.1 MMOL/L (ref 3.5–5)
PROTHROMBIN TIME: 14.5 SEC (ref 9.3–12.4)
RBC # BLD: 4.62 E12/L (ref 3.8–5.8)
SODIUM BLD-SCNC: 140 MMOL/L (ref 132–146)
TRIGL SERPL-MCNC: 88 MG/DL (ref 0–149)
TROPONIN, HIGH SENSITIVITY: 26 NG/L (ref 0–11)
VLDLC SERPL CALC-MCNC: 18 MG/DL
WBC # BLD: 5 E9/L (ref 4.5–11.5)

## 2021-07-15 PROCEDURE — G0378 HOSPITAL OBSERVATION PER HR: HCPCS

## 2021-07-15 PROCEDURE — 6370000000 HC RX 637 (ALT 250 FOR IP): Performed by: NURSE PRACTITIONER

## 2021-07-15 PROCEDURE — 93010 ELECTROCARDIOGRAM REPORT: CPT | Performed by: INTERNAL MEDICINE

## 2021-07-15 PROCEDURE — 83690 ASSAY OF LIPASE: CPT

## 2021-07-15 PROCEDURE — 80061 LIPID PANEL: CPT

## 2021-07-15 PROCEDURE — 82077 ASSAY SPEC XCP UR&BREATH IA: CPT

## 2021-07-15 PROCEDURE — 36415 COLL VENOUS BLD VENIPUNCTURE: CPT

## 2021-07-15 PROCEDURE — 93306 TTE W/DOPPLER COMPLETE: CPT

## 2021-07-15 PROCEDURE — 99245 OFF/OP CONSLTJ NEW/EST HI 55: CPT | Performed by: INTERNAL MEDICINE

## 2021-07-15 PROCEDURE — 6370000000 HC RX 637 (ALT 250 FOR IP): Performed by: EMERGENCY MEDICINE

## 2021-07-15 PROCEDURE — 85610 PROTHROMBIN TIME: CPT

## 2021-07-15 PROCEDURE — 84484 ASSAY OF TROPONIN QUANT: CPT

## 2021-07-15 PROCEDURE — 85378 FIBRIN DEGRADE SEMIQUANT: CPT

## 2021-07-15 PROCEDURE — 96372 THER/PROPH/DIAG INJ SC/IM: CPT

## 2021-07-15 PROCEDURE — 80048 BASIC METABOLIC PNL TOTAL CA: CPT

## 2021-07-15 PROCEDURE — 80307 DRUG TEST PRSMV CHEM ANLYZR: CPT

## 2021-07-15 PROCEDURE — 6370000000 HC RX 637 (ALT 250 FOR IP): Performed by: FAMILY MEDICINE

## 2021-07-15 PROCEDURE — APPSS60 APP SPLIT SHARED TIME 46-60 MINUTES: Performed by: NURSE PRACTITIONER

## 2021-07-15 PROCEDURE — 93005 ELECTROCARDIOGRAM TRACING: CPT | Performed by: EMERGENCY MEDICINE

## 2021-07-15 PROCEDURE — 85027 COMPLETE CBC AUTOMATED: CPT

## 2021-07-15 PROCEDURE — 2580000003 HC RX 258: Performed by: FAMILY MEDICINE

## 2021-07-15 PROCEDURE — 6360000002 HC RX W HCPCS: Performed by: FAMILY MEDICINE

## 2021-07-15 RX ORDER — ACETAMINOPHEN 325 MG/1
650 TABLET ORAL EVERY 6 HOURS PRN
Status: DISCONTINUED | OUTPATIENT
Start: 2021-07-15 | End: 2021-07-16 | Stop reason: HOSPADM

## 2021-07-15 RX ORDER — POLYETHYLENE GLYCOL 3350 17 G/17G
17 POWDER, FOR SOLUTION ORAL DAILY PRN
Status: DISCONTINUED | OUTPATIENT
Start: 2021-07-15 | End: 2021-07-16 | Stop reason: HOSPADM

## 2021-07-15 RX ORDER — ASPIRIN 81 MG/1
81 TABLET, CHEWABLE ORAL DAILY
Status: DISCONTINUED | OUTPATIENT
Start: 2021-07-16 | End: 2021-07-16 | Stop reason: HOSPADM

## 2021-07-15 RX ORDER — ONDANSETRON 2 MG/ML
4 INJECTION INTRAMUSCULAR; INTRAVENOUS EVERY 6 HOURS PRN
Status: DISCONTINUED | OUTPATIENT
Start: 2021-07-15 | End: 2021-07-16 | Stop reason: HOSPADM

## 2021-07-15 RX ORDER — ONDANSETRON 4 MG/1
4 TABLET, ORALLY DISINTEGRATING ORAL EVERY 8 HOURS PRN
Status: DISCONTINUED | OUTPATIENT
Start: 2021-07-15 | End: 2021-07-16 | Stop reason: HOSPADM

## 2021-07-15 RX ORDER — ISOSORBIDE MONONITRATE 30 MG/1
30 TABLET, EXTENDED RELEASE ORAL DAILY
Status: DISCONTINUED | OUTPATIENT
Start: 2021-07-15 | End: 2021-07-16

## 2021-07-15 RX ORDER — MORPHINE SULFATE 2 MG/ML
2 INJECTION, SOLUTION INTRAMUSCULAR; INTRAVENOUS EVERY 4 HOURS PRN
Status: DISCONTINUED | OUTPATIENT
Start: 2021-07-15 | End: 2021-07-16 | Stop reason: HOSPADM

## 2021-07-15 RX ORDER — SODIUM CHLORIDE 0.9 % (FLUSH) 0.9 %
5-40 SYRINGE (ML) INJECTION PRN
Status: DISCONTINUED | OUTPATIENT
Start: 2021-07-15 | End: 2021-07-16 | Stop reason: SDUPTHER

## 2021-07-15 RX ORDER — SODIUM CHLORIDE 0.9 % (FLUSH) 0.9 %
5-40 SYRINGE (ML) INJECTION EVERY 12 HOURS SCHEDULED
Status: DISCONTINUED | OUTPATIENT
Start: 2021-07-15 | End: 2021-07-16 | Stop reason: SDUPTHER

## 2021-07-15 RX ORDER — ATORVASTATIN CALCIUM 40 MG/1
40 TABLET, FILM COATED ORAL NIGHTLY
Status: DISCONTINUED | OUTPATIENT
Start: 2021-07-15 | End: 2021-07-16 | Stop reason: HOSPADM

## 2021-07-15 RX ORDER — GAUZE BANDAGE 2" X 2"
100 BANDAGE TOPICAL DAILY
Status: DISCONTINUED | OUTPATIENT
Start: 2021-07-15 | End: 2021-07-16 | Stop reason: HOSPADM

## 2021-07-15 RX ORDER — ACETAMINOPHEN 650 MG/1
650 SUPPOSITORY RECTAL EVERY 6 HOURS PRN
Status: DISCONTINUED | OUTPATIENT
Start: 2021-07-15 | End: 2021-07-16 | Stop reason: HOSPADM

## 2021-07-15 RX ORDER — FOLIC ACID 1 MG/1
1 TABLET ORAL DAILY
Status: DISCONTINUED | OUTPATIENT
Start: 2021-07-15 | End: 2021-07-16 | Stop reason: HOSPADM

## 2021-07-15 RX ORDER — NITROGLYCERIN 0.4 MG/1
0.4 TABLET SUBLINGUAL EVERY 5 MIN PRN
Status: DISCONTINUED | OUTPATIENT
Start: 2021-07-15 | End: 2021-07-16

## 2021-07-15 RX ORDER — METOPROLOL SUCCINATE 25 MG/1
25 TABLET, EXTENDED RELEASE ORAL DAILY
Status: DISCONTINUED | OUTPATIENT
Start: 2021-07-15 | End: 2021-07-16 | Stop reason: HOSPADM

## 2021-07-15 RX ORDER — AMLODIPINE BESYLATE 5 MG/1
10 TABLET ORAL DAILY
Status: DISCONTINUED | OUTPATIENT
Start: 2021-07-15 | End: 2021-07-16 | Stop reason: HOSPADM

## 2021-07-15 RX ORDER — DIVALPROEX SODIUM 250 MG/1
250 TABLET, DELAYED RELEASE ORAL EVERY 12 HOURS SCHEDULED
Status: DISCONTINUED | OUTPATIENT
Start: 2021-07-15 | End: 2021-07-16 | Stop reason: HOSPADM

## 2021-07-15 RX ORDER — SODIUM CHLORIDE 9 MG/ML
25 INJECTION, SOLUTION INTRAVENOUS PRN
Status: DISCONTINUED | OUTPATIENT
Start: 2021-07-15 | End: 2021-07-16 | Stop reason: SDUPTHER

## 2021-07-15 RX ADMIN — Medication 10 ML: at 20:52

## 2021-07-15 RX ADMIN — FOLIC ACID 1 MG: 1 TABLET ORAL at 10:29

## 2021-07-15 RX ADMIN — Medication 10 ML: at 10:29

## 2021-07-15 RX ADMIN — ATORVASTATIN CALCIUM 40 MG: 40 TABLET, FILM COATED ORAL at 20:52

## 2021-07-15 RX ADMIN — AMLODIPINE BESYLATE 10 MG: 5 TABLET ORAL at 10:29

## 2021-07-15 RX ADMIN — DIVALPROEX SODIUM 250 MG: 250 TABLET, DELAYED RELEASE ORAL at 20:52

## 2021-07-15 RX ADMIN — ENOXAPARIN SODIUM 40 MG: 40 INJECTION SUBCUTANEOUS at 10:29

## 2021-07-15 RX ADMIN — Medication 100 MG: at 10:29

## 2021-07-15 RX ADMIN — ATORVASTATIN CALCIUM 40 MG: 40 TABLET, FILM COATED ORAL at 01:21

## 2021-07-15 RX ADMIN — METOPROLOL SUCCINATE 25 MG: 25 TABLET, EXTENDED RELEASE ORAL at 12:03

## 2021-07-15 RX ADMIN — NITROGLYCERIN 0.5 INCH: 20 OINTMENT TOPICAL at 00:49

## 2021-07-15 RX ADMIN — DIVALPROEX SODIUM 250 MG: 250 TABLET, DELAYED RELEASE ORAL at 10:29

## 2021-07-15 RX ADMIN — ISOSORBIDE MONONITRATE 30 MG: 30 TABLET, EXTENDED RELEASE ORAL at 12:03

## 2021-07-15 ASSESSMENT — PAIN SCALES - GENERAL
PAINLEVEL_OUTOF10: 0
PAINLEVEL_OUTOF10: 0

## 2021-07-15 ASSESSMENT — PAIN SCALES - WONG BAKER: WONGBAKER_NUMERICALRESPONSE: 4
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
## 2021-07-15 NOTE — CARE COORDINATION
7/15/2021 - Care Coordination -  Admitted for chest pain. Cardiology consult done. Echo ordered. Spoke with pt in room to discuss discharge planning. Pt reports his PCP is Dr Lizandro Gonzalez but he has not seen him in while. Pharmacy is [facility]. Lives alone in a 2nd floor apt with a few steps up to apartment level. He is independent in ADLs . Denies hx HHC, CLAUDY/ARU and DME. He states he will have to call someone to get a ride home. Plan is to discharge home when medically stable. SW/CM will follow.

## 2021-07-15 NOTE — H&P
Hospital Medicine History & Physical      PCP: No primary care provider on file. Date of Admission: 7/14/2021    Date of Service: Pt seen/examined on 7/15/2021 and Placed in Observation. Chief Complaint: Chest pain      History Of Present Illness:      64 y.o. male who presented to [address] [place] [address] with medical history of coronary artery disease per record(patient denies knowledge of), psychiatric illness NOS, sleep apnea, alcohol abuse and obesity. Patient developed chest pain yesterday while at work, he was sitting down playing cards when it started. Pain was in the central part of the chest, dull, 4 out of 10, on and off, nonradiating, no associated nausea, vomiting, diaphoresis, dizziness or fever. No cough. No abdominal pain. Pain is not exertional.  He states that he has been having chest pain on and off for years and every time he has been in the hospital it was for chest pain rule out. No leg swelling. He has not taken his medications including antihypertensives for a long time. \"I am not used to taking medications\". He does not want to stay in the hospital.    Vital signs notable for blood pressure 188/109, labs showed platelet count of 727, bilirubin 1.9, troponin 26, this was 22 in June and chest x-ray is negative. EKG showed sinus rhythm rate of 76 with sinus arrhythmia, Q waves in leads II, III, aVF and V1 through V3. He had an abnormal stress test in May 2021 with fixed defect in the mid to basal inferior wall suggestive of prior infarct. EF was 43%. He had normal stress test in 2011 with EF of 60%. Chest x-ray is negative. He has never had cardiac catheterization. He is being admitted for further management. Past Medical History:          Diagnosis Date    CAD (coronary artery disease)     Hypertension     Sleep apnea        Past Surgical History: None    History reviewed. No pertinent surgical history.     Medications Prior to Admission:      Prior to Admission medications Medication Sig Start Date End Date Taking? Authorizing Provider   divalproex (DEPAKOTE) 250 MG DR tablet Take 1 tablet by mouth every 12 hours 6/14/21 7/14/21  WINTER Saez CNP   folic acid (FOLVITE) 1 MG tablet Take 1 tablet by mouth daily 6/15/21 7/15/21  WINTER Saez CNP   melatonin 3 MG TABS tablet Take 2 tablets by mouth daily 6/14/21 7/14/21  WINTER Saez - CNP   thiamine mononitrate (THIAMINE) 100 MG tablet Take 1 tablet by mouth daily 5/15/21   Monika Gonzalez MD   amLODIPine (NORVASC) 10 MG tablet Take 1 tablet by mouth daily 5/14/21   Taisha Louise MD       Allergies:  Patient has no known allergies. Social History:      The patient currently lives at home. TOBACCO:   reports that he has quit smoking. He has never used smokeless tobacco.  ETOH:   reports current alcohol use of about 5.0 standard drinks of alcohol per week. Drinks about 5x 24 ounce cans of beer at the time more than once a week. Denies drug use. Family History:     Reviewed in detail Positive as follows: Hypertension, hyperlipidemia      REVIEW OF SYSTEMS:   Pertinent positives as noted in the HPI. All other systems reviewed and negative. PHYSICAL EXAM:    BP (!) 175/105   Pulse 76   Temp 97.1 °F (36.2 °C)   Resp 20   Ht 6' 2\" (1.88 m)   Wt 270 lb (122.5 kg)   SpO2 99%   BMI 34.67 kg/m²     General appearance: Middle-aged male, no apparent distress, appears stated age and cooperative. Afebrile. HEENT:  Normal cephalic, atraumatic without obvious deformity. Pupils equal, round, and reactive to light. Extra ocular muscles intact. Conjunctivae/corneas clear. Neck: Supple, with full range of motion. No jugular venous distention. Trachea midline. Respiratory:  Normal respiratory effort. Clear to auscultation, bilaterally without Rales/Wheezes/Rhonchi. Cardiovascular:  Regular rate and rhythm with normal S1/S2 without murmurs, rubs or gallops.   Abdomen: Soft, non-tender, non-distended with normal bowel sounds. Musculoskeletal:  No clubbing, cyanosis or edema bilaterally. Full range of motion without deformity. Skin: Skin color, texture, turgor normal.  No rashes or lesions. Neurologic:  Neurovascularly intact without any focal sensory/motor deficits. Cranial nerves: II-XII intact, grossly non-focal.  Psychiatric:  Alert and oriented, thought content appropriate, normal insight  Capillary Refill: Brisk,< 3 seconds   Peripheral Pulses: +2 palpable, equal bilaterally       Labs:     Recent Labs     07/14/21 2224   WBC 5.4   HGB 15.6   HCT 45.9   *     Recent Labs     07/14/21 2224      K 4.5      CO2 23   BUN 6   CREATININE 1.1   CALCIUM 8.8     Recent Labs     07/14/21 2224   AST 25   ALT 19   BILITOT 1.9*   ALKPHOS 147*     No results for input(s): INR in the last 72 hours. No results for input(s): Shellia Bugler in the last 72 hours. Urinalysis:      Lab Results   Component Value Date    NITRU Negative 06/19/2021    WBCUA 5-10 06/04/2021    BACTERIA MODERATE 06/04/2021    RBCUA NONE 06/04/2021    BLOODU Negative 06/19/2021    SPECGRAV <=1.005 06/19/2021    GLUCOSEU Negative 06/19/2021       Radiology:   Reviewed and documented    XR CHEST PORTABLE   Final Result   No acute cardiopulmonary abnormality. ASSESSMENT:    Active Hospital Problems    Diagnosis Date Noted    CAD (coronary artery disease) [I25.10] 07/15/2021    Hypertensive urgency [I16.0] 07/15/2021    MARLEE (obstructive sleep apnea) [G47.33] 07/15/2021    Class 1 obesity in adult [E66.9] 07/15/2021    Abnormal stress test [R94.39] 07/15/2021    Elevated troponin [R77.8] 07/15/2021    LV dysfunction [I51.9] 07/15/2021    Thrombocytopenia (HCC) [D69.6] 07/15/2021    Hyperbilirubinemia [E80.6] 07/15/2021    Alcohol abuse [F10.10] 07/15/2021    HLD (hyperlipidemia) [E78.5] 07/15/2021    Cognitive disorder [F09] 06/08/2021    Chest pain [R07.9] 05/14/2021   . Hyperbilirubinemia  . Noncompliance    PLAN:  1. Chest pain rule out acute coronary syndrome. He has elevated troponin level and had abnormal stress test in May. This is concerning for NSTEMI. Consult cardiology. Aspirin, resume statin, cardiac enzymes. Currently chest pain-free  2. Hypertensive urgency, patient has been noncompliant with his blood pressure medications. Resume amlodipine and monitor pressure closely. Check urine drug screen  3. Thrombocytopenia, this is new, suspect underlying liver disease. Monitor. 4.  Hyperbilirubinemia possibly due to liver disease. No abdominal pain. 5.  Coronary artery disease, needs further evaluation by way of possible cardiac catheterization. 6.  Sleep apnea, CPAP as needed  7. LV dysfunction, EF of 43% newly identified in May 2021. Obtain 2D echo. 8.  Alcohol dependence, monitor for withdrawal.  9.  Hyperlipidemia, resume statin, check lipid panel. 10.  Psychiatric illness NOS, patient is supposed to be on Depakote. Level is low, resume. 11.  Noncompliance with medications, counseling. 12.  Obesity, dietary and lifestyle modification. DVT Prophylaxis: Lovenox, check INR, DC Lovenox if INR is greater than 2.  Closely monitor platelet count. Diet: Diet NPO Exceptions are: Sips of Water with Meds  Code Status: Full Code    PT/OT Eval Status: As needed    Dispo -observation/telemetry       Ankita Mendoza MD    Thank you No primary care provider on file. for the opportunity to be involved in this patient's care.

## 2021-07-15 NOTE — ED PROVIDER NOTES
HPI:  7/14/21, Time: 9:42 PM EDT         Virginie Robins is a 64 y.o. male presenting to the ED for chest pain, beginning a short time ago. The complaint has been persistent, moderate in severity, and worsened by nothing. Patient reporting left-sided chest pain that started a short time prior to arrival.  Patient reporting no active pain now. It was left-sided did not radiate he reports no shortness of breath or diaphoresis. Patient reporting no active pain right now. Patient reporting no abdominal pain or vomiting there is no leg pain or swelling he does have a history of hypertension. Patient reporting no fever chills or cough. ROS:   Pertinent positives and negatives are stated within HPI, all other systems reviewed and are negative.  --------------------------------------------- PAST HISTORY ---------------------------------------------  Past Medical History:  has a past medical history of CAD (coronary artery disease), Hypertension, and Sleep apnea. Past Surgical History:  has no past surgical history on file. Social History:  reports that he has quit smoking. He has never used smokeless tobacco. He reports current alcohol use of about 5.0 standard drinks of alcohol per week. He reports previous drug use. Family History: family history is not on file. The patients home medications have been reviewed. Allergies: Patient has no known allergies. ---------------------------------------------------PHYSICAL EXAM--------------------------------------    Constitutional/General: Alert and oriented x3, well appearing, non toxic in NAD  Head: Normocephalic and atraumatic  Eyes: PERRL, EOMI  Mouth: Oropharynx clear, handling secretions, no trismus  Neck: Supple, full ROM, non tender to palpation in the midline, no stridor, no crepitus, no meningeal signs  Pulmonary: Lungs clear to auscultation bilaterally, no wheezes, rales, or rhonchi.  Not in respiratory distress  Cardiovascular:  Regular rate. Regular rhythm. No murmurs, gallops, or rubs. 2+ distal pulses  Chest: no chest wall tenderness  Abdomen: Soft. Non tender. Non distended. +BS. No rebound, guarding, or rigidity. No pulsatile masses appreciated. Musculoskeletal: Moves all extremities x 4. Warm and well perfused, no clubbing, cyanosis, or edema. Capillary refill <3 seconds  Skin: warm and dry. No rashes. Neurologic: GCS 15, CN 2-12 grossly intact, no focal deficits, symmetric strength 5/5 in the upper and lower extremities bilaterally  Psych: Normal Affect    -------------------------------------------------- RESULTS -------------------------------------------------  I have personally reviewed all laboratory and imaging results for this patient. Results are listed below.      LABS:  Results for orders placed or performed during the hospital encounter of 07/14/21   CBC auto differential   Result Value Ref Range    WBC 5.4 4.5 - 11.5 E9/L    RBC 5.19 3.80 - 5.80 E12/L    Hemoglobin 15.6 12.5 - 16.5 g/dL    Hematocrit 45.9 37.0 - 54.0 %    MCV 88.4 80.0 - 99.9 fL    MCH 30.1 26.0 - 35.0 pg    MCHC 34.0 32.0 - 34.5 %    RDW 16.5 (H) 11.5 - 15.0 fL    Platelets 692 (L) 314 - 450 E9/L    MPV 10.9 7.0 - 12.0 fL    Neutrophils % 78.0 43.0 - 80.0 %    Immature Granulocytes % 0.6 0.0 - 5.0 %    Lymphocytes % 11.1 (L) 20.0 - 42.0 %    Monocytes % 9.1 2.0 - 12.0 %    Eosinophils % 0.6 0.0 - 6.0 %    Basophils % 0.6 0.0 - 2.0 %    Neutrophils Absolute 4.22 1.80 - 7.30 E9/L    Immature Granulocytes # 0.03 E9/L    Lymphocytes Absolute 0.60 (L) 1.50 - 4.00 E9/L    Monocytes Absolute 0.49 0.10 - 0.95 E9/L    Eosinophils Absolute 0.03 (L) 0.05 - 0.50 E9/L    Basophils Absolute 0.03 0.00 - 0.20 E9/L   Comprehensive Metabolic Panel   Result Value Ref Range    Sodium 136 132 - 146 mmol/L    Potassium 4.5 3.5 - 5.0 mmol/L    Chloride 102 98 - 107 mmol/L    CO2 23 22 - 29 mmol/L    Anion Gap 11 7 - 16 mmol/L    Glucose 119 (H) 74 - 99 mg/dL    BUN 6 6 - 20 mg/dL CREATININE 1.1 0.7 - 1.2 mg/dL    GFR Non-African American >60 >=60 mL/min/1.73    GFR African American >60     Calcium 8.8 8.6 - 10.2 mg/dL    Total Protein 7.8 6.4 - 8.3 g/dL    Albumin 3.9 3.5 - 5.2 g/dL    Total Bilirubin 1.9 (H) 0.0 - 1.2 mg/dL    Alkaline Phosphatase 147 (H) 40 - 129 U/L    ALT 19 0 - 40 U/L    AST 25 0 - 39 U/L   Troponin   Result Value Ref Range    Troponin, High Sensitivity 26 (H) 0 - 11 ng/L   Valproic acid level, total   Result Value Ref Range    Valproic Acid Lvl 3 (L) 50 - 100 mcg/mL   Troponin   Result Value Ref Range    Troponin, High Sensitivity 26 (H) 0 - 11 ng/L   EKG 12 Lead   Result Value Ref Range    Ventricular Rate 76 BPM    Atrial Rate 76 BPM    P-R Interval 156 ms    QRS Duration 80 ms    Q-T Interval 422 ms    QTc Calculation (Bazett) 474 ms    P Axis 21 degrees    R Axis -47 degrees    T Axis 60 degrees       RADIOLOGY:  Interpreted by Radiologist.  XR CHEST PORTABLE   Final Result   No acute cardiopulmonary abnormality. EKG: This EKG is signed and interpreted by me. Rate: 78  Rhythm: Sinus  Interpretation: non-specific EKG  Comparison: stable as compared to patient's most recent EKG      REPEAT EKG: This EKG is signed and interpreted by ED Physician. Rate: 76  Rhythm: Sinus. Interpretation: non-specific EKG. Comparison: stable as compared to patient's most recent EKG.        ------------------------- NURSING NOTES AND VITALS REVIEWED ---------------------------   The nursing notes within the ED encounter and vital signs as below have been reviewed by myself. BP (!) 179/97   Pulse 80   Temp 97.1 °F (36.2 °C)   Resp 20   Ht 6' 2\" (1.88 m)   Wt 270 lb (122.5 kg)   SpO2 97%   BMI 34.67 kg/m²   Oxygen Saturation Interpretation: Normal    The patients available past medical records and past encounters were reviewed.         ------------------------------ ED COURSE/MEDICAL DECISION MAKING----------------------  Medications   aspirin chewable tablet 324 mg (324 mg Oral Given 7/14/21 2226)   nitroglycerin (NITRO-BID) 2 % ointment 0.5 inch (0.5 inches Topical Given 7/15/21 0049)             Medical Decision Making:   Patient presenting here because of pains in his chest started at work he stated lasted about 5 minutes prior to arrival.  Patient reports no radiation of the pain. Patient started having pains again while here in the emergency department. Repeat EKG done and looks unchanged. Patient ordered nitro. Patient made aware of findings and plan. Patient will be admitted to monitored bed    Re-Evaluations:             Re-evaluation. Patients symptoms show no change    Reevaluated and started having chest pains again. Patient had repeat EKG and looks unchanged. Patient will work nitro here and already received aspirin. Patient made aware of findings and plan. Consultations:        Internal medicine         Critical Care: This patient's ED course included: a personal history and physicial eaxmination    This patient has been closely monitored during their ED course. Counseling: The emergency provider has spoken with the patient and discussed todays results, in addition to providing specific details for the plan of care and counseling regarding the diagnosis and prognosis. Questions are answered at this time and they are agreeable with the plan.       --------------------------------- IMPRESSION AND DISPOSITION ---------------------------------    IMPRESSION  1. Chest pain, unspecified type        DISPOSITION  Disposition: Admit to monitored bed  Patient condition is stable        NOTE: This report was transcribed using voice recognition software.  Every effort was made to ensure accuracy; however, inadve  rtent computerized transcription errors may be present          Antonio Don MD  07/15/21 8053

## 2021-07-15 NOTE — CONSULTS
Patient seen and examined. Chart, labs, imaging studies, EKG and rhythm strips reviewed. Full consult to follow. Consulted for CP     IMPRESSION:  1. Atypical CP. Troponin levels flat, not consistent with ACS. No acute EKG changes. 2. Equivocal stress test in 5/2021 with reported small area of fixed perfusion defect involving mid to basal inferior wall with corresponding regional wall abnormalities with calculated EF of 43%. 3. HTN, uncontrolled. LVH   4. Ex smoker (30 pack year) quit in 2010  5. MARLEE non complaint with C-pap  6. ETOH abuse with history of withdrawal seizure  7. Hx Paranoia    PLAN:   1. Start BB therapy  2. Start oral nitrates  3. Rest of cardiac medications the same  4. TTE (ordered) to be done today. If regional wall motion abnormality confirmed then will consider LHC  5.  Further recommendations to follow    Electronically signed by Kat Jaime MD on 7/15/2021 at 8:52 AM

## 2021-07-15 NOTE — ED NOTES
Complaint of chest pain 4/10 with tingling to hands and feet.  0197 Russ Robles notified and repeat EKG completed and reviewed     Roxy Nair RN  07/15/21 001

## 2021-07-16 VITALS
TEMPERATURE: 98.3 F | OXYGEN SATURATION: 95 % | WEIGHT: 268.2 LBS | HEART RATE: 62 BPM | HEIGHT: 74 IN | BODY MASS INDEX: 34.42 KG/M2 | RESPIRATION RATE: 16 BRPM | DIASTOLIC BLOOD PRESSURE: 97 MMHG | SYSTOLIC BLOOD PRESSURE: 148 MMHG

## 2021-07-16 LAB
ABO/RH: NORMAL
ANTIBODY SCREEN: NORMAL
EKG ATRIAL RATE: 70 BPM
EKG P AXIS: 32 DEGREES
EKG P-R INTERVAL: 144 MS
EKG Q-T INTERVAL: 422 MS
EKG QRS DURATION: 90 MS
EKG QTC CALCULATION (BAZETT): 455 MS
EKG R AXIS: -42 DEGREES
EKG T AXIS: 75 DEGREES
EKG VENTRICULAR RATE: 70 BPM

## 2021-07-16 PROCEDURE — C1769 GUIDE WIRE: HCPCS

## 2021-07-16 PROCEDURE — 93010 ELECTROCARDIOGRAM REPORT: CPT | Performed by: INTERNAL MEDICINE

## 2021-07-16 PROCEDURE — 93458 L HRT ARTERY/VENTRICLE ANGIO: CPT | Performed by: INTERNAL MEDICINE

## 2021-07-16 PROCEDURE — 2580000003 HC RX 258: Performed by: INTERNAL MEDICINE

## 2021-07-16 PROCEDURE — 86900 BLOOD TYPING SEROLOGIC ABO: CPT

## 2021-07-16 PROCEDURE — C1894 INTRO/SHEATH, NON-LASER: HCPCS

## 2021-07-16 PROCEDURE — 2709999900 HC NON-CHARGEABLE SUPPLY

## 2021-07-16 PROCEDURE — 2500000003 HC RX 250 WO HCPCS

## 2021-07-16 PROCEDURE — 6370000000 HC RX 637 (ALT 250 FOR IP): Performed by: INTERNAL MEDICINE

## 2021-07-16 PROCEDURE — G0378 HOSPITAL OBSERVATION PER HR: HCPCS

## 2021-07-16 PROCEDURE — 6370000000 HC RX 637 (ALT 250 FOR IP): Performed by: FAMILY MEDICINE

## 2021-07-16 PROCEDURE — 36415 COLL VENOUS BLD VENIPUNCTURE: CPT

## 2021-07-16 PROCEDURE — 99024 POSTOP FOLLOW-UP VISIT: CPT | Performed by: INTERNAL MEDICINE

## 2021-07-16 PROCEDURE — 93005 ELECTROCARDIOGRAM TRACING: CPT | Performed by: FAMILY MEDICINE

## 2021-07-16 PROCEDURE — 86850 RBC ANTIBODY SCREEN: CPT

## 2021-07-16 PROCEDURE — 86901 BLOOD TYPING SEROLOGIC RH(D): CPT

## 2021-07-16 PROCEDURE — 6360000002 HC RX W HCPCS

## 2021-07-16 RX ORDER — ASPIRIN 81 MG/1
81 TABLET, CHEWABLE ORAL DAILY
Qty: 30 TABLET | Refills: 3 | Status: SHIPPED | OUTPATIENT
Start: 2021-07-17

## 2021-07-16 RX ORDER — SODIUM CHLORIDE 9 MG/ML
25 INJECTION, SOLUTION INTRAVENOUS PRN
Status: DISCONTINUED | OUTPATIENT
Start: 2021-07-16 | End: 2021-07-16 | Stop reason: HOSPADM

## 2021-07-16 RX ORDER — SODIUM CHLORIDE 0.9 % (FLUSH) 0.9 %
5-40 SYRINGE (ML) INJECTION EVERY 12 HOURS SCHEDULED
Status: DISCONTINUED | OUTPATIENT
Start: 2021-07-16 | End: 2021-07-16 | Stop reason: HOSPADM

## 2021-07-16 RX ORDER — METOPROLOL SUCCINATE 25 MG/1
25 TABLET, EXTENDED RELEASE ORAL DAILY
Qty: 30 TABLET | Refills: 3 | Status: SHIPPED | OUTPATIENT
Start: 2021-07-17

## 2021-07-16 RX ORDER — SODIUM CHLORIDE 9 MG/ML
500 INJECTION, SOLUTION INTRAVENOUS CONTINUOUS
Status: DISCONTINUED | OUTPATIENT
Start: 2021-07-16 | End: 2021-07-16 | Stop reason: HOSPADM

## 2021-07-16 RX ORDER — ATORVASTATIN CALCIUM 40 MG/1
40 TABLET, FILM COATED ORAL NIGHTLY
Qty: 30 TABLET | Refills: 3 | Status: SHIPPED | OUTPATIENT
Start: 2021-07-16

## 2021-07-16 RX ORDER — SODIUM CHLORIDE 0.9 % (FLUSH) 0.9 %
5-40 SYRINGE (ML) INJECTION PRN
Status: DISCONTINUED | OUTPATIENT
Start: 2021-07-16 | End: 2021-07-16 | Stop reason: HOSPADM

## 2021-07-16 RX ADMIN — SODIUM CHLORIDE 500 ML: 0.9 INJECTION, SOLUTION INTRAVENOUS at 11:21

## 2021-07-16 RX ADMIN — DIVALPROEX SODIUM 250 MG: 250 TABLET, DELAYED RELEASE ORAL at 11:19

## 2021-07-16 RX ADMIN — SODIUM CHLORIDE, PRESERVATIVE FREE 10 ML: 5 INJECTION INTRAVENOUS at 11:20

## 2021-07-16 RX ADMIN — Medication 10 ML: at 07:00

## 2021-07-16 RX ADMIN — Medication 100 MG: at 11:20

## 2021-07-16 RX ADMIN — AMLODIPINE BESYLATE 10 MG: 5 TABLET ORAL at 11:19

## 2021-07-16 RX ADMIN — METOPROLOL SUCCINATE 25 MG: 25 TABLET, EXTENDED RELEASE ORAL at 11:19

## 2021-07-16 RX ADMIN — FOLIC ACID 1 MG: 1 TABLET ORAL at 11:20

## 2021-07-16 RX ADMIN — ASPIRIN 81 MG: 81 TABLET, CHEWABLE ORAL at 08:03

## 2021-07-16 ASSESSMENT — PAIN SCALES - GENERAL
PAINLEVEL_OUTOF10: 0
PAINLEVEL_OUTOF10: 0

## 2021-07-16 NOTE — DISCHARGE SUMMARY
Hospitalist Discharge Summary    Patient ID: Jazmine Jaime   Patient : 1965  Patient's PCP: No primary care provider on file. Admit Date: 2021   Admitting Physician: Kim Parham MD    Discharge Date:  2021   Discharge Physician: Yocasta Villalba MD   Discharge Condition: Stable  Discharge Disposition: [facility] course in brief:  (Please refer to daily progress notes for a comprehensive review of the hospitalization by requesting medical records)    77-year-old -American male with a Sudie Bristol showed inferior wall defect history of hyperlipidemia hypertension MARLEE non complaint with C-pap, ex smoker 30 pack years quit in , ETOH abuse. Patient had chest pain upon admission and some lightheadedness did resolve on arrival to ED. patient had atypical chest pain troponin levels were flat not consistent with ACS however no EKG changes cardiology saw the patient he was started on beta-blockers statin and aspirin underwent cardiac cath report shows  1. Patent coronary arteries. 2.  Normal left ventricular systolic function. Patient has been cleared by cardiology for discharge today.       Consults:   IP CONSULT TO INTERNAL MEDICINE  IP CONSULT TO CARDIOLOGY    Discharge Diagnoses:    Patient Active Problem List   Diagnosis    Lightheadedness    Acute cystitis with hematuria    Chest pain    Acute psychosis ([facility] [address]    Cognitive disorder    CAD (coronary artery disease)    Hypertensive urgency    MARLEE (obstructive sleep apnea)    Class 1 obesity in adult    Abnormal stress test    Elevated troponin    LV dysfunction    Thrombocytopenia (HCC)    Hyperbilirubinemia    Alcohol abuse    HLD (hyperlipidemia)       Discharge Instructions / Follow up:    Future Appointments   Date Time Provider Makeda Sherwood   2021  9:00 AM Nura Estes MD [facility]       Continued appropriate risk factor modification of blood pressure, diabetes and serum lipids will remain essential to reducing risk of future atherosclerotic development    Activity: activity as tolerated    Significant labs:  CBC:   Recent Labs     07/14/21  2224 07/15/21  0625   WBC 5.4 5.0   RBC 5.19 4.62   HGB 15.6 13.9   HCT 45.9 41.2   MCV 88.4 89.2   RDW 16.5* 16.2*   * 95*     BMP:   Recent Labs     07/14/21  2224 07/15/21  0625    140   K 4.5 4.1    105   CO2 23 24   BUN 6 7   CREATININE 1.1 1.0     LFT:  Recent Labs     07/14/21  2224 07/15/21  0127   PROT 7.8  --    ALKPHOS 147*  --    ALT 19  --    AST 25  --    BILITOT 1.9*  --    LIPASE  --  36     PT/INR:   Recent Labs     07/15/21  0625   INR 1.3     BNP: No results for input(s): BNP in the last 72 hours.   Hgb A1C:   Lab Results   Component Value Date    LABA1C 5.7 (H) 05/08/2021     Folate and B12: No results found for: Antoniette Kelley, No results found for: FOLATE  Thyroid Studies:   Lab Results   Component Value Date    TSH 1.333 01/07/2011       Urinalysis:    Lab Results   Component Value Date    NITRU Negative 06/19/2021    WBCUA 5-10 06/04/2021    BACTERIA MODERATE 06/04/2021    RBCUA NONE 06/04/2021    BLOODU Negative 06/19/2021    SPECGRAV <=1.005 06/19/2021    GLUCOSEU Negative 06/19/2021       Imaging:  Echo Complete    Result Date: 7/15/2021  Transthoracic Echocardiography Report (TTE)  Demographics   Patient Name    Nakia Solo       Gender            Male                  Selin Moe6  [id number]       Room Number       0283  Number   Account #       [de-identified]      Procedure Date    07/15/2021   Corporate ID                   Ordering                                 Physician   Accession       [id number]     Referring  Number                         Physician   Date of Birth   1965     Sonographer       Janie Hawk RDNATHAN   Age             64 year(s)     Interpreting      [address]                                 Physician         Physician Cardiology Shakila Anna MD                                  Any Other  Procedure Type of Study   TTE procedure:Echo Complete W/Doppler & Color Flow. Procedure Date Date: 07/15/2021 Start: 09:35 AM Study Location: Portable Technical Quality: Adequate visualization Indications:LV function. Patient Status: Routine Height: 74 inches Weight: 270 pounds BSA: 2.47 m^2 BMI: 34.67 kg/m^2 HR: 77 bpm BP: 170/97 mmHg  Findings   Left Ventricle  Left ventricle is normal in size . Mild concentric left ventricular hypertrophy. Possible basal inferior wall hypokinesis. Ejection fraction is visually estimated at 55%. There is doppler evidence of stage I diastolic dysfunction. Right Ventricle  Normal right ventricular size and function. Left Atrium  Normal sized left atrium. Interatrial septum appears intact. Right Atrium  Normal right atrium size. Mitral Valve  Focal calcification mitral valve leaflets. Physiologic and/or trace mitral regurgitation. Tricuspid Valve  Normal tricuspid valve structure and function. Physiologic and/or trace tricuspid regurgitation. RVSP could not be estimated. Aortic Valve  Normal aortic valve structure and function. Pulmonic Valve  Normal pulmonic valve structure and function. Pericardial Effusion  No evidence of pericardial effusion. Aorta  Aortic root dimension within normal limits. Conclusions   Summary  Left ventricle is normal in size . Mild concentric left ventricular hypertrophy. Possible basal inferior wall hypokinesis. Ejection fraction is visually estimated at 55%. There is doppler evidence of stage I diastolic dysfunction. Normal right ventricular size and function. Focal calcification mitral valve leaflets.    Signature   ----------------------------------------------------------------  Electronically signed by Shakila Anna MD(Interpreting  physician) on 07/15/2021 05:34 PM  ----------------------------------------------------------------  M-Mode/2D Measurements & Calculations   LV Diastolic     LV Systolic Dimension: 3.7 cm    AV Cusp Separation: 2.1  Dimension: 5.2   LV Volume Diastolic: 129.9 ml    cmLA Dimension: 4 cm  cm               LV Volume Systolic: 66.0 ml  LV YF:40.1 %     LV EDV/LV EDV Index: 126.9 FD/03  LV PW Diastolic: YK/P^4WD ESV/LV ESV Index: 56.5  1.4 cm           ml/23ml/ m^2                     RV Diastolic Dimension:  Septum           EF Calculated: 55.5 %            2.4 cm  Diastolic: 1.8   LV Mass Index: 153 l/min*m^2  cm               LV Length: 8.9 cm                Ascending Aorta: 2.7 cm  CO: 6.4 l/min                                     LA volume/Index: 66.5 ml  CI: 2.59 l/m*m^2 LVOT: 2.1 cm                     /26.94ml/m^2  LV Mass: 376.74                                   RA Area: 16.8 cm^2  g  Doppler Measurements & Calculations   MV Peak E-Wave: 0.68  AV Peak Velocity: 1.72 LVOT Peak Velocity: 1.12 m/s  m/s                   m/s                    LVOT Mean Velocity: 0.93 m/s  MV Peak A-Wave: 0.91  AV Peak Gradient:      LVOT Peak Gradient: 5  m/s                   11.86 mmHg             mmHgLVOT Mean Gradient: 3.7  MV E/A Ratio: 0.75    AV Mean Velocity: 1.32 mmHg  MV Peak Gradient: 4.5 m/s  mmHg                  AV Mean Gradient: 7.4  MV Mean Gradient: 1.8 mmHg  mmHg                  AV VTI: 35.1 cm  MV Mean Velocity:     AV Area  0.64 m/s              (Continuity):2.37 cm^2 PV Peak Velocity: 1.02 m/s  MV Deceleration Time:                        PV Peak Gradient: 4.19 mmHg  205.9 msec            LVOT VTI: 24 cm        PV Mean Velocity: 0.77 m/s  MV P1/2t: 70.1 msec                          PV Mean Gradient: 2.5 mmHg  MVA by PHT:3.14 cm^2  MV Area (continuity):  3.3 cm^2  MV E' Septal  Velocity: 0.04 m/s  MV E' Lateral  Velocity: 6 m/s  [email or web address] [id number]    CT Head WO Contrast    Result Date: 6/19/2021  EXAMINATION: CT OF THE HEAD WITHOUT CONTRAST  6/19/2021 10:33 pm TECHNIQUE: CT of the head was performed without the administration of intravenous contrast. Dose modulation, iterative reconstruction, and/or weight based adjustment of the mA/kV was utilized to reduce the radiation dose to as low as reasonably achievable. COMPARISON: May 27, 2021 HISTORY: ORDERING SYSTEM PROVIDED HISTORY: AMS TECHNOLOGIST PROVIDED HISTORY: Reason for exam:->AMS Has a \"code stroke\" or \"stroke alert\" been called? ->No Decision Support Exception - unselect if not a suspected or confirmed emergency medical condition->Emergency Medical Condition (MA) What reading provider will be dictating this exam?->CRC FINDINGS: BRAIN/VENTRICLES: There is no acute intracranial hemorrhage, mass effect or midline shift. No abnormal extra-axial fluid collection. The gray-white differentiation is maintained without evidence of an acute infarct. There is no evidence of hydrocephalus. ORBITS: The visualized portion of the orbits demonstrate no acute abnormality. SINUSES: There is mucosal thickening in the maxillary sinuses. SOFT TISSUES/SKULL:  No acute abnormality of the visualized skull or soft tissues. No acute intracranial abnormality. Maxillary sinusitis. XR CHEST PORTABLE    Result Date: 7/14/2021  EXAMINATION: ONE XRAY VIEW OF THE CHEST 7/14/2021 10:10 pm COMPARISON: 06/19/2021 HISTORY: ORDERING SYSTEM PROVIDED HISTORY: chest pain TECHNOLOGIST PROVIDED HISTORY: Reason for exam:->chest pain What reading provider will be dictating this exam?->CRC FINDINGS: The lungs are clear. The heart is normal in size. The aorta is tortuous. No pulmonary vascular congestion. No pneumothorax or pleural effusion is seen. No acute cardiopulmonary abnormality. XR CHEST PORTABLE    Result Date: 6/19/2021  EXAMINATION: ONE XRAY VIEW OF THE CHEST 6/19/2021 11:09 pm COMPARISON: None.  HISTORY: ORDERING SYSTEM PROVIDED HISTORY: altered mental status TECHNOLOGIST PROVIDED HISTORY: Reason for exam:->altered mental status What reading provider will be dictating this exam?->CRC FINDINGS: The lungs are without acute focal process. There is no effusion or pneumothorax. The cardiomediastinal silhouette is without acute process. The osseous structures are without acute process. No acute process.        Discharge Medications:      Medication List      START taking these medications    aspirin 81 MG chewable tablet  Take 1 tablet by mouth daily  Start taking on: July 17, 2021     atorvastatin 40 MG tablet  Commonly known as: LIPITOR  Take 1 tablet by mouth nightly     metoprolol succinate 25 MG extended release tablet  Commonly known as: TOPROL XL  Take 1 tablet by mouth daily  Start taking on: July 17, 2021        CONTINUE taking these medications    amLODIPine 10 MG tablet  Commonly known as: NORVASC  Take 1 tablet by mouth daily     divalproex 250 MG DR tablet  Commonly known as: DEPAKOTE  Take 1 tablet by mouth every 12 hours     folic acid 1 MG tablet  Commonly known as: FOLVITE  Take 1 tablet by mouth daily     melatonin 3 MG Tabs tablet  Take 2 tablets by mouth daily     vitamin B-1 100 MG tablet  Commonly known as: THIAMINE  Take 1 tablet by mouth daily           Where to Get Your Medications      [facility] [address] [phone] - F [phone]  [place] [address]    Phone: [phone]   · aspirin 81 MG chewable tablet  · atorvastatin 40 MG tablet  · metoprolol succinate 25 MG extended release tablet         Time Spent on discharge is more than 45 minutes in the examination, evaluation, counseling and review of medications and discharge plan.    +++++++++++++++++++++++++++++++++++++++++++++++++  Yocasta Villalba MD  [address]  +++++++++++++++++++++++++++++++++++++++++++++++++  NOTE: This report was transcribed using voice recognition software. Every effort was made to ensure accuracy; however, inadvertent computerized transcription errors may be present.

## 2021-07-16 NOTE — OP NOTE
Operative Note      Patient: Sunshine Bone  YOB: 1965  MRN: [id number]    Date of Procedure: 7/16/21    Indication:  1. CP. Abnormal stress test  2. AUC score: 7  3. AUC indication: 16    Procedure: Left Heart Catheterization, coronary angiography, left ventriculography    Anesthesia: Versed, Fentanyl  Time sedation was administered: 09:50. I was present in the room when sedation was administered. Procedure end time: 10:08  Time spent with face to face monitoring of moderate sedation: 25 minutes    LHC performed via right radial approach using a 6 F sheath. 2.5mg of diluted Verapamil and 200mcg of nitroglycerine administered through the sheath. 5000 U heparin administered IV. Findings:  Left main: 0%  stenosis  LAD: 0. %  stenosis  Circumflex: 0. %   stenosis  RCA: Dominant. 0. %  stenosis  LV angio: 55%  ejection fraction    Hemodynamics:  LV: 155 mmHg. EDP: 11 No gradient across AV. Ao: 129/86 ( 105 ). Sheath removed and TR band applied. There was good hemostasis achieved and the distal pulses were intact. Complication: None   Estimated blood loss: 20 cc  Contrast use: 60 cc    Post op diagnosis:  Patent coronaries  Normal LV systolic function    PLAN:  Medical therapy with continued risk factor modification  Discontinue Imdur / SL NTG  Monitor BP.  Consider adding an ARB if needed  May be discharged later today from cardiology stand point  Cardiology will sign off      Electronically signed by Liliana Tierney MD on 7/16/2021 at 10:18 AM

## 2021-07-16 NOTE — CARE COORDINATION
Met with pt at bedside. Had heart cath today. Pt is independent in room. Lives alone, Stoop to enter then 4-5 steps to 2nd floor to apartment. Pt has cpap at home. Current iv- zofran q6. Pt anticipates discharge home with no needs when medically stable. Family/friend is able to transport home. CM/SS will continue to follow for discharge needs.     Rachel, Vel [place]

## 2021-07-16 NOTE — PROGRESS NOTES
Inpatient Cardiology Progress note     PATIENT IS BEING FOLLOWED FOR: CP    Connie Howard is a 64 y.o. male known to Dr. Ruvalcaba Oas: Denies CP or SOB  OBJECTIVE: No apparent distress     ROS:  Consist: Denies fevers, chills or night sweats  Heart: Denies chest pain, palpitations, lightheadedness, dizziness or syncope  Lungs: Denies SOB, cough, wheezing, orthopnea or PND  GI: Denies abdominal pain, vomiting or diarrhea    PHYSICAL EXAM:   BP (!) 154/94   Pulse 78   Temp 97.8 °F (36.6 °C) (Temporal)   Resp 18   Ht 6' 2\" (1.88 m)   Wt 268 lb 3.2 oz (121.7 kg)   SpO2 97%   BMI 34.43 kg/m²    B/P Range last 24 hours: Systolic (86FPN), SWP:330 , Min:114 , RWQ:520    Diastolic (89YGI), BPF:56, Min:58, Max:97    CONST: Well developed, well nourished AA who appears of stated age. Awake, alert and cooperative. No apparent distress  HEENT:   Head- Normocephalic, atraumatic   Eyes- Conjunctivae pink, anicteric  Throat- Oral mucosa pink and moist  Neck-  No stridor, trachea midline, no jugular venous distention. No carotid bruit  CHEST: Chest symmetrical and non-tender to palpation. No accessory muscle use or intercostal retractions  RESPIRATORY:  Lung sounds - clear throughout fields   CARDIOVASCULAR:     Heart Inspection- shows no noted pulsations  Heart Palpation- no heaves or thrills; PMI is non-displaced   Heart Ausculation- Regular rate and rhythm, no murmur. No s3, s4 or rub   PV: No lower extremity edema. No varicosities. Pedal pulses palpable, no clubbing or cyanosis   ABDOMEN: Soft, non-tender to light palpation. Bowel sounds present. No palpable masses no organomegaly; no abdominal bruit  MS: Good muscle strength and tone. No atrophy or abnormal movements. : Deferred  SKIN: Warm and dry no statis dermatitis or ulcers   NEURO / PSYCH: Oriented to person, place and time. Speech clear and appropriate. Follows all commands.  Flat affect       Intake/Output Summary (Last 24 hours) at 7/16/2021 9330  Last data filed at 7/16/2021 0427  Gross per 24 hour   Intake 840 ml   Output --   Net 840 ml       Weight:   Wt Readings from Last 3 Encounters:   07/16/21 268 lb 3.2 oz (121.7 kg)   06/19/21 270 lb (122.5 kg)   05/27/21 270 lb (122.5 kg)     Current Inpatient Medications:   amLODIPine  10 mg Oral Daily    folic acid  1 mg Oral Daily    divalproex  250 mg Oral 2 times per day    vitamin B-1  100 mg Oral Daily    sodium chloride flush  5-40 mL Intravenous 2 times per day    aspirin  81 mg Oral Daily    enoxaparin  40 mg Subcutaneous Daily    atorvastatin  40 mg Oral Nightly    isosorbide mononitrate  30 mg Oral Daily    metoprolol succinate  25 mg Oral Daily       IV Infusions (if any):   sodium chloride         DIAGNOSTIC/ LABORATORY DATA:  Labs:   CBC:   Recent Labs     07/14/21  2224 07/15/21  0625   WBC 5.4 5.0   HGB 15.6 13.9   HCT 45.9 41.2   * 95*     BMP:   Recent Labs     07/14/21  2224 07/15/21  0625    140   K 4.5 4.1   CO2 23 24   BUN 6 7   CREATININE 1.1 1.0   LABGLOM >60 >60   CALCIUM 8.8 8.4*     TFT:   Lab Results   Component Value Date    TSH 1.333 01/07/2011      HgA1c:   Lab Results   Component Value Date    LABA1C 5.7 (H) 05/08/2021     PT/INR:   Recent Labs     07/15/21  0625   PROTIME 14.5*   INR 1.3     CARDIAC ENZYMES:  Recent Labs     07/14/21  2224 07/15/21  0021   TROPHS 26* 26*     FASTING LIPID PANEL:  Lab Results   Component Value Date    CHOL 162 07/15/2021    HDL 46 07/15/2021    LDLCALC 98 07/15/2021    TRIG 88 07/15/2021     LIVER PROFILE:  Recent Labs     07/14/21 2224   AST 25   ALT 19   LABALBU 3.9       CXR 7/14/21: No acute cardiopulmonary abnormality. 12 lead EKG 7/16/21: SR. LAD. Decrease in R wave amplitude from V2 to V3    Telemetry: SR    Lexiscan MPS 5/12/21:  1.  ECG during the infusion did not change.    2.  The myocardial perfusion imaging was abnormal.    3.  The abnormality was a small area of fixed perfusion defect  involving the mid to basal inferior wall suggestive of prior infarct. 4.  Overall left ventricular systolic function was abnormal with  regional wall motion abnormalities. 5.  No transient ischemic dilatation. 6.  Intermediate risk general pharmacologic stress test.     Thank you for sending your patient to this [facility] Hamilton Diego MD, [facility] cardiology. Echo:  Summary   Left ventricle is normal in size . Mild concentric left ventricular hypertrophy. Possible basal inferior wall hypokinesis. Ejection fraction is visually estimated at 55%. There is doppler evidence of stage I diastolic dysfunction. Normal right ventricular size and function. Focal calcification mitral valve leaflets      ASSESSMENT:   1. Atypical CP. Troponin levels flat, not consistent with ACS. No acute EKG changes. 2. Equivocal stress test in 5/2021 with reported small area of fixed perfusion defect involving mid to basal inferior wall with corresponding regional wall abnormalities with calculated EF of 43%. 3. HTN, uncontrolled. LVH   4. Ex smoker (30 pack year) quit in 2010  5. MARLEE non complaint with C-pap  6. ETOH abuse with history of withdrawal seizure   8. Hx Paranoia        PLAN:  1. Continue current cardiac medications  2. Cath +/- PCI today. Risks, benefits and alternative therapies to the procedure explained. He understood and consented to proceed  3.  Further recommendations to follow    Electronically signed by Mika Hines MD on 7/16/2021 at 8:23 AM

## 2021-07-16 NOTE — PROCEDURES
[address]                  [address] [facility],  [facility]                            CARDIAC CATHETERIZATION    PATIENT NAME: Meek Mcneil                  :        1965  MED REC NO:   [id number]                            ROOM:       [id number]  ACCOUNT NO:   [de-identified]                           ADMIT DATE: 2021  PROVIDER:     Edmundo Mcknight MD    DATE OF PROCEDURE:  2021    PROCEDURES:  Left heart catheterization, selective coronary angiography,  and left ventriculography. The procedure was done through right radial approach using ultrasound  guidance. The patient received intravenous Versed and intravenous fentanyl for  sedation. INDICATION:  Recurrent admissions for chest pain. Equivocal stress test  in 2021. PRESSURES:  1. Aorta 129/86 with a mean of 105.  2.  Left ventricular systolic pressure 494, left ventricular  end-diastolic pressure 11.  3.  There was no significant gradient across the aortic valve on  pullback. CORONARY ANGIOGRAPHY:  LEFT MAIN:  The left main artery did not appear to have any significant  angiographic disease. LAD:  The left anterior descending artery did not appear to have any  significant angiographic disease. LCX:  The left circumflex did not appear to have any significant  angiographic disease. RCA:  The right coronary artery is a large, dominant vessel which did  not appear to have any angiographic disease. LEFT VENTRICULOGRAPHY:  The left ventricle is normal in size. There was  no regional wall motion abnormality noted on the 35-degree LANDEROS view. The ejection fraction was estimated at 55%. There was no mitral  regurgitation. The right radial arterial sheath was removed at the end of the procedure  and a TR band was applied with adequate hemostasis and with preservation  of pulse.     The patient tolerated the procedure well and left the cardiac  catheterization laboratory in a stable condition. CONCLUSION:  1. Patent coronary arteries. 2.  Normal left ventricular systolic function.         Flores Henry MD    D: 07/16/2021 10:57:24       T: 07/16/2021 11:06:55     [email or web address]  Job#: [id number]     Doc#: [id number]    CC:

## 2021-07-16 NOTE — PROGRESS NOTES
Came into patients room and patient had pulled out both IVs, cardiac monitor and dressing over cath site. No bleeding noted at cath site but a hematoma had formed. Preassure held for 5 minutes. Hematoma resolved. No s/s of bleeding new opsite applied.

## 2021-08-14 PROBLEM — R79.89 ELEVATED TROPONIN: Status: RESOLVED | Noted: 2021-07-15 | Resolved: 2021-08-14

## 2021-08-14 PROBLEM — R77.8 ELEVATED TROPONIN: Status: RESOLVED | Noted: 2021-07-15 | Resolved: 2021-08-14

## 2021-10-13 ENCOUNTER — APPOINTMENT (OUTPATIENT)
Dept: GENERAL RADIOLOGY | Age: 56
DRG: 951 | End: 2021-10-13
Payer: MEDICARE

## 2021-10-13 ENCOUNTER — APPOINTMENT (OUTPATIENT)
Dept: CT IMAGING | Age: 56
DRG: 951 | End: 2021-10-13
Payer: MEDICARE

## 2021-10-13 LAB
ALBUMIN SERPL-MCNC: 3.1 G/DL (ref 3.5–5.2)
ALP BLD-CCNC: 104 U/L (ref 40–129)
ALT SERPL-CCNC: 14 U/L (ref 0–40)
ANION GAP SERPL CALCULATED.3IONS-SCNC: 12 MMOL/L (ref 7–16)
AST SERPL-CCNC: 17 U/L (ref 0–39)
BACTERIA: ABNORMAL /HPF
BILIRUB SERPL-MCNC: 2.6 MG/DL (ref 0–1.2)
BILIRUBIN URINE: ABNORMAL
BLOOD, URINE: NEGATIVE
BUN BLDV-MCNC: 9 MG/DL (ref 6–20)
CALCIUM SERPL-MCNC: 9.1 MG/DL (ref 8.6–10.2)
CHLORIDE BLD-SCNC: 99 MMOL/L (ref 98–107)
CLARITY: CLEAR
CO2: 22 MMOL/L (ref 22–29)
COLOR: YELLOW
CREAT SERPL-MCNC: 0.9 MG/DL (ref 0.7–1.2)
D DIMER: 888 NG/ML DDU
EKG ATRIAL RATE: 84 BPM
EKG P AXIS: 47 DEGREES
EKG P-R INTERVAL: 130 MS
EKG Q-T INTERVAL: 420 MS
EKG QRS DURATION: 86 MS
EKG QTC CALCULATION (BAZETT): 496 MS
EKG R AXIS: -6 DEGREES
EKG T AXIS: -23 DEGREES
EKG VENTRICULAR RATE: 84 BPM
GFR AFRICAN AMERICAN: >60
GFR NON-AFRICAN AMERICAN: >60 ML/MIN/1.73
GLUCOSE BLD-MCNC: 125 MG/DL (ref 74–99)
GLUCOSE URINE: NEGATIVE MG/DL
HCT VFR BLD CALC: 45.6 % (ref 37–54)
HEMOGLOBIN: 15.6 G/DL (ref 12.5–16.5)
KETONES, URINE: NEGATIVE MG/DL
LEUKOCYTE ESTERASE, URINE: ABNORMAL
MCH RBC QN AUTO: 29.4 PG (ref 26–35)
MCHC RBC AUTO-ENTMCNC: 34.2 % (ref 32–34.5)
MCV RBC AUTO: 86 FL (ref 80–99.9)
NITRITE, URINE: POSITIVE
PDW BLD-RTO: 13.5 FL (ref 11.5–15)
PH UA: 5 (ref 5–9)
PLATELET # BLD: 173 E9/L (ref 130–450)
PMV BLD AUTO: 10.6 FL (ref 7–12)
POTASSIUM SERPL-SCNC: 4.3 MMOL/L (ref 3.5–5)
PRO-BNP: 371 PG/ML (ref 0–125)
PROTEIN UA: NEGATIVE MG/DL
RBC # BLD: 5.3 E12/L (ref 3.8–5.8)
RBC UA: ABNORMAL /HPF (ref 0–2)
SODIUM BLD-SCNC: 133 MMOL/L (ref 132–146)
SPECIFIC GRAVITY UA: 1.02 (ref 1–1.03)
TOTAL PROTEIN: 7.9 G/DL (ref 6.4–8.3)
TROPONIN, HIGH SENSITIVITY: 16 NG/L (ref 0–11)
TROPONIN, HIGH SENSITIVITY: 18 NG/L (ref 0–11)
UROBILINOGEN, URINE: 2 E.U./DL
WBC # BLD: 11 E9/L (ref 4.5–11.5)
WBC UA: ABNORMAL /HPF (ref 0–5)

## 2021-10-13 PROCEDURE — 71046 X-RAY EXAM CHEST 2 VIEWS: CPT

## 2021-10-13 PROCEDURE — 80053 COMPREHEN METABOLIC PANEL: CPT

## 2021-10-13 PROCEDURE — 6360000004 HC RX CONTRAST MEDICATION: Performed by: RADIOLOGY

## 2021-10-13 PROCEDURE — 84484 ASSAY OF TROPONIN QUANT: CPT

## 2021-10-13 PROCEDURE — 99284 EMERGENCY DEPT VISIT MOD MDM: CPT

## 2021-10-13 PROCEDURE — 71275 CT ANGIOGRAPHY CHEST: CPT

## 2021-10-13 PROCEDURE — 85027 COMPLETE CBC AUTOMATED: CPT

## 2021-10-13 PROCEDURE — 96365 THER/PROPH/DIAG IV INF INIT: CPT

## 2021-10-13 PROCEDURE — 81001 URINALYSIS AUTO W/SCOPE: CPT

## 2021-10-13 PROCEDURE — 85378 FIBRIN DEGRADE SEMIQUANT: CPT

## 2021-10-13 PROCEDURE — 93005 ELECTROCARDIOGRAM TRACING: CPT | Performed by: NURSE PRACTITIONER

## 2021-10-13 PROCEDURE — 83880 ASSAY OF NATRIURETIC PEPTIDE: CPT

## 2021-10-13 PROCEDURE — 93010 ELECTROCARDIOGRAM REPORT: CPT | Performed by: INTERNAL MEDICINE

## 2021-10-13 RX ADMIN — IOPAMIDOL 60 ML: 755 INJECTION, SOLUTION INTRAVENOUS at 21:50

## 2021-10-13 ASSESSMENT — PAIN SCALES - GENERAL: PAINLEVEL_OUTOF10: 5

## 2021-10-13 NOTE — ED TRIAGE NOTES
FIRST PROVIDER CONTACT ASSESSMENT NOTE                                                                                                Department of Emergency Medicine                                                      First Provider Note  10/13/21  5:26 PM EDT  NAME: Thai Goncalves  : 1965  MRN: [id number]    Chief Complaint: Back Pain (left lower flank pain started this morning) and Chest Pain (comes and goes for months )      History of Present Illness:   Thai Goncalves is a 64 y.o. male who presents to the ED for chest and  flank pain    Focused Physical Exam:  VS:    ED Triage Vitals [10/13/21 1250]   BP Temp Temp Source Pulse Resp SpO2 Height Weight   (!) 162/100 98.1 °F (36.7 °C) Oral 88 17 98 % 6' 2\" (1.88 m) 268 lb (121.6 kg)        General: Alert and in no apparent distress. Medical History:  has a past medical history of CAD (coronary artery disease), Hypertension, and Sleep apnea. Surgical History:  has no past surgical history on file. Social History:  reports that he has quit smoking. He has never used smokeless tobacco. He reports current alcohol use of about 5.0 standard drinks of alcohol per week. He reports previous drug use. Family History: family history is not on file. Allergies: Patient has no known allergies.      Initial Plan of Care:  Initiate Treatment-Testing, Proceed toTreatment Area When Bed Available for ED Attending/MLP to Continue Care    -------------------------------------------------END OF FIRST PROVIDER CONTACT ASSESSMENT NOTE--------------------------------------------------------  Electronically signed by WINTER Hernández CNP   DD: 10/13/21

## 2021-10-14 ENCOUNTER — ANESTHESIA (OUTPATIENT)
Dept: OPERATING ROOM | Age: 56
DRG: 951 | End: 2021-10-14
Payer: MEDICARE

## 2021-10-14 ENCOUNTER — APPOINTMENT (OUTPATIENT)
Dept: CT IMAGING | Age: 56
DRG: 951 | End: 2021-10-14
Payer: MEDICARE

## 2021-10-14 ENCOUNTER — HOSPITAL ENCOUNTER (INPATIENT)
Age: 56
LOS: 4 days | Discharge: NURSING, REHAB OR LONG-TERM CARE | DRG: 951 | End: 2021-10-18
Attending: EMERGENCY MEDICINE | Admitting: INTERNAL MEDICINE
Payer: MEDICARE

## 2021-10-14 ENCOUNTER — ANESTHESIA EVENT (OUTPATIENT)
Dept: OPERATING ROOM | Age: 56
DRG: 951 | End: 2021-10-14
Payer: MEDICARE

## 2021-10-14 VITALS — DIASTOLIC BLOOD PRESSURE: 113 MMHG | TEMPERATURE: 98.2 F | OXYGEN SATURATION: 95 % | SYSTOLIC BLOOD PRESSURE: 176 MMHG

## 2021-10-14 DIAGNOSIS — R07.9 CHEST PAIN, UNSPECIFIED TYPE: Primary | ICD-10-CM

## 2021-10-14 DIAGNOSIS — K57.32 DIVERTICULITIS OF COLON: ICD-10-CM

## 2021-10-14 DIAGNOSIS — K57.32 SIGMOID DIVERTICULITIS: ICD-10-CM

## 2021-10-14 DIAGNOSIS — I25.10 CORONARY ARTERY DISEASE INVOLVING NATIVE HEART WITHOUT ANGINA PECTORIS, UNSPECIFIED VESSEL OR LESION TYPE: ICD-10-CM

## 2021-10-14 DIAGNOSIS — F10.10 ALCOHOL ABUSE: ICD-10-CM

## 2021-10-14 DIAGNOSIS — R10.9 FLANK PAIN: ICD-10-CM

## 2021-10-14 DIAGNOSIS — R93.5 ABNORMAL COMPUTED TOMOGRAPHY OF ABDOMEN AND PELVIS: ICD-10-CM

## 2021-10-14 LAB
ABO/RH: NORMAL
ANTIBODY SCREEN: NORMAL

## 2021-10-14 PROCEDURE — 99222 1ST HOSP IP/OBS MODERATE 55: CPT | Performed by: INTERNAL MEDICINE

## 2021-10-14 PROCEDURE — 86850 RBC ANTIBODY SCREEN: CPT

## 2021-10-14 PROCEDURE — 2580000003 HC RX 258: Performed by: NURSE ANESTHETIST, CERTIFIED REGISTERED

## 2021-10-14 PROCEDURE — 3700000001 HC ADD 15 MINUTES (ANESTHESIA): Performed by: SURGERY

## 2021-10-14 PROCEDURE — 74177 CT ABD & PELVIS W/CONTRAST: CPT

## 2021-10-14 PROCEDURE — 36415 COLL VENOUS BLD VENIPUNCTURE: CPT

## 2021-10-14 PROCEDURE — 2500000003 HC RX 250 WO HCPCS: Performed by: STUDENT IN AN ORGANIZED HEALTH CARE EDUCATION/TRAINING PROGRAM

## 2021-10-14 PROCEDURE — 6370000000 HC RX 637 (ALT 250 FOR IP): Performed by: STUDENT IN AN ORGANIZED HEALTH CARE EDUCATION/TRAINING PROGRAM

## 2021-10-14 PROCEDURE — 2500000003 HC RX 250 WO HCPCS: Performed by: NURSE ANESTHETIST, CERTIFIED REGISTERED

## 2021-10-14 PROCEDURE — 0KBG0ZZ EXCISION OF LEFT TRUNK MUSCLE, OPEN APPROACH: ICD-10-PCS | Performed by: INTERNAL MEDICINE

## 2021-10-14 PROCEDURE — 6360000002 HC RX W HCPCS: Performed by: STUDENT IN AN ORGANIZED HEALTH CARE EDUCATION/TRAINING PROGRAM

## 2021-10-14 PROCEDURE — 86900 BLOOD TYPING SEROLOGIC ABO: CPT

## 2021-10-14 PROCEDURE — 2709999900 HC NON-CHARGEABLE SUPPLY: Performed by: SURGERY

## 2021-10-14 PROCEDURE — 6360000002 HC RX W HCPCS: Performed by: NURSE ANESTHETIST, CERTIFIED REGISTERED

## 2021-10-14 PROCEDURE — 87070 CULTURE OTHR SPECIMN AEROBIC: CPT

## 2021-10-14 PROCEDURE — 87186 SC STD MICRODIL/AGAR DIL: CPT

## 2021-10-14 PROCEDURE — 87075 CULTR BACTERIA EXCEPT BLOOD: CPT

## 2021-10-14 PROCEDURE — 87077 CULTURE AEROBIC IDENTIFY: CPT

## 2021-10-14 PROCEDURE — 6360000004 HC RX CONTRAST MEDICATION: Performed by: RADIOLOGY

## 2021-10-14 PROCEDURE — 86901 BLOOD TYPING SEROLOGIC RH(D): CPT

## 2021-10-14 PROCEDURE — 1200000000 HC SEMI PRIVATE

## 2021-10-14 PROCEDURE — 87205 SMEAR GRAM STAIN: CPT

## 2021-10-14 PROCEDURE — 7100000000 HC PACU RECOVERY - FIRST 15 MIN: Performed by: SURGERY

## 2021-10-14 PROCEDURE — 2580000003 HC RX 258: Performed by: STUDENT IN AN ORGANIZED HEALTH CARE EDUCATION/TRAINING PROGRAM

## 2021-10-14 PROCEDURE — 2580000003 HC RX 258: Performed by: INTERNAL MEDICINE

## 2021-10-14 PROCEDURE — 7100000001 HC PACU RECOVERY - ADDTL 15 MIN: Performed by: SURGERY

## 2021-10-14 PROCEDURE — 88304 TISSUE EXAM BY PATHOLOGIST: CPT

## 2021-10-14 PROCEDURE — 3600000002 HC SURGERY LEVEL 2 BASE: Performed by: SURGERY

## 2021-10-14 PROCEDURE — 3600000012 HC SURGERY LEVEL 2 ADDTL 15MIN: Performed by: SURGERY

## 2021-10-14 PROCEDURE — 2500000003 HC RX 250 WO HCPCS: Performed by: SURGERY

## 2021-10-14 PROCEDURE — 3700000000 HC ANESTHESIA ATTENDED CARE: Performed by: SURGERY

## 2021-10-14 RX ORDER — LABETALOL HYDROCHLORIDE 5 MG/ML
INJECTION, SOLUTION INTRAVENOUS PRN
Status: DISCONTINUED | OUTPATIENT
Start: 2021-10-14 | End: 2021-10-14 | Stop reason: SDUPTHER

## 2021-10-14 RX ORDER — METRONIDAZOLE 500 MG/1
500 TABLET ORAL ONCE
Status: DISCONTINUED | OUTPATIENT
Start: 2021-10-14 | End: 2021-10-14

## 2021-10-14 RX ORDER — NEOSTIGMINE METHYLSULFATE 1 MG/ML
INJECTION, SOLUTION INTRAVENOUS PRN
Status: DISCONTINUED | OUTPATIENT
Start: 2021-10-14 | End: 2021-10-14 | Stop reason: SDUPTHER

## 2021-10-14 RX ORDER — POLYETHYLENE GLYCOL 3350 17 G/17G
17 POWDER, FOR SOLUTION ORAL DAILY PRN
Status: DISCONTINUED | OUTPATIENT
Start: 2021-10-14 | End: 2021-10-15

## 2021-10-14 RX ORDER — LIDOCAINE HYDROCHLORIDE AND EPINEPHRINE 10; 10 MG/ML; UG/ML
INJECTION, SOLUTION INFILTRATION; PERINEURAL PRN
Status: DISCONTINUED | OUTPATIENT
Start: 2021-10-14 | End: 2021-10-14 | Stop reason: ALTCHOICE

## 2021-10-14 RX ORDER — DEXTROSE MONOHYDRATE 50 MG/ML
100 INJECTION, SOLUTION INTRAVENOUS PRN
Status: DISCONTINUED | OUTPATIENT
Start: 2021-10-14 | End: 2021-10-18 | Stop reason: HOSPADM

## 2021-10-14 RX ORDER — SODIUM CHLORIDE, SODIUM LACTATE, POTASSIUM CHLORIDE, CALCIUM CHLORIDE 600; 310; 30; 20 MG/100ML; MG/100ML; MG/100ML; MG/100ML
INJECTION, SOLUTION INTRAVENOUS CONTINUOUS
Status: DISCONTINUED | OUTPATIENT
Start: 2021-10-14 | End: 2021-10-14

## 2021-10-14 RX ORDER — GLYCOPYRROLATE 1 MG/5 ML
SYRINGE (ML) INTRAVENOUS PRN
Status: DISCONTINUED | OUTPATIENT
Start: 2021-10-14 | End: 2021-10-14 | Stop reason: SDUPTHER

## 2021-10-14 RX ORDER — SODIUM CHLORIDE 9 MG/ML
INJECTION, SOLUTION INTRAVENOUS CONTINUOUS PRN
Status: DISCONTINUED | OUTPATIENT
Start: 2021-10-14 | End: 2021-10-14 | Stop reason: SDUPTHER

## 2021-10-14 RX ORDER — OXYCODONE HYDROCHLORIDE 5 MG/1
5 TABLET ORAL EVERY 4 HOURS PRN
Status: DISCONTINUED | OUTPATIENT
Start: 2021-10-14 | End: 2021-10-18 | Stop reason: HOSPADM

## 2021-10-14 RX ORDER — CLINDAMYCIN PHOSPHATE 600 MG/50ML
600 INJECTION INTRAVENOUS EVERY 8 HOURS
Status: DISCONTINUED | OUTPATIENT
Start: 2021-10-14 | End: 2021-10-15

## 2021-10-14 RX ORDER — ACETAMINOPHEN 650 MG/1
650 SUPPOSITORY RECTAL EVERY 6 HOURS PRN
Status: DISCONTINUED | OUTPATIENT
Start: 2021-10-14 | End: 2021-10-15

## 2021-10-14 RX ORDER — OXYCODONE HYDROCHLORIDE 10 MG/1
10 TABLET ORAL EVERY 4 HOURS PRN
Status: DISCONTINUED | OUTPATIENT
Start: 2021-10-14 | End: 2021-10-18 | Stop reason: HOSPADM

## 2021-10-14 RX ORDER — DEXTROSE MONOHYDRATE 25 G/50ML
12.5 INJECTION, SOLUTION INTRAVENOUS PRN
Status: DISCONTINUED | OUTPATIENT
Start: 2021-10-14 | End: 2021-10-18 | Stop reason: HOSPADM

## 2021-10-14 RX ORDER — SODIUM CHLORIDE 9 MG/ML
25 INJECTION, SOLUTION INTRAVENOUS PRN
Status: DISCONTINUED | OUTPATIENT
Start: 2021-10-14 | End: 2021-10-18 | Stop reason: SDUPTHER

## 2021-10-14 RX ORDER — ONDANSETRON 4 MG/1
4 TABLET, ORALLY DISINTEGRATING ORAL EVERY 8 HOURS PRN
Status: DISCONTINUED | OUTPATIENT
Start: 2021-10-14 | End: 2021-10-18 | Stop reason: HOSPADM

## 2021-10-14 RX ORDER — SODIUM CHLORIDE 0.9 % (FLUSH) 0.9 %
10 SYRINGE (ML) INJECTION EVERY 12 HOURS SCHEDULED
Status: DISCONTINUED | OUTPATIENT
Start: 2021-10-14 | End: 2021-10-18 | Stop reason: SDUPTHER

## 2021-10-14 RX ORDER — ACETAMINOPHEN 325 MG/1
650 TABLET ORAL EVERY 6 HOURS PRN
Status: DISCONTINUED | OUTPATIENT
Start: 2021-10-14 | End: 2021-10-15

## 2021-10-14 RX ORDER — VECURONIUM BROMIDE 1 MG/ML
INJECTION, POWDER, LYOPHILIZED, FOR SOLUTION INTRAVENOUS PRN
Status: DISCONTINUED | OUTPATIENT
Start: 2021-10-14 | End: 2021-10-14 | Stop reason: SDUPTHER

## 2021-10-14 RX ORDER — PIPERACILLIN SODIUM, TAZOBACTAM SODIUM 3; .375 G/15ML; G/15ML
INJECTION, POWDER, LYOPHILIZED, FOR SOLUTION INTRAVENOUS PRN
Status: DISCONTINUED | OUTPATIENT
Start: 2021-10-14 | End: 2021-10-14 | Stop reason: SDUPTHER

## 2021-10-14 RX ORDER — FENTANYL CITRATE 50 UG/ML
INJECTION, SOLUTION INTRAMUSCULAR; INTRAVENOUS PRN
Status: DISCONTINUED | OUTPATIENT
Start: 2021-10-14 | End: 2021-10-14 | Stop reason: SDUPTHER

## 2021-10-14 RX ORDER — DEXAMETHASONE SODIUM PHOSPHATE 10 MG/ML
INJECTION INTRAMUSCULAR; INTRAVENOUS PRN
Status: DISCONTINUED | OUTPATIENT
Start: 2021-10-14 | End: 2021-10-14 | Stop reason: SDUPTHER

## 2021-10-14 RX ORDER — SODIUM CHLORIDE, SODIUM LACTATE, POTASSIUM CHLORIDE, CALCIUM CHLORIDE 600; 310; 30; 20 MG/100ML; MG/100ML; MG/100ML; MG/100ML
INJECTION, SOLUTION INTRAVENOUS CONTINUOUS PRN
Status: DISCONTINUED | OUTPATIENT
Start: 2021-10-14 | End: 2021-10-14 | Stop reason: SDUPTHER

## 2021-10-14 RX ORDER — PROPOFOL 10 MG/ML
INJECTION, EMULSION INTRAVENOUS PRN
Status: DISCONTINUED | OUTPATIENT
Start: 2021-10-14 | End: 2021-10-14 | Stop reason: SDUPTHER

## 2021-10-14 RX ORDER — DEXTROSE, SODIUM CHLORIDE, SODIUM LACTATE, POTASSIUM CHLORIDE, AND CALCIUM CHLORIDE 5; .6; .31; .03; .02 G/100ML; G/100ML; G/100ML; G/100ML; G/100ML
INJECTION, SOLUTION INTRAVENOUS CONTINUOUS
Status: ACTIVE | OUTPATIENT
Start: 2021-10-14 | End: 2021-10-15

## 2021-10-14 RX ORDER — ONDANSETRON 2 MG/ML
INJECTION INTRAMUSCULAR; INTRAVENOUS PRN
Status: DISCONTINUED | OUTPATIENT
Start: 2021-10-14 | End: 2021-10-14 | Stop reason: SDUPTHER

## 2021-10-14 RX ORDER — LIDOCAINE HYDROCHLORIDE 20 MG/ML
INJECTION, SOLUTION INTRAVENOUS PRN
Status: DISCONTINUED | OUTPATIENT
Start: 2021-10-14 | End: 2021-10-14 | Stop reason: SDUPTHER

## 2021-10-14 RX ORDER — NICOTINE POLACRILEX 4 MG
15 LOZENGE BUCCAL PRN
Status: DISCONTINUED | OUTPATIENT
Start: 2021-10-14 | End: 2021-10-18 | Stop reason: HOSPADM

## 2021-10-14 RX ORDER — SODIUM CHLORIDE 0.9 % (FLUSH) 0.9 %
10 SYRINGE (ML) INJECTION PRN
Status: DISCONTINUED | OUTPATIENT
Start: 2021-10-14 | End: 2021-10-18 | Stop reason: SDUPTHER

## 2021-10-14 RX ORDER — ACETAMINOPHEN 500 MG
1000 TABLET ORAL EVERY 8 HOURS SCHEDULED
Status: DISCONTINUED | OUTPATIENT
Start: 2021-10-14 | End: 2021-10-18 | Stop reason: HOSPADM

## 2021-10-14 RX ORDER — ONDANSETRON 2 MG/ML
4 INJECTION INTRAMUSCULAR; INTRAVENOUS EVERY 6 HOURS PRN
Status: DISCONTINUED | OUTPATIENT
Start: 2021-10-14 | End: 2021-10-18 | Stop reason: HOSPADM

## 2021-10-14 RX ADMIN — FENTANYL CITRATE 200 MCG: 50 INJECTION, SOLUTION INTRAMUSCULAR; INTRAVENOUS at 15:39

## 2021-10-14 RX ADMIN — LABETALOL HYDROCHLORIDE 5 MG: 5 INJECTION INTRAVENOUS at 16:36

## 2021-10-14 RX ADMIN — Medication 3 MG: at 16:17

## 2021-10-14 RX ADMIN — Medication 0.6 MG: at 16:17

## 2021-10-14 RX ADMIN — CLINDAMYCIN PHOSPHATE 600 MG: 600 INJECTION, SOLUTION INTRAVENOUS at 07:39

## 2021-10-14 RX ADMIN — LIDOCAINE HYDROCHLORIDE 100 MG: 20 INJECTION, SOLUTION INTRAVENOUS at 15:39

## 2021-10-14 RX ADMIN — FENTANYL CITRATE 50 MCG: 50 INJECTION, SOLUTION INTRAMUSCULAR; INTRAVENOUS at 16:03

## 2021-10-14 RX ADMIN — VECURONIUM BROMIDE 7 MG: 10 INJECTION, POWDER, LYOPHILIZED, FOR SOLUTION INTRAVENOUS at 15:39

## 2021-10-14 RX ADMIN — SODIUM CHLORIDE: 9 INJECTION, SOLUTION INTRAVENOUS at 15:59

## 2021-10-14 RX ADMIN — DEXAMETHASONE SODIUM PHOSPHATE 10 MG: 10 INJECTION INTRAMUSCULAR; INTRAVENOUS at 16:01

## 2021-10-14 RX ADMIN — SODIUM CHLORIDE, POTASSIUM CHLORIDE, SODIUM LACTATE AND CALCIUM CHLORIDE: 600; 310; 30; 20 INJECTION, SOLUTION INTRAVENOUS at 10:01

## 2021-10-14 RX ADMIN — CLINDAMYCIN PHOSPHATE 600 MG: 600 INJECTION, SOLUTION INTRAVENOUS at 15:55

## 2021-10-14 RX ADMIN — IOPAMIDOL 90 ML: 755 INJECTION, SOLUTION INTRAVENOUS at 06:10

## 2021-10-14 RX ADMIN — ACETAMINOPHEN 1000 MG: 500 TABLET ORAL at 22:45

## 2021-10-14 RX ADMIN — PROPOFOL 150 MG: 10 INJECTION, EMULSION INTRAVENOUS at 15:39

## 2021-10-14 RX ADMIN — Medication 10 ML: at 23:37

## 2021-10-14 RX ADMIN — SUGAMMADEX 400 MG: 100 INJECTION, SOLUTION INTRAVENOUS at 16:28

## 2021-10-14 RX ADMIN — PIPERACILLIN AND TAZOBACTAM 3.38 G: 3; .375 INJECTION, POWDER, LYOPHILIZED, FOR SOLUTION INTRAVENOUS at 15:55

## 2021-10-14 RX ADMIN — VANCOMYCIN HYDROCHLORIDE 2500 MG: 10 INJECTION, POWDER, LYOPHILIZED, FOR SOLUTION INTRAVENOUS at 20:00

## 2021-10-14 RX ADMIN — PIPERACILLIN AND TAZOBACTAM 3375 MG: 3; .375 INJECTION, POWDER, LYOPHILIZED, FOR SOLUTION INTRAVENOUS at 18:30

## 2021-10-14 RX ADMIN — SODIUM CHLORIDE, POTASSIUM CHLORIDE, SODIUM LACTATE AND CALCIUM CHLORIDE: 600; 310; 30; 20 INJECTION, SOLUTION INTRAVENOUS at 15:28

## 2021-10-14 RX ADMIN — CLINDAMYCIN PHOSPHATE 600 MG: 600 INJECTION, SOLUTION INTRAVENOUS at 22:45

## 2021-10-14 RX ADMIN — SODIUM CHLORIDE, SODIUM LACTATE, POTASSIUM CHLORIDE, CALCIUM CHLORIDE AND DEXTROSE MONOHYDRATE: 5; 600; 310; 30; 20 INJECTION, SOLUTION INTRAVENOUS at 23:37

## 2021-10-14 RX ADMIN — ONDANSETRON HYDROCHLORIDE 4 MG: 2 INJECTION, SOLUTION INTRAMUSCULAR; INTRAVENOUS at 16:01

## 2021-10-14 RX ADMIN — PIPERACILLIN AND TAZOBACTAM 3375 MG: 3; .375 INJECTION, POWDER, LYOPHILIZED, FOR SOLUTION INTRAVENOUS at 10:01

## 2021-10-14 RX ADMIN — LABETALOL HYDROCHLORIDE 5 MG: 5 INJECTION INTRAVENOUS at 16:01

## 2021-10-14 ASSESSMENT — PULMONARY FUNCTION TESTS
PIF_VALUE: 27
PIF_VALUE: 0
PIF_VALUE: 32
PIF_VALUE: 22
PIF_VALUE: 2
PIF_VALUE: 1
PIF_VALUE: 0
PIF_VALUE: 3
PIF_VALUE: 27
PIF_VALUE: 0
PIF_VALUE: 22
PIF_VALUE: 0
PIF_VALUE: 0
PIF_VALUE: 26
PIF_VALUE: 27
PIF_VALUE: 0
PIF_VALUE: 26
PIF_VALUE: 0
PIF_VALUE: 27
PIF_VALUE: 2
PIF_VALUE: 27
PIF_VALUE: 0
PIF_VALUE: 1
PIF_VALUE: 26
PIF_VALUE: 27
PIF_VALUE: 27
PIF_VALUE: 26
PIF_VALUE: 0
PIF_VALUE: 1
PIF_VALUE: 1
PIF_VALUE: 22
PIF_VALUE: 0
PIF_VALUE: 23
PIF_VALUE: 2
PIF_VALUE: 26
PIF_VALUE: 22
PIF_VALUE: 0
PIF_VALUE: 28
PIF_VALUE: 1
PIF_VALUE: 22
PIF_VALUE: 27
PIF_VALUE: 26
PIF_VALUE: 7
PIF_VALUE: 27
PIF_VALUE: 0
PIF_VALUE: 1
PIF_VALUE: 27
PIF_VALUE: 0
PIF_VALUE: 27
PIF_VALUE: 0
PIF_VALUE: 0
PIF_VALUE: 2
PIF_VALUE: 3
PIF_VALUE: 1
PIF_VALUE: 26
PIF_VALUE: 2
PIF_VALUE: 23
PIF_VALUE: 3
PIF_VALUE: 2
PIF_VALUE: 0
PIF_VALUE: 25
PIF_VALUE: 0
PIF_VALUE: 3
PIF_VALUE: 0
PIF_VALUE: 22
PIF_VALUE: 0
PIF_VALUE: 1
PIF_VALUE: 22
PIF_VALUE: 1
PIF_VALUE: 0
PIF_VALUE: 29
PIF_VALUE: 0
PIF_VALUE: 0
PIF_VALUE: 26
PIF_VALUE: 0
PIF_VALUE: 0
PIF_VALUE: 22
PIF_VALUE: 27
PIF_VALUE: 0
PIF_VALUE: 0
PIF_VALUE: 21
PIF_VALUE: 26
PIF_VALUE: 0
PIF_VALUE: 22
PIF_VALUE: 0
PIF_VALUE: 0
PIF_VALUE: 26
PIF_VALUE: 0
PIF_VALUE: 1
PIF_VALUE: 0
PIF_VALUE: 27
PIF_VALUE: 26
PIF_VALUE: 0
PIF_VALUE: 27
PIF_VALUE: 0

## 2021-10-14 ASSESSMENT — PAIN SCALES - GENERAL
PAINLEVEL_OUTOF10: 0

## 2021-10-14 NOTE — ED NOTES
Bed: 05  Expected date:   Expected time:   Means of arrival:   Comments:  Yunior Ibrahim RN  10/14/21 3581

## 2021-10-14 NOTE — PROGRESS NOTES
[facility]  Internal Medicine Residency / House Medicine Service      Initial H and P    Attending Physician Statement  I have discussed the case, including pertinent history and exam findings with the resident and the team. I have reviewed all significant past medical history, surgical history, social history, family history, allergies, and home medications independently. I have seen and examined the patient and the key elements of the encounter have been performed by me. I agree with the assessment, plan and orders as documented by the resident. 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 and soft tissue infection    ON exam- significant induration of left flank- warmth to touch   Gen Surgery following   Empiric Atbs   TO OR today    Follow per residents post-op   Follow cultures    Await additional recommendations   Consider ID consultation- currently hemodynamically stable     Splenomegaly and prior thrombocytopenia   Trend platelet count   Check merlin smear   Monitor cell counts    Consider additional work-up    Enlarge prostate on imaging   Check PSA     Disposition- admit to medicine for continued management- await intervention and follow post-op    Remainder of medical problems as per resident note.       Charo Jimenez MD, [place]   Internal Medicine Residency Faculty

## 2021-10-14 NOTE — H&P
[facility]  Internal Medicine Residency Program  History and Physical    Patient:  Jeannette Jenkins 64 y.o. male MRN: [id number]     Date of Service: 10/14/2021    Hospital Day: 1      Chief complaint: had concerns including Back Pain (left lower flank pain started this morning) and Chest Pain (comes and goes for months ). History of Present Illness   The patient is a 64 y.o. male with a past medical history of HTN (non-compliant with meds- amlodipine, Toprol XL), CAD (equivocal stress test 5/21; negative LHC (7/16/21) EF 55%, also non-compliant with meds-aspirin, atorvastatin, Toprol XL), MARLEE (non-compliant with CPAP), ETOH with withdrawal seizures (non-compliant with meds-thiamine, folic acid and divalproex)  who comes into the ED for chest pain and L lower flank pain. About a week to a week and a half ago, the patient noticed redness of the skin over the L flank area. He denies insect bite, trauma to the area, fever, chills, night sweats, loss of appetite, weight loss, abdominal pain, nausea, vomiting, hematochezia, melena, alternating constipation and diarrhea, changes in bowel movement, mucoid stool, joint pains. He has had no colonoscopy ever. He denies personal history of diverticulosis. Denies family history of colon cancer, or inflammatory bowel disease. He states that the area hurst when he is lying down on his L side for the past week. During this time, he also had on and off chest pain, more on the L side of the chest, grade 5/10, no relation to activity, non-radiating with no associated dyspnea, palpitation, lightheadedness. He states that the chest pain has gotten worse this morning, prompting him to go the ED, hence this admission. In the ED, BP was initially elevated at 162/100, HR 84, afebrile.  CT scan of the abdomen was done and showed inflammatory changes surrounding the proximal sigmoid colon extending through the abdominal wall and into the L flank where there are collections of fluid and gas. This most likely represents complicated diverticulitis with fistula formation with or without gas-forming infection. 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 Excisional debridement of the L flank wound was planned. He was placed on NPO, started on LR at 125 ml/hr, given Zosyn, clindamycin and vancomycin IV. He has slight leukocytosis with WBC at 11. Na was 133 but otherwise wnl. Bilirubin was elevated at 2.7. CTA of the chest was done and showed no PE, with splenomegaly and probable hepatomegaly. Past Medical History:      Diagnosis Date    CAD (coronary artery disease)     Hypertension     Sleep apnea        Past Surgical History:    History reviewed. No pertinent surgical history. Medications Prior to Admission:    Prior to Admission medications    Medication Sig Start Date End Date Taking? Authorizing Provider   aspirin 81 MG chewable tablet Take 1 tablet by mouth daily 7/17/21   Justice Riddle MD   atorvastatin (LIPITOR) 40 MG tablet Take 1 tablet by mouth nightly 7/16/21   Justice Riddle MD   metoprolol succinate (TOPROL XL) 25 MG extended release tablet Take 1 tablet by mouth daily 7/17/21   Justice Riddle MD   thiamine mononitrate (THIAMINE) 100 MG tablet Take 1 tablet by mouth daily 5/15/21   Monika Gonzalez MD   amLODIPine (NORVASC) 10 MG tablet Take 1 tablet by mouth daily 5/14/21   Anna Varela MD       Allergies:  Patient has no known allergies. Social History:   TOBACCO:   30-pack year history; reports that he has quit smoking in 2010. He has never used smokeless tobacco.  ETOH:   reports current alcohol use of about 5.0 standard drinks of alcohol per week. OCCUPATION:      Family History:   History reviewed. No pertinent family history.     REVIEW OF SYSTEMS:    · Constitutional: No fever, no chills, no change in weight; good appetite  · HEENT: No blurred vision, no ear problems, no sore throat, no rhinorrhea. · Respiratory: No cough, no sputum production, no pleuritic chest pain, no shortness of breath  · Cardiology: No angina, no dyspnea on exertion, no paroxysmal nocturnal dyspnea, no orthopnea, no palpitation, no leg swelling. · Gastroenterology: No dysphagia, no reflux; no abdominal pain, no nausea or vomiting; no constipation or diarrhea.  No hematochezia   · Genitourinary: No dysuria, no frequency, hesitancy; no hematuria  · Musculoskeletal: no joint pain, no myalgia, no change in range of movement; pain on the L flank area on L side lying position  · Neurology: no focal weakness in extremities, no slurred speech, no double vision, no tingling or numbness sensation  · Endocrinology: no temperature intolerance, no polyphagia, polydipsia or polyuria  · Hematology: no increased bleeding, no bruising, no lymphadenopathy  · Skin: no skin changes noticed by patient  · Psychology: no depressed mood, no suicidal ideation    Physical Exam   · Vitals: BP (!) 137/91   Pulse 84   Temp 97.4 °F (36.3 °C)   Resp (!) 0   Ht 6' 2\" (1.88 m)   Wt 268 lb (121.6 kg)   SpO2 97%   BMI 34.41 kg/m²     · General Appearance: alert and oriented to person, place and time, well developed and well- nourished, in no acute distress  · Skin: warm and dry, no rash or erythema  · Head: normocephalic and atraumatic  · Eyes: pupils equal, round, and reactive to light, extraocular eye movements intact, conjunctivae normal  · ENT: tympanic membrane, external ear and ear canal normal bilaterally, nose without deformity, nasal mucosa and turbinates normal without polyps  · Neck: supple and non-tender without mass, no thyromegaly or thyroid nodules, no cervical lymphadenopathy  · Pulmonary/Chest: clear to auscultation bilaterally- no wheezes, rales or rhonchi, normal air movement, no respiratory distress  · Cardiovascular: normal rate, regular rhythm, normal S1 and S2, no murmurs, rubs, clicks, or gallops, distal pulses intact, no carotid bruits  · Abdomen: (+) approximately 10 x 20 cm erythematous induration on the L flank area, warm, slightly tender to touch, no draining sinus, no open wound noted;   · abdomen is flabby, soft, non-tender, non-distended, normal bowel sounds  · Extremities: no cyanosis, clubbing or edema  · Musculoskeletal: normal range of motion, no joint swelling, deformity or tenderness  · Neurologic: reflexes normal and symmetric, no cranial nerve deficit, gait, coordination and speech normal   Labs and Imaging Studies   Basic Labs  Recent Labs     10/13/21  1310      K 4.3   CL 99   CO2 22   BUN 9   CREATININE 0.9   GLUCOSE 125*   CALCIUM 9.1       Recent Labs     10/13/21  1310   WBC 11.0   RBC 5.30   HGB 15.6   HCT 45.6   MCV 86.0   MCH 29.4   MCHC 34.2   RDW 13.5      MPV 10.6         Imaging Studies:  CT ABDOMEN PELVIS W IV CONTRAST Additional Contrast? None   Final Result   Inflammatory changes surrounding the proximal sigmoid colon extending through   the abdominal wall and into the left flank where there are collections of   fluid and gas. This most likely represents complicated diverticulitis with   fistula formation with or without gas-forming infection. Splenomegaly. CTA PULMONARY W CONTRAST   Final Result   1. No pulmonary embolism. 2. Minimal atelectasis or scarring in the dependent aspect of the right lower   lobe. Otherwise, the lungs are clear. 3. Splenomegaly. Probable hepatomegaly. Consider dedicated CT of the   abdomen and pelvis for further evaluation. 4. Left ventricular hypertrophy. XR CHEST (2 VW)   Final Result   No acute process. CT ABDOMEN PELVIS W IV CONTRAST Additional Contrast? Oral and Rectal    (Results Pending)        EKG: normal sinus rhythm, unchanged from previous tracings.     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 This most likely represents complicated diverticulitis with fistula formation with or without gas-forming infection.   Plan  -ESR, CRP  -excision debridement this afternoon per general surgery  -follow surgical culture and sensitivity  -continue Zosyn, clindamycin and vancomycin  -continue LR at 125ml/hr  -pain mgt per surgery service  -closely monitor VS for any decompensation    Hx HTN  -patient is non-compliant with meds  -supposed to be on amlodipine 10 mg daily  -initial SBP in the 160`s  Plan  -continue to monitor BP    Hx CAD  -on and off chest pain for the past months  -worsening chest pain this am  -trop 18->16  -stress test (5/2021)-equivocal, small area of fixed perfusion defect mid to basal inferior wall with regional wall motion abnormality EF 43%  -recent admission 7/14-16/2021 for chest pain  -2D echo 7/15/21- EF 55%, mild LVH, stage I diastolic dystfunction, possible basal inferior wall hypokinesis  -s/p LHC 7/16/21- no stenosis in L main, LAD, circumflex and RCA, EF 55%  -was discharged on aspirin 81 mg daily, Toprol XL 25 mg daily, atorvastatin 40 mg daily-but reports that he is not taking any medications for a while  Plan  -continue to monitor  -counseling on medication compliance    Hx MARLEE  -non-compliant with CPAP at home  -does not follow-up with Pulmonology  Plan  -monitor  -refer to Pulmo as OP  -may need a new sleep study    Splenomegaly r/o lymphoproliferative disorder, malignancy  -no significant leukocytosis, no constitutional and B symptoms  -no anemia or thombocytopenia  -alcohol drinker-2x a week, patient not really answering straight as to how much  Plan  -peripheral blood smear  -will check for PSA      PT/OT evaluation:   DVT prophylaxis/ GI prophylaxis: PCD`s  Disposition: admit to     Home Natarajan MD, PGY-1  Attending physician: Dr. Colon Alu

## 2021-10-14 NOTE — ANESTHESIA PRE PROCEDURE
Department of Anesthesiology  Preprocedure Note       Name:  Jeannette Jenkins   Age:  64 y.o.  :  1965                                          MRN:  [id number]         Date:  10/14/2021      Surgeon: Jessie Mohs):  Arron Cowden, MD    Procedure: Procedure(s):  ABDOMEN INCISION AND DRAINAGE    Medications prior to admission:   Prior to Admission medications    Medication Sig Start Date End Date Taking?  Authorizing Provider   aspirin 81 MG chewable tablet Take 1 tablet by mouth daily 21   Arturo Goodrich MD   atorvastatin (LIPITOR) 40 MG tablet Take 1 tablet by mouth nightly 21   Arturo Goodrich MD   metoprolol succinate (TOPROL XL) 25 MG extended release tablet Take 1 tablet by mouth daily 21   Arturo Goodrich MD   thiamine mononitrate (THIAMINE) 100 MG tablet Take 1 tablet by mouth daily 5/15/21   Duane Mitchell MD   amLODIPine (NORVASC) 10 MG tablet Take 1 tablet by mouth daily 21   Monika Gonzalez MD       Current medications:    Current Facility-Administered Medications   Medication Dose Route Frequency Provider Last Rate Last Admin    piperacillin-tazobactam (ZOSYN) 3,375 mg in dextrose 5 % 100 mL IVPB extended infusion (mini-bag)  3,375 mg IntraVENous Bishop Barcenas MD 25 mL/hr at 10/14/21 1001 3,375 mg at 10/14/21 1001    clindamycin (CLEOCIN) 600 mg in dextrose 5 % 50 mL IVPB  600 mg IntraVENous Bishop Barecnas MD   Stopped at 10/14/21 4186    lactated ringers infusion   IntraVENous Continuous Konstantin Whalen  mL/hr at 10/14/21 1001 New Bag at 10/14/21 1001    vancomycin (VANCOCIN) 2,500 mg in dextrose 5 % 500 mL IVPB  20 mg/kg IntraVENous Once Konstantin Whalen MD        vancomycin (VANCOCIN) 1,250 mg in dextrose 5 % 250 mL IVPB  1,250 mg IntraVENous Q12H Konstantin Whalen MD         Current Outpatient Medications   Medication Sig Dispense Refill    aspirin 81 MG chewable tablet Take 1 tablet by mouth daily 30 tablet 3    atorvastatin (LIPITOR) 40 MG tablet Take 1 tablet by mouth nightly 30 tablet 3    metoprolol succinate (TOPROL XL) 25 MG extended release tablet Take 1 tablet by mouth daily 30 tablet 3    thiamine mononitrate (THIAMINE) 100 MG tablet Take 1 tablet by mouth daily 90 tablet 0    amLODIPine (NORVASC) 10 MG tablet Take 1 tablet by mouth daily 30 tablet 1       Allergies:  No Known Allergies    Problem List:    Patient Active Problem List   Diagnosis Code    Lightheadedness R42    Acute cystitis with hematuria N30.01    Chest pain R07.9    Acute psychosis ([address] F23    Cognitive disorder F09    CAD (coronary artery disease) I25.10    Hypertensive urgency I16.0    MARLEE (obstructive sleep apnea) G47.33    Class 1 obesity in adult E66.9    Abnormal stress test R94.39    LV dysfunction I51.9    Thrombocytopenia (HCC) D69.6    Hyperbilirubinemia E80.6    Alcohol abuse F10.10    HLD (hyperlipidemia) E78.5    Sigmoid diverticulitis K57.32       Past Medical History:        Diagnosis Date    CAD (coronary artery disease)     Hypertension     Sleep apnea        Past Surgical History:  History reviewed. No pertinent surgical history. Social History:    Social History     Tobacco Use    Smoking status: Former Smoker    Smokeless tobacco: Never Used   Substance Use Topics    Alcohol use:  Yes     Alcohol/week: 5.0 standard drinks     Types: 5 Cans of beer per week                                Counseling given: Not Answered      Vital Signs (Current):   Vitals:    10/13/21 1250 10/13/21 1300 10/13/21 1444 10/14/21 0531   BP: (!) 162/100 (!) 161/112 (!) 159/91 118/78   Pulse: 88 94 94 110   Resp: 17 18 18 20   Temp: 36.7 °C (98.1 °F) 37 °C (98.6 °F) 37.5 °C (99.5 °F) 38.1 °C (100.5 °F)   TempSrc: Oral   Oral   SpO2: 98% 95% 94% 96%   Weight: 268 lb (121.6 kg)      Height: 6' 2\" (1.88 m)                                                 BP Readings from Last 3 Encounters:   10/14/21 118/78   07/16/21 (!) 148/97   06/20/21 132/85       NPO Status:   NPO since 10/13/2021 @ 2300                                                                                 ECHO 7/15/2021   Left Ventricle   Left ventricle is normal in size . Mild concentric left ventricular hypertrophy. Possible basal inferior wall hypokinesis. Ejection fraction is visually estimated at 55%. There is doppler evidence of stage I diastolic dysfunction. Right Ventricle   Normal right ventricular size and function. Left Atrium   Normal sized left atrium. Interatrial septum appears intact. Right Atrium   Normal right atrium size. Mitral Valve   Focal calcification mitral valve leaflets. Physiologic and/or trace mitral regurgitation. Tricuspid Valve   Normal tricuspid valve structure and function. Physiologic and/or trace tricuspid regurgitation. RVSP could not be estimated. Aortic Valve   Normal aortic valve structure and function. Pulmonic Valve   Normal pulmonic valve structure and function. Pericardial Effusion   No evidence of pericardial effusion. Aorta   Aortic root dimension within normal limits. Conclusions      Summary   Left ventricle is normal in size . Mild concentric left ventricular hypertrophy. Possible basal inferior wall hypokinesis. Ejection fraction is visually estimated at 55%. There is doppler evidence of stage I diastolic dysfunction. Normal right ventricular size and function. Focal calcification mitral valve leaflets. EKG 10/13/2021  Narrative & Impression    Normal sinus rhythm  Nonspecific ST and T wave abnormality  Abnormal ECG            NM Myocardial 5/12/2021  Impression   1.  ECG during the infusion did not change. 2.  The myocardial perfusion imaging was abnormal.     3.  The abnormality was a small area of fixed perfusion defect   involving the mid to basal inferior wall suggestive of prior infarct.    4.  Overall left ventricular systolic function was abnormal with   regional wall motion abnormalities. 5.  No transient ischemic dilatation. 6.  Intermediate risk general pharmacologic stress test.               Diagnostic Cardiac Cath 7/16/2021  CONCLUSION:  1. Patent coronary arteries. 2.  Normal left ventricular systolic function. Chest Xray 10/13/2021  Impression   No acute process. CT Abdomen/Pelvis 10/14/2021  Impression   Inflammatory changes surrounding the proximal sigmoid colon extending through   the abdominal wall and into the left flank where there are collections of   fluid and gas.  This most likely represents complicated diverticulitis with   fistula formation with or without gas-forming infection.       Splenomegaly. BMI:   Wt Readings from Last 3 Encounters:   10/13/21 268 lb (121.6 kg)   07/16/21 268 lb 3.2 oz (121.7 kg)   06/19/21 270 lb (122.5 kg)     Body mass index is 34.41 kg/m². CBC:   Lab Results   Component Value Date    WBC 11.0 10/13/2021    RBC 5.30 10/13/2021    HGB 15.6 10/13/2021    HCT 45.6 10/13/2021    MCV 86.0 10/13/2021    RDW 13.5 10/13/2021     10/13/2021       CMP:   Lab Results   Component Value Date     10/13/2021    K 4.3 10/13/2021    K 4.1 07/15/2021    CL 99 10/13/2021    CO2 22 10/13/2021    BUN 9 10/13/2021    CREATININE 0.9 10/13/2021    GFRAA >60 10/13/2021    LABGLOM >60 10/13/2021    GLUCOSE 125 10/13/2021    GLUCOSE 110 01/07/2011    PROT 7.9 10/13/2021    CALCIUM 9.1 10/13/2021    BILITOT 2.6 10/13/2021    ALKPHOS 104 10/13/2021    AST 17 10/13/2021    ALT 14 10/13/2021       POC Tests: No results for input(s): POCGLU, POCNA, POCK, POCCL, POCBUN, POCHEMO, POCHCT in the last 72 hours.     Coags:   Lab Results   Component Value Date    PROTIME 14.5 07/15/2021    INR 1.3 07/15/2021       HCG (If Applicable): No results found for: PREGTESTUR, PREGSERUM, HCG, HCGQUANT     ABGs: No results found for: PHART, PO2ART, CYV2KDK, YHC0GMI, BEART, Q7HLYEMJ Type & Screen (If Applicable):  No results found for: LABABO, LABRH    Drug/Infectious Status (If Applicable):  No results found for: HIV, HEPCAB    COVID-19 Screening (If Applicable):   Lab Results   Component Value Date    COVID19 NOT DETECTED 06/05/2021           Anesthesia Evaluation  Patient summary reviewed and Nursing notes reviewed no history of anesthetic complications:   Airway: Mallampati: II  TM distance: >3 FB   Neck ROM: full  Mouth opening: > = 3 FB Dental:      Comment: Patient denies loose teeth. Pulmonary: breath sounds clear to auscultation  (+) sleep apnea: on noncompliant,                             Cardiovascular:    (+) hypertension:, CAD:, hyperlipidemia      ECG reviewed  Rhythm: regular  Rate: normal  Echocardiogram reviewed         : patient is suppose to be on 25 mg metoprolol but does not take regularly. Neuro/Psych:                ROS comment: ETOH abuse - patient drinks between 1-3 40 oz. Containers of beer daily GI/Hepatic/Renal:            ROS comment: Left lower flank pain - CT abdomen showing diverticulitis. Endo/Other:                     Abdominal:   (+) obese,           Vascular: Other Findings:           Anesthesia Plan      general     ASA 3       Induction: intravenous. BIS  MIPS: Postoperative opioids intended, Prophylactic antiemetics administered and Postoperative trial extubation. Anesthetic plan and risks discussed with patient. Use of blood products discussed with patient whom consented to blood products. Plan discussed with attending and CRNA.                 Esther Vidal RN   10/14/2021

## 2021-10-14 NOTE — CARE COORDINATION
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 Plan is for OR for excisional debridement of L flank wound. Gait team ordered to keep the patient mobile. Cm/Sw will follow for any needs post op.   Mohinder Lucas RN CM

## 2021-10-14 NOTE — CONSULTS
General ROS: negative obtundation, AMS  ENT ROS: negative rhinorrhea, epistaxis  Allergy and Immunology ROS: negative itchy/watery eyes or nasal congestion  Hematological and Lymphatic ROS: negative spontaneous bleeding or bruising  Endocrine ROS: negative  lethargy, mood swings, palpitations or polydipsia/polyuria  Respiratory ROS: negative sputum changes, stridor, tachypnea or wheezing  Cardiovascular ROS: negative for - loss of consciousness, murmur or orthopnea  Gastrointestinal ROS: negative for - hematochezia or hematemesis  Genito-Urinary ROS: negative for -  genital discharge or hematuria  Musculoskeletal ROS: negative for - focal weakness, gangrene  Psych/Neuro ROS: negative for - visual or auditory hallucinations, suicidal ideation         PHYSICAL EXAM:  /78   Pulse 110   Temp 100.5 °F (38.1 °C) (Oral)   Resp 20   Ht 6' 2\" (1.88 m)   Wt 268 lb (121.6 kg)   SpO2 96%   BMI 34.41 kg/m²     General appearance: NAD, appears stated age  Head: NCAT, PERRLA, EOMI, red conjunctiva  Neck: supple, no masses, trachea midline  Lungs: Equal chest rise bilaterally, no retractions, no audible wheezing  Heart: warm throguhout  Abdomen: soft, NTTP throughout, ND  Skin: at the left lower flank there is an area of erythema, induration without appreciable crepitus that is exquisitely TTP   Gu: no cva tenderness  Extremities: atraumatic, no focal motor deficits, no open wounds  Psych: No tremor, visual hallucinations      LABS:  Recent Labs     10/13/21  1310   WBC 11.0   HGB 15.6   HCT 45.6          Recent Labs     10/13/21  1310      K 4.3   CL 99   CO2 22   BUN 9   CREATININE 0.9   CALCIUM 9.1       Recent Labs     10/13/21  1310   BILITOT 2.6*   AST 17   ALT 14   ALKPHOS 104   LABALBU 3.1*         RADIOLOGY:  XR CHEST (2 VW)    Result Date: 10/13/2021  EXAMINATION: TWO XRAY VIEWS OF THE CHEST 10/13/2021 5:20 pm COMPARISON: July 14 HISTORY: ORDERING SYSTEM PROVIDED HISTORY: dyspnea TECHNOLOGIST proximal sigmoid colon extending through the abdominal wall and into the left flank where there are collections of fluid and gas. This most likely represents complicated diverticulitis with fistula formation with or without gas-forming infection. Splenomegaly. CTA PULMONARY W CONTRAST    Result Date: 10/13/2021  EXAMINATION: CTA OF THE CHEST 10/13/2021 9:30 pm TECHNIQUE: CTA of the chest was performed after the administration of intravenous contrast.  Multiplanar reformatted images are provided for review. MIP images are provided for review. Dose modulation, iterative reconstruction, and/or weight based adjustment of the mA/kV was utilized to reduce the radiation dose to as low as reasonably achievable. COMPARISON: None. HISTORY: ORDERING SYSTEM PROVIDED HISTORY: rule out pE TECHNOLOGIST PROVIDED HISTORY: Reason for exam:->rule out pE Decision Support Exception - unselect if not a suspected or confirmed emergency medical condition->Emergency Medical Condition ([place]) What reading provider will be dictating this exam?->CRC FINDINGS: Pulmonary Arteries: Pulmonary arteries are adequately opacified for evaluation. No evidence of intraluminal filling defect to suggest pulmonary embolism. Main pulmonary artery is normal in caliber. Mediastinum: The heart is normal in size. The left ventricular wall appears somewhat hypertrophied. The thoracic aorta is unremarkable. No lymphadenopathy seen in the chest. Lungs/pleura: Minimal atelectasis or scarring in the dependent aspects the right lower lobe. The lungs are otherwise clear. The central airway is clear. No pneumothorax or pleural effusion is seen. Upper Abdomen: The partially visualized spleen appears enlarged, measuring 17 cm in the maximal AP dimension. The liver may also be somewhat enlarged. Soft Tissues/Bones: No acute bone or soft tissue abnormality. 1. No pulmonary embolism.  2. Minimal atelectasis or scarring in the dependent aspect of the right signed by Noemi Keita MD on 10/14/2021 at 3:29 PM

## 2021-10-14 NOTE — LETTER
[address] Medicaid  CERTIFICATION OF NECESSITY  FOR TRANSPORTATION   BY WHEELCHAIR VAN     Individual Information   1. Name: Deisi Pizarro 2. [place] Medicaid Billing Number:    3. Address: [address] [place] [address]      Transportation Provider Information   4. Provider Name:    5. [place] Medicaid Provider Number:  National Provider Identifier (NPI):      Certification  7. Criteria:  By signing this document, the practitioner certifies that two statements are true:  A. This individual must be accompanied by a mobility-related assistive device from the point of pick-up to the point of drop-off. B. Transport of this individual by standard passenger vehicle or common carrier is precluded or contraindicated. 8. Period Beginning Date:       10/15/2021     9. Length  [x] Not more than 10 day(s)  [] One Year     Additional Information Relevant to Certification   10. Comments or Explanations, If Necessary or Appropriate          Certifying Practitioner Information   11. Name of Practitioner: Janene Boas MD   12. [place] Medicaid Provider Number, If Applicable:  [address] Provider Identifier (NPI):      Signature Information   14. Date of Signature:  10/15/2021   15. Name of Person Signing: Vannessa Nash RN CM     16. Signature and Professional Designation: Vannessa Nash RN CM     [facility] [id number]  Rev. 7/2015    [address] [place] [address] Encounter Date/Time: 10/14/2021 Kalyan MONTE 2. Account: [de-identified]    MRN: [id number]    Patient: Deisi Pizarro    Contact Serial #: [id number]      ENCOUNTER          Patient Class: I Private Enc?   No Unit RM BD: [id number]   Hospital Service: Med/Surg   Encounter DX: Diverticulitis of colon *   ADM Provider: Marybeth Olmos MD   Procedure:     ATT Provider: Marybeth Olmos MD   REF Provider:        Admission DX: Diverticulitis of colon, Flank pain, Sigmoid diverticulitis, Abnormal computed tomography of abdomen and pelvis, Chest pain, unspecified type and DX codes: K57.32, R10.9, K57.32, R93.5, R07.9      PATIENT                 Name: Ade Jones : 1965 (64 yrs)   Address: [address] Sex: Male   [facility] [address]         Marital Status: Legally    Employer: [employer]         Judaism: Methodist   Primary Care Provider:           Primary Phone: NEGRITA Perez 14 Name Legal Guardian? Relationship to Patient Home Phone Work Phone   1. Lucy Manriquez  2. Federico Porras No    Brother/Sister  Brother/Sister [phone]              GUARANTOR            Guarantor: Ade Jones     : 1965   Address: [address] Sex: Male   [place] [address]     Relation to Patient: Self       Home Phone: [phone]   Guarantor ID: [id number]       Work Phone: [phone]   Guarantor Employer: [employer]         Status: FULL TIME      COVERAGE        PRIMARY INSURANCE   Payor: [employer] Plan: [employer]   Payor Address: [address]       Group Number: [id number] Insurance Type: INDEMNITY   Subscriber Name: Phillip Rivero : 1965   Subscriber ID: [id number] Raheem Patton. Rel. to Sub: Self   SECONDARY INSURANCE   Payor:   Plan:     Payor Address:  ,           Group Number:   Insurance Type:     Subscriber Name:   Subscriber :     Subscriber ID:   Pat.  Rel. to Sub:             CSN: [id number]

## 2021-10-14 NOTE — ED PROVIDER NOTES
HPI:  10/14/21, Time: 5:13 AM EDT         Alton Diaz is a 64 y.o. male presenting to the ED for chest pain as well as flank pain, beginning chest pain started hours ago. The complaint has been persistent, moderate in severity, and worsened by nothing. Patient reporting chest pain that started yesterday evening. Patient reports his chest pain is since resolved. While waiting in the waiting room. Patient reports the other reason why he came to the emergency department as he is been having some flank pain he woke up with left flank swelling 5 days ago he reports no trauma or injury reports no history of being stung by insect. Patient reporting no fever no chills he reports no abdominal pain or vomiting. Patient does have history of hypertension history of coronary disease. He does report having stress test in July. There is no evidence of any blockage on heart catheterization. Patient reporting no urinary symptoms. He reports no black or tarry stools or cough    ROS:   Pertinent positives and negatives are stated within HPI, all other systems reviewed and are negative.  --------------------------------------------- PAST HISTORY ---------------------------------------------  Past Medical History:  has a past medical history of CAD (coronary artery disease), Hypertension, and Sleep apnea. Past Surgical History:  has no past surgical history on file. Social History:  reports that he has quit smoking. He has never used smokeless tobacco. He reports current alcohol use of about 5.0 standard drinks of alcohol per week. He reports previous drug use. Family History: family history is not on file. The patients home medications have been reviewed. Allergies: Patient has no known allergies.     ---------------------------------------------------PHYSICAL EXAM--------------------------------------    Constitutional/General: Alert and oriented x3, well appearing, non toxic in NAD  Head: Normocephalic and atraumatic  Eyes: PERRL, EOMI  Mouth: Oropharynx clear, handling secretions, no trismus  Neck: Supple, full ROM, non tender to palpation in the midline, no stridor, no crepitus, no meningeal signs  Pulmonary: Lungs clear to auscultation bilaterally, no wheezes, rales, or rhonchi. Not in respiratory distress  Cardiovascular:  Regular rate. Regular rhythm. No murmurs, gallops, or rubs. 2+ distal pulses  Chest: no chest wall tenderness  Abdomen: Soft. Non tender. Non distended. +BS. No rebound, guarding, or rigidity. No pulsatile masses appreciated. Noted tenderness and swelling to left flank region mild erythema noted  Musculoskeletal: Moves all extremities x 4. Warm and well perfused, no clubbing, cyanosis, or edema. Capillary refill <3 seconds  Skin: warm and dry. No rashes. Neurologic: GCS 15, CN 2-12 grossly intact, no focal deficits, symmetric strength 5/5 in the upper and lower extremities bilaterally  Psych: Normal Affect    -------------------------------------------------- RESULTS -------------------------------------------------  I have personally reviewed all laboratory and imaging results for this patient. Results are listed below.      LABS:  Results for orders placed or performed during the hospital encounter of 10/14/21   CBC   Result Value Ref Range    WBC 11.0 4.5 - 11.5 E9/L    RBC 5.30 3.80 - 5.80 E12/L    Hemoglobin 15.6 12.5 - 16.5 g/dL    Hematocrit 45.6 37.0 - 54.0 %    MCV 86.0 80.0 - 99.9 fL    MCH 29.4 26.0 - 35.0 pg    MCHC 34.2 32.0 - 34.5 %    RDW 13.5 11.5 - 15.0 fL    Platelets 118 751 - 079 E9/L    MPV 10.6 7.0 - 12.0 fL   Comprehensive Metabolic Panel   Result Value Ref Range    Sodium 133 132 - 146 mmol/L    Potassium 4.3 3.5 - 5.0 mmol/L    Chloride 99 98 - 107 mmol/L    CO2 22 22 - 29 mmol/L    Anion Gap 12 7 - 16 mmol/L    Glucose 125 (H) 74 - 99 mg/dL    BUN 9 6 - 20 mg/dL    CREATININE 0.9 0.7 - 1.2 mg/dL    GFR Non-African American >60 >=60 mL/min/1.73 GFR African American >60     Calcium 9.1 8.6 - 10.2 mg/dL    Total Protein 7.9 6.4 - 8.3 g/dL    Albumin 3.1 (L) 3.5 - 5.2 g/dL    Total Bilirubin 2.6 (H) 0.0 - 1.2 mg/dL    Alkaline Phosphatase 104 40 - 129 U/L    ALT 14 0 - 40 U/L    AST 17 0 - 39 U/L   Troponin   Result Value Ref Range    Troponin, High Sensitivity 18 (H) 0 - 11 ng/L   Brain Natriuretic Peptide   Result Value Ref Range    Pro- (H) 0 - 125 pg/mL   Urinalysis with Microscopic   Result Value Ref Range    Color, UA Yellow Straw/Yellow    Clarity, UA Clear Clear    Glucose, Ur Negative Negative mg/dL    Bilirubin Urine SMALL (A) Negative    Ketones, Urine Negative Negative mg/dL    Specific Gravity, UA 1.025 1.005 - 1.030    Blood, Urine Negative Negative    pH, UA 5.0 5.0 - 9.0    Protein, UA Negative Negative mg/dL    Urobilinogen, Urine 2.0 (A) <2.0 E.U./dL    Nitrite, Urine POSITIVE (A) Negative    Leukocyte Esterase, Urine TRACE (A) Negative    WBC, UA 0-1 0 - 5 /HPF    RBC, UA NONE 0 - 2 /HPF    Bacteria, UA NONE SEEN None Seen /HPF   D-Dimer, Quantitative   Result Value Ref Range    D-Dimer, Quant 888 ng/mL DDU   Troponin   Result Value Ref Range    Troponin, High Sensitivity 16 (H) 0 - 11 ng/L   EKG 12 Lead   Result Value Ref Range    Ventricular Rate 84 BPM    Atrial Rate 84 BPM    P-R Interval 130 ms    QRS Duration 86 ms    Q-T Interval 420 ms    QTc Calculation (Bazett) 496 ms    P Axis 47 degrees    R Axis -6 degrees    T Axis -23 degrees       RADIOLOGY:  Interpreted by Radiologist.  CT ABDOMEN PELVIS W IV CONTRAST Additional Contrast? None   Final Result   Inflammatory changes surrounding the proximal sigmoid colon extending through   the abdominal wall and into the left flank where there are collections of   fluid and gas. This most likely represents complicated diverticulitis with   fistula formation with or without gas-forming infection. Splenomegaly. CTA PULMONARY W CONTRAST   Final Result   1.  No pulmonary embolism. 2. Minimal atelectasis or scarring in the dependent aspect of the right lower   lobe. Otherwise, the lungs are clear. 3. Splenomegaly. Probable hepatomegaly. Consider dedicated CT of the   abdomen and pelvis for further evaluation. 4. Left ventricular hypertrophy. XR CHEST (2 VW)   Final Result   No acute process. EKG:  This EKG is signed and interpreted by me. Rate: 84  Rhythm: Sinus  Interpretation: non-specific EKG  Comparison: stable as compared to patient's most recent EKG      ------------------------- NURSING NOTES AND VITALS REVIEWED ---------------------------   The nursing notes within the ED encounter and vital signs as below have been reviewed by myself. /78   Pulse 110   Temp 100.5 °F (38.1 °C) (Oral)   Resp 20   Ht 6' 2\" (1.88 m)   Wt 268 lb (121.6 kg)   SpO2 96%   BMI 34.41 kg/m²   Oxygen Saturation Interpretation: Normal    The patients available past medical records and past encounters were reviewed. ------------------------------ ED COURSE/MEDICAL DECISION MAKING----------------------  Medications   metroNIDAZOLE (FLAGYL) tablet 500 mg (has no administration in time range)   cefTRIAXone (ROCEPHIN) 1,000 mg in sterile water 10 mL IV syringe (has no administration in time range)   iopamidol (ISOVUE-370) 76 % injection 60 mL (60 mLs IntraVENous Given 10/13/21 2150)   iopamidol (ISOVUE-370) 76 % injection 90 mL (90 mLs IntraVENous Given 10/14/21 0610)             Medical Decision Making:      Presenting here because of several complaints of which she had some chest pains hours prior arrival having no active pain right now. Patient does report ongoing left-sided flank pain and swelling. Patient reports no fall or injury he reports no fever chills he reports no history of being bit by insect. He reports no itching. Patient labs noted reviewed as well as EKG. Patient heart catheterization noted from July 2021.   Patient has no

## 2021-10-14 NOTE — OP NOTE
Operative Note      Patient: Aleksandar Griffin  YOB: 1965  MRN: [id number]    Date of Procedure: 10/14/2021    Pre-Op Diagnosis: Left flank abscess    Post-Op Diagnosis: Same       Procedure(s):  Left flank excisional debridement    Surgeon(s):  Marylu Sprague MD    Assistant:   Resident: Dada De Anda MD    Anesthesia: General    Estimated Blood Loss (mL): 98OA    Complications: None    Specimens:   ID Type Source Tests Collected by Time Destination   1 : LEFT FLANK ABSCESS Tissue Tissue CULTURE, ANAEROBIC, CULTURE, FUNGUS, GRAM STAIN, CULTURE, SURGICAL, CULTURE WITH SMEAR, ACID FAST Ty MD Gita 10/14/2021 7548    A : LEFT FLANK TISSUE Tissue Tissue SURGICAL PATHOLOGY Marylu Sprague MD 10/14/2021 1602        Implants:  * No implants in log *      Drains: * No LDAs found *    Findings: Left flank abscess with purulence and feculent odor. [id number] to muscle    Indications for procedure:  72-year-old male with a history of hypertension who presented to the hospital with initially complaints of chest pain. His chest pain was worked up and was negative with a CTA of the chest looking for pulmonary embolism and EKG and troponin both were nonspecific. A CT scan of his abdomen pelvis with IV contrast was obtained given left flank induration and erythema which had been present for about a week. CT scan showed an area of significant inflammation with subcutaneous air without obvious abscess. There is also incidentally seen inflammatory changes around the sigmoid and descending colon and the suggestion of diverticulitis with possible fistulous connection with the subcutaneous findings. He was started on empiric antibiotics. After discussion of the risks and benefits of the procedure the patient was agreeable to proceed to the operating room for left flank excision debridement. Detailed Description of Procedure:    The patient was brought to the operating room and positioned supine on the operating room table. Sequential compression devices were placed on the patient's lower extremities and were powered on. General anesthesia was administered and preoperative antibiotics were administered per the anesthetic record. The patient was positioned with his right side down. The patient was prepped and draped in the usual sterile fashion and the procedure went forth with strict aseptic technique under maximal barrier precautions. Immediately prior to the procedure a time-out was called and the surgical checklist was reviewed and agreed upon by all present. The area of our planned incision was localized with local anesthetic in a broad distribution. Then, a 10 scalpel blade was used to make an incision overlying the induration and erythema at the left flank approximately 10 cm in total size. This was carried down with Bovie electrocautery and there was obvious purulence and feculent odor encountered. Cultures of the drainage were taken. The loculations were finger fractured in the abscess cavity was extensive tracking posteriorly approximately 5 cm and anteriorly approximately 5 cm. The purulence was completely evacuated. Irrigation was undertaken to better evaluate the tissues in the area. There was an area which contained a structure that appeared to be a lumen which we thought may represent a fistulous tract. We took this tissue and sent off for permanent section for evaluation by the pathologist. The wound extended to the muscular layer of the left flank and we estimated the total wound size to be 10cm x 8cm x 4cm. After thorough irrigation of the wound and hemostasis obtained with Bovie electrocautery we packed the wound with a saline soaked Kerlix gauze. We covered the kerlix with 4 x 4 gauze and a heavy drainage pack which was taped in place. Patient tolerated the procedure well without complications. Counts were correct x2.   The patient was extubated brought to PACU in stable condition. Undergo daily dressing changes with saline soaked Kerlix and heavy drainage pack daily. We will plan to obtain a CT of the abdomen pelvis with p.o. and rectal contrast to further evaluate for enterocutaneous fistula. Dr. Vj Parish was present for the entire case.     Electronically signed by Santiago Box MD on 10/14/2021 at 4:32 PM

## 2021-10-14 NOTE — PROGRESS NOTES
Pharmacy Consultation Note  (Antibiotic Dosing and Monitoring)    Initial consult date: 10/14/21  Consulting physician: Dr. Trudi Babinski  Drug: Vancomycin  Indication: Skin and Soft Tissue Infection    Age/  Gender Height Weight IBW Dosing weight  Allergy Information   56 y.o./male 6' 2\" (188 cm) 268 lb (121.6 kg)     Ideal body weight: 82.2 kg (181 lb 3.5 oz)  Adjusted ideal body weight: 97.9 kg (215 lb 14.9 oz)  121.6 kg  Patient has no known allergies. Temp (24hrs), Av.2 °F (37.3 °C), Min:98.1 °F (36.7 °C), Max:100.5 °F (38.1 °C)          Date  WBC BUN SCr CrCl  (mL/min) Drug/Dose Time   Given Level(s)   (Time) Comments   10/13 11 9 0.9 127       10/14 - - -  -   Vancomycin 2500mg once  Vancomycin 1250mg q12h (1100)  (2300)     10/15       Rm with AM labs                              No intake or output data in the 24 hours ending 10/14/21 0849    Historical Cultures:  No results found for: ORG  No results for input(s): BC in the last 72 hours. Cultures:  available culture and sensitivity results were reviewed in [facility]    Assessment:  · 64 y.o. male has been initiated Vancomycin for skin and soft tissue infection  · Estimated CrCl = 127 mL/min  · Goal AUC:CANDACE = 400-600 mcg*hr/mL.  (IHD,PD,CRRT,dose-by-level excluded)  · Vancomycin 1250 mg q12h (predicted AUC/CANDACE = 461, Tr = 14.8)      Plan:  · Will initiate vancomycin at a dose of 2500mg load once; followed by 1250mg q12h  · Monitor renal function   · Clinical pharmacy to follow      Clay Estrada [facility] 10/14/2021 8:49 AM

## 2021-10-15 ENCOUNTER — APPOINTMENT (OUTPATIENT)
Dept: CT IMAGING | Age: 56
DRG: 951 | End: 2021-10-15
Payer: MEDICARE

## 2021-10-15 LAB
ALBUMIN SERPL-MCNC: 2.8 G/DL (ref 3.5–5.2)
ALP BLD-CCNC: 86 U/L (ref 40–129)
ALT SERPL-CCNC: 13 U/L (ref 0–40)
ANION GAP SERPL CALCULATED.3IONS-SCNC: 14 MMOL/L (ref 7–16)
ANISOCYTOSIS: ABNORMAL
AST SERPL-CCNC: 11 U/L (ref 0–39)
ATYPICAL LYMPHOCYTE RELATIVE PERCENT: 0.9 % (ref 0–4)
BASOPHILS ABSOLUTE: 0 E9/L (ref 0–0.2)
BASOPHILS RELATIVE PERCENT: 0.1 % (ref 0–2)
BILIRUB SERPL-MCNC: 1.5 MG/DL (ref 0–1.2)
BUN BLDV-MCNC: 8 MG/DL (ref 6–20)
BURR CELLS: ABNORMAL
C-REACTIVE PROTEIN: 5.2 MG/DL (ref 0–0.4)
CALCIUM SERPL-MCNC: 9 MG/DL (ref 8.6–10.2)
CHLORIDE BLD-SCNC: 100 MMOL/L (ref 98–107)
CO2: 24 MMOL/L (ref 22–29)
CREAT SERPL-MCNC: 0.9 MG/DL (ref 0.7–1.2)
EOSINOPHILS ABSOLUTE: 0 E9/L (ref 0.05–0.5)
EOSINOPHILS RELATIVE PERCENT: 0 % (ref 0–6)
GFR AFRICAN AMERICAN: >60
GFR NON-AFRICAN AMERICAN: >60 ML/MIN/1.73
GLUCOSE BLD-MCNC: 172 MG/DL (ref 74–99)
GRAM STAIN ORDERABLE: NORMAL
HCT VFR BLD CALC: 43.2 % (ref 37–54)
HEMOGLOBIN: 14.8 G/DL (ref 12.5–16.5)
LYMPHOCYTES ABSOLUTE: 0.36 E9/L (ref 1.5–4)
LYMPHOCYTES RELATIVE PERCENT: 2.6 % (ref 20–42)
MCH RBC QN AUTO: 30 PG (ref 26–35)
MCHC RBC AUTO-ENTMCNC: 34.3 % (ref 32–34.5)
MCV RBC AUTO: 87.6 FL (ref 80–99.9)
METER GLUCOSE: 115 MG/DL (ref 74–99)
METER GLUCOSE: 116 MG/DL (ref 74–99)
MONOCYTES ABSOLUTE: 0.36 E9/L (ref 0.1–0.95)
MONOCYTES RELATIVE PERCENT: 3.5 % (ref 2–12)
NEUTROPHILS ABSOLUTE: 8.28 E9/L (ref 1.8–7.3)
NEUTROPHILS RELATIVE PERCENT: 93 % (ref 43–80)
OVALOCYTES: ABNORMAL
PATHOLOGIST REVIEW: NORMAL
PDW BLD-RTO: 13.4 FL (ref 11.5–15)
PLATELET # BLD: 160 E9/L (ref 130–450)
PMV BLD AUTO: 10.2 FL (ref 7–12)
POIKILOCYTES: ABNORMAL
POLYCHROMASIA: ABNORMAL
POTASSIUM REFLEX MAGNESIUM: 4.5 MMOL/L (ref 3.5–5)
POTASSIUM SERPL-SCNC: 4.5 MMOL/L (ref 3.5–5)
PROSTATE SPECIFIC ANTIGEN: 0.53 NG/ML (ref 0–4)
RBC # BLD: 4.93 E12/L (ref 3.8–5.8)
SEDIMENTATION RATE, ERYTHROCYTE: 56 MM/HR (ref 0–15)
SODIUM BLD-SCNC: 138 MMOL/L (ref 132–146)
TOTAL PROTEIN: 7.3 G/DL (ref 6.4–8.3)
VANCOMYCIN RANDOM: 17.4 MCG/ML (ref 5–40)
WBC # BLD: 8.9 E9/L (ref 4.5–11.5)

## 2021-10-15 PROCEDURE — 97530 THERAPEUTIC ACTIVITIES: CPT

## 2021-10-15 PROCEDURE — 6370000000 HC RX 637 (ALT 250 FOR IP): Performed by: STUDENT IN AN ORGANIZED HEALTH CARE EDUCATION/TRAINING PROGRAM

## 2021-10-15 PROCEDURE — 6370000000 HC RX 637 (ALT 250 FOR IP): Performed by: INTERNAL MEDICINE

## 2021-10-15 PROCEDURE — 82962 GLUCOSE BLOOD TEST: CPT

## 2021-10-15 PROCEDURE — 36415 COLL VENOUS BLD VENIPUNCTURE: CPT

## 2021-10-15 PROCEDURE — 2580000003 HC RX 258: Performed by: STUDENT IN AN ORGANIZED HEALTH CARE EDUCATION/TRAINING PROGRAM

## 2021-10-15 PROCEDURE — 1200000000 HC SEMI PRIVATE

## 2021-10-15 PROCEDURE — 6360000002 HC RX W HCPCS: Performed by: INTERNAL MEDICINE

## 2021-10-15 PROCEDURE — 2580000003 HC RX 258: Performed by: INTERNAL MEDICINE

## 2021-10-15 PROCEDURE — 97165 OT EVAL LOW COMPLEX 30 MIN: CPT

## 2021-10-15 PROCEDURE — 2500000003 HC RX 250 WO HCPCS: Performed by: STUDENT IN AN ORGANIZED HEALTH CARE EDUCATION/TRAINING PROGRAM

## 2021-10-15 PROCEDURE — 80202 ASSAY OF VANCOMYCIN: CPT

## 2021-10-15 PROCEDURE — 85651 RBC SED RATE NONAUTOMATED: CPT

## 2021-10-15 PROCEDURE — 97161 PT EVAL LOW COMPLEX 20 MIN: CPT

## 2021-10-15 PROCEDURE — 85025 COMPLETE CBC W/AUTO DIFF WBC: CPT

## 2021-10-15 PROCEDURE — 80053 COMPREHEN METABOLIC PANEL: CPT

## 2021-10-15 PROCEDURE — 84153 ASSAY OF PSA TOTAL: CPT

## 2021-10-15 PROCEDURE — 86140 C-REACTIVE PROTEIN: CPT

## 2021-10-15 PROCEDURE — 6360000004 HC RX CONTRAST MEDICATION: Performed by: EMERGENCY MEDICINE

## 2021-10-15 PROCEDURE — 80048 BASIC METABOLIC PNL TOTAL CA: CPT

## 2021-10-15 PROCEDURE — 6360000002 HC RX W HCPCS: Performed by: STUDENT IN AN ORGANIZED HEALTH CARE EDUCATION/TRAINING PROGRAM

## 2021-10-15 PROCEDURE — 99233 SBSQ HOSP IP/OBS HIGH 50: CPT | Performed by: INTERNAL MEDICINE

## 2021-10-15 PROCEDURE — 74177 CT ABD & PELVIS W/CONTRAST: CPT

## 2021-10-15 RX ORDER — GAUZE BANDAGE 2" X 2"
100 BANDAGE TOPICAL DAILY
Status: DISCONTINUED | OUTPATIENT
Start: 2021-10-15 | End: 2021-10-18 | Stop reason: HOSPADM

## 2021-10-15 RX ORDER — ATORVASTATIN CALCIUM 40 MG/1
40 TABLET, FILM COATED ORAL NIGHTLY
Status: DISCONTINUED | OUTPATIENT
Start: 2021-10-15 | End: 2021-10-18 | Stop reason: HOSPADM

## 2021-10-15 RX ORDER — METOPROLOL SUCCINATE 25 MG/1
25 TABLET, EXTENDED RELEASE ORAL DAILY
Status: DISCONTINUED | OUTPATIENT
Start: 2021-10-15 | End: 2021-10-18 | Stop reason: HOSPADM

## 2021-10-15 RX ORDER — PHENOBARBITAL 32.4 MG/1
32.4 TABLET ORAL 2 TIMES DAILY
Status: DISCONTINUED | OUTPATIENT
Start: 2021-10-18 | End: 2021-10-16

## 2021-10-15 RX ORDER — SENNA AND DOCUSATE SODIUM 50; 8.6 MG/1; MG/1
2 TABLET, FILM COATED ORAL 2 TIMES DAILY
Status: DISCONTINUED | OUTPATIENT
Start: 2021-10-15 | End: 2021-10-18 | Stop reason: HOSPADM

## 2021-10-15 RX ORDER — ASPIRIN 81 MG/1
81 TABLET, CHEWABLE ORAL DAILY
Status: DISCONTINUED | OUTPATIENT
Start: 2021-10-15 | End: 2021-10-18 | Stop reason: HOSPADM

## 2021-10-15 RX ORDER — PHENOBARBITAL 32.4 MG/1
64.8 TABLET ORAL 2 TIMES DAILY
Status: DISCONTINUED | OUTPATIENT
Start: 2021-10-17 | End: 2021-10-16

## 2021-10-15 RX ORDER — POLYETHYLENE GLYCOL 3350 17 G/17G
17 POWDER, FOR SOLUTION ORAL 2 TIMES DAILY
Status: DISCONTINUED | OUTPATIENT
Start: 2021-10-15 | End: 2021-10-18 | Stop reason: HOSPADM

## 2021-10-15 RX ORDER — PHENOBARBITAL 32.4 MG/1
64.8 TABLET ORAL 3 TIMES DAILY
Status: COMPLETED | OUTPATIENT
Start: 2021-10-16 | End: 2021-10-17

## 2021-10-15 RX ORDER — PHENOBARBITAL 32.4 MG/1
64.8 TABLET ORAL 4 TIMES DAILY
Status: COMPLETED | OUTPATIENT
Start: 2021-10-15 | End: 2021-10-16

## 2021-10-15 RX ORDER — SODIUM CHLORIDE 0.9 % (FLUSH) 0.9 %
10 SYRINGE (ML) INJECTION
Status: ACTIVE | OUTPATIENT
Start: 2021-10-15 | End: 2021-10-15

## 2021-10-15 RX ORDER — CHLORDIAZEPOXIDE HYDROCHLORIDE 25 MG/1
25 CAPSULE, GELATIN COATED ORAL DAILY
Status: DISCONTINUED | OUTPATIENT
Start: 2021-10-15 | End: 2021-10-15

## 2021-10-15 RX ADMIN — ASPIRIN 81 MG CHEWABLE TABLET 81 MG: 81 TABLET CHEWABLE at 17:41

## 2021-10-15 RX ADMIN — IOHEXOL 50 ML: 240 INJECTION, SOLUTION INTRATHECAL; INTRAVASCULAR; INTRAVENOUS; ORAL at 12:31

## 2021-10-15 RX ADMIN — DOCUSATE SODIUM 50 MG AND SENNOSIDES 8.6 MG 2 TABLET: 8.6; 5 TABLET, FILM COATED ORAL at 20:33

## 2021-10-15 RX ADMIN — ACETAMINOPHEN 1000 MG: 500 TABLET ORAL at 21:58

## 2021-10-15 RX ADMIN — PHENOBARBITAL 64.8 MG: 32.4 TABLET ORAL at 17:41

## 2021-10-15 RX ADMIN — THIAMINE HCL TAB 100 MG 100 MG: 100 TAB at 17:42

## 2021-10-15 RX ADMIN — ENOXAPARIN SODIUM 40 MG: 100 INJECTION SUBCUTANEOUS at 10:11

## 2021-10-15 RX ADMIN — HYDROMORPHONE HYDROCHLORIDE 0.5 MG: 1 INJECTION, SOLUTION INTRAMUSCULAR; INTRAVENOUS; SUBCUTANEOUS at 05:57

## 2021-10-15 RX ADMIN — PIPERACILLIN AND TAZOBACTAM 3375 MG: 3; .375 INJECTION, POWDER, LYOPHILIZED, FOR SOLUTION INTRAVENOUS at 07:49

## 2021-10-15 RX ADMIN — Medication 10 ML: at 20:33

## 2021-10-15 RX ADMIN — PIPERACILLIN AND TAZOBACTAM 3375 MG: 3; .375 INJECTION, POWDER, LYOPHILIZED, FOR SOLUTION INTRAVENOUS at 00:00

## 2021-10-15 RX ADMIN — ACETAMINOPHEN 1000 MG: 500 TABLET ORAL at 14:55

## 2021-10-15 RX ADMIN — CLINDAMYCIN PHOSPHATE 600 MG: 600 INJECTION, SOLUTION INTRAVENOUS at 10:11

## 2021-10-15 RX ADMIN — POLYETHYLENE GLYCOL 3350 17 G: 17 POWDER, FOR SOLUTION ORAL at 20:32

## 2021-10-15 RX ADMIN — PHENOBARBITAL 64.8 MG: 32.4 TABLET ORAL at 20:33

## 2021-10-15 RX ADMIN — PIPERACILLIN AND TAZOBACTAM 3375 MG: 3; .375 INJECTION, POWDER, LYOPHILIZED, FOR SOLUTION INTRAVENOUS at 23:43

## 2021-10-15 RX ADMIN — Medication 10 ML: at 10:11

## 2021-10-15 RX ADMIN — METOPROLOL SUCCINATE 25 MG: 25 TABLET, EXTENDED RELEASE ORAL at 17:42

## 2021-10-15 RX ADMIN — ACETAMINOPHEN 1000 MG: 500 TABLET ORAL at 05:57

## 2021-10-15 RX ADMIN — PIPERACILLIN AND TAZOBACTAM 3375 MG: 3; .375 INJECTION, POWDER, LYOPHILIZED, FOR SOLUTION INTRAVENOUS at 15:51

## 2021-10-15 RX ADMIN — IOPAMIDOL 90 ML: 755 INJECTION, SOLUTION INTRAVENOUS at 12:31

## 2021-10-15 RX ADMIN — ATORVASTATIN CALCIUM 40 MG: 40 TABLET, FILM COATED ORAL at 20:33

## 2021-10-15 ASSESSMENT — PAIN SCALES - GENERAL
PAINLEVEL_OUTOF10: 3
PAINLEVEL_OUTOF10: 0

## 2021-10-15 NOTE — PROGRESS NOTES
[facility]  Internal Medicine Residency Program  Progress Note - House Team 1    Patient:  Thai Goncalves 64 y.o. male MRN: [id number]     Date of Service: 10/15/2021     CC: L flank pain and chest pain  Overnight events: s/p excisional debridement L flank abscess. No acute events overnight after the procedure. Subjective     The patient is seen and examined this morning. He is lying on bed, not in acute distress. He is not complaining of any pain right now, except when he moves. Denies chest pain, dyspnea, abdominal pain, nausea and vomiting. No fever overnight. Vitals stable. Objective     Physical Exam:  · Vitals: BP (!) 148/92   Pulse 87   Temp 98 °F (36.7 °C) (Axillary)   Resp 20   Ht 6' 2\" (1.88 m)   Wt 268 lb (121.6 kg)   SpO2 98%   BMI 34.41 kg/m²     · I & O - 24hr: No intake/output data recorded. · General Appearance: alert, appears stated age and cooperative  · HEENT:  Head: Normal, normocephalic, atraumatic. · Neck: no adenopathy, no carotid bruit, no JVD, supple, symmetrical, trachea midline and thyroid not enlarged, symmetric, no tenderness/mass/nodules  · Lung: clear to auscultation bilaterally  · Heart: regular rate and rhythm, S1, S2 normal, no murmur, click, rub or gallop  · Abdomen: (+) 10 cm incision, open with gauze packing, with surrounding induration and erythema; with serosanguinous discharge covered with ABD pads; NABS, soft, non-tender  · Extremities:  extremities normal, atraumatic, no cyanosis or edema  · Musculokeletal: No joint swelling, no muscle tenderness. ROM normal in all joints of extremities.    · Neurologic: Mental status: Alert, oriented, thought content appropriate  Subject  Pertinent Labs & Imaging Studies   anushka  CBC with Differential:    Lab Results   Component Value Date    WBC 11.0 10/13/2021    RBC 5.30 10/13/2021    HGB 15.6 10/13/2021    HCT 45.6 10/13/2021     10/13/2021    MCV 86.0 10/13/2021    MCH 29.4 10/13/2021 MCHC 34.2 10/13/2021    RDW 13.5 10/13/2021    NRBC 0.9 05/07/2021    LYMPHOPCT 11.1 07/14/2021    MONOPCT 9.1 07/14/2021    BASOPCT 0.6 07/14/2021    MONOSABS 0.49 07/14/2021    LYMPHSABS 0.60 07/14/2021    EOSABS 0.03 07/14/2021    BASOSABS 0.03 07/14/2021     CMP:    Lab Results   Component Value Date     10/13/2021    K 4.3 10/13/2021    K 4.1 07/15/2021    CL 99 10/13/2021    CO2 22 10/13/2021    BUN 9 10/13/2021    CREATININE 0.9 10/13/2021    GFRAA >60 10/13/2021    LABGLOM >60 10/13/2021    GLUCOSE 125 10/13/2021    GLUCOSE 110 01/07/2011    PROT 7.9 10/13/2021    LABALBU 3.1 10/13/2021    LABALBU 4.1 01/07/2011    CALCIUM 9.1 10/13/2021    BILITOT 2.6 10/13/2021    ALKPHOS 104 10/13/2021    AST 17 10/13/2021    ALT 14 10/13/2021     Last 3 Troponin:    Lab Results   Component Value Date    TROPONINI <0.01 05/12/2021    TROPONINI <0.01 05/11/2021    TROPONINI <0.01 05/09/2021     HgBA1c:    Lab Results   Component Value Date    LABA1C 5.7 05/08/2021     Microalbumen/Creatinine ratio:  No components found for: RUCREAT    Resident's Assessment and Plan     Assessment and Plan:    L flank abscess r/o enterocutaneous fistula  -large indurated area L flank, minimal pain, no draining wound/sinus  -s/p excisional debridement 10/14/21  -total wound size 10cm x 8cm x 4 cm  -pending surgical cultures  -`s  -WBC 8.9, no febrile episodes  Plan  -CT of the abdomen/pelvis with PO and rectal contrast to further evaluate for enterocutaneous fistula per gen surg  -continue antibiotics  -follow cultures     Complicated sigmoid diverticulitis 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   -large indurated area L flank, minimal pain, no draining wound/sinus  -no history of diverticulosis/diverticulitis, has not had a colonoscopy  -denies trauma, insect bites, abdominal pain, bowel changes, weight loss and constitutional symptoms  -10/14/21 CT scan of the abdomen was done and showed inflammatory changes surrounding the no constitutional and B symptoms  -no anemia or thombocytopenia  -decreased albumin at 2.8  ALP, AST, ALT wnl; bilirubin trending down from 2.6 to 1.5  Plan  -pending peripheral blood smear  -will check for PSA      PT/OT evaluation:  DVT prophylaxis/ GI prophylaxis: enoxaparin  Disposition: home +/- home health / Angeles Stover / Mina Zamudio / Joao Mcnamara MD, PGY-1  Attending physician: Dr. Patrick Tempe [facility]  Internal Medicine Clinic    Attending Physician Statement:  Germaine Eisenmenger, M.D., F.A.C.P. I have discussed the case, including pertinent history and exam findings with the resident/NP. I have seen and examined the patient and the key elements of the encounter have been performed by me. I agree with the resident ROS, PMHx, PSHx, meds reviewed and assessment, plan and orders as documented by the resident/NP      Hospital charts reviewed, including other providers notes, relevant labs and imaging. >50% of time spent coordinating care with other providers and/or counseling patient/family  Remainder of medical problems as per resident note. POD#1 Left flank excisional debridement for Left flank abscess. Per internal med note today, total wound size 10cm x 8cm x 4 cm. Plan: CT of the abdomen/pelvis with PO and rectal contrast   Sp abscess/diverticulitis  Local abscess flank wall, tracking from prior diverticulits  ?lumen with possible fistula tract   \"enterocutaneous fistula\" per gen surgery. -  Excision into mucle  The depth of tissue incised was muscle,walls of gut ? Ok  To feed per Gen surg after CT scan. - for now no diverting colostomy plans  Patient is currently on IV Cleocin q 8 hrs and IV Zosyn q 8 hrs. It was brought to my attention that this patient is homeless.     Chest pain workup in past, likely nonanginal  +Vague- medically noncompliance with prior cardiac meds-- ASA/statin/betablocker    +splenomegaly  +etoh, drinks \"most of days\"-- no DTs yet  Hx of etoh withdrawals/with seizure  Thaimine, folate, ciwa

## 2021-10-15 NOTE — CONSULTS
[facility] CONSULT NOTE    Reason for Consult: Diverticulitis   Requested by: Phliip Sherman MD     Chief complaint: Chest and flank pain    History Obtained From: Patient and EMR    HISTORY Aster              The patient is a 64 y.o. male with history of CAD, hypertension, presented on 10/13 with chest and left flank pain for 5 days found to be septic with fever up to 100.5 F and CT abdomen and pelvis showing inflammatory changes surrounding proximal sigmoid colon extending through abdominal wall and into the left flank. There are collections of fluid and gas likely representing complicated diverticulitis with fistula formation. He underwent left leg excisional debridement on 10/14 during which left flank abscess was evacuated, a lumen thought to be fistulous tract was noted. Tissue Gram stain and culture showed abundant polymorphonuclear leukocytes, no epithelial cells, few gram-positive cocci, abundant gram-negative rods. CT abdomen and pelvis with IV and oral contrast showed inflammatory changes with wall thickening and edema of the distal descending and sigmoid colon likely diverticulitis with soft tissue connection to the abdominal wall and tiny abscess collection in the abdominal wall with adjacent lateral abscess collection in the subcutaneous tissue probably representing inflammation with enclosed colocutaneous fistula. Clindamycin, piperacillin-tazobactam and vancomycin were started on admission. ID service was subsequently consulted for further recommendations.     Past Medical History  Past Medical History:   Diagnosis Date    CAD (coronary artery disease)     Hypertension     Sleep apnea        Current Facility-Administered Medications   Medication Dose Route Frequency Provider Last Rate Last Admin    polyethylene glycol (GLYCOLAX) packet 17 g  17 g Oral BID Michaela Banuelos DO        sennosides-docusate sodium (SENOKOT-S) 8.6-50 MG tablet 2 tablet  2 tablet Oral BID Mariana Parks DO [place]        iohexol (OMNIPAQUE 240) injection 50 mL  50 mL Oral ONCE PRN David Layne DO        chlordiazePOXIDE (LIBRIUM) capsule 25 mg  25 mg Oral Daily Jacquelyn Corral MD        piperacillin-tazobactam (ZOSYN) 3,375 mg in dextrose 5 % 100 mL IVPB extended infusion (mini-bag)  3,375 mg IntraVENous Q8H Huyen Cassidy MD 25 mL/hr at 10/15/21 0749 3,375 mg at 10/15/21 0749    clindamycin (CLEOCIN) 600 mg in dextrose 5 % 50 mL IVPB  600 mg IntraVENous Q8H Julienne Velez DO   Stopped at 10/15/21 1047    vancomycin (VANCOCIN) 1,250 mg in dextrose 5 % 250 mL IVPB  1,250 mg IntraVENous Q12H Huyen Cassidy MD        sodium chloride flush 0.9 % injection 10 mL  10 mL IntraVENous 2 times per day Neha Terrell MD   10 mL at 10/15/21 1011    sodium chloride flush 0.9 % injection 10 mL  10 mL IntraVENous PRN Neha Terrell MD        0.9 % sodium chloride infusion  25 mL IntraVENous PRN Neha Terrell MD        enoxaparin (LOVENOX) injection 40 mg  40 mg SubCUTAneous Daily Neha Terrell MD   40 mg at 10/15/21 1011    ondansetron (ZOFRAN-ODT) disintegrating tablet 4 mg  4 mg Oral Q8H PRN Neha Terrell MD        Or    [facility]dansetron [facility]) injection 4 mg  4 mg IntraVENous Q6H PRN Neha Terrell MD        glucose (GLUTOSE) 40 % oral gel 15 g  15 g Oral PRN Neha Terrell MD        dextrose 50 % IV solution  12.5 g IntraVENous PRN Neha Terrell MD        glucagon (rDNA) injection 1 mg  1 mg IntraMUSCular PRN Neha Terrell MD        dextrose 5 % solution  100 mL/hr IntraVENous PRN Neha Terrell MD        dextrose 5 % in lactated ringers infusion   IntraVENous Continuous Huyen Cassidy MD 75 mL/hr at 10/14/21 2337 New Bag at 10/14/21 2337    acetaminophen (TYLENOL) tablet 1,000 mg  1,000 mg Oral 3 times per day Huyen Cassidy MD   1,000 mg at 10/15/21 0557    oxyCODONE (ROXICODONE) immediate release tablet 5 mg  5 mg Oral Q4H PRN Bri Byrne MD        Or   Ernestina Guaman oxyCODONE (ROXICODONE) immediate release tablet 10 mg  10 mg Oral Q4H PRN Bri Byrne MD        HYDROmorphone (DILAUDID) injection 0.5 mg  0.5 mg IntraVENous Q4H PRN Bri Byrne MD   0.5 mg at 10/15/21 0557       No Known Allergies    Surgical History  Past Surgical History:   Procedure Laterality Date    ABDOMEN SURGERY Left 10/14/2021    ABDOMEN INCISION AND DRAINAGE performed by Karyna Rasmussen MD at [facility] History  Social History     Socioeconomic History    Marital status: Legally    Tobacco Use    Smoking status: Former Smoker    Smokeless tobacco: Never Used   Vaping Use    Vaping Use: Never used   Substance and Sexual Activity    Alcohol use: Yes     Alcohol/week: 5.0 standard drinks     Types: 5 Cans of beer per week    Drug use: Not Currently         Family Medical History  History reviewed. No pertinent family history.     Review of Systems:  Constitutional: No fever, no chills  Eyes: No vision changes, no retroorbital pain  ENT: No hearing changes, no ear pain  Respiratory: No cough, no dyspnea  Cardiovascular: No chest pain, no palpitations  Gastrointestinal: No abdominal pain, no diarrhea  Genitourinary: No dysuria, no hematuria  Integumentary: No rash, no itching  Musculoskeletal: No muscle pain, no joint pain  Neurologic: No headache, no numbness in extremities    Physical Examination:  Vitals:    10/14/21 1701 10/14/21 1717 10/14/21 2000 10/15/21 0758   BP: (!) 121/90 (!) 137/91 (!) 148/92 132/88   Pulse: 86 84 87 87   Resp: (!) 2 (!) 0 20 16   Temp:   98 °F (36.7 °C) 98 °F (36.7 °C)   TempSrc:   Axillary Oral   SpO2: (!) 88% 97% 98% 97%   Weight:       Height:         Constitutional: Alert, not in distress  Eyes: Sclerae anicteric, no conjunctival erythema  ENT: No buccal lesion, no pharyngeal exudates  Neck: No nuchal rigidity, no cervical adenopathy  Lungs: Clear breath sounds, no crackles, no wheezes  Heart: Regular rate and rhythm, no murmurs  Abdomen: Bowel sounds present, soft, nontender  Skin: Warm and dry, no active dermatoses  Musculoskeletal: No joint erythema, no joint swelling    Labs, imaging, and medical records/notes were personally reviewed. 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 Continue piperacillin-tazobactam 3.375 g every 8 hours. Follow-up tissue cultures. Follow-up Surgery recommendations regarding enterocutaneous fistula. .  Continue local wound care. Thank you for involving me in the care of Susy Mehta. Dr. Magen Kumar will continue to follow. Please do not hesitate to call for any questions or concerns.     Electronically signed by Yumi Bishop MD on 10/15/2021 at 12:17 PM

## 2021-10-15 NOTE — PLAN OF CARE
Problem: Falls - Risk of:  Goal: Will remain free from falls  Description: Will remain free from falls  10/15/2021 1241 by Nehemias Enrique RN  Outcome: Met This Shift  10/15/2021 0158 by Nai Byrne RN  Outcome: Met This Shift  Goal: Absence of physical injury  Description: Absence of physical injury  10/15/2021 1241 by Nehemias Enrique RN  Outcome: Met This Shift  10/15/2021 0158 by Nai Byrne RN  Outcome: Met This Shift     Problem: Skin Integrity:  Goal: Will show no infection signs and symptoms  Description: Will show no infection signs and symptoms  Outcome: Met This Shift  Goal: Absence of new skin breakdown  Description: Absence of new skin breakdown  Outcome: Met This Shift

## 2021-10-15 NOTE — PROGRESS NOTES
[address]       DCDY:                                                  Patient Name: Sang Ricardo  MRN: [id number]  : 1965  Room: [address] [place] [address]    Evaluating OT: [place] [address]    Referring Provider[de-identified] Beena Jenkins MD     Specific Provider Orders/Date: OT evaluation and treatment 10/15/21    Diagnosis:  Diverticulitis of colon [K57.32]  Flank pain [R10.9]  Sigmoid diverticulitis [K57.32]  Abnormal computed tomography of abdomen and pelvis [R93.5]  Chest pain, unspecified type [R07.9]      Pertinent Medical History:  has a past medical history of CAD (coronary artery disease), Hypertension, and Sleep apnea.        Precautions:  Fall Risk, wound L flank    Assessment of current deficits   [x] Functional mobility   [x]ADLs  [x] Strength               []Cognition   [x] Functional transfers   [] IADLs         [x] Safety Awareness   [x]Endurance   [] Fine Coordination              [x] Balance      [] Vision/perception   []Sensation    []Gross Motor Coordination  [] ROM  [] Delirium                   [] Motor Control     OT PLAN OF CARE   OT POC based on physician orders, patient diagnosis and results of clinical assessment    Frequency/Duration 1-4 days/wk for 2 weeks PRN   Specific OT Treatment to include:   * Instruction/training on adapted ADL techniques and AE recommendations to increase functional independence within precautions       * Functional transfer/mobility training/DME recommendations for increased independence, safety, and fall prevention  * Patient/Family education to increase follow through with safety techniques and functional independence  * Therapeutic exercise to improve motor endurance, ROM, and functional strength for ADLs/functional transfers  * Therapeutic activities to facilitate/challenge dynamic balance, stand tolerance for increased safety and independence with ADLs  * Positioning to improve skin integrity, interaction with environment and functional independence      Recommended Adaptive Equipment:  TBD     Home Living:  Per chart review pt is homeless. However pt stated he lives alone in an  apt with 0 step(s) to enter and 0 rail(s); bed/bath on 1st   Bathroom setup: tub shower   Equipment owned: none    Prior Level of Function: Independent  with ADLs , Independent  with IADLs; ambulated with no AD  Driving: yes      Pain Level: none at this time  Cognition: A&O: 4/4;   Follows 2 step directions: good    Memory:  good    Sequencing:  good    Problem solving:  fair    Judgement/safety:  fair     Functional Assessment:   AM-PAC Daily Activity Raw Score: 19/24     Initial Eval Status  Date: 10/15/21 Treatment Status  Date: STG=LTG  Time frame: 10-14 days   Feeding Independent   Independent    Grooming Stand by Assist   Independent    UB Dressing Stand by Assist   Independent    LB Dressing Stand by Assist   Independent    Bathing Minimal Assist  Independent    Toileting Stand by Assist   Independent    Bed Mobility  Supine to sit: Minimal Assist d/t L flank wound  Sit to supine: NT   Supine to sit:  Independent   Sit to supine: Independent    Functional Transfers Sit to stand: Stand by Assist   Stand to sit: Stand by Assist   Stand pivot: Stand by Assist   Independent    Functional Mobility SBA with no AD  indep   Balance Sitting: indep     Standing: sba  Sitting: indep      Standing: indep   Activity Tolerance fair  good   Visual/  Perceptual Glasses: reading          BUE  ROM/Strength/  Fine motor Coordination Hand dominance: L    RUE: ROM WFL     Strength: grossly 4/5      Strength:  WFL     Coordination:   WFL    LUE: ROM  WFL     Strength: grossly 4/5      Strength:  WFL     Coordination: WFL       Hearing: WFL   Sensation:  No c/o numbness or tingling   Tone:  WFL   Edema:  None noted    Comments:   RN cleared patient for OT. Upon arrival patient on transport cart outside his room. OT assisted pt to his bed with SBA d/t IV pole . Patient presents with slightlydecreased  ADLs, functional transfers, functional mobility, d/t L flank wound. Therapist facilitated and instructed pt on adapted  techniques & compensatory strategies to improve safety and independence with basic ADLs, bed mobility,  functional transfers & mobility to allow pt to achieve highest level of independence and safely. At end of session, patient sitting EOB with RN present. with call light and phone within reach, all lines and tubes intact. Pt would benefit from continued skilled OT to increase safety and independence with completion of ADL tasks and functional mobility for improved quality of life. Rehab Potential: Good  for established goals     Patient / Family Goal:  Not stated    Patient and/or family were instructed on functional diagnosis, prognosis/goals and OT plan of care. Demonstrated good understanding. Eval Complexity: Low    Time In: 1:12  Time Out: 1:25  Total Treatment Time: 0 minutes    Min Units   OT Eval Low 13762  x     OT Eval Medium 93892      OT Eval High 38841       OT Re-Eval [id number]       Therapeutic Ex 10971       Therapeutic Activities 22683      ADL/Self Care 88593      Orthotic Management 74672       Neuro Re-Ed 47848       Non-Billable Time          Evaluation Time includes thorough review of current medical information, gathering information on past medical history/social history and prior level of function, completion of standardized testing/informal observation of tasks, assessment of data and education on plan of care and goals.             [place], [place] [address]

## 2021-10-15 NOTE — PROGRESS NOTES
Physician Progress Note      PATIENT:               Jm Oglesby  CSN #:                  [id number]  :                       1965  ADMIT DATE:       10/14/2021 5:12 AM  DISCH DATE:  RESPONDING  PROVIDER #:        Keily Adamson MD          QUERY TEXT:    Patient admitted with left flank abscess. Per Op note dated 10/14   documentation of I&D,\"This was carried down with Bovie electrocautery\". To accurately reflect the procedure performed please further specify the depth   of tissue incised and drained: The medical record reflects the following:  Risk Factors: medical noncompliance Diverticulitis  Clinical Indicators: CT scan showed an area of significant inflammation with   subcutaneous air without obvious abscess. There is also incidentally seen   inflammatory changes around the sigmoid and descending colon and the   suggestion of diverticulitis with possible fistulous connection with the   subcutaneous findings. Treatment: I&D left flank abscess    Sultana Ballesteros RN BSN CCDS  Options provided:  -- Skin only  -- Subcutaneous tissue  -- Fascia  -- Muscle  -- Other - I will add my own diagnosis  -- Disagree - Not applicable / Not valid  -- Disagree - Clinically unable to determine / Unknown  -- Refer to Clinical Documentation Reviewer    PROVIDER RESPONSE TEXT:    The depth of tissue incised was muscle, as detailed in the operative report.     Query created by: Sejal Miranda on 10/15/2021 7:38 AM      Electronically signed by:  Keily Adamson MD 10/15/2021 7:45 AM

## 2021-10-15 NOTE — CARE COORDINATION
Patient is POD#1 Left flank excisional debridement for Left flank abscess. Per internal med note today, total wound size 10cm x 8cm x 4 cm. Plan: CT of the abdomen/pelvis with PO and rectal contrast to further evaluate for enterocutaneous fistula per gen surgery. Patient is currently on IV Cleocin q 8 hrs and IV Zosyn q 8 hrs. It was brought to my attention that this patient is homeless. I attempted to meet with patient in room to explain role and discuss transition of care but he was out of room receiving test. Due to anticipated need for IV antibiotics and wound care, I made a preliminary referral to Prabha Lopez at [facility]. If accepted, I will discuss this with the patient. PT/OT evals have also been ordered. Tony Lakhani RN, CM        Per Prabha Lopez from [facility], they are able to accept patient when medically ready under the precert waiver. Patient is still out of his room for testing. If patient is agreeable, Prabha Lopez said updated PT/OT and a negative Covid-19 test is all that is needed prior to discharge. A Hens will need to be started and completed once the discharge order goes. Ambulette form in envelope in soft chart. Tony Lakhani RN, CM      I met with patient in room to discuss transition of care and the need for medical care after discharge. He said he lives in a motel and is agreeable to go to [facility]. He is agreeable to [facility]. Patient will need a  negative Covid-19 test prior to discharge. A Hens was started and will need to be completed once the discharge order goes in. Therapy department notified of need for updated PT/OT. Ambulette form in envelope in soft chart.   Tony Lakhani RN, CM

## 2021-10-15 NOTE — ANESTHESIA POSTPROCEDURE EVALUATION
Department of Anesthesiology  Postprocedure Note    Patient: Ade Jones  MRN: [id number]  YOB: 1965  Date of evaluation: 10/14/2021  Time:  8:04 PM     Procedure Summary     Date: 10/14/21 Room / Location: [facility] OR  / [facility]    Anesthesia Start: 2809 Anesthesia Stop: 3160    Procedure: ABDOMEN INCISION AND DRAINAGE (Left Abdomen) Diagnosis: (/)    Surgeons: Feli Tillman MD Responsible Provider: Gio Hollis MD    Anesthesia Type: general ASA Status: 3          Anesthesia Type: general    Sophia Phase I: Sophia Score: 10    Sophia Phase II:      Last vitals: Reviewed and per EMR flowsheets.        Anesthesia Post Evaluation    Patient location during evaluation: PACU  Patient participation: complete - patient participated  Level of consciousness: awake and alert  Airway patency: patent  Nausea & Vomiting: no nausea and no vomiting  Complications: no  Cardiovascular status: hemodynamically stable and blood pressure returned to baseline  Respiratory status: acceptable  Hydration status: euvolemic

## 2021-10-15 NOTE — PROGRESS NOTES
GENERAL SURGERY  DAILY PROGRESS NOTE  10/15/2021    Subjective:  No acute complaints or overnight events. Patient is fairly comfortable this morning. No nausea. Objective:  BP (!) 148/92   Pulse 87   Temp 98 °F (36.7 °C) (Axillary)   Resp 20   Ht 6' 2\" (1.88 m)   Wt 268 lb (121.6 kg)   SpO2 98%   BMI 34.41 kg/m²     GENERAL:  No acute distress. Alert and interactive. Oriented x3. LUNGS:  Symmetric chest rise, no audible wheezes  CARDIOVASC:  Warm throughout, no chest pain. ABDOMEN:  Soft, non distended, non tender. FLANK: Surgical wound present to left flank. Kerlix packing was soaked with blood. No purulent drainage or surrounding erythema noted. EXTREMITIES: Moves all extremities. Assessment/Plan:  64 y.o. 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 Denies hx of diverticulitis or abdominal pain. His chest pain which led to his presentation to the hospital has resolved.     - CT abdomen and pelvis with PO and rectal contrast  - NPO prior to imaging study  - okay for low fiber diet following CT scan  - bowel regimen ordered  - continue antibiotics  - daily labs  - ambulate q shift  - incentive spirometry    Plan discussed with Dr. Marye Gitelman    Electronically signed by Noah Jenkins DO on 10/15/2021 at 8:02 AM

## 2021-10-15 NOTE — PROGRESS NOTES
Addendum      Vancomycin has been discontinued by ID Dr. Austin Collins to [place] [address] Physician to re-consult pharmacy if future dosing is needed    Thank you for the consult,    Minnie Naylor, PharmD   Pharmacy Resident   Phone: [phone]  10/15/2021 2:15 PM      Pharmacy Consultation Note  (Antibiotic Dosing and Monitoring)    Initial consult date: 10/14/21  Consulting physician: Dr. Emeka Rodriguez  Drug: Vancomycin  Indication: Skin and Soft Tissue Infection- complicated diverticulitis with possibly fistula    Age/  Gender Height Weight IBW  Allergy Information   56 y.o./male 6' 2\" (188 cm) 268 lb (121.6 kg)     Ideal body weight: 82.2 kg (181 lb 3.5 oz)  Adjusted ideal body weight: 97.9 kg (215 lb 14.9 oz)   Patient has no known allergies. Renal Function:  Recent Labs     10/13/21  1310 10/15/21  0441   BUN 9 8   CREATININE 0.9 0.9       Intake/Output Summary (Last 24 hours) at 10/15/2021 0810  Last data filed at 10/14/2021 1638  Gross per 24 hour   Intake 800 ml   Output 25 ml   Net 775 ml       Vancomycin Monitoring:  Trough:  No results for input(s): VANCOTROUGH in the last 72 hours. Random:    Recent Labs     10/15/21  0441   VANCORANDOM 17.4       Vancomycin Administration Times:  Recent vancomycin administrations                   vancomycin (VANCOCIN) 2,500 mg in dextrose 5 % 500 mL IVPB (mg) 2,500 mg New Bag 10/14/21 2000                Assessment:  · Patient is a 64 y.o. male who has been initiated on vancomycin for SSTI. Patient admitted for complicated diverticulitis with possible fistula. Patient s/p I&D with general surgery on 10/14. · Estimated Creatinine Clearance: 127 mL/min (based on SCr of 0.9 mg/dL). · To dose vancomycin, pharmacy will be utilizing [employer] calculation software for goal AUC/CANDACE 400-600 mg/L-hr   · 10/15: Started on vancomycin, pip-tazo, and clindamycin in ED. Given vancomycin 2500 mg IV x 1 on 10/14, ID consulted. Vancomycin random level @0441 = 17.4.  Renal function stable Scr 0.9, WBC 8.9, afebrile.      Plan:  · Will continue vancomycin 1250 mg IV q12h (predicted AUC/CANDACE = 494, Tr = 15.3)   · Will check vancomycin levels when appropriate  · Will continue to monitor renal function   · Clinical pharmacy to follow      Minnie Naylor, PharmD   Pharmacy Resident   Phone: [phone]  10/15/2021 11:56 AM

## 2021-10-15 NOTE — PROGRESS NOTES
with all functional transfers   [x] Gait Training to improve gait mechanics, endurance and assess need for appropriate assistive device  [x] Stair Training in preparation for safe discharge home and/or into the community   [] Positioning to prevent skin breakdown and contractures  [x] Safety and Education Training   [] Patient/Caregiver Education   [] HEP  [] Other     PT long term treatment goals are located in above grid    Frequency of treatments: 2-5x/week x 1-2 weeks. Time in  1454  Time out  1515    Total Treatment Time  10 minutes     Evaluation Time includes thorough review of current medical information, gathering information on past medical history/social history and prior level of function, completion of standardized testing/informal observation of tasks, assessment of data and education on plan of care and goals. CPT codes:  [x] Low Complexity PT evaluation 28370  [] Moderate Complexity PT evaluation 50917  [] High Complexity PT evaluation 40173  [] PT Re-evaluation 12481  [] Gait training 83155 0 minutes  [] Manual therapy 68596 0 minutes  [x] Therapeutic activities 84172 10 minutes  [] Therapeutic exercises 57420 0 minutes  [] Neuromuscular reeducation 42419 0 minutes     Melina Nunez.  [place], [facility]  [id number]

## 2021-10-15 NOTE — PROGRESS NOTES
Wound care: patient off the floor for test, unable to see for wound care visit. Per patients nurse general surgery changed dressing this morning. Will attempt to see at a later time.  Aurora Blake RN

## 2021-10-15 NOTE — DISCHARGE SUMMARY
[address] [facility]  Discharge Summary    PCP: No primary care provider on file. Admit Date:10/14/2021  Discharge Date: 10/18/2021    Admission Diagnosis:   1. 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 Hx HTN  3. Hx CAD  4. Pre-diabetes  5. Hx MARLEE  6. Splenomegaly likely d/t alcohol liver disease r/o lymphoproliferative disorder, malignancy    Discharge Diagnosis:  1. L flank abscess 2/2  enterocutaneous fistula d/t complicated diverticulitis s/p excision debridement L flank, 10/14/21  2. 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 Hx HTN  4. Hx CAD  5. Pre-diabetes  6. Hx MARLEE  7. Alcohol abuse, with Hx of alcohol withdrawal seizures  8. Splenomegaly most likely 2/2 alcohol liver disease    Hospital Course: The patient is a 64 y.o. male with a past medical history of HTN (non-compliant with meds- amlodipine, Toprol XL), CAD (equivocal stress test 5/21; negative LHC (7/16/21) EF 55%, also non-compliant with meds-aspirin, atorvastatin, Toprol XL), MARLEE (non-compliant with CPAP), ETOH with withdrawal seizures (non-compliant with meds-thiamine, folic acid and divalproex)  who comes into the ED for chest pain and L lower flank pain. In the ED, BP was initially elevated at 162/100, HR 84, afebrile. CT scan of the abdomen was done and showed inflammatory changes surrounding the proximal sigmoid colon extending through the abdominal wall and into the L flank where there are collections of fluid and gas. This most likely represents complicated diverticulitis with fistula formation with or without gas-forming infection. 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 Excisional debridement of the L flank wound was planned.  He was placed on NPO, started on LR at 125 ml/hr, given Zosyn, clindamycin and vancomycin IV. He has slight leukocytosis with WBC at 11. Na was 133 but otherwise wnl. Bilirubin was elevated at 2.7. CTA of the chest was done and showed no PE, with splenomegaly and probable hepatomegaly. That afternoon, he underwent excisional debridement of the L flank abscess. Intra-op findings showed loculated abscess with feculent and purulent drainage with an area which contained a structure that appeared to be a lumen which we thought may represent a fistulous tract. This tissue was taken out and sent off for permanent section for evaluation by the pathologist. The wound extended to the muscular layer of the left flank and we estimated the total wound size to be 10cm x 8cm x 4cm and was covered the kerlix with 4 x 4 gauze and a heavy drainage pack which was taped in place. He was referred to ID for evaluation and management. He was seen by ID the following day. Clindamycin and vancomycin were discontinued. CT of the abdomen and pelvis with PO and rectal contrast was done and showed inflammatory changes with wall thickening and edema of the distal descending and sigmoid colon likely diverticulitis, with a soft tissue connection to the abdominal wall with tiny abscess collection in the abdominal wall with an adjacent lateral abscess collection in the subcutaneous tissue. This could represent inflammation with a colo-cutaneous fistula which is closed. However, an open communication or a contrast extravasation could not be established. Inflammatory changes in the pancreatic head. Awaiting for next plan from general surgery. He was also started on Librium in anticipation for alcohol withdrawal. Currently, no signs and symptoms of alcohol withdrawal.        10/16/21. Patient`s vitals were stable. No leukocytosis, abdominal pain nor withdrawal symptoms noted overnight. Packing on the wound changed this morning.  There was no note of fecal matter on LORazepam 1 MG tablet  Commonly known as: ATIVAN  Take 1 tablet by mouth every 2 hours as needed for Anxiety (agitation) for up to 10 doses. oxyCODONE 5 MG immediate release tablet  Commonly known as: ROXICODONE  Take 1 tablet by mouth every 6 hours as needed for Pain for up to 3 days. PHENobarbital 32.4 MG tablet  Commonly known as: LUMINAL  Take 1 tablet by mouth daily for 2 doses.   Start taking on: October 19, 2021     piperacillin-tazobactam 3.375 (3-0.375) g injection  Commonly known as: Zosyn  Infuse 3,375 mg intravenously every 8 hours for 21 days     polyethylene glycol 17 g packet  Commonly known as: GLYCOLAX  Take 17 g by mouth daily as needed for Constipation     sennosides-docusate sodium 8.6-50 MG tablet  Commonly known as: SENOKOT-S  Take 2 tablets by mouth 2 times daily        CONTINUE taking these medications    aspirin 81 MG chewable tablet  Take 1 tablet by mouth daily     atorvastatin 40 MG tablet  Commonly known as: LIPITOR  Take 1 tablet by mouth nightly     folic acid 1 MG tablet  Commonly known as: FOLVITE  Take 1 tablet by mouth daily     melatonin 3 MG Tabs tablet  Take 2 tablets by mouth daily     metoprolol succinate 25 MG extended release tablet  Commonly known as: TOPROL XL  Take 1 tablet by mouth daily     vitamin B-1 100 MG tablet  Commonly known as: THIAMINE  Take 1 tablet by mouth daily        STOP taking these medications    amLODIPine 10 MG tablet  Commonly known as: NORVASC           Where to Get Your Medications      These medications were sent to [address]  [address], [place] [address]    Phone: [phone]   · folic acid 1 MG tablet  · melatonin 3 MG Tabs tablet  · polyethylene glycol 17 g packet  · sennosides-docusate sodium 8.6-50 MG tablet     You can get these medications from any pharmacy    Bring a paper prescription for each of these medications  · LORazepam 1 MG tablet  · oxyCODONE 5 MG immediate release tablet  · PHENobarbital 32.4 MG tablet  · piperacillin-tazobactam 3.375 (3-0.375) g injection        Follow up with General surgery in 3 weeks and infectious disease in 2 weeks. Repeat BMP in 2 weeks.       Jerrol Hatchet, MD  PGY-1   1:16 PM 10/18/2021

## 2021-10-16 PROBLEM — E44.1 MILD PROTEIN-CALORIE MALNUTRITION (HCC): Status: ACTIVE | Noted: 2021-10-16

## 2021-10-16 LAB
ALBUMIN SERPL-MCNC: 3.1 G/DL (ref 3.5–5.2)
ALP BLD-CCNC: 84 U/L (ref 40–129)
ALT SERPL-CCNC: 15 U/L (ref 0–40)
ANION GAP SERPL CALCULATED.3IONS-SCNC: 9 MMOL/L (ref 7–16)
AST SERPL-CCNC: 17 U/L (ref 0–39)
BASOPHILS ABSOLUTE: 0.13 E9/L (ref 0–0.2)
BASOPHILS RELATIVE PERCENT: 1.7 % (ref 0–2)
BILIRUB SERPL-MCNC: 1.1 MG/DL (ref 0–1.2)
BUN BLDV-MCNC: 11 MG/DL (ref 6–20)
CALCIUM SERPL-MCNC: 8.8 MG/DL (ref 8.6–10.2)
CHLORIDE BLD-SCNC: 99 MMOL/L (ref 98–107)
CO2: 26 MMOL/L (ref 22–29)
CREAT SERPL-MCNC: 1.1 MG/DL (ref 0.7–1.2)
EOSINOPHILS ABSOLUTE: 0 E9/L (ref 0.05–0.5)
EOSINOPHILS RELATIVE PERCENT: 0.1 % (ref 0–6)
GFR AFRICAN AMERICAN: >60
GFR NON-AFRICAN AMERICAN: >60 ML/MIN/1.73
GLUCOSE BLD-MCNC: 117 MG/DL (ref 74–99)
HCT VFR BLD CALC: 40.7 % (ref 37–54)
HEMOGLOBIN: 13.9 G/DL (ref 12.5–16.5)
LYMPHOCYTES ABSOLUTE: 0.46 E9/L (ref 1.5–4)
LYMPHOCYTES RELATIVE PERCENT: 6.1 % (ref 20–42)
MAGNESIUM: 2.3 MG/DL (ref 1.6–2.6)
MCH RBC QN AUTO: 30 PG (ref 26–35)
MCHC RBC AUTO-ENTMCNC: 34.2 % (ref 32–34.5)
MCV RBC AUTO: 87.9 FL (ref 80–99.9)
METER GLUCOSE: 105 MG/DL (ref 74–99)
METER GLUCOSE: 113 MG/DL (ref 74–99)
MONOCYTES ABSOLUTE: 0.38 E9/L (ref 0.1–0.95)
MONOCYTES RELATIVE PERCENT: 5.2 % (ref 2–12)
NEUTROPHILS ABSOLUTE: 6.61 E9/L (ref 1.8–7.3)
NEUTROPHILS RELATIVE PERCENT: 87 % (ref 43–80)
PDW BLD-RTO: 13.4 FL (ref 11.5–15)
PHOSPHORUS: 2.7 MG/DL (ref 2.5–4.5)
PLATELET # BLD: 183 E9/L (ref 130–450)
PMV BLD AUTO: 10.2 FL (ref 7–12)
POLYCHROMASIA: ABNORMAL
POTASSIUM SERPL-SCNC: 4.1 MMOL/L (ref 3.5–5)
RBC # BLD: 4.63 E12/L (ref 3.8–5.8)
SODIUM BLD-SCNC: 134 MMOL/L (ref 132–146)
TOTAL PROTEIN: 6.2 G/DL (ref 6.4–8.3)
WBC # BLD: 7.6 E9/L (ref 4.5–11.5)

## 2021-10-16 PROCEDURE — 2580000003 HC RX 258: Performed by: STUDENT IN AN ORGANIZED HEALTH CARE EDUCATION/TRAINING PROGRAM

## 2021-10-16 PROCEDURE — 6370000000 HC RX 637 (ALT 250 FOR IP): Performed by: INTERNAL MEDICINE

## 2021-10-16 PROCEDURE — 2500000003 HC RX 250 WO HCPCS: Performed by: INTERNAL MEDICINE

## 2021-10-16 PROCEDURE — 36415 COLL VENOUS BLD VENIPUNCTURE: CPT

## 2021-10-16 PROCEDURE — 82962 GLUCOSE BLOOD TEST: CPT

## 2021-10-16 PROCEDURE — 99239 HOSP IP/OBS DSCHRG MGMT >30: CPT | Performed by: INTERNAL MEDICINE

## 2021-10-16 PROCEDURE — 87635 SARS-COV-2 COVID-19 AMP PRB: CPT

## 2021-10-16 PROCEDURE — 84100 ASSAY OF PHOSPHORUS: CPT

## 2021-10-16 PROCEDURE — 6360000002 HC RX W HCPCS: Performed by: STUDENT IN AN ORGANIZED HEALTH CARE EDUCATION/TRAINING PROGRAM

## 2021-10-16 PROCEDURE — 2580000003 HC RX 258: Performed by: INTERNAL MEDICINE

## 2021-10-16 PROCEDURE — 1200000000 HC SEMI PRIVATE

## 2021-10-16 PROCEDURE — 6360000002 HC RX W HCPCS: Performed by: INTERNAL MEDICINE

## 2021-10-16 PROCEDURE — 80053 COMPREHEN METABOLIC PANEL: CPT

## 2021-10-16 PROCEDURE — 6370000000 HC RX 637 (ALT 250 FOR IP): Performed by: STUDENT IN AN ORGANIZED HEALTH CARE EDUCATION/TRAINING PROGRAM

## 2021-10-16 PROCEDURE — 85025 COMPLETE CBC W/AUTO DIFF WBC: CPT

## 2021-10-16 PROCEDURE — 83735 ASSAY OF MAGNESIUM: CPT

## 2021-10-16 RX ORDER — FOLIC ACID 1 MG/1
1 TABLET ORAL DAILY
Qty: 30 TABLET | Refills: 0 | Status: SHIPPED | OUTPATIENT
Start: 2021-10-16 | End: 2021-10-18

## 2021-10-16 RX ORDER — OXYCODONE HYDROCHLORIDE 5 MG/1
5 TABLET ORAL EVERY 6 HOURS PRN
Qty: 12 TABLET | Refills: 0 | Status: SHIPPED | OUTPATIENT
Start: 2021-10-16 | End: 2021-10-19

## 2021-10-16 RX ORDER — LANOLIN ALCOHOL/MO/W.PET/CERES
6 CREAM (GRAM) TOPICAL DAILY
Qty: 60 TABLET | Refills: 0 | Status: SHIPPED | OUTPATIENT
Start: 2021-10-16 | End: 2021-11-15

## 2021-10-16 RX ORDER — LORAZEPAM 1 MG/1
1 TABLET ORAL
Status: DISCONTINUED | OUTPATIENT
Start: 2021-10-16 | End: 2021-10-18 | Stop reason: HOSPADM

## 2021-10-16 RX ORDER — FOLIC ACID 5 MG/ML
1 INJECTION, SOLUTION INTRAMUSCULAR; INTRAVENOUS; SUBCUTANEOUS DAILY
Status: DISCONTINUED | OUTPATIENT
Start: 2021-10-16 | End: 2021-10-17

## 2021-10-16 RX ADMIN — PIPERACILLIN AND TAZOBACTAM 3375 MG: 3; .375 INJECTION, POWDER, LYOPHILIZED, FOR SOLUTION INTRAVENOUS at 06:44

## 2021-10-16 RX ADMIN — PHENOBARBITAL 64.8 MG: 32.4 TABLET ORAL at 13:31

## 2021-10-16 RX ADMIN — METOPROLOL SUCCINATE 25 MG: 25 TABLET, EXTENDED RELEASE ORAL at 09:15

## 2021-10-16 RX ADMIN — ENOXAPARIN SODIUM 40 MG: 100 INJECTION SUBCUTANEOUS at 09:16

## 2021-10-16 RX ADMIN — ATORVASTATIN CALCIUM 40 MG: 40 TABLET, FILM COATED ORAL at 21:14

## 2021-10-16 RX ADMIN — ACETAMINOPHEN 1000 MG: 500 TABLET ORAL at 13:31

## 2021-10-16 RX ADMIN — ASPIRIN 81 MG CHEWABLE TABLET 81 MG: 81 TABLET CHEWABLE at 09:16

## 2021-10-16 RX ADMIN — PHENOBARBITAL 64.8 MG: 32.4 TABLET ORAL at 21:14

## 2021-10-16 RX ADMIN — PIPERACILLIN AND TAZOBACTAM 3375 MG: 3; .375 INJECTION, POWDER, LYOPHILIZED, FOR SOLUTION INTRAVENOUS at 17:20

## 2021-10-16 RX ADMIN — Medication 10 ML: at 21:14

## 2021-10-16 RX ADMIN — ACETAMINOPHEN 1000 MG: 500 TABLET ORAL at 21:13

## 2021-10-16 RX ADMIN — PHENOBARBITAL 64.8 MG: 32.4 TABLET ORAL at 09:15

## 2021-10-16 RX ADMIN — ACETAMINOPHEN 1000 MG: 500 TABLET ORAL at 05:38

## 2021-10-16 RX ADMIN — THIAMINE HCL TAB 100 MG 100 MG: 100 TAB at 09:15

## 2021-10-16 RX ADMIN — PIPERACILLIN AND TAZOBACTAM 3375 MG: 3; .375 INJECTION, POWDER, LYOPHILIZED, FOR SOLUTION INTRAVENOUS at 23:34

## 2021-10-16 RX ADMIN — FOLIC ACID 1 MG: 5 INJECTION, SOLUTION INTRAMUSCULAR; INTRAVENOUS; SUBCUTANEOUS at 09:21

## 2021-10-16 RX ADMIN — Medication 10 ML: at 09:16

## 2021-10-16 ASSESSMENT — PAIN SCALES - GENERAL
PAINLEVEL_OUTOF10: 0

## 2021-10-16 NOTE — PROGRESS NOTES
[facility]  Internal Medicine Residency Program  Progress Note - House Team 1    Patient:  Sam Dorsey 64 y.o. male MRN: [id number]     Date of Service: 10/16/2021     CC: L flank pain and chest pain  Overnight events: No acute events overnight. Subjective     The patient is seen and examined this morning. He is lying on bed, not in acute distress. Packing was changed. Old packing soaked with blood and clots with no feculent material seen, non-foul smelling. Denies chest pain, dyspnea, abdominal pain, nausea and vomiting. Denies anxiety, tremors, sweating, agitation. No hypertension nor tachycardia noted overnight. Objective     Physical Exam:  · Vitals: BP (!) 149/93   Pulse 81   Temp 97.7 °F (36.5 °C) (Oral)   Resp 16   Ht 6' 2\" (1.88 m)   Wt 268 lb (121.6 kg)   SpO2 97%   BMI 34.41 kg/m²     · I & O - 24hr: No intake/output data recorded. · General Appearance: alert, appears stated age and cooperative  · HEENT:  Head: Normal, normocephalic, atraumatic. · Neck: no adenopathy, no carotid bruit, no JVD, supple, symmetrical, trachea midline and thyroid not enlarged, symmetric, no tenderness/mass/nodules  · Lung: clear to auscultation bilaterally  · Heart: regular rate and rhythm, S1, S2 normal, no murmur, click, rub or gallop  · Abdomen: (+) 10 cm incision with erythematous and tender wound edges, open with gauze packing, with surrounding induration and erythema; with serosanguinous discharge covered with ABD pads; old packing with no visible feculent matter, non-foul smelling;  NABS, soft, non-tender  · Extremities:  extremities normal, atraumatic, no cyanosis or edema  · Musculokeletal: No joint swelling, no muscle tenderness. ROM normal in all joints of extremities.    · Neurologic: Mental status: Alert, oriented, thought content appropriate  Subject  Pertinent Labs & Imaging Studies   anushka  CBC with Differential:    Lab Results   Component Value Date    WBC 8.9 10/15/2021 RBC 4.93 10/15/2021    HGB 14.8 10/15/2021    HCT 43.2 10/15/2021     10/15/2021    MCV 87.6 10/15/2021    MCH 30.0 10/15/2021    MCHC 34.3 10/15/2021    RDW 13.4 10/15/2021    NRBC 0.9 05/07/2021    LYMPHOPCT 2.6 10/15/2021    MONOPCT 3.5 10/15/2021    BASOPCT 0.1 10/15/2021    MONOSABS 0.36 10/15/2021    LYMPHSABS 0.36 10/15/2021    EOSABS 0.00 10/15/2021    BASOSABS 0.00 10/15/2021     CMP:    Lab Results   Component Value Date     10/15/2021    K 4.5 10/15/2021    K 4.5 10/15/2021     10/15/2021    CO2 24 10/15/2021    BUN 8 10/15/2021    CREATININE 0.9 10/15/2021    GFRAA >60 10/15/2021    LABGLOM >60 10/15/2021    GLUCOSE 172 10/15/2021    GLUCOSE 110 01/07/2011    PROT 7.3 10/15/2021    LABALBU 2.8 10/15/2021    LABALBU 4.1 01/07/2011    CALCIUM 9.0 10/15/2021    BILITOT 1.5 10/15/2021    ALKPHOS 86 10/15/2021    AST 11 10/15/2021    ALT 13 10/15/2021     Last 3 Troponin:    Lab Results   Component Value Date    TROPONINI <0.01 05/12/2021    TROPONINI <0.01 05/11/2021    TROPONINI <0.01 05/09/2021     HgBA1c:    Lab Results   Component Value Date    LABA1C 5.7 05/08/2021     Microalbumen/Creatinine ratio:  No components found for: JOCELINE    Resident's Assessment and Plan     Assessment and Plan:    L flank abscess s/p excisional debridement 10/14/21 2/2 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  -large indurated area L flank, minimal pain, no draining wound/sinus  -s/p excisional debridement 10/14/21  -total wound size 10cm x 8cm x 4 cm  -packing changed this morning, soaked with blood and clots, no visible fecal matter noted, non-foul smelling  -wound edges are tender, erythematous  -10/15/21 CT of the abdomen/pelvis with PO and rectal contrast:sigmoid diverticulitis in distal descending and sigmoid colon with a soft tissue connection to the abdominal wall with tiny abscess collection in the abdl wall with an adjacent lateral abscess collection in the subcutaneous tissue-could represent inflammation with a colocutaneous fistula which is closed. -Gram stain abscess: few gm + cocci, abundant gm neg rods  -surgical culture pending    -Zosyn D2  -blood glucose 113-116 mg/dl  [facility]  Plan  -General surgery and ID following  -no further surgical intervention for now per Gen surg, \"we will treat this like typical diverticulitis with non-operative management and antibiotics for now, if any signs of stool coming out of his debridement site present, we will discuss further surgery\"  -Vancomycin and clindamycin dcd 10/15/21 per ID recs  -follow cultures  -wound care  -pain control  -colonoscopy as out-patient  -dc planning to [facility]     Complicated sigmoid diverticulitis w/ 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   10/14/21: s/p debridement of L flank abscess, no obvious fistulous tract  -10/15/21: Rpt CT of abdomen with PO/rectal contrast: as above  -ESR, CRP both elevated  -pending surgical cultures  Plan  -follow surgical culture and sensitivity  -continue Zosyn D2  -pain mgt per surgery service     Hx HTN  -SBP in the 130-140`s  Plan  -metoprolol resumed  -continue to monitor BP     Hx CAD  -denies chest pain since admission  -no significant change in troponin  -s/p [facility] 7/16/21- no stenosis in L main, LAD, circumflex and RCA, EF 55%  Plan  -resumed aspirin 81 mg daily, Toprol XL 25 mg daily and atorvastatin 40 mg daily     Hx MARLEE  -non-compliant with CPAP   -does not follow-up with Pulmonology  Plan  -monitor  -refer to Pulmo as out-patient  -sleep study as out-patient     Splenomegaly likely 2/2 alcohol liver disease  -alcohol drinker since 27years old  -drinks 5 bottles of 40 oz beer most days of the week  -with hypoalbuminemia  -PBS: negative for immature forms or blasts  -pending PSA  Plan  -will continue to monitor    Alcohol abuse with Hx of alcohol withdrawal seizures  -vague historian  -admits to drinking 3-5 40 oz beer most days of the week for the past year or   -has been drinking since 27years old  -with hx of ETOH withdrawal seizure per previous notes  -last alcohol intake?, says a week or 2 ago  -overnight, no s/sx of alcohol withdrawal  -vital signs stable  Plan  -phenobarbital started  -monitor for signs and symptoms of alcohol withdrawal, DT      PT/OT evaluation:  DVT prophylaxis/ GI prophylaxis: enoxaparin  Disposition: Oxana Solis MD, PGY-1  Attending physician: Dr. Catarina Gaona  [facility] Medicine [facility]    Attending Physician Statement:  Stefano Spears M.D., F.A.C.P. I have discussed the case, including pertinent history and exam findings with the resident/NP. I have seen and examined the patient and the key elements of the encounter have been performed by me. I agree with the resident ROS, PMHx, PSHx, meds reviewed and assessment, plan and orders as documented by the resident/NP      Hospital charts reviewed, including other providers notes, relevant labs and imaging. >50% of time spent coordinating care with other providers and/or counseling patient/family  Remainder of medical problems as per resident note. POD#2 Left flank excisional debridement for Left flank abscess. Per internal med note today, total wound size 10cm x 8cm x 4 cm. CT of the abdomen/pelvis with PO and rectal contrast   Sp abscess/diverticulitis  Local abscess flank wall, tracking from prior diverticulits  -lumen with possible fistula tract   \"enterocutaneous fistula\" per gen surgery. -  Excision into mucle  The depth of tissue incised was muscle,walls of gut ? Ok      Eating OK  per Gen surg  - for now no diverting colostomy plans -- (GI lumen reestablished in past)  Patient is currently on IV Cleocin q 8 hrs and IV Zosyn q 8 hrs.      Lives in hotel, but setting up at facility [facility]- talked to  at length    Chest pain workup in past, likely nonanginal  +Vague- medically noncompliance with prior cardiac meds-- ASA/statin/betablocker  Hx of etoh withdrawals/with seizure  Thaimine, folate,   Wean off phenobarb  +splenomegaly  +etoh, drinks \"most of days\"-- no DTs yet  DC time >30min- possible DC today  Notify   RESIDENT --- OK for DC   I titrated off phenobarb, NO DTs seen (benzo prn)  if covid negative  And facility set up for IV abx as per ID request

## 2021-10-16 NOTE — DISCHARGE INSTR - COC
Continuity of Care Form    Patient Name: Sal Madrid   :  1965  MRN:  [id number]    Admit date:  10/14/2021  Discharge date:  ***    Code Status Order: Full Code   Advance Directives:   Advance Care Flowsheet Documentation       Date/Time Healthcare Directive Type of Healthcare Directive Copy in [address] Agent's Name Healthcare Agent's Phone Number    10/14/21 1446  No, patient does not have an advance directive for healthcare treatment  --  --  --  --  --            Admitting Physician:  Priya Servin MD  PCP: No primary care provider on file. Discharging Nurse: [facility] Unit/Room#: 8405/8405-B  Discharging Unit Phone Number: ***    Emergency Contact:   Extended Emergency Contact Information  [facility] Phone: [phone]  Relation: Brother/Sister  Preferred language: English   needed? No  Secondary Emergency Contact: Francisca [facility] Phone: [phone]  Relation: Brother/Sister    Past Surgical History:  Past Surgical History:   Procedure Laterality Date    ABDOMEN SURGERY Left 10/14/2021    ABDOMEN INCISION AND DRAINAGE performed by Akiko Nickerson MD at [address] [place] [address]       Immunization History: There is no immunization history on file for this patient.     Active Problems:  Patient Active Problem List   Diagnosis Code    Lightheadedness R42    Acute cystitis with hematuria N30.01    Chest pain R07.9    Acute psychosis ([facility] [address] F23    Cognitive disorder F09    CAD (coronary artery disease) I25.10    Hypertensive urgency I16.0    MARLEE (obstructive sleep apnea) G47.33    Class 1 obesity in adult E66.9    Abnormal stress test R94.39    LV dysfunction I51.9    Thrombocytopenia (HCC) D69.6    Hyperbilirubinemia E80.6    Alcohol abuse F10.10    HLD (hyperlipidemia) E78.5    Sigmoid diverticulitis K57.32       Isolation/Infection:   Isolation            No Isolation          Patient Infection Status       Infection Onset Added Last Indicated Last Indicated By Review Planned Expiration Resolved Resolved By    None active    Resolved    COVID-19 Rule Out 06/05/21 06/05/21 06/05/21 COVID-19 & Influenza Combo (Ordered)   06/05/21 Rule-Out Test Resulted            Nurse Assessment:  Last Vital Signs: /74   Pulse 78   Temp 98.3 °F (36.8 °C) (Oral)   Resp 16   Ht 6' 2\" (1.88 m)   Wt 242 lb 6.4 oz (110 kg)   SpO2 94%   BMI 31.12 kg/m²     Last documented pain score (0-10 scale): Pain Level: 0  Last Weight:   Wt Readings from Last 1 Encounters:   10/16/21 242 lb 6.4 oz (110 kg)     Mental Status:  {IP PT MENTAL STATUS:20030:::0}    IV Access:  { [facility] IV ACCESS:095995440:::0}    Nursing Mobility/ADLs:  Walking   {P DME ADLs:258570367:::0}  Transfer  {CHP DME ADLs:418307046:::0}  Bathing  {CHP DME ADLs:918660805:::0}  Dressing  {CHP DME ADLs:497611900:::0}  Toileting  {CHP DME ADLs:088521453:::0}  Feeding  {CHP DME ADLs:804948542:::0}  Med Admin  {P DME ADLs:426609380:::0}  Med Delivery   { [facility] MED Delivery:838948140:::0}    Wound Care Documentation and Therapy:        Elimination:  Continence: Bowel: {YES / HZ:69863}  Bladder: {YES / OS:73891}  Urinary Catheter: {Urinary Catheter:314508225:::0}   Colostomy/Ileostomy/Ileal Conduit: {YES / YP:37076}       Date of Last BM: ***  No intake or output data in the 24 hours ending 10/16/21 1034  No intake/output data recorded.     Safety Concerns:     [facility] Safety Concerns:780726933:::0}    Impairments/Disabilities:      [facility] Impairments/Disabilities:040151846:::0}    Nutrition Therapy:  Current Nutrition Therapy:   [facility] Diet List:702888090:::0}    Routes of Feeding: {CHP DME Other Feedings:204933777:::0}  Liquids: {Slp liquid thickness:96179}  Daily Fluid Restriction: {CHP DME Yes amt example:358429475:::0}  Last Modified Barium Swallow with Video (Video Swallowing Test): {Done Not Done FEXY:482081838:::1}    Treatments at the Time of Hospital Discharge:   Respiratory Treatments: ***  Oxygen Therapy:  {Therapy; copd oxygen:80750:::0}  Ventilator:    { CC Vent List:464147723:::0}    Rehab Therapies: {THERAPEUTIC INTERVENTION:8442802163}  Weight Bearing Status/Restrictions: [address] Weight Bearin:::0}  Other Medical Equipment (for information only, NOT a DME order):  {EQUIPMENT:263891204}  Other Treatments: ***    Patient's personal belongings (please select all that are sent with patient):  {CHP DME Belongings:269286277:::0}    RN SIGNATURE:  {Esignature:186055888:::0}    CASE MANAGEMENT/SOCIAL WORK SECTION    Inpatient Status Date: ***    Readmission Risk Assessment Score:  Readmission Risk              Risk of Unplanned Readmission:  25           Discharging to Facility/ Agency   Name:   Address:  Phone:  Fax:    Dialysis Facility (if applicable)   Name:  Address:  Dialysis Schedule:  Phone:  Fax:    / signature: {Esignature:967907878:::0}    PHYSICIAN SECTION    Prognosis: Good    Condition at Discharge: Stable    Rehab Potential (if transferring to Rehab): Good    Recommended Labs or Other Treatments After Discharge:  -Follow-up with general surgery for wound care and colonoscopy with general surgery. -Monitor for alcohol withdrawal. Patient has history of ETOH withdrawal seizures. He was on phenobarbital in-patient. Physician Certification: I certify the above information and transfer of Sang Ricardo  is necessary for the continuing treatment of the diagnosis listed and that he requires Subacute Rehab for greater 30 days. Update Admission H&P: Changes in H&P as follows -  Patient will be discharged to subacute rehab for IV antibiotics, wound care and physical therapy.      PHYSICIAN SIGNATURE:  Electronically signed by Melva Silva MD on 10/18/21 at 12:02 PM EDT

## 2021-10-16 NOTE — CARE COORDINATION
Social Work Discharge Planning;  Per [facility], patient is accepted and can discharge when ready to [facility]. Patient will need a COVID test prior to discharge. SWCM will need to complete HENS prior to discharge.   SW following   Electronically signed by EDWARD Lezama on 10/16/2021 at 10:35 AM

## 2021-10-16 NOTE — PROGRESS NOTES
GENERAL SURGERY  DAILY PROGRESS NOTE  10/16/2021    Subjective:  No acute events overnight. Pain is well controlled. Objective:  BP (!) 149/93   Pulse 81   Temp 97.7 °F (36.5 °C) (Oral)   Resp 16   Ht 6' 2\" (1.88 m)   Wt 242 lb 6.4 oz (110 kg)   SpO2 97%   BMI 31.12 kg/m²     GENERAL:  No acute distress. Alert and interactive. Oriented x3. LUNGS:  normal respiratory effort on room air  CARDIOVASC:  Warm throughout, no chest pain. ABDOMEN:  Soft, non distended, appropriately tender to palpation around incision on left flank, which is packed with kerlix that is moderately saturated with blood. No purulent drainage or surrounding erythema noted. EXTREMITIES: warm and well perfused      Assessment/Plan:  64 y.o. 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 CT performed without obvious EC fistula, will continue to manage conservatively as diverticulitis.      - continue diet as tolerated  - bowel regimen ordered  - continue antibiotics per ID   - f/u surg cultures and path   - [facility] for dressing changes, ordered  - okay for dc from sx perspective    Plan discussed with Dr. Torin Vidales    Electronically signed by Mary Pettit MD on 10/16/2021 at 8:15 AM

## 2021-10-16 NOTE — PLAN OF CARE
Problem: Falls - Risk of:  Goal: Will remain free from falls  Description: Will remain free from falls  10/16/2021 0034 by Mir Price RN  Outcome: Met This Shift  10/15/2021 1241 by Kourtney Caldwell RN  Outcome: Met This Shift  Goal: Absence of physical injury  Description: Absence of physical injury  10/16/2021 0034 by Mir Price RN  Outcome: Met This Shift  10/15/2021 1241 by Kourtney Caldwell RN  Outcome: Met This Shift     Problem: Skin Integrity:  Goal: Will show no infection signs and symptoms  Description: Will show no infection signs and symptoms  10/16/2021 0034 by Mir Price RN  Outcome: Met This Shift  10/15/2021 1241 by Kourtney Caldwell RN  Outcome: Met This Shift  Goal: Absence of new skin breakdown  Description: Absence of new skin breakdown  10/16/2021 0034 by Mir Price RN  Outcome: Met This Shift  10/15/2021 1241 by Kourtney Caldwell RN  Outcome: Met This Shift

## 2021-10-16 NOTE — PROGRESS NOTES
[address] [place] [address] [facility]  Progress Note    SUBJECTIVE:  Chief Complaint   Patient presents with    Back Pain     left lower flank pain started this morning    Chest Pain     comes and goes for months      Patient is tolerating medications. No reported adverse drug reactions. No nausea, vomiting. + loose stools. No abdominal pain    Review of systems:  As stated above in the chief complaint, otherwise negative. Medications:  Scheduled Meds:   folic acid  1 mg IntraVENous Daily    polyethylene glycol  17 g Oral BID    sennosides-docusate sodium  2 tablet Oral BID    aspirin  81 mg Oral Daily    atorvastatin  40 mg Oral Nightly    metoprolol succinate  25 mg Oral Daily    vitamin B-1  100 mg Oral Daily    PHENobarbital  64.8 mg Oral 4x Daily    Followed by   [facility] PHENobarbital  64.8 mg Oral TID    Followed by   Rianna Onofre ON 10/17/2021] PHENobarbital  64.8 mg Oral BID    Followed by   Rianna Onofre ON 10/18/2021] PHENobarbital  32.4 mg Oral BID    piperacillin-tazobactam  3,375 mg IntraVENous Q8H    sodium chloride flush  10 mL IntraVENous 2 times per day    enoxaparin  40 mg SubCUTAneous Daily    acetaminophen  1,000 mg Oral 3 times per day     Continuous Infusions:   sodium chloride      dextrose       PRN Meds:iohexol, sodium chloride flush, sodium chloride, ondansetron **OR** ondansetron, glucose, dextrose, glucagon (rDNA), dextrose, oxyCODONE **OR** oxyCODONE HCl, HYDROmorphone    OBJECTIVE:  /74   Pulse 78   Temp 98.3 °F (36.8 °C) (Oral)   Resp 16   Ht 6' 2\" (1.88 m)   Wt 242 lb 6.4 oz (110 kg)   SpO2 94%   BMI 31.12 kg/m²   Temp  Av °F (36.7 °C)  Min: 97.7 °F (36.5 °C)  Max: 98.3 °F (36.8 °C)  Constitutional: The patient is awake, alert, and oriented. Skin: Warm and dry. No rashes were noted. HEENT: Round and reactive pupils. Moist mucous membranes. No ulcerations or thrush. Neck: Supple to movements. Chest: No use of accessory muscles to breathe. Symmetrical expansion. No wheezing, crackles or rhonchi. Cardiovascular: S1 and S2 are rhythmic and regular. No murmurs appreciated. Abdomen: Positive bowel sounds to auscultation. Benign to palpation. No masses felt. No hepatosplenomegaly. surgical wound dressed  Extremities: No clubbing, no cyanosis, no edema.   Lines: peripheral    Laboratory and Tests Review:  Lab Results   Component Value Date    WBC 7.6 10/16/2021    WBC 8.9 10/15/2021    WBC 11.0 10/13/2021    HGB 13.9 10/16/2021    HCT 40.7 10/16/2021    MCV 87.9 10/16/2021     10/16/2021     Lab Results   Component Value Date    NEUTROABS 8.28 (H) 10/15/2021    NEUTROABS 4.22 07/14/2021    NEUTROABS 4.32 06/19/2021     No results found for: CRPHS  Lab Results   Component Value Date    ALT 15 10/16/2021    AST 17 10/16/2021    ALKPHOS 84 10/16/2021    BILITOT 1.1 10/16/2021     Lab Results   Component Value Date     10/16/2021    K 4.1 10/16/2021    K 4.5 10/15/2021    CL 99 10/16/2021    CO2 26 10/16/2021    BUN 11 10/16/2021    CREATININE 1.1 10/16/2021    CREATININE 0.9 10/15/2021    CREATININE 0.9 10/13/2021    GFRAA >60 10/16/2021    LABGLOM >60 10/16/2021    GLUCOSE 117 10/16/2021    GLUCOSE 110 01/07/2011    PROT 6.2 10/16/2021    LABALBU 3.1 10/16/2021    LABALBU 4.1 01/07/2011    CALCIUM 8.8 10/16/2021    BILITOT 1.1 10/16/2021    ALKPHOS 84 10/16/2021    AST 17 10/16/2021    ALT 15 10/16/2021     Lab Results   Component Value Date    CRP 5.2 (H) 10/15/2021     Lab Results   Component Value Date    SEDRATE 56 (H) 10/15/2021    SEDRATE 20 (H) 01/07/2011     Radiology:      Microbiology:   No results found for: BC, ORG  No results found for: Otto Puff, [place]  No results found for: WNDABS  No results found for: RESPSMEAR  No results found for: MPNEUMO, CLAMYDCU, LABLEGI, AFBCX, FUNGSM, LABFUNG  No results found for: CULTRESP  No results found for: CXCATHTIP  No results found for: BFCS  No results found for: CXSURG  Urine Culture, Routine

## 2021-10-16 NOTE — PLAN OF CARE
Problem: Falls - Risk of:  Goal: Will remain free from falls  Description: Will remain free from falls  10/16/2021 1104 by Zev Daly RN  Outcome: Met This Shift  10/16/2021 0034 by Laurel Gaston RN  Outcome: Met This Shift  Goal: Absence of physical injury  Description: Absence of physical injury  10/16/2021 1104 by Zev Daly RN  Outcome: Met This Shift  10/16/2021 0034 by Laurel Gaston RN  Outcome: Met This Shift

## 2021-10-16 NOTE — CONSULTS
(Ensure HP & Leonardo BID)  Nutrition Education/Counseling:  Education not indicated   Coordination of Nutrition Care:  Continue to monitor while inpatient    Goals:  Consume >75% meals/ONS       Nutrition Monitoring and Evaluation:   Food/Nutrient Intake Outcomes:  Diet Advancement/Tolerance, Food and Nutrient Intake, Supplement Intake  Physical Signs/Symptoms Outcomes:  Biochemical Data, GI Status, Fluid Status or Edema, Nutrition Focused Physical Findings, Skin, Weight     Discharge Planning:     Too soon to determine     Electronically signed by Ryan Gonzalez, MS, RD, LD on 10/16/21 at 11:27 AM EDT    Contact: [phone]

## 2021-10-17 LAB
ALBUMIN SERPL-MCNC: 3.1 G/DL (ref 3.5–5.2)
ALP BLD-CCNC: 81 U/L (ref 40–129)
ALT SERPL-CCNC: 16 U/L (ref 0–40)
ANION GAP SERPL CALCULATED.3IONS-SCNC: 8 MMOL/L (ref 7–16)
AST SERPL-CCNC: 16 U/L (ref 0–39)
BASOPHILS ABSOLUTE: 0 E9/L (ref 0–0.2)
BASOPHILS RELATIVE PERCENT: 0.5 % (ref 0–2)
BILIRUB SERPL-MCNC: 0.9 MG/DL (ref 0–1.2)
BUN BLDV-MCNC: 7 MG/DL (ref 6–20)
CALCIUM SERPL-MCNC: 8.5 MG/DL (ref 8.6–10.2)
CHLORIDE BLD-SCNC: 101 MMOL/L (ref 98–107)
CO2: 25 MMOL/L (ref 22–29)
CREAT SERPL-MCNC: 0.9 MG/DL (ref 0.7–1.2)
EOSINOPHILS ABSOLUTE: 0.16 E9/L (ref 0.05–0.5)
EOSINOPHILS RELATIVE PERCENT: 2.6 % (ref 0–6)
GFR AFRICAN AMERICAN: >60
GFR NON-AFRICAN AMERICAN: >60 ML/MIN/1.73
GLUCOSE BLD-MCNC: 103 MG/DL (ref 74–99)
HCT VFR BLD CALC: 41.5 % (ref 37–54)
HEMOGLOBIN: 13.9 G/DL (ref 12.5–16.5)
LYMPHOCYTES ABSOLUTE: 0.32 E9/L (ref 1.5–4)
LYMPHOCYTES RELATIVE PERCENT: 5.3 % (ref 20–42)
MAGNESIUM: 2.2 MG/DL (ref 1.6–2.6)
MCH RBC QN AUTO: 29.6 PG (ref 26–35)
MCHC RBC AUTO-ENTMCNC: 33.5 % (ref 32–34.5)
MCV RBC AUTO: 88.5 FL (ref 80–99.9)
METAMYELOCYTES RELATIVE PERCENT: 0.9 % (ref 0–1)
METER GLUCOSE: 102 MG/DL (ref 74–99)
METER GLUCOSE: 102 MG/DL (ref 74–99)
METER GLUCOSE: 173 MG/DL (ref 74–99)
MONOCYTES ABSOLUTE: 0.94 E9/L (ref 0.1–0.95)
MONOCYTES RELATIVE PERCENT: 14.9 % (ref 2–12)
NEUTROPHILS ABSOLUTE: 4.85 E9/L (ref 1.8–7.3)
NEUTROPHILS RELATIVE PERCENT: 76.3 % (ref 43–80)
PDW BLD-RTO: 13.4 FL (ref 11.5–15)
PHOSPHORUS: 3.1 MG/DL (ref 2.5–4.5)
PLATELET # BLD: 192 E9/L (ref 130–450)
PMV BLD AUTO: 10.5 FL (ref 7–12)
POLYCHROMASIA: ABNORMAL
POTASSIUM SERPL-SCNC: 4.1 MMOL/L (ref 3.5–5)
RBC # BLD: 4.69 E12/L (ref 3.8–5.8)
SARS-COV-2, NAAT: NOT DETECTED
SODIUM BLD-SCNC: 134 MMOL/L (ref 132–146)
TOTAL PROTEIN: 6.2 G/DL (ref 6.4–8.3)
WBC # BLD: 6.3 E9/L (ref 4.5–11.5)

## 2021-10-17 PROCEDURE — 85025 COMPLETE CBC W/AUTO DIFF WBC: CPT

## 2021-10-17 PROCEDURE — 6370000000 HC RX 637 (ALT 250 FOR IP): Performed by: INTERNAL MEDICINE

## 2021-10-17 PROCEDURE — 99232 SBSQ HOSP IP/OBS MODERATE 35: CPT | Performed by: INTERNAL MEDICINE

## 2021-10-17 PROCEDURE — 1200000000 HC SEMI PRIVATE

## 2021-10-17 PROCEDURE — 82962 GLUCOSE BLOOD TEST: CPT

## 2021-10-17 PROCEDURE — 84100 ASSAY OF PHOSPHORUS: CPT

## 2021-10-17 PROCEDURE — 80053 COMPREHEN METABOLIC PANEL: CPT

## 2021-10-17 PROCEDURE — 6360000002 HC RX W HCPCS: Performed by: STUDENT IN AN ORGANIZED HEALTH CARE EDUCATION/TRAINING PROGRAM

## 2021-10-17 PROCEDURE — 6360000002 HC RX W HCPCS: Performed by: INTERNAL MEDICINE

## 2021-10-17 PROCEDURE — 2580000003 HC RX 258: Performed by: STUDENT IN AN ORGANIZED HEALTH CARE EDUCATION/TRAINING PROGRAM

## 2021-10-17 PROCEDURE — 83735 ASSAY OF MAGNESIUM: CPT

## 2021-10-17 PROCEDURE — 6370000000 HC RX 637 (ALT 250 FOR IP): Performed by: STUDENT IN AN ORGANIZED HEALTH CARE EDUCATION/TRAINING PROGRAM

## 2021-10-17 PROCEDURE — 2580000003 HC RX 258: Performed by: INTERNAL MEDICINE

## 2021-10-17 PROCEDURE — 36415 COLL VENOUS BLD VENIPUNCTURE: CPT

## 2021-10-17 PROCEDURE — 2500000003 HC RX 250 WO HCPCS: Performed by: INTERNAL MEDICINE

## 2021-10-17 RX ORDER — FOLIC ACID 1 MG/1
1 TABLET ORAL DAILY
Status: DISCONTINUED | OUTPATIENT
Start: 2021-10-17 | End: 2021-10-18 | Stop reason: HOSPADM

## 2021-10-17 RX ORDER — LORAZEPAM 1 MG/1
1 TABLET ORAL
Qty: 10 TABLET | Refills: 0 | Status: SHIPPED | OUTPATIENT
Start: 2021-10-17 | End: 2021-10-21

## 2021-10-17 RX ORDER — PHENOBARBITAL 32.4 MG/1
64.8 TABLET ORAL 2 TIMES DAILY
Status: DISCONTINUED | OUTPATIENT
Start: 2021-10-18 | End: 2021-10-18

## 2021-10-17 RX ADMIN — FOLIC ACID 1 MG: 5 INJECTION, SOLUTION INTRAMUSCULAR; INTRAVENOUS; SUBCUTANEOUS at 08:40

## 2021-10-17 RX ADMIN — ENOXAPARIN SODIUM 40 MG: 100 INJECTION SUBCUTANEOUS at 08:40

## 2021-10-17 RX ADMIN — Medication 10 ML: at 08:40

## 2021-10-17 RX ADMIN — ACETAMINOPHEN 1000 MG: 500 TABLET ORAL at 16:18

## 2021-10-17 RX ADMIN — ACETAMINOPHEN 1000 MG: 500 TABLET ORAL at 21:11

## 2021-10-17 RX ADMIN — PIPERACILLIN AND TAZOBACTAM 3375 MG: 3; .375 INJECTION, POWDER, LYOPHILIZED, FOR SOLUTION INTRAVENOUS at 23:42

## 2021-10-17 RX ADMIN — ASPIRIN 81 MG CHEWABLE TABLET 81 MG: 81 TABLET CHEWABLE at 08:40

## 2021-10-17 RX ADMIN — PHENOBARBITAL 64.8 MG: 32.4 TABLET ORAL at 16:18

## 2021-10-17 RX ADMIN — ACETAMINOPHEN 1000 MG: 500 TABLET ORAL at 07:00

## 2021-10-17 RX ADMIN — FOLIC ACID 1 MG: 1 TABLET ORAL at 16:19

## 2021-10-17 RX ADMIN — PIPERACILLIN AND TAZOBACTAM 3375 MG: 3; .375 INJECTION, POWDER, LYOPHILIZED, FOR SOLUTION INTRAVENOUS at 16:19

## 2021-10-17 RX ADMIN — THIAMINE HCL TAB 100 MG 100 MG: 100 TAB at 08:40

## 2021-10-17 RX ADMIN — PIPERACILLIN AND TAZOBACTAM 3375 MG: 3; .375 INJECTION, POWDER, LYOPHILIZED, FOR SOLUTION INTRAVENOUS at 07:52

## 2021-10-17 RX ADMIN — Medication 10 ML: at 21:11

## 2021-10-17 RX ADMIN — METOPROLOL SUCCINATE 25 MG: 25 TABLET, EXTENDED RELEASE ORAL at 08:40

## 2021-10-17 RX ADMIN — ATORVASTATIN CALCIUM 40 MG: 40 TABLET, FILM COATED ORAL at 21:11

## 2021-10-17 RX ADMIN — PHENOBARBITAL 64.8 MG: 32.4 TABLET ORAL at 08:40

## 2021-10-17 ASSESSMENT — PAIN SCALES - GENERAL
PAINLEVEL_OUTOF10: 0

## 2021-10-17 NOTE — PROGRESS NOTES
[facility]  [facility] Residency Program  Progress Note - House Team 1    Patient:  Cleo Heredia 64 y.o. male MRN: [id number]     Date of Service: 10/17/2021     CC: L flank pain and chest pain  Overnight events: No acute events overnight. Subjective     The patient is seen and examined this morning. He is lying on bed, not in acute distress. Packing was changed. Denies chest pain, dyspnea, abdominal pain, nausea and vomiting. Denies anxiety, tremors, sweating, agitation. Objective     Physical Exam:  · Vitals: BP (!) 144/88   Pulse 84   Temp 98.2 °F (36.8 °C) (Oral)   Resp 18   Ht 6' 2\" (1.88 m)   Wt 242 lb 6.4 oz (110 kg)   SpO2 97%   BMI 31.12 kg/m²     · I & O - 24hr: No intake/output data recorded. · General Appearance: alert, appears stated age and cooperative  · HEENT:  Head: Normal, normocephalic, atraumatic. · Neck: no adenopathy, no carotid bruit, no JVD, supple, symmetrical, trachea midline and thyroid not enlarged, symmetric, no tenderness/mass/nodules  · Lung: clear to auscultation bilaterally  · Heart: regular rate and rhythm, S1, S2 normal, no murmur, click, rub or gallop  · Abdomen: (+) 10 cm incision with erythematous and tender wound edges, open with gauze packing, with surrounding induration and erythema; with serosanguinous discharge covered with ABD pads; old packing with no visible feculent matter, non-foul smelling;  NABS, soft, non-tender  · Extremities:  extremities normal, atraumatic, no cyanosis or edema  · Musculokeletal: No joint swelling, no muscle tenderness. ROM normal in all joints of extremities.    · Neurologic: Mental status: Alert, oriented, thought content appropriate  Subject  Pertinent Labs & Imaging Studies   anushka  CBC with Differential:    Lab Results   Component Value Date    WBC 7.6 10/16/2021    RBC 4.63 10/16/2021    HGB 13.9 10/16/2021    HCT 40.7 10/16/2021     10/16/2021    MCV 87.9 10/16/2021    MCH 30.0 10/16/2021    MCHC 34.2 10/16/2021    RDW 13.4 10/16/2021    NRBC 0.9 05/07/2021    LYMPHOPCT 6.1 10/16/2021    MONOPCT 5.2 10/16/2021    BASOPCT 1.7 10/16/2021    MONOSABS 0.38 10/16/2021    LYMPHSABS 0.46 10/16/2021    EOSABS 0.00 10/16/2021    BASOSABS 0.13 10/16/2021     CMP:    Lab Results   Component Value Date     10/16/2021    K 4.1 10/16/2021    K 4.5 10/15/2021    CL 99 10/16/2021    CO2 26 10/16/2021    BUN 11 10/16/2021    CREATININE 1.1 10/16/2021    GFRAA >60 10/16/2021    LABGLOM >60 10/16/2021    GLUCOSE 117 10/16/2021    GLUCOSE 110 01/07/2011    PROT 6.2 10/16/2021    LABALBU 3.1 10/16/2021    LABALBU 4.1 01/07/2011    CALCIUM 8.8 10/16/2021    BILITOT 1.1 10/16/2021    ALKPHOS 84 10/16/2021    AST 17 10/16/2021    ALT 15 10/16/2021     Last 3 Troponin:    Lab Results   Component Value Date    TROPONINI <0.01 05/12/2021    TROPONINI <0.01 05/11/2021    TROPONINI <0.01 05/09/2021     HgBA1c:    Lab Results   Component Value Date    LABA1C 5.7 05/08/2021     Microalbumen/Creatinine ratio:  No components found for: RUCREAT    Resident's Assessment and Plan     Assessment and Plan:    L flank abscess s/p excisional debridement 10/14/21 2/2 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 10/14/21: s/p debridement of L flank abscess, no obvious fistulous tract  -10/15/21: Rpt CT of abdomen with PO/rectal contrast: as above  -tissue/surgical cultures-heavy growth of gram-negative rods  Plan  -follow surgical culture and sensitivity  -continue Zosyn D3  -pain mgt per surgery service     Hx HTN  -SBP in the 120-140`s  Plan  -continue metoprolol succinate 25 mg daily  -continue to monitor BP     Hx CAD  -denies chest pain since admission  -no significant change in troponin  -s/p [facility] 7/16/21- no stenosis in L main, LAD, circumflex and RCA, EF 55%  -The 10-year ASCVD risk score (Yefri Castaneda, et al., 2013) is: 8.2%  Plan  -resumed aspirin 81 mg daily, Toprol XL 25 mg daily and atorvastatin 40 mg daily    Pre-diabetes  -A1C 5.7%  -blood glucose 105-116 mg/dl  Plan   -continue to monitor     Hx MARLEE  -non-compliant with CPAP   -does not follow-up with Pulmonology  Plan  -refer to Pulmo as out-patient  -sleep study as out-patient     Splenomegaly likely 2/2 alcohol liver disease  -alcohol drinker since 27years old  -drinks 5 bottles of 40 oz beer most days of the week  -with hypoalbuminemia  -PBS: negative for immature forms or blasts  -pending PSA  Plan  -will continue to monitor    Alcohol abuse with Hx of alcohol withdrawal seizures  -no signs and symptoms of DT`s  -phenobarbital titrated and dcdc  Plan  -monitor for signs and symptoms of alcohol withdrawal, DT      PT/OT evaluation:  DVT prophylaxis/ GI prophylaxis: enoxaparin  Disposition: Poppy Clinton MD, PGY-1  Attending physician: Dr. Sharlene Espinoza  [facility]    Attending Physician Statement:  Klever Haque M.D., F.A.C.P.     I have discussed the case, including pertinent history and exam findings with the resident/NP. I have seen and examined the patient and the key elements of the encounter have been performed by me. I agree with the resident ROS, PMHx, PSHx, meds reviewed and assessment, plan and orders as documented by the resident/NP      Hospital charts reviewed, including other providers notes, relevant labs and imaging. >50% of time spent coordinating care with other providers and/or counseling patient/family  Remainder of medical problems as per resident note. POD#2 Left flank excisional debridement for Left flank abscess. Per internal med note today, total wound size 10cm x 8cm x 4 cm. CT of the abdomen/pelvis with PO and rectal contrast   Sp abscess/diverticulitis  Local abscess flank wall, tracking from prior diverticulits  -lumen with possible fistula tract   \"enterocutaneous fistula\" per gen surgery. -  Excision into mucle  The depth of tissue incised was muscle,walls of gut ? Ok      Eating OK  per Gen surg  - for now no diverting colostomy plans -- (GI lumen reestablished in past)  Patient is currently on  and IV Zosyn q 8 hrs.      Lives in hotel, but setting up at facility [facility]- talked to  at length    Chest pain workup in past, likely nonanginal  +Vague- medically noncompliance with prior cardiac meds-- ASA/statin/betablocker    Pain well controlled, no nausea-- stable nutrition/electrolytes  Hx of etoh withdrawals/with seizure  Thaimine, folate,   Wean off phenobarb  +splenomegaly  +etoh, drinks \"most of days\"-- no DTs yet  I titrated off phenobarb, NO DTs seen (benzo prn)  Once sensitivities back from culture  Gram neg rods---  Await final ID recommendations on Abx--then DC ASAP   OK for DC   if covid negative  And facility set up for IV abx as per ID request

## 2021-10-17 NOTE — PROGRESS NOTES
GENERAL SURGERY  DAILY PROGRESS NOTE  10/17/2021    Subjective:  No acute events overnight. Pain is well controlled. He states that has had a normal bowel movement. He is tolerating a regular diet    Objective:  BP (!) 144/88   Pulse 84   Temp 98.2 °F (36.8 °C) (Oral)   Resp 18   Ht 6' 2\" (1.88 m)   Wt 240 lb 4.8 oz (109 kg)   SpO2 97%   BMI 30.85 kg/m²     GENERAL:  No acute distress. Alert and interactive. Oriented x3. LUNGS:  normal respiratory effort on room air  CARDIOVASC:  Warm throughout, no chest pain. ABDOMEN:  Soft, non distended, mildly tender to palpation around incision on left flank, which is covered with a heavy drainage package without strikethrough or drainage. No purulent drainage or surrounding erythema noted. EXTREMITIES: warm and well perfused      Assessment/Plan:  64 y.o. 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 CT performed without obvious EC fistula, will continue to manage conservatively as diverticulitis.      - continue diet as tolerated  - continue bowel regimen  - f/u surg cultures and path   - [facility] for dressing changes, ordered  - okay for dc from surgical perspective following antibiotic recs by ID    Plan discussed with Dr. Mary Ann Smith    Electronically signed by Rod Horvath DO on 10/17/2021 at 8:31 AM

## 2021-10-17 NOTE — PROGRESS NOTES
[address] [place] [address] [facility]  Progress Note    SUBJECTIVE:  Chief Complaint   Patient presents with    Back Pain     left lower flank pain started this morning    Chest Pain     comes and goes for months      Patient is tolerating medications. No reported adverse drug reactions. No nausea, vomiting. + loose stools. No abdominal pain    Review of systems:  As stated above in the chief complaint, otherwise negative. Medications:  Scheduled Meds:   folic acid  1 mg IntraVENous Daily    polyethylene glycol  17 g Oral BID    sennosides-docusate sodium  2 tablet Oral BID    aspirin  81 mg Oral Daily    atorvastatin  40 mg Oral Nightly    metoprolol succinate  25 mg Oral Daily    vitamin B-1  100 mg Oral Daily    PHENobarbital  64.8 mg Oral TID    piperacillin-tazobactam  3,375 mg IntraVENous Q8H    sodium chloride flush  10 mL IntraVENous 2 times per day    enoxaparin  40 mg SubCUTAneous Daily    acetaminophen  1,000 mg Oral 3 times per day     Continuous Infusions:   sodium chloride      dextrose       PRN Meds:LORazepam, iohexol, sodium chloride flush, sodium chloride, ondansetron **OR** ondansetron, glucose, dextrose, glucagon (rDNA), dextrose, oxyCODONE **OR** oxyCODONE HCl, HYDROmorphone    OBJECTIVE:  BP (!) 140/76   Pulse 70   Temp 99 °F (37.2 °C) (Oral)   Resp 16   Ht 6' 2\" (1.88 m)   Wt 240 lb 4.8 oz (109 kg)   SpO2 95%   BMI 30.85 kg/m²   Temp  Av.6 °F (37 °C)  Min: 98.2 °F (36.8 °C)  Max: 99 °F (37.2 °C)  Constitutional: The patient is awake, alert, and oriented. Skin: Warm and dry. No rashes were noted. HEENT: Round and reactive pupils. Moist mucous membranes. No ulcerations or thrush. Neck: Supple to movements. Chest: No use of accessory muscles to breathe. Symmetrical expansion. No wheezing, crackles or rhonchi. Cardiovascular: S1 and S2 are rhythmic and regular. No murmurs appreciated. Abdomen: Positive bowel sounds to auscultation.  Benign to palpation. No masses felt. No hepatosplenomegaly. surgical wound dressed  Extremities: No clubbing, no cyanosis, no edema.   Lines: peripheral    Laboratory and Tests Review:  Lab Results   Component Value Date    WBC 6.3 10/17/2021    WBC 7.6 10/16/2021    WBC 8.9 10/15/2021    HGB 13.9 10/17/2021    HCT 41.5 10/17/2021    MCV 88.5 10/17/2021     10/17/2021     Lab Results   Component Value Date    NEUTROABS 6.61 10/16/2021    NEUTROABS 8.28 (H) 10/15/2021    NEUTROABS 4.22 07/14/2021     No results found for: CRPHS  Lab Results   Component Value Date    ALT 16 10/17/2021    AST 16 10/17/2021    ALKPHOS 81 10/17/2021    BILITOT 0.9 10/17/2021     Lab Results   Component Value Date     10/17/2021    K 4.1 10/17/2021    K 4.5 10/15/2021     10/17/2021    CO2 25 10/17/2021    BUN 7 10/17/2021    CREATININE 0.9 10/17/2021    CREATININE 1.1 10/16/2021    CREATININE 0.9 10/15/2021    GFRAA >60 10/17/2021    LABGLOM >60 10/17/2021    GLUCOSE 103 10/17/2021    GLUCOSE 110 01/07/2011    PROT 6.2 10/17/2021    LABALBU 3.1 10/17/2021    LABALBU 4.1 01/07/2011    CALCIUM 8.5 10/17/2021    BILITOT 0.9 10/17/2021    ALKPHOS 81 10/17/2021    AST 16 10/17/2021    ALT 16 10/17/2021     Lab Results   Component Value Date    CRP 5.2 (H) 10/15/2021     Lab Results   Component Value Date    SEDRATE 56 (H) 10/15/2021    SEDRATE 20 (H) 01/07/2011     Radiology:      Microbiology:   Lab Results   Component Value Date    ORG Gram negative rich 10/14/2021     Lab Results   Component Value Date    ORG Gram negative rich 10/14/2021     No results found for: WNDABS  No results found for: RESPSMEAR  No results found for: MPNEUMO, CLAMYDCU, LABLEGI, AFBCX, FUNGSM, LABFUNG  No results found for: CULTRESP  No results found for: CXCATHTIP  No results found for: BFCS  Culture Surgical   Date Value Ref Range Status   10/14/2021   Preliminary    Heavy growth  Identification and sensitivity to follow       Urine Culture, Routine Date Value Ref Range Status   05/12/2021 <10,000 CFU/mL  Mixed gram positive organisms    Final     No results found for: [facility]  No results for input(s): PROCAL in the last 72 hours.     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 Follow-up tissue cultures. GNR and GPO  Follow-up Surgery recommendations regarding enterocutaneous fistula. .  Continue local wound care.     Thank you for involving me in the care of Douglas Avila Hughes 1160, APRN - CNP  9:48 AM   Pt seen and examined. Above discussed agree with advanced practice nurse. Labs, cultures, and radiographs reviewed. Face to Face encounter occurred. Changes made as necessary.      Melisa Looney MD  10/17/2021

## 2021-10-18 ENCOUNTER — APPOINTMENT (OUTPATIENT)
Dept: GENERAL RADIOLOGY | Age: 56
DRG: 951 | End: 2021-10-18
Payer: MEDICARE

## 2021-10-18 VITALS
HEIGHT: 74 IN | DIASTOLIC BLOOD PRESSURE: 80 MMHG | BODY MASS INDEX: 30.84 KG/M2 | SYSTOLIC BLOOD PRESSURE: 141 MMHG | HEART RATE: 60 BPM | TEMPERATURE: 97.5 F | RESPIRATION RATE: 14 BRPM | WEIGHT: 240.3 LBS | OXYGEN SATURATION: 97 %

## 2021-10-18 LAB
ALBUMIN SERPL-MCNC: 3.2 G/DL (ref 3.5–5.2)
ALP BLD-CCNC: 89 U/L (ref 40–129)
ALT SERPL-CCNC: 16 U/L (ref 0–40)
ANAEROBIC CULTURE: ABNORMAL
ANAEROBIC CULTURE: ABNORMAL
ANION GAP SERPL CALCULATED.3IONS-SCNC: 8 MMOL/L (ref 7–16)
AST SERPL-CCNC: 15 U/L (ref 0–39)
BASOPHILS ABSOLUTE: 0.05 E9/L (ref 0–0.2)
BASOPHILS RELATIVE PERCENT: 0.8 % (ref 0–2)
BILIRUB SERPL-MCNC: 0.8 MG/DL (ref 0–1.2)
BUN BLDV-MCNC: 9 MG/DL (ref 6–20)
CALCIUM SERPL-MCNC: 8.8 MG/DL (ref 8.6–10.2)
CHLORIDE BLD-SCNC: 100 MMOL/L (ref 98–107)
CO2: 25 MMOL/L (ref 22–29)
CREAT SERPL-MCNC: 0.9 MG/DL (ref 0.7–1.2)
EOSINOPHILS ABSOLUTE: 0.14 E9/L (ref 0.05–0.5)
EOSINOPHILS RELATIVE PERCENT: 2.4 % (ref 0–6)
GFR AFRICAN AMERICAN: >60
GFR NON-AFRICAN AMERICAN: >60 ML/MIN/1.73
GLUCOSE BLD-MCNC: 114 MG/DL (ref 74–99)
HCT VFR BLD CALC: 43 % (ref 37–54)
HEMOGLOBIN: 14.2 G/DL (ref 12.5–16.5)
IMMATURE GRANULOCYTES #: 0.21 E9/L
IMMATURE GRANULOCYTES %: 3.5 % (ref 0–5)
LYMPHOCYTES ABSOLUTE: 0.74 E9/L (ref 1.5–4)
LYMPHOCYTES RELATIVE PERCENT: 12.5 % (ref 20–42)
MAGNESIUM: 2.2 MG/DL (ref 1.6–2.6)
MCH RBC QN AUTO: 29.2 PG (ref 26–35)
MCHC RBC AUTO-ENTMCNC: 33 % (ref 32–34.5)
MCV RBC AUTO: 88.5 FL (ref 80–99.9)
METER GLUCOSE: 101 MG/DL (ref 74–99)
MONOCYTES ABSOLUTE: 0.79 E9/L (ref 0.1–0.95)
MONOCYTES RELATIVE PERCENT: 13.3 % (ref 2–12)
NEUTROPHILS ABSOLUTE: 4 E9/L (ref 1.8–7.3)
NEUTROPHILS RELATIVE PERCENT: 67.5 % (ref 43–80)
ORGANISM: ABNORMAL
PDW BLD-RTO: 13.2 FL (ref 11.5–15)
PHOSPHORUS: 3.2 MG/DL (ref 2.5–4.5)
PLATELET # BLD: 215 E9/L (ref 130–450)
PMV BLD AUTO: 9.7 FL (ref 7–12)
POTASSIUM SERPL-SCNC: 4 MMOL/L (ref 3.5–5)
RBC # BLD: 4.86 E12/L (ref 3.8–5.8)
SODIUM BLD-SCNC: 133 MMOL/L (ref 132–146)
TOTAL PROTEIN: 6.6 G/DL (ref 6.4–8.3)
WBC # BLD: 5.9 E9/L (ref 4.5–11.5)

## 2021-10-18 PROCEDURE — C1751 CATH, INF, PER/CENT/MIDLINE: HCPCS

## 2021-10-18 PROCEDURE — 71045 X-RAY EXAM CHEST 1 VIEW: CPT

## 2021-10-18 PROCEDURE — 99232 SBSQ HOSP IP/OBS MODERATE 35: CPT | Performed by: INTERNAL MEDICINE

## 2021-10-18 PROCEDURE — 80053 COMPREHEN METABOLIC PANEL: CPT

## 2021-10-18 PROCEDURE — 6370000000 HC RX 637 (ALT 250 FOR IP): Performed by: INTERNAL MEDICINE

## 2021-10-18 PROCEDURE — 83735 ASSAY OF MAGNESIUM: CPT

## 2021-10-18 PROCEDURE — 36415 COLL VENOUS BLD VENIPUNCTURE: CPT

## 2021-10-18 PROCEDURE — 2500000003 HC RX 250 WO HCPCS: Performed by: INTERNAL MEDICINE

## 2021-10-18 PROCEDURE — 2580000003 HC RX 258: Performed by: STUDENT IN AN ORGANIZED HEALTH CARE EDUCATION/TRAINING PROGRAM

## 2021-10-18 PROCEDURE — 76937 US GUIDE VASCULAR ACCESS: CPT

## 2021-10-18 PROCEDURE — 6370000000 HC RX 637 (ALT 250 FOR IP): Performed by: STUDENT IN AN ORGANIZED HEALTH CARE EDUCATION/TRAINING PROGRAM

## 2021-10-18 PROCEDURE — 6360000002 HC RX W HCPCS: Performed by: STUDENT IN AN ORGANIZED HEALTH CARE EDUCATION/TRAINING PROGRAM

## 2021-10-18 PROCEDURE — 97530 THERAPEUTIC ACTIVITIES: CPT

## 2021-10-18 PROCEDURE — 82962 GLUCOSE BLOOD TEST: CPT

## 2021-10-18 PROCEDURE — 2580000003 HC RX 258: Performed by: INTERNAL MEDICINE

## 2021-10-18 PROCEDURE — 05HB33Z INSERTION OF INFUSION DEVICE INTO RIGHT BASILIC VEIN, PERCUTANEOUS APPROACH: ICD-10-PCS | Performed by: INTERNAL MEDICINE

## 2021-10-18 PROCEDURE — 6360000002 HC RX W HCPCS: Performed by: INTERNAL MEDICINE

## 2021-10-18 PROCEDURE — 36569 INSJ PICC 5 YR+ W/O IMAGING: CPT

## 2021-10-18 PROCEDURE — 85025 COMPLETE CBC W/AUTO DIFF WBC: CPT

## 2021-10-18 PROCEDURE — 84100 ASSAY OF PHOSPHORUS: CPT

## 2021-10-18 RX ORDER — SODIUM CHLORIDE 9 MG/ML
25 INJECTION, SOLUTION INTRAVENOUS PRN
Status: DISCONTINUED | OUTPATIENT
Start: 2021-10-18 | End: 2021-10-18 | Stop reason: SDUPTHER

## 2021-10-18 RX ORDER — HEPARIN SODIUM (PORCINE) LOCK FLUSH IV SOLN 100 UNIT/ML 100 UNIT/ML
3 SOLUTION INTRAVENOUS PRN
Status: DISCONTINUED | OUTPATIENT
Start: 2021-10-18 | End: 2021-10-18 | Stop reason: HOSPADM

## 2021-10-18 RX ORDER — PHENOBARBITAL 32.4 MG/1
32.4 TABLET ORAL DAILY
Qty: 2 TABLET | Refills: 0 | Status: SHIPPED | OUTPATIENT
Start: 2021-10-19 | End: 2021-10-21

## 2021-10-18 RX ORDER — HEPARIN SODIUM (PORCINE) LOCK FLUSH IV SOLN 100 UNIT/ML 100 UNIT/ML
3 SOLUTION INTRAVENOUS EVERY 12 HOURS SCHEDULED
Status: DISCONTINUED | OUTPATIENT
Start: 2021-10-18 | End: 2021-10-18 | Stop reason: SDUPTHER

## 2021-10-18 RX ORDER — PHENOBARBITAL 32.4 MG/1
64.8 TABLET ORAL DAILY
Status: DISCONTINUED | OUTPATIENT
Start: 2021-10-19 | End: 2021-10-18 | Stop reason: SDUPTHER

## 2021-10-18 RX ORDER — FOLIC ACID 1 MG/1
1 TABLET ORAL DAILY
Qty: 30 TABLET | Refills: 0 | Status: SHIPPED | OUTPATIENT
Start: 2021-10-18 | End: 2022-03-03 | Stop reason: ALTCHOICE

## 2021-10-18 RX ORDER — LIDOCAINE HYDROCHLORIDE 10 MG/ML
5 INJECTION, SOLUTION EPIDURAL; INFILTRATION; INTRACAUDAL; PERINEURAL ONCE
Status: DISCONTINUED | OUTPATIENT
Start: 2021-10-18 | End: 2021-10-18 | Stop reason: SDUPTHER

## 2021-10-18 RX ORDER — HEPARIN SODIUM (PORCINE) LOCK FLUSH IV SOLN 100 UNIT/ML 100 UNIT/ML
3 SOLUTION INTRAVENOUS PRN
Status: DISCONTINUED | OUTPATIENT
Start: 2021-10-18 | End: 2021-10-18 | Stop reason: SDUPTHER

## 2021-10-18 RX ORDER — PHENOBARBITAL 32.4 MG/1
32.4 TABLET ORAL DAILY
Status: DISCONTINUED | OUTPATIENT
Start: 2021-10-21 | End: 2021-10-18 | Stop reason: SDUPTHER

## 2021-10-18 RX ORDER — SODIUM CHLORIDE 9 MG/ML
25 INJECTION, SOLUTION INTRAVENOUS PRN
Status: DISCONTINUED | OUTPATIENT
Start: 2021-10-18 | End: 2021-10-18 | Stop reason: HOSPADM

## 2021-10-18 RX ORDER — LIDOCAINE HYDROCHLORIDE 10 MG/ML
5 INJECTION, SOLUTION EPIDURAL; INFILTRATION; INTRACAUDAL; PERINEURAL ONCE
Status: COMPLETED | OUTPATIENT
Start: 2021-10-18 | End: 2021-10-18

## 2021-10-18 RX ORDER — SENNA AND DOCUSATE SODIUM 50; 8.6 MG/1; MG/1
2 TABLET, FILM COATED ORAL 2 TIMES DAILY
Qty: 60 TABLET | Refills: 1 | Status: SHIPPED | OUTPATIENT
Start: 2021-10-18 | End: 2021-11-17

## 2021-10-18 RX ORDER — HEPARIN SODIUM (PORCINE) LOCK FLUSH IV SOLN 100 UNIT/ML 100 UNIT/ML
3 SOLUTION INTRAVENOUS EVERY 12 HOURS SCHEDULED
Status: DISCONTINUED | OUTPATIENT
Start: 2021-10-18 | End: 2021-10-18 | Stop reason: HOSPADM

## 2021-10-18 RX ORDER — SODIUM CHLORIDE 0.9 % (FLUSH) 0.9 %
5-40 SYRINGE (ML) INJECTION EVERY 12 HOURS SCHEDULED
Status: DISCONTINUED | OUTPATIENT
Start: 2021-10-18 | End: 2021-10-18 | Stop reason: HOSPADM

## 2021-10-18 RX ORDER — PIPERACILLIN SODIUM, TAZOBACTAM SODIUM 3; .375 G/15ML; G/15ML
3375 INJECTION, POWDER, LYOPHILIZED, FOR SOLUTION INTRAVENOUS EVERY 8 HOURS
Qty: 63 EACH | Refills: 0 | Status: SHIPPED | OUTPATIENT
Start: 2021-10-18 | End: 2021-11-08

## 2021-10-18 RX ORDER — PHENOBARBITAL 32.4 MG/1
32.4 TABLET ORAL DAILY
Status: DISCONTINUED | OUTPATIENT
Start: 2021-10-19 | End: 2021-10-18 | Stop reason: HOSPADM

## 2021-10-18 RX ORDER — SODIUM CHLORIDE 0.9 % (FLUSH) 0.9 %
5-40 SYRINGE (ML) INJECTION PRN
Status: DISCONTINUED | OUTPATIENT
Start: 2021-10-18 | End: 2021-10-18 | Stop reason: HOSPADM

## 2021-10-18 RX ORDER — POLYETHYLENE GLYCOL 3350 17 G/17G
17 POWDER, FOR SOLUTION ORAL DAILY PRN
Qty: 527 G | Refills: 1 | Status: SHIPPED | OUTPATIENT
Start: 2021-10-18 | End: 2021-11-17

## 2021-10-18 RX ORDER — SODIUM CHLORIDE 0.9 % (FLUSH) 0.9 %
5-40 SYRINGE (ML) INJECTION EVERY 12 HOURS SCHEDULED
Status: DISCONTINUED | OUTPATIENT
Start: 2021-10-18 | End: 2021-10-18 | Stop reason: SDUPTHER

## 2021-10-18 RX ORDER — SODIUM CHLORIDE 0.9 % (FLUSH) 0.9 %
5-40 SYRINGE (ML) INJECTION PRN
Status: DISCONTINUED | OUTPATIENT
Start: 2021-10-18 | End: 2021-10-18 | Stop reason: SDUPTHER

## 2021-10-18 RX ADMIN — PIPERACILLIN AND TAZOBACTAM 3375 MG: 3; .375 INJECTION, POWDER, LYOPHILIZED, FOR SOLUTION INTRAVENOUS at 08:08

## 2021-10-18 RX ADMIN — ENOXAPARIN SODIUM 40 MG: 100 INJECTION SUBCUTANEOUS at 09:00

## 2021-10-18 RX ADMIN — PHENOBARBITAL 64.8 MG: 32.4 TABLET ORAL at 09:00

## 2021-10-18 RX ADMIN — LIDOCAINE HYDROCHLORIDE 5 ML: 10 INJECTION, SOLUTION EPIDURAL; INFILTRATION; INTRACAUDAL; PERINEURAL at 13:54

## 2021-10-18 RX ADMIN — METOPROLOL SUCCINATE 25 MG: 25 TABLET, EXTENDED RELEASE ORAL at 09:00

## 2021-10-18 RX ADMIN — ACETAMINOPHEN 1000 MG: 500 TABLET ORAL at 06:05

## 2021-10-18 RX ADMIN — PIPERACILLIN AND TAZOBACTAM 3375 MG: 3; .375 INJECTION, POWDER, LYOPHILIZED, FOR SOLUTION INTRAVENOUS at 15:09

## 2021-10-18 RX ADMIN — ASPIRIN 81 MG CHEWABLE TABLET 81 MG: 81 TABLET CHEWABLE at 09:00

## 2021-10-18 RX ADMIN — Medication 10 ML: at 08:11

## 2021-10-18 RX ADMIN — FOLIC ACID 1 MG: 1 TABLET ORAL at 09:00

## 2021-10-18 RX ADMIN — THIAMINE HCL TAB 100 MG 100 MG: 100 TAB at 09:00

## 2021-10-18 NOTE — PROGRESS NOTES
Vascular Access Procedure Note    Procedure Date:   10/18/2021    Pre-procedure Verification/Time-Out:  The proposed risks versus benefits of this procedure were discussed in detail by the physician with patient. written consent was obtained from the patient. Relevant documentation was reviewed prior to procedure including signed consent form and medications. All necessary equipment for procedure is available at time of procedure yes. An audible time out was done at 1620PM by team members, correctly identifying patients name, medical record number, correct side, correct site, and correct procedure to be performed with registered nurse members of the procedure team all in agreement.         Indication for Procedure:   Reason for Insertion: intravenous antibiotics    ASA Assessment (Required for Moderate & Deep Sedation):      Procedure:   Reason for Consultation: power PICC line    Clinician Performing Procedure:   Jerie Osgood rn    Assistant:  none    Sedation:   Analgesia Used: lidocaine 1%    Procedure Details/Findings:  Catheter Nepali Size: 5.5  Lot Number: [id number]  Product #: [id number]  Expiration Date: 12/11/2022  Maximum Barrier Precautions: cap, eye shield, full body drape, gloves, gown, handwashing and mask  Skin Prep: chlorhexidine  Technique: modified seldinger and ultrasound guided with VPS  Attempts: 1  Exposed (cm): 1  Total (cm): 43  Placement Site: left brachial vein  Vessel Size: 0.60  Dressing: securement device, transparent dressing and biopatch  Blood Return: Yes   Ultrasound Guidance: Yes   Arm Circumference Mid-Bicep (cm): 35  Chest X-Ray Ordered: VasoNova VPS  End Placement: caj    Complications:   none     Post-operative Condition:  stable  Patient Tolerated Procedure: well     Comments/Post-operative Education:   Post Procedure Interventions: no blood pressure sign placed above bed    Carolee Saenz RN  10/18/21  5:06 PM

## 2021-10-18 NOTE — PROGRESS NOTES
Right picc removed due to coiling. . dsd applied. Pt. Tolerated well new picc placed left upper extremity.

## 2021-10-18 NOTE — PLAN OF CARE
Problem: Falls - Risk of:  Goal: Will remain free from falls  Description: Will remain free from falls  10/18/2021 9208 by Stefan Dubois RN  Outcome: Ongoing  10/18/2021 0349 by Earle Barakat  Outcome: Met This Shift  Goal: Absence of physical injury  Description: Absence of physical injury  10/18/2021 3723 by Stefan Dubois RN  Outcome: Ongoing  10/18/2021 0349 by Earle Barakat  Outcome: Met This Shift     Problem: Skin Integrity:  Goal: Will show no infection signs and symptoms  Description: Will show no infection signs and symptoms  10/18/2021 3295 by Stefan Dubois RN  Outcome: Ongoing  10/18/2021 0349 by Earle Barakat  Outcome: Met This Shift  Goal: Absence of new skin breakdown  Description: Absence of new skin breakdown  10/18/2021 8184 by Stefan Dubois RN  Outcome: Ongoing  10/18/2021 0349 by Earle Barakat  Outcome: Met This Shift

## 2021-10-18 NOTE — PROGRESS NOTES
GENERAL SURGERY  DAILY PROGRESS NOTE  10/18/2021    Subjective:  No acute events overnight. No new complaints. Pain is well controlled. Patient is tolerating a regular diet and moving his bowels. Surgical cultures growing E coli. Objective:  BP (!) 155/73   Pulse 80   Temp 98.9 °F (37.2 °C) (Oral)   Resp 18   Ht 6' 2\" (1.88 m)   Wt 240 lb 4.8 oz (109 kg)   SpO2 95%   BMI 30.85 kg/m²     GENERAL:  No acute distress. Alert and interactive. Oriented x3. LUNGS:  normal respiratory effort on room air  CARDIOVASC:  Warm throughout, no chest pain. ABDOMEN:  Soft, non distended, mildly tender to palpation around incision on left flank, which is covered with a heavy drainage package without strikethrough or drainage. No purulent drainage or surrounding erythema noted. EXTREMITIES: warm and well perfused      Assessment/Plan:  64 y.o. 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 CT performed without obvious EC fistula, will continue to manage conservatively as diverticulitis. Surgical cultures growing E coli. - continue diet as tolerated  - continue bowel regimen  - [facility] for dressing changes ordered  - okay for dc from surgical perspective following antibiotic recs by ID    Plan discussed with Dr. Elmira Eldridge    Electronically signed by Martha Coffey DO on 10/18/2021 at 6:33 AM      Pt seen and examined; agree w above  Doing well  Appreciate ID input    [id number] Marni Rodriguez for dc from surgery pov  Will need colonoscopy in 4-6 weeks    Mallory Beard MD  Minimally Invasive General Surgery and Endoscopy  [address] [place] [address].  [address]: [phone]  F: [phone]    Electronically signed by Taylor Tillman MD on 10/18/2021 at 7:34 AM

## 2021-10-18 NOTE — PROGRESS NOTES
[address] [place] [address] [facility]  Progress Note    SUBJECTIVE:  Chief Complaint   Patient presents with    Back Pain     left lower flank pain started this morning    Chest Pain     comes and goes for months      Patient is tolerating medications. No reported adverse drug reactions. No nausea, vomiting. + loose stools. No abdominal pain    Review of systems:  As stated above in the chief complaint, otherwise negative. Medications:  Scheduled Meds:   [START ON 10/19/2021] PHENobarbital  64.8 mg Oral Daily    Followed by   Tadeo Pascual ON 10/21/2021] PHENobarbital  32.4 mg Oral Daily    folic acid  1 mg Oral Daily    polyethylene glycol  17 g Oral BID    sennosides-docusate sodium  2 tablet Oral BID    aspirin  81 mg Oral Daily    atorvastatin  40 mg Oral Nightly    metoprolol succinate  25 mg Oral Daily    vitamin B-1  100 mg Oral Daily    piperacillin-tazobactam  3,375 mg IntraVENous Q8H    sodium chloride flush  10 mL IntraVENous 2 times per day    enoxaparin  40 mg SubCUTAneous Daily    acetaminophen  1,000 mg Oral 3 times per day     Continuous Infusions:   sodium chloride      dextrose       PRN Meds:LORazepam, iohexol, sodium chloride flush, sodium chloride, ondansetron **OR** ondansetron, glucose, dextrose, glucagon (rDNA), dextrose, oxyCODONE **OR** oxyCODONE HCl, HYDROmorphone    OBJECTIVE:  BP (!) 141/80   Pulse 60   Temp 97.5 °F (36.4 °C) (Oral)   Resp 14   Ht 6' 2\" (1.88 m)   Wt 240 lb 4.8 oz (109 kg)   SpO2 97%   BMI 30.85 kg/m²   Temp  Av.2 °F (36.8 °C)  Min: 97.5 °F (36.4 °C)  Max: 98.9 °F (37.2 °C)  Constitutional: The patient is awake, alert, and oriented. Skin: Warm and dry. No rashes were noted. HEENT: Round and reactive pupils. Moist mucous membranes. No ulcerations or thrush. Neck: Supple to movements. Chest: No use of accessory muscles to breathe. Symmetrical expansion. No wheezing, crackles or rhonchi.   Cardiovascular: S1 and S2 are rhythmic and regular. No murmurs appreciated. Abdomen: Positive bowel sounds to auscultation. Benign to palpation. No masses felt. No hepatosplenomegaly. surgical wound dressed  Extremities: No clubbing, no cyanosis, no edema.   Lines: peripheral    Laboratory and Tests Review:  Lab Results   Component Value Date    WBC 5.9 10/18/2021    WBC 6.3 10/17/2021    WBC 7.6 10/16/2021    HGB 14.2 10/18/2021    HCT 43.0 10/18/2021    MCV 88.5 10/18/2021     10/18/2021     Lab Results   Component Value Date    NEUTROABS 4.00 10/18/2021    NEUTROABS 4.85 10/17/2021    NEUTROABS 6.61 10/16/2021     No results found for: CRP  Lab Results   Component Value Date    ALT 16 10/18/2021    AST 15 10/18/2021    ALKPHOS 89 10/18/2021    BILITOT 0.8 10/18/2021     Lab Results   Component Value Date     10/18/2021    K 4.0 10/18/2021    K 4.5 10/15/2021     10/18/2021    CO2 25 10/18/2021    BUN 9 10/18/2021    CREATININE 0.9 10/18/2021    CREATININE 0.9 10/17/2021    CREATININE 1.1 10/16/2021    GFRAA >60 10/18/2021    LABGLOM >60 10/18/2021    GLUCOSE 114 10/18/2021    GLUCOSE 110 01/07/2011    PROT 6.6 10/18/2021    LABALBU 3.2 10/18/2021    LABALBU 4.1 01/07/2011    CALCIUM 8.8 10/18/2021    BILITOT 0.8 10/18/2021    ALKPHOS 89 10/18/2021    AST 15 10/18/2021    ALT 16 10/18/2021     Lab Results   Component Value Date    CRP 5.2 (H) 10/15/2021     Lab Results   Component Value Date    SEDRATE 56 (H) 10/15/2021    SEDRATE 20 (H) 01/07/2011     Radiology:      Microbiology:   Lab Results   Component Value Date    ORG Escherichia coli 10/14/2021     Lab Results   Component Value Date    ORG Escherichia coli 10/14/2021     No results found for: WNDABS  No results found for: RESPSMEAR  No results found for: MPNEUMO, CLAMYDCU, LABLEGI, AFBCX, FUNGSM, LABFUNG  No results found for: CULTRESP  No results found for: CXCATHTIP  No results found for: BFCS  Culture Surgical   Date Value Ref Range Status   10/14/2021 Preliminary    Heavy growth  Identification and sensitivity to follow       Urine Culture, Routine   Date Value Ref Range Status   05/12/2021 <10,000 CFU/mL  Mixed gram positive organisms    Final     No results found for: [address]   No results for input(s): PROCAL in the last 72 hours.     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 Coli s to zosyn     Plan:    Continue piperacillin-tazobactam 3.375 g every 8 hours. Follow-up tissue cultures. GNR and GPO  Follow-up Surgery recommendations regarding enterocutaneous fistula. .  Continue local wound care.   To ECF on zosyn for three weeks   followup with Dr. Sven Kolb in two weeks     Thank you for involving me in the care of Patrick Boyd MD  10:52 AM     10/18/2021

## 2021-10-18 NOTE — CARE COORDINATION
Discharge order noted. Patient is POD#4 Left flank excisional debridement for Left flank abscess. Per internal med note today, total wound size 10cm x 8cm x 4 cm. Per general surgery note today, okay for dc from surgical perspective following antibiotic recs by ID. Per ID note today, To ECF on zosyn for three weeks. PICC is ordered and being placed today. Plan is to discharge to [facility] under the precert waiver. Stefano Romero from [facility] will set up transport and let me know service and time. Therapy department notified of need for stat PT/OT updates. Rapid Covid-19 test given to RN to do today. Ambulette form and Hens in envelope in soft chart. Mis Sosa RN CM      Per Stefano Romero from [facility], transportation is set up via [employer] for 6 pm today. Charge nurse, RN and patient all notified. Last negative Covid-19 was from yesterday 10/17. Ambulette form and Hens in envelope in soft chart.   Mis Sosa RN CM

## 2021-10-18 NOTE — PROGRESS NOTES
Occupational Therapy  OT BEDSIDE TREATMENT NOTE   [address] [address]  [address] [place], [place]      AVXR:  Patient Name: Callie Kirk  MRN: [id number]  : 1965  Room: [address] [place] [address]     Evaluating OT: [place] [address]     Referring Provider[de-identified] Brett Haile MD                                     Specific Provider Orders/Date: OT evaluation and treatment 10/15/21     Diagnosis:  Diverticulitis of colon [K57.32]  Flank pain [R10.9]  Sigmoid diverticulitis [K57.32]  Abnormal computed tomography of abdomen and pelvis [R93.5]  Chest pain, unspecified type [R07.9]       Pertinent Medical History:  has a past medical history of CAD (coronary artery disease), Hypertension, and Sleep apnea.         Precautions:  Fall Risk, wound L flank     Assessment of current deficits   [x]? Functional mobility             [x]?ADLs           [x]? Strength                  []?Cognition   [x]? Functional transfers           []? IADLs         [x]? Safety Awareness   [x]? Endurance   []? Fine Coordination              [x]? Balance      []? Vision/perception   []? Sensation     []? Gross Motor Coordination  []? ROM           []?  Delirium                   []? Motor Control      OT PLAN OF CARE   OT POC based on physician orders, patient diagnosis and results of clinical assessment     Frequency/Duration 1-4 days/wk for 2 weeks PRN   Specific OT Treatment to include:   * Instruction/training on adapted ADL techniques and AE recommendations to increase functional independence within precautions       * Functional transfer/mobility training/DME recommendations for increased independence, safety, and fall prevention  * Patient/Family education to increase follow through with safety techniques and functional independence  * Therapeutic exercise to improve motor endurance, ROM, and functional strength for ADLs/functional transfers  * Therapeutic activities to facilitate/challenge dynamic balance, stand tolerance for increased safety and independence with ADLs  * Positioning to improve skin integrity, interaction with environment and functional independence        Recommended Adaptive Equipment:  TBD      Home Living:  Per chart review pt is homeless. However pt stated he lives alone in an  apt with 0 step(s) to enter and 0 rail(s); bed/bath on 1st   Bathroom setup: tub shower   Equipment owned: none     Prior Level of Function: Independent  with ADLs , Independent  with IADLs; ambulated with no AD  Driving: yes        Pain Level: none at this time  Cognition: A&O: 4/4  Follows 2 step directions: good               Memory:  good               Sequencing:  good               Problem solving:  fair               Judgement/safety:  fair      Functional Assessment:   AM-PAC Daily Activity Raw Score: 19/24       Initial Eval Status  Date: 10/15/21 Treatment Status  Date: 10/18/21 STG=LTG  Time frame: 10-14 days   Feeding Independent   Ind Independent    Grooming Stand by Assist  Set up  [place] by Assist  Per last tx  Independent    LB Dressing Stand by Assist  SBA  To don pants seated EOB  Independent    Bathing Minimal Assist Per last tx  Independent    Toileting Stand by Assist   per last tx Independent    Bed Mobility  Supine to sit: Minimal Assist d/t L flank wound  Sit to supine: NT   SBA- supine>sit  Educated pt on technique to increase independence. Supine to sit:  Independent   Sit to supine: Independent    Functional Transfers Sit to stand: Stand by Assist   Stand to sit: Stand by Assist   Stand pivot: Stand by Assist  Per last tx   (see below) Independent    Functional Mobility SBA with no AD Per last tx  indep   Balance Sitting: indep     Standing: sba Sitting: Ind  Sitting: indep      Standing: indep   Activity Tolerance fair fair  good   Visual/  Perceptual Glasses: reading             BUE  ROM/Strength/  Fine motor Coordination Hand dominance: L     RUE: ROM WFL     Strength: grossly 4/5      Strength:  WFL     Coordination:   WFL     LUE: ROM  WFL     Strength: grossly 4/5      Strength:  WFL     Coordination: WFL           Comments: Upon arrival pt supine in bed. Pt educated on techniques to increase independence and safety during ADL's and bed mobility. Once pants were donned pt was bleeding at picc line site, nursing notified immediately. At end of session pt left supine in bed, call light within reach, nurse and IV team present. · Pt has made fair progress towards set goals.      · Continue with current plan of care    Treatment Time In: 2:35            Treatment Time Out: 2:45             Treatment Charges: Mins Units   Ther Ex  [id number]     Manual Therapy [id number]     Thera Activities [id number] 10 1   ADL/Home Mgt [id number]     Neuro Re-ed 50078     Group Therapy      Orthotic manage/training  55522     Non-Billable Time     Total Timed Treatment 10 [address] Hutchison

## 2021-10-18 NOTE — PROGRESS NOTES
[facility]  Internal Medicine Residency Program  Progress Note - House Team     Patient:  Dominik Duarte 64 y.o. male MRN: [id number]     Date of Service: 10/18/2021     CC: L flank pain and chest pain  Overnight events: PRINCE    Subjective     Patient was seen and examined this morning at bedside in no acute distress and lying on his R side. Patient denies any pain and conversed pleasantly with me. Objective     Physical Exam:  · Vitals: BP (!) 141/80   Pulse 60   Temp 97.5 °F (36.4 °C) (Oral)   Resp 14   Ht 6' 2\" (1.88 m)   Wt 240 lb 4.8 oz (109 kg)   SpO2 97%   BMI 30.85 kg/m²     · I & O - 24hr: No intake/output data recorded. Physical Exam  Vitals reviewed. HENT:      Head: Normocephalic and atraumatic. Eyes:      Extraocular Movements: Extraocular movements intact. Cardiovascular:      Rate and Rhythm: Normal rate and regular rhythm. Heart sounds: Normal heart sounds. Pulmonary:      Effort: Pulmonary effort is normal.      Breath sounds: Normal breath sounds. Abdominal:      Comments: Tender on L-flank near surgical debridement site. Neurological:      General: No focal deficit present. Mental Status: He is alert.    Psychiatric:         Mood and Affect: Mood normal.       Subject  Pertinent Labs & Imaging Studies   anushka  CBC with Differential:    Lab Results   Component Value Date    WBC 5.9 10/18/2021    RBC 4.86 10/18/2021    HGB 14.2 10/18/2021    HCT 43.0 10/18/2021     10/18/2021    MCV 88.5 10/18/2021    MCH 29.2 10/18/2021    MCHC 33.0 10/18/2021    RDW 13.2 10/18/2021    NRBC 0.9 05/07/2021    METASPCT 0.9 10/17/2021    LYMPHOPCT 12.5 10/18/2021    MONOPCT 13.3 10/18/2021    BASOPCT 0.8 10/18/2021    MONOSABS 0.79 10/18/2021    LYMPHSABS 0.74 10/18/2021    EOSABS 0.14 10/18/2021    BASOSABS 0.05 10/18/2021     CMP:    Lab Results   Component Value Date     10/18/2021    K 4.0 10/18/2021    K 4.5 10/15/2021     10/18/2021    CO2 25 10/18/2021    BUN 9 10/18/2021    CREATININE 0.9 10/18/2021    GFRAA >60 10/18/2021    LABGLOM >60 10/18/2021    GLUCOSE 114 10/18/2021    GLUCOSE 110 01/07/2011    PROT 6.6 10/18/2021    LABALBU 3.2 10/18/2021    LABALBU 4.1 01/07/2011    CALCIUM 8.8 10/18/2021    BILITOT 0.8 10/18/2021    ALKPHOS 89 10/18/2021    AST 15 10/18/2021    ALT 16 10/18/2021     Albumin:    Lab Results   Component Value Date    LABALBU 3.2 10/18/2021    LABALBU 4.1 01/07/2011     HgBA1c:    Lab Results   Component Value Date    LABA1C 5.7 05/08/2021       CT ABDOMEN PELVIS W IV CONTRAST Additional Contrast? Oral and Rectal   Final Result   Inflammatory changes with wall thickening and edema of the distal descending   and sigmoid colon likely diverticulitis, with  a soft tissue connection to   the abdominal wall with tiny abscess collection in the abdominal wall with an   adjacent lateral abscess collection in the subcutaneous tissue. This could   represent inflammation with a  colo cutaneous fistula which is closed. However, an open communication or a contrast extravasation could not be   established      Atelectasis/ground-glass densities in the lung bases. Splenomegaly. Inflammatory changes in the pancreatic head. Focal pancreatitis has to be   excluded. Nonobstructing left kidney stone and irregularity of the inferior endplate of   L4. Consider MRI. CT ABDOMEN PELVIS W IV CONTRAST Additional Contrast? None   Final Result   Inflammatory changes surrounding the proximal sigmoid colon extending through   the abdominal wall and into the left flank where there are collections of   fluid and gas. This most likely represents complicated diverticulitis with   fistula formation with or without gas-forming infection. Splenomegaly. CTA PULMONARY W CONTRAST   Final Result   1. No pulmonary embolism. 2. Minimal atelectasis or scarring in the dependent aspect of the right lower   lobe.   Otherwise, the lungs are clear. 3. Splenomegaly. Probable hepatomegaly. Consider dedicated CT of the   abdomen and pelvis for further evaluation. 4. Left ventricular hypertrophy. XR CHEST (2 VW)   Final Result   No acute process. Medical Student's Assessment and Plan     Assessment and Plan:    1. L flank abscess s/p debridement (10/14) and possible EC fistula  -s/p excisional debridement 10/14/21  -10/15/21 CT abdomen/pelvis w/ PO and rectal contrast showed sigmoid diverticuli in distal descending and sigmoid colon w/ colocutaneous fistula which is closed  -Surgical site culture - E. Coli growth  -Zosyn (Day 4)  -Per Gen Surg - clear for discharge. F/u OP colonoscopy in 4 weeks  -Per ID - continue Zosyn for 3 weeks, F/u with Dr. Lily Huff in 2 weeks    2. Complicated sigmoid diverticulitis w/ possible EC fistula  --10/14/21 CT scan of the abdomen: possible complicated diverticulitis with fistula formation with or without gas-forming infection. 10/14/21: s/p debridement of L flank abscess, no obvious fistulous tract  -10/15/21: Rpt CT of abdomen with PO/rectal contrast: as above  -tissue/surgical cultures-heavy growth of gram-negative rods  -surgical site culture showed E. Coli growth (10/17/21)  -Continue Zosyn (Day 4)  -Pain management per Gen Surg    3. Hx of HTN  -SBP in 120-140s  -Continue metoprolol succinate 25mg daily  -Continue monitor BP    4. Hx of CAD  -No new chest pain since admission  -No significant change in troponin  -LHC (7/16/21) showed no stenosis in L main, LAD, circumflex, or RCA. EF 55%  -ASCVD risk is 8.2%  -Resume aspirin 81mg daily, Toprol XL 25 mg daily, Atorvastatin 40 mg daily    5. Pre-Diabetes  -HbA1C 5.7%  -Blood Glucose 105-116 mg/dl  -Continue to monitor    6. Hx of MARLEE  -Non-compliant with CPAP  -Does not follow up w/ Pulmonology    7.  Splenomegaly likely 2/2 alcoholic liver disease  -Alcohol drinker since 27years old  -Drinks 5 bottles of 40 oz beer most days of week  -w/ hypoalbuminemia (latest 3.2)  -PBS: negative for immature forms or blasts  -PSA 10/15 was 0.53  -Continue to monitor    8.  Alcohol abuse w/ Hx of alcohol withdrawal seizures  -No signs or symptoms of Delirium Tremens  -Phenobarbital titrated and dcdc  -Continue to monitor for signs/symptoms of withdrawal, DT      PT/OT evaluation:   DVT prophylaxis/ GI prophylaxis: Enoxaparin  Disposition: Discharge to [facility] [place], [place]  Attending physician: Dr. Opal Carvalho

## 2021-10-18 NOTE — PROGRESS NOTES
[facility]  Internal Medicine Residency Program  Progress Note - House Team 1    Patient:  Alyssa Lee 64 y.o. male MRN: [id number]     Date of Service: 10/18/2021     CC: L flank pain and chest pain  Overnight events: No acute events overnight. Subjective     The patient is seen and examined this morning. He is lying on bed, not in acute distress. Packing was changed by general surgery this morning. Still with wound packing, lesser serosanguinous discharge, less tenderness on wound edges, less erythema as well, but still with areas of induration. Denies chest pain, dyspnea, abdominal pain, nausea and vomiting. Denies anxiety, tremors, sweating, agitation. Objective     Physical Exam:  · Vitals: BP (!) 155/73   Pulse 80   Temp 98.9 °F (37.2 °C) (Oral)   Resp 18   Ht 6' 2\" (1.88 m)   Wt 240 lb 4.8 oz (109 kg)   SpO2 95%   BMI 30.85 kg/m²     · I & O - 24hr: No intake/output data recorded. · General Appearance: alert, appears stated age and cooperative  · HEENT:  Head: Normal, normocephalic, atraumatic. · Neck: no adenopathy, no carotid bruit, no JVD, supple, symmetrical, trachea midline and thyroid not enlarged, symmetric, no tenderness/mass/nodules  · Lung: clear to auscultation bilaterally  · Heart: regular rate and rhythm, S1, S2 normal, no murmur, click, rub or gallop  · Abdomen: (+) 10 cm incision with erythematous and tender wound edges, open with kerlik packing, with surrounding induration and erythema, less than previous days;  covered with ABD pads;  NABS, soft, non-tender  · Extremities:  extremities normal, atraumatic, no cyanosis or edema  · Musculokeletal: No joint swelling, no muscle tenderness. ROM normal in all joints of extremities.    · Neurologic: Mental status: Alert, oriented, thought content appropriate  Subject  Pertinent Labs & Imaging Studies   anushka  CBC with Differential:    Lab Results   Component Value Date    WBC 6.3 10/17/2021    RBC 4.69 10/17/2021    HGB 13.9 10/17/2021    HCT 41.5 10/17/2021     10/17/2021    MCV 88.5 10/17/2021    MCH 29.6 10/17/2021    MCHC 33.5 10/17/2021    RDW 13.4 10/17/2021    NRBC 0.9 05/07/2021    METASPCT 0.9 10/17/2021    LYMPHOPCT 5.3 10/17/2021    MONOPCT 14.9 10/17/2021    BASOPCT 0.5 10/17/2021    MONOSABS 0.94 10/17/2021    LYMPHSABS 0.32 10/17/2021    EOSABS 0.16 10/17/2021    BASOSABS 0.00 10/17/2021     CMP:    Lab Results   Component Value Date     10/17/2021    K 4.1 10/17/2021    K 4.5 10/15/2021     10/17/2021    CO2 25 10/17/2021    BUN 7 10/17/2021    CREATININE 0.9 10/17/2021    GFRAA >60 10/17/2021    LABGLOM >60 10/17/2021    GLUCOSE 103 10/17/2021    GLUCOSE 110 01/07/2011    PROT 6.2 10/17/2021    LABALBU 3.1 10/17/2021    LABALBU 4.1 01/07/2011    CALCIUM 8.5 10/17/2021    BILITOT 0.9 10/17/2021    ALKPHOS 81 10/17/2021    AST 16 10/17/2021    ALT 16 10/17/2021     Last 3 Troponin:    Lab Results   Component Value Date    TROPONINI <0.01 05/12/2021    TROPONINI <0.01 05/11/2021    TROPONINI <0.01 05/09/2021     HgBA1c:    Lab Results   Component Value Date    LABA1C 5.7 05/08/2021     Microalbumen/Creatinine ratio:  No components found for: MAURACREAT    Resident's Assessment and Plan     Assessment and Plan:    L flank abscess s/p excisional debridement 10/14/21 2/2 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   -as above     Hx HTN  -SBP in the 140-150`s  Plan  -continue metoprolol succinate 25 mg daily  -resume amlodipine 5 mg daily  -continue to monitor BP     Hx CAD  - EF 55%  -The 10-year ASCVD risk score (Arabella Watson, et al., 2013) is: 8.2%  Plan  -comntinue aspirin 81 mg daily, Toprol XL 25 mg daily and atorvastatin 40 mg daily    Pre-diabetes  -A1C 5.7%  -blood glucose 103-117 mg/dl  Plan   -continue to monitor     Hx MARLEE  -refer to Pulmo as out-patient  -sleep study as out-patient     Splenomegaly likely 2/2 alcohol liver disease  -will monitor as out-patient  -counselling on effects of alcohol and alcohol cessation    Alcohol abuse with Hx of alcohol withdrawal seizures  -no signs and symptoms of DT`s  -had one dose of phenobarbital yesterday and one again today  Plan  -monitor for signs and symptoms of alcohol withdrawal and DT  -alcohol cessation    PT/OT evaluation:  DVT prophylaxis/ GI prophylaxis: enoxaparin  Disposition: Troy Rodriguez MD, PGY-1  Attending physician: Dr. Opal Carvalho  Attending Physician Statement:  Moreno Schreiber M.D., F.A.C.P.     I have discussed the case, including pertinent history and exam findings with the resident/NP. I have seen and examined the patient and the key elements of the encounter have been performed by me. I agree with the resident ROS, PMHx, PSHx, meds reviewed and assessment, plan and orders as documented by the resident/NP    [facility] charts reviewed, including other providers notes, relevant labs and imaging. >50% of time spent coordinating care with other providers and/or counseling patient/family  Remainder of medical problems as per resident note.     POD#2 Left flank excisional debridement for Left flank abscess.  Per internal med note today, total wound size 10cm x 8cm x 4 cm.      CT of the abdomen/pelvis with PO and rectal contrast   Sp abscess/diverticulitis  Local abscess flank wall, tracking from prior diverticulits  -lumen with possible fistula tract   \"enterocutaneous fistula\" per gen surgery. -  Excision into mucle  The depth of tissue incised was muscle,walls of gut ? Ok        Eating OK  per Gen surg  - for now no diverting colostomy plans -- (GI lumen reestablished in past)  Patient is currently on  and IV Zosyn q 8 hrs.      Lives in hotel, but setting up at facility [facility]- talked to  at length     Chest pain workup in past, likely nonanginal  +Vague- medically noncompliance with prior cardiac meds-- ASA/statin/betablocker     Pain well controlled, no nausea-- stable nutrition/electrolytes  Hx of etoh withdrawals/with seizure  Thaimine, folate,   Wean off phenobarb  +splenomegaly  +etoh, drinks \"most of days\"-- no DTs yet   titrated down phenobarb, NO DTs seen (benzo prn)  Once sensitivities back from culture  Gram neg rods---E joey  Sensitive to zysyn  Continue piperacillin-tazobactam 3.375 g every 8 hours. Per ID Follow-up tissue cultures.  GNR and GPO     OK for DC   if covid negative  And facility set up for IV abx as per ID request

## 2021-10-19 LAB
CULTURE SURGICAL: ABNORMAL
ORGANISM: ABNORMAL

## 2021-11-05 ENCOUNTER — OFFICE VISIT (OUTPATIENT)
Dept: PRIMARY CARE CLINIC | Age: 56
End: 2021-11-05
Payer: MEDICARE

## 2021-11-05 VITALS
SYSTOLIC BLOOD PRESSURE: 136 MMHG | RESPIRATION RATE: 19 BRPM | BODY MASS INDEX: 29.52 KG/M2 | HEIGHT: 74 IN | HEART RATE: 68 BPM | DIASTOLIC BLOOD PRESSURE: 88 MMHG | WEIGHT: 230 LBS | TEMPERATURE: 96.9 F | OXYGEN SATURATION: 95 %

## 2021-11-05 DIAGNOSIS — Z98.890 STATUS POST INCISION AND DRAINAGE: ICD-10-CM

## 2021-11-05 DIAGNOSIS — K57.32 SIGMOID DIVERTICULITIS: ICD-10-CM

## 2021-11-05 DIAGNOSIS — Z09 HOSPITAL DISCHARGE FOLLOW-UP: Primary | ICD-10-CM

## 2021-11-05 LAB
HCT VFR BLD CALC: 39.8 % (ref 37–54)
HEMOGLOBIN: 13.4 G/DL (ref 12.5–16.5)
MCH RBC QN AUTO: 28.6 PG (ref 26–35)
MCHC RBC AUTO-ENTMCNC: 33.7 % (ref 32–34.5)
MCV RBC AUTO: 84.9 FL (ref 80–99.9)
PDW BLD-RTO: 12.5 FL (ref 11.5–15)
PLATELET # BLD: 175 E9/L (ref 130–450)
PMV BLD AUTO: 10.8 FL (ref 7–12)
RBC # BLD: 4.69 E12/L (ref 3.8–5.8)
WBC # BLD: 7 E9/L (ref 4.5–11.5)

## 2021-11-05 PROCEDURE — 1111F DSCHRG MED/CURRENT MED MERGE: CPT | Performed by: STUDENT IN AN ORGANIZED HEALTH CARE EDUCATION/TRAINING PROGRAM

## 2021-11-05 PROCEDURE — G8427 DOCREV CUR MEDS BY ELIG CLIN: HCPCS | Performed by: STUDENT IN AN ORGANIZED HEALTH CARE EDUCATION/TRAINING PROGRAM

## 2021-11-05 PROCEDURE — 99204 OFFICE O/P NEW MOD 45 MIN: CPT | Performed by: STUDENT IN AN ORGANIZED HEALTH CARE EDUCATION/TRAINING PROGRAM

## 2021-11-05 PROCEDURE — G8419 CALC BMI OUT NRM PARAM NOF/U: HCPCS | Performed by: STUDENT IN AN ORGANIZED HEALTH CARE EDUCATION/TRAINING PROGRAM

## 2021-11-05 PROCEDURE — 1036F TOBACCO NON-USER: CPT | Performed by: STUDENT IN AN ORGANIZED HEALTH CARE EDUCATION/TRAINING PROGRAM

## 2021-11-05 PROCEDURE — G8484 FLU IMMUNIZE NO ADMIN: HCPCS | Performed by: STUDENT IN AN ORGANIZED HEALTH CARE EDUCATION/TRAINING PROGRAM

## 2021-11-05 PROCEDURE — 3017F COLORECTAL CA SCREEN DOC REV: CPT | Performed by: STUDENT IN AN ORGANIZED HEALTH CARE EDUCATION/TRAINING PROGRAM

## 2021-11-05 RX ORDER — LORAZEPAM 1 MG/1
1 TABLET ORAL
COMMUNITY

## 2021-11-05 ASSESSMENT — PATIENT HEALTH QUESTIONNAIRE - PHQ9
SUM OF ALL RESPONSES TO PHQ QUESTIONS 1-9: 0
SUM OF ALL RESPONSES TO PHQ QUESTIONS 1-9: 0
1. LITTLE INTEREST OR PLEASURE IN DOING THINGS: 0
SUM OF ALL RESPONSES TO PHQ9 QUESTIONS 1 & 2: 0
SUM OF ALL RESPONSES TO PHQ QUESTIONS 1-9: 0
2. FEELING DOWN, DEPRESSED OR HOPELESS: 0

## 2021-11-05 NOTE — PROGRESS NOTES
[facility]  Department of Family Medicine      Patient:  Jareth Ramos 64 y.o. male     Date of Service: 21      Chief complaint:   Chief Complaint   Patient presents with    Establish Care         History ofPresent Illness   The patient is a 64 y.o. male  presented to the clinic with complaints as above. 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  -discharged to [facility] ([facility]) on iv abx for 3 weeks, needs f/u c scope 4 weeks from when patient was discharged  -currently, denies any abdominal pain, fever, chills  -moving bowels ok, does not think there is blood in there   -does not know if he has f/u with general surgery or ID however paperwork indicates he does have follow up with them     Past Medical History:      Diagnosis Date    CAD (coronary artery disease)     HTN (hypertension)     Hypercholesteremia     Hypertension     Sleep apnea        PastSurgical History:        Procedure Laterality Date    ABDOMEN SURGERY Left 10/14/2021    ABDOMEN INCISION AND DRAINAGE performed by Ravindra Suarez MD at [facility]:    Patient has no known allergies. Social History:   Social History     Socioeconomic History    Marital status: Legally      Spouse name: Not on file    Number of children: Not on file    Years of education: Not on file    Highest education level: Not on file   Occupational History    Not on file   Tobacco Use    Smoking status: Former Smoker     Packs/day: 1.00     Years: 6.00     Pack years: 6.00     Quit date:      Years since quittin.8    Smokeless tobacco: Never Used   Vaping Use    Vaping Use: Never used   Substance and Sexual Activity    Alcohol use:  Yes     Alcohol/week: 5.0 standard drinks     Types: 5 Cans of beer per week    Drug use: Not Currently    Sexual activity: Not Currently   Other Topics Concern    Not on file Social History Narrative    Not on file     Social Determinants of Health     Financial Resource Strain:     Difficulty of Paying Living Expenses: Not on file   Food Insecurity:     Worried About Running Out of Food in the Last Year: Not on file    Donta of Food in the Last Year: Not on file   Transportation Needs:     Lack of Transportation (Medical): Not on file    Lack of Transportation (Non-Medical): Not on file   Physical Activity:     Days of Exercise per Week: Not on file    Minutes of Exercise per Session: Not on file   Stress:     Feeling of Stress : Not on file   Social Connections:     Frequency of Communication with Friends and Family: Not on file    Frequency of Social Gatherings with Friends and Family: Not on file    Attends Worship Services: Not on file    Active Member of [address] [place] [address] [employer] or Organizations: Not on file    Attends Club or Organization Meetings: Not on file    Marital Status: Not on file   Intimate Partner Violence:     Fear of Current or Ex-Partner: Not on file    Emotionally Abused: Not on file    Physically Abused: Not on file    Sexually Abused: Not on file   Housing Stability:     Unable to Pay for Housing in the Last Year: Not on file    Number of [place] in the Last Year: Not on file    Unstable Housing in the Last Year: Not on file        Family History:   History reviewed. No pertinent family history. Review of Systems:   Review of Systems - as above     Physical Exam   Vitals: /88   Pulse 68   Temp 96.9 °F (36.1 °C) (Temporal)   Resp 19   Ht 6' 2\" (1.88 m)   Wt 230 lb (104.3 kg)   SpO2 95%   BMI 29.53 kg/m²   Physical Exam  Constitutional:       Appearance: He is well-developed. HENT:      Head: Normocephalic and atraumatic. Eyes:      General:         Right eye: No discharge. Left eye: No discharge. Conjunctiva/sclera: Conjunctivae normal.   Neck:      Trachea: No tracheal deviation.    Cardiovascular:      Rate and Rhythm: Normal rate and regular rhythm. Heart sounds: Normal heart sounds. Pulmonary:      Effort: Pulmonary effort is normal. No respiratory distress. Breath sounds: Normal breath sounds. No wheezing. Abdominal:      General: Bowel sounds are normal. There is no distension. Palpations: Abdomen is soft. Tenderness: There is no abdominal tenderness. Musculoskeletal:         General: No tenderness. Cervical back: Normal range of motion and neck supple. Skin:     General: Skin is warm and dry. Comments: PICC line in left arm   Neurological:      Mental Status: He is alert. Comments: Forgetful   Psychiatric:         Behavior: Behavior normal.           Assessment and Plan       1. Hospital discharge follow-up  For Sigmoid diverticulitis and s/p left flank abscess I and D  -Patient seems to be improving, is getting appropriate treatment at his [facility], left flank appears to look ok, and he does have appropriate follow up with the specialists  -Will get repeat CBC to ensure infection has resolved, however vitals are stable and patient afebrile so infection unlikely    2. Sigmoid diverticulitis  Discussion as above, continue abx  - CBC; Future    3. Status post incision and drainage  Discussion as above, has f/u with gen surg.   - CBC; Future      Counseled regarding above diagnosis, including possible risks and complications,  especially if left uncontrolled. Counseled regarding the possible side effects, risks, benefits and alternatives to treatment;patient and/or guardian verbalizes understanding, agrees, feels comfortable with and wishes to proceed with above treatment plan. Call or go to [address] if symptoms worsen or persist. Advised patient to call with any new medication issues, and, as applicable, read all Rx info from pharmacy to assure aware of all possible risks and side effects of medicationbefore taking.      Patient and/or guardian given opportunity to ask questions/raise concerns. The patient verbalized comfort and understanding ofinstructions. I encourage further reading and education about your health conditions. Information on many health conditions is provided by [facility] [employer]: [email or web address]  Please bring any questions to me at your nextvisit. Return to Office: Return in about 1 month (around 12/5/2021) for f/u diverticulitis and left flank i and d for abscess . Medication List:    Current Outpatient Medications   Medication Sig Dispense Refill    LORazepam (ATIVAN) 1 MG tablet Take 1 mg by mouth every 6 hours as needed for Anxiety.  piperacillin-tazobactam (ZOSYN) 3.375 (3-0.375) g injection Infuse 3,375 mg intravenously every 8 hours for 21 days 63 each 0    folic acid (FOLVITE) 1 MG tablet Take 1 tablet by mouth daily 30 tablet 0    sennosides-docusate sodium (SENOKOT-S) 8.6-50 MG tablet Take 2 tablets by mouth 2 times daily 60 tablet 1    polyethylene glycol (GLYCOLAX) 17 g packet Take 17 g by mouth daily as needed for Constipation 527 g 1    melatonin 3 MG TABS tablet Take 2 tablets by mouth daily 60 tablet 0    aspirin 81 MG chewable tablet Take 1 tablet by mouth daily 30 tablet 3    atorvastatin (LIPITOR) 40 MG tablet Take 1 tablet by mouth nightly 30 tablet 3    metoprolol succinate (TOPROL XL) 25 MG extended release tablet Take 1 tablet by mouth daily 30 tablet 3    thiamine mononitrate (THIAMINE) 100 MG tablet Take 1 tablet by mouth daily 90 tablet 0     No current facility-administered medications for this visit. Jay Bergman,        This document may have been prepared at least partially through the use of voice recognition software. Although effort is taken to assure the accuracy ofthis document, it is possible that grammatical, syntax,  or spelling errors may occur.

## 2021-11-06 PROBLEM — Z98.890 STATUS POST INCISION AND DRAINAGE: Status: ACTIVE | Noted: 2021-11-06

## 2022-02-01 ENCOUNTER — APPOINTMENT (OUTPATIENT)
Dept: CT IMAGING | Age: 57
End: 2022-02-01
Payer: MEDICARE

## 2022-02-01 ENCOUNTER — HOSPITAL ENCOUNTER (EMERGENCY)
Age: 57
Discharge: HOME | End: 2022-02-01
Attending: EMERGENCY MEDICINE
Payer: MEDICARE

## 2022-02-01 VITALS
TEMPERATURE: 98 F | OXYGEN SATURATION: 97 % | SYSTOLIC BLOOD PRESSURE: 168 MMHG | RESPIRATION RATE: 16 BRPM | BODY MASS INDEX: 30.16 KG/M2 | WEIGHT: 235 LBS | DIASTOLIC BLOOD PRESSURE: 92 MMHG | HEIGHT: 74 IN | HEART RATE: 78 BPM

## 2022-02-01 DIAGNOSIS — R19.07 GENERALIZED ABDOMINAL MASS: ICD-10-CM

## 2022-02-01 DIAGNOSIS — L02.91 ABSCESS: Primary | ICD-10-CM

## 2022-02-01 DIAGNOSIS — K57.32 DIVERTICULITIS OF COLON: ICD-10-CM

## 2022-02-01 LAB
ALBUMIN SERPL-MCNC: 3.9 G/DL (ref 3.5–5.2)
ALP BLD-CCNC: 108 U/L (ref 40–129)
ALT SERPL-CCNC: 12 U/L (ref 0–40)
ANION GAP SERPL CALCULATED.3IONS-SCNC: 11 MMOL/L (ref 7–16)
AST SERPL-CCNC: 13 U/L (ref 0–39)
BASOPHILS ABSOLUTE: 0.04 E9/L (ref 0–0.2)
BASOPHILS RELATIVE PERCENT: 0.6 % (ref 0–2)
BILIRUB SERPL-MCNC: 0.7 MG/DL (ref 0–1.2)
BUN BLDV-MCNC: 8 MG/DL (ref 6–20)
CALCIUM SERPL-MCNC: 9.5 MG/DL (ref 8.6–10.2)
CHLORIDE BLD-SCNC: 101 MMOL/L (ref 98–107)
CO2: 26 MMOL/L (ref 22–29)
CREAT SERPL-MCNC: 0.8 MG/DL (ref 0.7–1.2)
EOSINOPHILS ABSOLUTE: 0.29 E9/L (ref 0.05–0.5)
EOSINOPHILS RELATIVE PERCENT: 4.4 % (ref 0–6)
GFR AFRICAN AMERICAN: >60
GFR NON-AFRICAN AMERICAN: >60 ML/MIN/1.73
GLUCOSE BLD-MCNC: 119 MG/DL (ref 74–99)
HCT VFR BLD CALC: 39.7 % (ref 37–54)
HEMOGLOBIN: 13 G/DL (ref 12.5–16.5)
IMMATURE GRANULOCYTES #: 0.02 E9/L
IMMATURE GRANULOCYTES %: 0.3 % (ref 0–5)
LACTIC ACID: 1.4 MMOL/L (ref 0.5–2.2)
LYMPHOCYTES ABSOLUTE: 0.98 E9/L (ref 1.5–4)
LYMPHOCYTES RELATIVE PERCENT: 14.9 % (ref 20–42)
MCH RBC QN AUTO: 26.9 PG (ref 26–35)
MCHC RBC AUTO-ENTMCNC: 32.7 % (ref 32–34.5)
MCV RBC AUTO: 82.2 FL (ref 80–99.9)
MONOCYTES ABSOLUTE: 0.56 E9/L (ref 0.1–0.95)
MONOCYTES RELATIVE PERCENT: 8.5 % (ref 2–12)
NEUTROPHILS ABSOLUTE: 4.69 E9/L (ref 1.8–7.3)
NEUTROPHILS RELATIVE PERCENT: 71.3 % (ref 43–80)
PDW BLD-RTO: 13 FL (ref 11.5–15)
PLATELET # BLD: 193 E9/L (ref 130–450)
PMV BLD AUTO: 10.6 FL (ref 7–12)
POTASSIUM REFLEX MAGNESIUM: 4 MMOL/L (ref 3.5–5)
RBC # BLD: 4.83 E12/L (ref 3.8–5.8)
SODIUM BLD-SCNC: 138 MMOL/L (ref 132–146)
TOTAL PROTEIN: 7.6 G/DL (ref 6.4–8.3)
WBC # BLD: 6.6 E9/L (ref 4.5–11.5)

## 2022-02-01 PROCEDURE — 87205 SMEAR GRAM STAIN: CPT

## 2022-02-01 PROCEDURE — 87077 CULTURE AEROBIC IDENTIFY: CPT

## 2022-02-01 PROCEDURE — 99284 EMERGENCY DEPT VISIT MOD MDM: CPT

## 2022-02-01 PROCEDURE — 85025 COMPLETE CBC W/AUTO DIFF WBC: CPT

## 2022-02-01 PROCEDURE — 83605 ASSAY OF LACTIC ACID: CPT

## 2022-02-01 PROCEDURE — 6360000004 HC RX CONTRAST MEDICATION: Performed by: RADIOLOGY

## 2022-02-01 PROCEDURE — 87070 CULTURE OTHR SPECIMN AEROBIC: CPT

## 2022-02-01 PROCEDURE — 87040 BLOOD CULTURE FOR BACTERIA: CPT

## 2022-02-01 PROCEDURE — 36415 COLL VENOUS BLD VENIPUNCTURE: CPT

## 2022-02-01 PROCEDURE — 87186 SC STD MICRODIL/AGAR DIL: CPT

## 2022-02-01 PROCEDURE — 74177 CT ABD & PELVIS W/CONTRAST: CPT

## 2022-02-01 PROCEDURE — 80053 COMPREHEN METABOLIC PANEL: CPT

## 2022-02-01 RX ORDER — METRONIDAZOLE 500 MG/1
500 TABLET ORAL 3 TIMES DAILY
Qty: 21 TABLET | Refills: 0 | Status: SHIPPED | OUTPATIENT
Start: 2022-02-01 | End: 2022-02-08

## 2022-02-01 RX ORDER — CEFDINIR 300 MG/1
300 CAPSULE ORAL 2 TIMES DAILY
Qty: 14 CAPSULE | Refills: 0 | Status: SHIPPED | OUTPATIENT
Start: 2022-02-01 | End: 2022-02-08

## 2022-02-01 RX ADMIN — IOPAMIDOL 75 ML: 755 INJECTION, SOLUTION INTRAVENOUS at 11:11

## 2022-02-01 ASSESSMENT — ENCOUNTER SYMPTOMS
VOMITING: 0
SINUS PAIN: 0
BACK PAIN: 0
CHEST TIGHTNESS: 0
DIARRHEA: 0
RHINORRHEA: 0
EYE DISCHARGE: 0
ABDOMINAL PAIN: 0
SINUS PRESSURE: 0
SHORTNESS OF BREATH: 0
COUGH: 0
CONSTIPATION: 0
ABDOMINAL DISTENTION: 0
NAUSEA: 0
ANAL BLEEDING: 0

## 2022-02-01 NOTE — CARE COORDINATION
Social Work/Transition of Care:    PAS cancelled due to [facility] available to transport patient.     Electronically signed by Julio Cesar Hay on 9/2/1882 at 5:20 PM

## 2022-02-01 NOTE — CONSULTS
21   Erik Alatorre MD   thiamine mononitrate (THIAMINE) 100 MG tablet Take 1 tablet by mouth daily 5/15/21   Monika Gonzalez MD       No Known Allergies    History reviewed. No pertinent family history. Social History     Tobacco Use    Smoking status: Former Smoker     Packs/day: 1.00     Years: 6.00     Pack years: 6.00     Quit date:      Years since quittin.0    Smokeless tobacco: Never Used   Vaping Use    Vaping Use: Never used   Substance Use Topics    Alcohol use: Yes     Alcohol/week: 5.0 standard drinks     Types: 5 Cans of beer per week    Drug use: Not Currently         Review of Systems   General ROS: negative  Hematological and Lymphatic ROS: negative  Respiratory ROS: no cough, shortness of breath, or wheezing  Cardiovascular ROS: no chest pain or dyspnea on exertion  Gastrointestinal ROS: no abdominal pain, change in bowel habits, or black or bloody stools  Genito-Urinary ROS: no dysuria, trouble voiding, or hematuria  Musculoskeletal ROS: negative      PHYSICAL EXAM:    Vitals:    22 1152   BP: (!) 176/95   Pulse: 75   Resp: 16   Temp:    SpO2: 98%       General Appearance:  awake, alert, oriented, in no acute distress  Skin:  Skin color, texture, turgor normal. No rashes or lesions. Head/face:  NCAT  Eyes:  No gross abnormalities. Lungs:  Breathing Pattern: regular, no distress  Heart:  Heart regular rate and rhythm  Abdomen:  Soft, non-tender, normal bowel sounds. No bruits, organomegaly or masses. Extremities: Extremities warm to touch, pink, with no edema. Back: Left flank old I&D site with some scar tissue and serous drainage. No erythema. No purulent drainage. No foul smell.     LABS:    CBC  Recent Labs     22  0957   WBC 6.6   HGB 13.0   HCT 39.7        BMP  Recent Labs     22  0957      K 4.0      CO2 26   BUN 8   CREATININE 0.8   CALCIUM 9.5     Liver Function  Recent Labs     22  0957   BILITOT 0.7   AST 13   ALT 12   ALKPHOS 108   PROT 7.6   LABALBU 3.9     No results for input(s): LACTATE in the last 72 hours. No results for input(s): INR, PTT in the last 72 hours. Invalid input(s): PT    RADIOLOGY    CT ABDOMEN PELVIS W IV CONTRAST Additional Contrast? None    Result Date: 2/1/2022  EXAMINATION: CT OF THE ABDOMEN AND PELVIS WITH CONTRAST 2/1/2022 11:11 am TECHNIQUE: CT of the abdomen and pelvis was performed with the administration of intravenous contrast. Multiplanar reformatted images are provided for review. Dose modulation, iterative reconstruction, and/or weight based adjustment of the mA/kV was utilized to reduce the radiation dose to as low as reasonably achievable. COMPARISON: 10/15/2021 abdomen/pelvis CT. HISTORY: ORDERING SYSTEM PROVIDED HISTORY: left sided flank abscess. history of diverticulitis,  previous concern for fistula TECHNOLOGIST PROVIDED HISTORY: Additional Contrast?->None Reason for exam:->left sided flank abscess. history of diverticulitis, previous concern for fistula Decision Support Exception - unselect if not a suspected or confirmed emergency medical condition->Emergency Medical Condition (MA) FINDINGS: Lower Chest: There are linear densities at the dependent aspect of the lung bases, right more than left, not significantly changed. There is a small pericardial effusion unchanged. Organs: The liver is unremarkable. The spleen is enlarged. The pancreas and adrenal glands are normal.  The gallbladder is contracted although there is no definite CT evidence of acute cholecystitis. 8 there is a 4 mm calcification left kidney inferior pole with additional smaller left renal calcifications noted. There are several small cysts within the left kidney. The right kidney is unremarkable. No hydronephrosis. GI/Bowel: The stomach and small bowel are unremarkable.   There is diverticulosis involving the colon with extensive worsening bowel wall thickening of the mid to distal descending and mid to proximal sigmoid colon along with worsening pericolonic inflammatory change particularly in the left paracolic gutter. There is enlargement of the adjacent left iliacus and iliopsoas muscles although no definite discrete hypodense fluid collection is identified within these muscles. There is linear density within the subcutaneous tissues of the left posterior flank where previously there was extensive soft tissue gas and overlying cutaneous thickening. Pelvis: The bladder is unremarkable. The prostate is mildly enlarged. Peritoneum/Retroperitoneum: There are scattered atherosclerotic calcifications of the abdominal aorta and iliac arteries but no abdominal aortic aneurysm. Bones/Soft Tissues: There is moderate lower thoracic and mild lumbar spondylosis. There are relatively stable appearing endplate changes of the inferior L4 and superior L3 vertebral bodies. There is dextroconvex curvature of the lower thoracic and lumbar spine. Worsened more extensive bowel wall thickening involving distal descending and sigmoid colon. This may be due to diverticulitis/colitis although underlying neoplastic disease cannot be excluded based on this examination. Extensive inflammatory/infectious changes around the involved left colon particularly in the left paracolic gutter with edema and/or myositis of the iliopsoas and iliacus muscles. No definite discrete abscess is identified however. Previously seen left subcutaneous flank infection is markedly improved however. Relatively stable appearing endplate changes at L3 and L4 as described. While these may be degenerative, the possibility of discitis/osteomyelitis secondary to adjacent myositis cannot be excluded based on this examination. Further evaluation of the lumbar spine with MRI is suggested. Splenomegaly of uncertain etiology. Bibasilar atelectasis and/or scarring, unchanged. Nonobstructive left nephrolithiasis.          ASSESSMENT:  62 y.o. male with serous drainage from an old left flank cyst.  Patient also has inflammation around his sigmoid colon on CT scan. PLAN:    No plans for I&D  No need for antibiotics    Discussed with patient at length the need for colonoscopy. He has never had a colonoscopy and has CT findings concerning enough to warrant diagnostic flex sig or colonoscopy. I explained to him that with this very well could be a cancer. He adamantly states that he does not want to stay in the hospital and get a colonoscopy tomorrow. I did get him to calm down, and he is agreeable to a colonoscopy as an outpatient. I explained that any delay in the diagnosis could potentially harm him and lead to a premature  death, but the patient still wishes to leave the hospital.  Again, he is agreeable to outpatient colonoscopy so I gave him Dr. Esvin Barrios number in the chart and told him to call the office tomorrow. He states that he will do this.     Electronically signed by Dana Pena MD on 2/1/22 at 3:21 PM EST

## 2022-02-01 NOTE — ED PROVIDER NOTES
HPI     Patient is a 62 y.o. male presents with a chief complaint of abscess  This has been occurring for a few months. Patient states that it gets better with nothing. Patient states that it gets worse with nothing. Patient states that it is mild in severity. Patient states it was gradual in onset. Patient presents from a facility with concern of abscess. Patient stated that he otherwise feels well. Patient denies any fevers or chills. Patient denies any chest pain. Patient has no abdominal pain. Patient has a history of diverticulosis. 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 Patient has no pain in his back. Review of Systems   Constitutional: Negative for activity change, appetite change, fatigue and fever. HENT: Negative for congestion, rhinorrhea, sinus pressure and sinus pain. Eyes: Negative for discharge. Respiratory: Negative for cough, chest tightness and shortness of breath. Cardiovascular: Negative for chest pain and palpitations. Gastrointestinal: Negative for abdominal distention, abdominal pain, anal bleeding, constipation, diarrhea, nausea and vomiting. Endocrine: Negative for polydipsia and polyuria. Genitourinary: Negative for decreased urine volume, difficulty urinating, enuresis, flank pain, frequency and urgency. Musculoskeletal: Negative for arthralgias, back pain and neck stiffness. Skin: Positive for wound. Negative for rash. Neurological: Negative for dizziness, weakness, light-headedness and headaches. Psychiatric/Behavioral: Negative for agitation, behavioral problems and confusion. Physical Exam  Vitals and nursing note reviewed. Constitutional:       Appearance: He is well-developed. HENT:      Head: Normocephalic and atraumatic. Eyes:      Conjunctiva/sclera: Conjunctivae normal.   Cardiovascular:      Rate and Rhythm: Normal rate and regular rhythm.       Heart sounds: Normal heart sounds. No murmur heard. Pulmonary:      Effort: Pulmonary effort is normal. No respiratory distress. Breath sounds: Normal breath sounds. No wheezing or rales. Abdominal:      General: Bowel sounds are normal.      Palpations: Abdomen is soft. Tenderness: There is no abdominal tenderness. There is no guarding or rebound. Comments: Small area of induration on the left flank. Purulent material is expressed. Musculoskeletal:         General: No tenderness or deformity. Cervical back: Normal range of motion and neck supple. Skin:     General: Skin is warm and dry. Neurological:      Mental Status: He is alert and oriented to person, place, and time. Cranial Nerves: No cranial nerve deficit. Coordination: Coordination normal.          Procedures     [facility]     ED Course as of 02/01/22 1601   Tue Feb 01, 2022   1329 Surgery came down and evaluated patient. [JM]   5 Discussion had with surgery stated that patient can follow-up as an outpatient for his possible abscess. Stated that patient should follow-up in 2 weeks. Patient stated that he would like to go home. [JM]   1623 Discussed patient being discharged home with internal medicine. Patient will follow up closely with them. States that they will reach out and schedule appointment and follow-up on patient's abscess. [JM]      ED Course User Index  [JM] Romulo Adler MD      Patient is a 62 y.o. male presenting with left-sided abscess. Patient has no abdominal pain. Patient has no chest pain or shortness of breath. Patient has no fevers or chills. Patient's vitals are stable. Gentle pressure placed over induration resulted and purulent material being expressed. Patient will have labs drawn. Patient will have a CT of the abdomen given the story of fistula. Patient has possible diverticulosis with diverticulitis. Patient also may have possible cancer. General surgery was called.   General surgery came down and evaluated patient. Stated that patient should follow-up as an outpatient in 2 weeks. Patient's vitals are stable. Patient's lab work is stable. Patient cultures drawn. Discussed this with patient and patient stated that he would want to go home. Called and discussed this with patient's primary care provider who will follow up with him as an outpatient. Stated that they will follow-up closely given patient's history and having an abscess. Patient will be discharged home with antibiotics. Patient was given extensive return precautions. Patient will be discharged back to the facility        Patient was seen and evaluated by myself and my attending Grayson Plaza MD. Assessment and Plan discussed with attending provider, please see attestation for final plan of care. This note was done using dictation software and there may be some grammatical errors associated with this. Ebenezer Turner MD         ED Course as of 02/01/22 1604   Tue Feb 01, 2022   1329 Surgery came down and evaluated patient. [JM]   5 Discussion had with surgery stated that patient can follow-up as an outpatient for his possible abscess. Stated that patient should follow-up in 2 weeks. Patient stated that he would like to go home. [JM]   3222 Discussed patient being discharged home with internal medicine. Patient will follow up closely with them. States that they will reach out and schedule appointment and follow-up on patient's abscess. [ZACH]      ED Course User Index  [JM] Ebenezer Turner MD       --------------------------------------------- PAST HISTORY ---------------------------------------------  Past Medical History:  has a past medical history of CAD (coronary artery disease), HTN (hypertension), Hypercholesteremia, Hypertension, and Sleep apnea. Past Surgical History:  has a past surgical history that includes Abdomen surgery (Left, 10/14/2021). Social History:  reports that he quit smoking about 21 years ago.  He has a 6.00 pack-year smoking history. He has never used smokeless tobacco. He reports current alcohol use of about 5.0 standard drinks of alcohol per week. He reports previous drug use. Family History: family history is not on file. The patients home medications have been reviewed. Allergies: Patient has no known allergies.     -------------------------------------------------- RESULTS -------------------------------------------------  Labs:  Results for orders placed or performed during the hospital encounter of 02/01/22   CBC Auto Differential   Result Value Ref Range    WBC 6.6 4.5 - 11.5 E9/L    RBC 4.83 3.80 - 5.80 E12/L    Hemoglobin 13.0 12.5 - 16.5 g/dL    Hematocrit 39.7 37.0 - 54.0 %    MCV 82.2 80.0 - 99.9 fL    MCH 26.9 26.0 - 35.0 pg    MCHC 32.7 32.0 - 34.5 %    RDW 13.0 11.5 - 15.0 fL    Platelets 216 645 - 541 E9/L    MPV 10.6 7.0 - 12.0 fL    Neutrophils % 71.3 43.0 - 80.0 %    Immature Granulocytes % 0.3 0.0 - 5.0 %    Lymphocytes % 14.9 (L) 20.0 - 42.0 %    Monocytes % 8.5 2.0 - 12.0 %    Eosinophils % 4.4 0.0 - 6.0 %    Basophils % 0.6 0.0 - 2.0 %    Neutrophils Absolute 4.69 1.80 - 7.30 E9/L    Immature Granulocytes # 0.02 E9/L    Lymphocytes Absolute 0.98 (L) 1.50 - 4.00 E9/L    Monocytes Absolute 0.56 0.10 - 0.95 E9/L    Eosinophils Absolute 0.29 0.05 - 0.50 E9/L    Basophils Absolute 0.04 0.00 - 0.20 E9/L   Comprehensive Metabolic Panel w/ Reflex to MG   Result Value Ref Range    Sodium 138 132 - 146 mmol/L    Potassium reflex Magnesium 4.0 3.5 - 5.0 mmol/L    Chloride 101 98 - 107 mmol/L    CO2 26 22 - 29 mmol/L    Anion Gap 11 7 - 16 mmol/L    Glucose 119 (H) 74 - 99 mg/dL    BUN 8 6 - 20 mg/dL    CREATININE 0.8 0.7 - 1.2 mg/dL    GFR Non-African American >60 >=60 mL/min/1.73    GFR African American >60     Calcium 9.5 8.6 - 10.2 mg/dL    Total Protein 7.6 6.4 - 8.3 g/dL    Albumin 3.9 3.5 - 5.2 g/dL    Total Bilirubin 0.7 0.0 - 1.2 mg/dL    Alkaline Phosphatase 108 40 - 129 U/L ALT 12 0 - 40 U/L    AST 13 0 - 39 U/L   LACTIC ACID, PLASMA   Result Value Ref Range    Lactic Acid 1.4 0.5 - 2.2 mmol/L       Radiology:  CT ABDOMEN PELVIS W IV CONTRAST Additional Contrast? None   Final Result   Worsened more extensive bowel wall thickening involving distal descending and   sigmoid colon. This may be due to diverticulitis/colitis although underlying   neoplastic disease cannot be excluded based on this examination. Extensive inflammatory/infectious changes around the involved left colon   particularly in the left paracolic gutter with edema and/or myositis of the   iliopsoas and iliacus muscles. No definite discrete abscess is identified   however. Previously seen left subcutaneous flank infection is markedly   improved however. Relatively stable appearing endplate changes at L3 and L4 as described. While these may be degenerative, the possibility of discitis/osteomyelitis   secondary to adjacent myositis cannot be excluded based on this examination. Further evaluation of the lumbar spine with MRI is suggested. Splenomegaly of uncertain etiology. Bibasilar atelectasis and/or scarring, unchanged. Nonobstructive left nephrolithiasis.             ------------------------- NURSING NOTES AND VITALS REVIEWED ---------------------------  Date / Time Roomed:  2/1/2022  9:12 AM  ED Bed Assignment:  22/22    The nursing notes within the ED encounter and vital signs as below have been reviewed.    BP (!) 176/95   Pulse 75   Temp 98 °F (36.7 °C)   Resp 16   Ht 6' 2\" (1.88 m)   Wt 235 lb (106.6 kg)   SpO2 98%   BMI 30.17 kg/m²   Oxygen Saturation Interpretation: Normal      ------------------------------------------ PROGRESS NOTES ------------------------------------------  4:04 PM EST  I have spoken with the patient and family if present and discussed todays results, in addition to providing specific details for the plan of care and counseling regarding the diagnosis and prognosis. Their questions are answered at this time and they are agreeable with the plan. I discussed at length with them reasons for immediate return here for re evaluation. They will followup with their primary care provider and general surgery by calling their office as soon as possible.      --------------------------------- ADDITIONAL PROVIDER NOTES ---------------------------------  At this time the patient is without objective evidence of an acute process requiring hospitalization or inpatient management. They have remained hemodynamically stable throughout their entire ED visit and are stable for discharge with outpatient follow-up. The plan has been discussed in detail and they are aware of the specific conditions for emergent return, as well as the importance of follow-up. New Prescriptions    CEFDINIR (OMNICEF) 300 MG CAPSULE    Take 1 capsule by mouth 2 times daily for 7 days    METRONIDAZOLE (FLAGYL) 500 MG TABLET    Take 1 tablet by mouth 3 times daily for 7 days       Diagnosis:  1. Abscess    2. Generalized abdominal mass    3. Diverticulitis of colon        Disposition:  Patient's disposition: Discharge to home  Patient's condition is stable.        Remi Guido MD  Resident  02/01/22 9618

## 2022-02-01 NOTE — CARE COORDINATION
Social Work/Transition of Care:    Pt returning to [facility] spoke to Sundar Blackmon, liaison for the facility Theresa can be called, MARIYA spoke with Andrew Ramirez who is available to transport, will send .     Electronically signed by Shahida Perdomo on 5/7/1774 at 4:55 PM

## 2022-02-03 LAB
GRAM STAIN RESULT: ABNORMAL
ORGANISM: ABNORMAL
WOUND/ABSCESS: ABNORMAL

## 2022-02-06 LAB
BLOOD CULTURE, ROUTINE: NORMAL
CULTURE, BLOOD 2: NORMAL

## 2022-02-23 ENCOUNTER — HOSPITAL ENCOUNTER (INPATIENT)
Age: 57
LOS: 7 days | Discharge: NURSING, REHAB OR LONG-TERM CARE | DRG: 230 | End: 2022-03-02
Attending: EMERGENCY MEDICINE | Admitting: STUDENT IN AN ORGANIZED HEALTH CARE EDUCATION/TRAINING PROGRAM
Payer: MEDICAID

## 2022-02-23 ENCOUNTER — APPOINTMENT (OUTPATIENT)
Dept: CT IMAGING | Age: 57
DRG: 230 | End: 2022-02-23
Payer: MEDICAID

## 2022-02-23 DIAGNOSIS — Z98.890 STATUS POST INCISION AND DRAINAGE: ICD-10-CM

## 2022-02-23 DIAGNOSIS — K59.81 COLONIC PSEUDOOBSTRUCTION: ICD-10-CM

## 2022-02-23 DIAGNOSIS — E44.1 MILD PROTEIN-CALORIE MALNUTRITION (HCC): ICD-10-CM

## 2022-02-23 DIAGNOSIS — K63.2 COLONIC FISTULA: ICD-10-CM

## 2022-02-23 DIAGNOSIS — G47.33 OSA (OBSTRUCTIVE SLEEP APNEA): ICD-10-CM

## 2022-02-23 DIAGNOSIS — F23 ACUTE PSYCHOSIS (HCC): ICD-10-CM

## 2022-02-23 DIAGNOSIS — N30.01 ACUTE CYSTITIS WITH HEMATURIA: ICD-10-CM

## 2022-02-23 DIAGNOSIS — K57.32 SIGMOID DIVERTICULITIS: ICD-10-CM

## 2022-02-23 DIAGNOSIS — R94.39 ABNORMAL STRESS TEST: ICD-10-CM

## 2022-02-23 DIAGNOSIS — R42 LIGHTHEADEDNESS: ICD-10-CM

## 2022-02-23 DIAGNOSIS — F10.10 ALCOHOL ABUSE: ICD-10-CM

## 2022-02-23 DIAGNOSIS — I51.9 LV DYSFUNCTION: ICD-10-CM

## 2022-02-23 DIAGNOSIS — K63.2 ENTERO-COLONIC FISTULA: Primary | ICD-10-CM

## 2022-02-23 DIAGNOSIS — F09 COGNITIVE DISORDER: ICD-10-CM

## 2022-02-23 DIAGNOSIS — D69.6 THROMBOCYTOPENIA (HCC): ICD-10-CM

## 2022-02-23 DIAGNOSIS — I16.0 HYPERTENSIVE URGENCY: ICD-10-CM

## 2022-02-23 DIAGNOSIS — E80.6 HYPERBILIRUBINEMIA: ICD-10-CM

## 2022-02-23 LAB
ALBUMIN SERPL-MCNC: 4.3 G/DL (ref 3.5–5.2)
ALP BLD-CCNC: 108 U/L (ref 40–129)
ALT SERPL-CCNC: 10 U/L (ref 0–40)
ANION GAP SERPL CALCULATED.3IONS-SCNC: 15 MMOL/L (ref 7–16)
AST SERPL-CCNC: 13 U/L (ref 0–39)
BASOPHILS ABSOLUTE: 0.04 E9/L (ref 0–0.2)
BASOPHILS RELATIVE PERCENT: 0.4 % (ref 0–2)
BILIRUB SERPL-MCNC: 1.6 MG/DL (ref 0–1.2)
BUN BLDV-MCNC: 13 MG/DL (ref 6–20)
CALCIUM SERPL-MCNC: 9.3 MG/DL (ref 8.6–10.2)
CHLORIDE BLD-SCNC: 98 MMOL/L (ref 98–107)
CO2: 23 MMOL/L (ref 22–29)
CREAT SERPL-MCNC: 0.8 MG/DL (ref 0.7–1.2)
EOSINOPHILS ABSOLUTE: 0.04 E9/L (ref 0.05–0.5)
EOSINOPHILS RELATIVE PERCENT: 0.4 % (ref 0–6)
GFR AFRICAN AMERICAN: >60
GFR NON-AFRICAN AMERICAN: >60 ML/MIN/1.73
GLUCOSE BLD-MCNC: 135 MG/DL (ref 74–99)
HCT VFR BLD CALC: 43.4 % (ref 37–54)
HEMOGLOBIN: 14.5 G/DL (ref 12.5–16.5)
IMMATURE GRANULOCYTES #: 0.06 E9/L
IMMATURE GRANULOCYTES %: 0.5 % (ref 0–5)
LACTIC ACID, SEPSIS: 1.3 MMOL/L (ref 0.5–1.9)
LIPASE: 36 U/L (ref 13–60)
LYMPHOCYTES ABSOLUTE: 0.7 E9/L (ref 1.5–4)
LYMPHOCYTES RELATIVE PERCENT: 6.3 % (ref 20–42)
MAGNESIUM: 1.9 MG/DL (ref 1.6–2.6)
MCH RBC QN AUTO: 26.5 PG (ref 26–35)
MCHC RBC AUTO-ENTMCNC: 33.4 % (ref 32–34.5)
MCV RBC AUTO: 79.3 FL (ref 80–99.9)
MONOCYTES ABSOLUTE: 1.22 E9/L (ref 0.1–0.95)
MONOCYTES RELATIVE PERCENT: 11 % (ref 2–12)
NEUTROPHILS ABSOLUTE: 9.04 E9/L (ref 1.8–7.3)
NEUTROPHILS RELATIVE PERCENT: 81.4 % (ref 43–80)
PDW BLD-RTO: 13.2 FL (ref 11.5–15)
PLATELET # BLD: 228 E9/L (ref 130–450)
PMV BLD AUTO: 10.5 FL (ref 7–12)
POTASSIUM REFLEX MAGNESIUM: 3.1 MMOL/L (ref 3.5–5)
RBC # BLD: 5.47 E12/L (ref 3.8–5.8)
SODIUM BLD-SCNC: 136 MMOL/L (ref 132–146)
TOTAL PROTEIN: 8.1 G/DL (ref 6.4–8.3)
WBC # BLD: 11.1 E9/L (ref 4.5–11.5)

## 2022-02-23 PROCEDURE — 83605 ASSAY OF LACTIC ACID: CPT

## 2022-02-23 PROCEDURE — 83735 ASSAY OF MAGNESIUM: CPT

## 2022-02-23 PROCEDURE — 87040 BLOOD CULTURE FOR BACTERIA: CPT

## 2022-02-23 PROCEDURE — 6360000004 HC RX CONTRAST MEDICATION: Performed by: RADIOLOGY

## 2022-02-23 PROCEDURE — 1200000000 HC SEMI PRIVATE

## 2022-02-23 PROCEDURE — 85025 COMPLETE CBC W/AUTO DIFF WBC: CPT

## 2022-02-23 PROCEDURE — 99284 EMERGENCY DEPT VISIT MOD MDM: CPT

## 2022-02-23 PROCEDURE — 80053 COMPREHEN METABOLIC PANEL: CPT

## 2022-02-23 PROCEDURE — 6370000000 HC RX 637 (ALT 250 FOR IP): Performed by: STUDENT IN AN ORGANIZED HEALTH CARE EDUCATION/TRAINING PROGRAM

## 2022-02-23 PROCEDURE — 2580000003 HC RX 258: Performed by: STUDENT IN AN ORGANIZED HEALTH CARE EDUCATION/TRAINING PROGRAM

## 2022-02-23 PROCEDURE — 74177 CT ABD & PELVIS W/CONTRAST: CPT

## 2022-02-23 PROCEDURE — 83690 ASSAY OF LIPASE: CPT

## 2022-02-23 RX ORDER — ALBUTEROL SULFATE 90 UG/1
2 AEROSOL, METERED RESPIRATORY (INHALATION) EVERY 6 HOURS PRN
Status: DISCONTINUED | OUTPATIENT
Start: 2022-02-23 | End: 2022-02-23 | Stop reason: CLARIF

## 2022-02-23 RX ORDER — LORAZEPAM 1 MG/1
1 TABLET ORAL EVERY 6 HOURS PRN
Status: DISCONTINUED | OUTPATIENT
Start: 2022-02-23 | End: 2022-03-02 | Stop reason: HOSPADM

## 2022-02-23 RX ORDER — ASPIRIN 81 MG/1
81 TABLET, CHEWABLE ORAL DAILY
Status: DISCONTINUED | OUTPATIENT
Start: 2022-02-24 | End: 2022-02-24

## 2022-02-23 RX ORDER — LANOLIN ALCOHOL/MO/W.PET/CERES
3 CREAM (GRAM) TOPICAL DAILY
Status: DISCONTINUED | OUTPATIENT
Start: 2022-02-24 | End: 2022-02-24

## 2022-02-23 RX ORDER — DOCUSATE SODIUM 100 MG/1
100 CAPSULE, LIQUID FILLED ORAL 2 TIMES DAILY
Status: DISCONTINUED | OUTPATIENT
Start: 2022-02-23 | End: 2022-02-24

## 2022-02-23 RX ORDER — ATORVASTATIN CALCIUM 40 MG/1
40 TABLET, FILM COATED ORAL NIGHTLY
Status: DISCONTINUED | OUTPATIENT
Start: 2022-02-23 | End: 2022-02-24

## 2022-02-23 RX ORDER — DOCUSATE SODIUM 100 MG/1
100 CAPSULE, LIQUID FILLED ORAL 2 TIMES DAILY
COMMUNITY

## 2022-02-23 RX ORDER — LANOLIN ALCOHOL/MO/W.PET/CERES
100 CREAM (GRAM) TOPICAL DAILY
Status: DISCONTINUED | OUTPATIENT
Start: 2022-02-24 | End: 2022-03-02 | Stop reason: HOSPADM

## 2022-02-23 RX ORDER — ALBUTEROL SULFATE 2.5 MG/3ML
2.5 SOLUTION RESPIRATORY (INHALATION) EVERY 6 HOURS PRN
Status: DISCONTINUED | OUTPATIENT
Start: 2022-02-23 | End: 2022-03-02 | Stop reason: HOSPADM

## 2022-02-23 RX ORDER — FOLIC ACID 1 MG/1
1 TABLET ORAL DAILY
COMMUNITY

## 2022-02-23 RX ORDER — FOLIC ACID 1 MG/1
1 TABLET ORAL DAILY
Status: DISCONTINUED | OUTPATIENT
Start: 2022-02-24 | End: 2022-03-02 | Stop reason: HOSPADM

## 2022-02-23 RX ORDER — METOPROLOL SUCCINATE 25 MG/1
25 TABLET, EXTENDED RELEASE ORAL DAILY
Status: DISCONTINUED | OUTPATIENT
Start: 2022-02-24 | End: 2022-03-02 | Stop reason: HOSPADM

## 2022-02-23 RX ORDER — ALBUTEROL SULFATE 90 UG/1
2 AEROSOL, METERED RESPIRATORY (INHALATION) EVERY 6 HOURS PRN
COMMUNITY

## 2022-02-23 RX ORDER — 0.9 % SODIUM CHLORIDE 0.9 %
1000 INTRAVENOUS SOLUTION INTRAVENOUS ONCE
Status: COMPLETED | OUTPATIENT
Start: 2022-02-23 | End: 2022-02-23

## 2022-02-23 RX ORDER — LANOLIN ALCOHOL/MO/W.PET/CERES
6 CREAM (GRAM) TOPICAL NIGHTLY
COMMUNITY

## 2022-02-23 RX ADMIN — IOPAMIDOL 75 ML: 755 INJECTION, SOLUTION INTRAVENOUS at 16:31

## 2022-02-23 RX ADMIN — SODIUM CHLORIDE 1000 ML: 9 INJECTION, SOLUTION INTRAVENOUS at 16:17

## 2022-02-23 RX ADMIN — POTASSIUM BICARBONATE 40 MEQ: 782 TABLET, EFFERVESCENT ORAL at 18:34

## 2022-02-23 ASSESSMENT — ENCOUNTER SYMPTOMS
ANAL BLEEDING: 0
CHEST TIGHTNESS: 0
DIARRHEA: 0
NAUSEA: 0
SINUS PRESSURE: 0
SHORTNESS OF BREATH: 0
ABDOMINAL DISTENTION: 0
SINUS PAIN: 0
BACK PAIN: 0
RHINORRHEA: 0
COUGH: 0
EYE DISCHARGE: 0
VOMITING: 0
CONSTIPATION: 0
ABDOMINAL PAIN: 1

## 2022-02-23 ASSESSMENT — PAIN SCALES - GENERAL: PAINLEVEL_OUTOF10: 0

## 2022-02-23 NOTE — ED PROVIDER NOTES
Cardiovascular:      Rate and Rhythm: Normal rate and regular rhythm. Heart sounds: Normal heart sounds. No murmur heard. Pulmonary:      Effort: Pulmonary effort is normal. No respiratory distress. Breath sounds: Normal breath sounds. No wheezing or rales. Abdominal:      General: Bowel sounds are normal.      Palpations: Abdomen is soft. Tenderness: There is no abdominal tenderness. There is no guarding or rebound. Musculoskeletal:         General: No tenderness or deformity. Cervical back: Normal range of motion and neck supple. Skin:     General: Skin is warm and dry. Comments: Wound on the left flank with feculent material actively draining. Neurological:      Mental Status: He is alert and oriented to person, place, and time. Cranial Nerves: No cranial nerve deficit. Coordination: Coordination normal.          Procedures     [facility]     ED Course as of 02/23/22 1810 Wed Feb 23, 2022 1810 Spoke to family medicine and stated that patient can be admitted to surgery. Discussed this with Dr. Viktoriya Rodriguez who stated that we can admit patient to the service for [JM]      ED Course User Index  [JM] Yusef Acosta MD      Patient is a 62 y.o. male presenting with abdominal wound. Patient states that he otherwise feels well. Patient is alert and oriented but seemingly intermittently confused but is at baseline from previous interactions with this provider. Patient has a obvious fistula over the left side. No surrounding erythema but significant fecal material was expressed. Patient is otherwise afebrile. Patient had labs drawn. Patient's white count is normal.  Patient had a normal lactic. Patient had no other symptoms. Discussed this with general surgery and patient will be admitted for wound evaluation. Patient is agreeable to this plan. Discussed starting antibiotics with surgery and patient is not felt to require antibiotics at this time.   Patient CT scan was concerning for possible obstruction but patient had a bowel movement while in the emergency room. Patient was seen and evaluated by myself and my attending Joan Smith MD. Assessment and Plan discussed with attending provider, please see attestation for final plan of care. This note was done using dictation software and there may be some grammatical errors associated with this. Abida Paz MD       ED Course as of 02/23/22 1907 Wed Feb 23, 2022   1810 Spoke to family medicine and stated that patient can be admitted to surgery. Discussed this with Dr. Eran Dillon who stated that we can admit patient to the service for []      ED Course User Index  [] Abida Paz MD       --------------------------------------------- PAST HISTORY ---------------------------------------------  Past Medical History:  has a past medical history of CAD (coronary artery disease), HTN (hypertension), Hypercholesteremia, Hypertension, and Sleep apnea. Past Surgical History:  has a past surgical history that includes Abdomen surgery (Left, 10/14/2021). Social History:  reports that he quit smoking about 21 years ago. He has a 6.00 pack-year smoking history. He has never used smokeless tobacco. He reports current alcohol use of about 5.0 standard drinks of alcohol per week. He reports previous drug use. Family History: family history is not on file. The patients home medications have been reviewed. Allergies: Patient has no known allergies.     -------------------------------------------------- RESULTS -------------------------------------------------    LABS:  Results for orders placed or performed during the hospital encounter of 02/23/22   CBC with Auto Differential   Result Value Ref Range    WBC 11.1 4.5 - 11.5 E9/L    RBC 5.47 3.80 - 5.80 E12/L    Hemoglobin 14.5 12.5 - 16.5 g/dL    Hematocrit 43.4 37.0 - 54.0 %    MCV 79.3 (L) 80.0 - 99.9 fL    MCH 26.5 26.0 - 35.0 pg    MCHC 33.4 32.0 - 34.5 %    RDW 13.2 11.5 - 15.0 fL    Platelets 698 534 - 365 E9/L    MPV 10.5 7.0 - 12.0 fL    Neutrophils % 81.4 (H) 43.0 - 80.0 %    Immature Granulocytes % 0.5 0.0 - 5.0 %    Lymphocytes % 6.3 (L) 20.0 - 42.0 %    Monocytes % 11.0 2.0 - 12.0 %    Eosinophils % 0.4 0.0 - 6.0 %    Basophils % 0.4 0.0 - 2.0 %    Neutrophils Absolute 9.04 (H) 1.80 - 7.30 E9/L    Immature Granulocytes # 0.06 E9/L    Lymphocytes Absolute 0.70 (L) 1.50 - 4.00 E9/L    Monocytes Absolute 1.22 (H) 0.10 - 0.95 E9/L    Eosinophils Absolute 0.04 (L) 0.05 - 0.50 E9/L    Basophils Absolute 0.04 0.00 - 0.20 E9/L   Comprehensive Metabolic Panel w/ Reflex to MG   Result Value Ref Range    Sodium 136 132 - 146 mmol/L    Potassium reflex Magnesium 3.1 (L) 3.5 - 5.0 mmol/L    Chloride 98 98 - 107 mmol/L    CO2 23 22 - 29 mmol/L    Anion Gap 15 7 - 16 mmol/L    Glucose 135 (H) 74 - 99 mg/dL    BUN 13 6 - 20 mg/dL    CREATININE 0.8 0.7 - 1.2 mg/dL    GFR Non-African American >60 >=60 mL/min/1.73    GFR African American >60     Calcium 9.3 8.6 - 10.2 mg/dL    Total Protein 8.1 6.4 - 8.3 g/dL    Albumin 4.3 3.5 - 5.2 g/dL    Total Bilirubin 1.6 (H) 0.0 - 1.2 mg/dL    Alkaline Phosphatase 108 40 - 129 U/L    ALT 10 0 - 40 U/L    AST 13 0 - 39 U/L   Lipase   Result Value Ref Range    Lipase 36 13 - 60 U/L   Lactate, Sepsis   Result Value Ref Range    Lactic Acid, Sepsis 1.3 0.5 - 1.9 mmol/L   Magnesium   Result Value Ref Range    Magnesium 1.9 1.6 - 2.6 mg/dL       RADIOLOGY:  CT ABDOMEN PELVIS W IV CONTRAST Additional Contrast? None   Final Result   1. Persistent mass in the distal descending colon and proximal sigmoid colon   now causing obstruction. 2. Colonic diverticulosis without evidence of diverticulitis. 3. Splenomegaly. 4. Left nonobstructive nephrolithiasis.    5. Possible fistula from the colonic mass into the subcutaneous fat of the   inferior left flank.                 ------------------------- NURSING NOTES AND VITALS REVIEWED ---------------------------  Date / Time Roomed:  2/23/2022  2:30 PM  ED Bed Assignment:  24/24    The nursing notes within the ED encounter and vital signs as below have been reviewed. Patient Vitals for the past 24 hrs:   BP Temp Temp src Pulse Resp SpO2 Height Weight   02/23/22 1800 131/76 98.6 °F (37 °C) -- 88 16 97 % -- --   02/23/22 1442 126/74 98.9 °F (37.2 °C) Oral 83 16 97 % 6' 2\" (1.88 m) 230 lb (104.3 kg)       Oxygen Saturation Interpretation: Normal    ------------------------------------------ PROGRESS NOTES ------------------------------------------  See ED course for reevaluation    Counseling:  I have spoken with the patient and discussed todays results, in addition to providing specific details for the plan of care and counseling regarding the diagnosis and prognosis. Their questions are answered at this time and they are agreeable with the plan of admission.    --------------------------------- ADDITIONAL PROVIDER NOTES ---------------------------------  Consultations:   Spoke with Dr. Graciela Norris. Discussed case. They will admit the patient. This patient's ED course included: a personal history and physicial examination, re-evaluation prior to disposition, multiple bedside re-evaluations, IV medications and cardiac monitoring    This patient has remained hemodynamically stable during their ED course. Diagnosis:  1. Entero-colonic fistula    2. Colonic pseudoobstruction        Disposition:  Patient's disposition: Admit to med/surg floor  Patient's condition is Stable        .      Yuliana Fisher MD  Resident  02/23/22 8829

## 2022-02-24 ENCOUNTER — ANESTHESIA EVENT (OUTPATIENT)
Dept: OPERATING ROOM | Age: 57
DRG: 230 | End: 2022-02-24
Payer: MEDICAID

## 2022-02-24 ENCOUNTER — ANESTHESIA (OUTPATIENT)
Dept: OPERATING ROOM | Age: 57
DRG: 230 | End: 2022-02-24
Payer: MEDICAID

## 2022-02-24 VITALS — OXYGEN SATURATION: 100 % | SYSTOLIC BLOOD PRESSURE: 117 MMHG | TEMPERATURE: 100.4 F | DIASTOLIC BLOOD PRESSURE: 72 MMHG

## 2022-02-24 LAB
ANION GAP SERPL CALCULATED.3IONS-SCNC: 14 MMOL/L (ref 7–16)
BUN BLDV-MCNC: 11 MG/DL (ref 6–20)
CALCIUM SERPL-MCNC: 9.3 MG/DL (ref 8.6–10.2)
CEA: 1.3 NG/ML (ref 0–5.2)
CHLORIDE BLD-SCNC: 100 MMOL/L (ref 98–107)
CO2: 23 MMOL/L (ref 22–29)
CREAT SERPL-MCNC: 0.7 MG/DL (ref 0.7–1.2)
GFR AFRICAN AMERICAN: >60
GFR NON-AFRICAN AMERICAN: >60 ML/MIN/1.73
GLUCOSE BLD-MCNC: 128 MG/DL (ref 74–99)
HCT VFR BLD CALC: 42.7 % (ref 37–54)
HEMOGLOBIN: 14.2 G/DL (ref 12.5–16.5)
LACTIC ACID: 1 MMOL/L (ref 0.5–2.2)
MCH RBC QN AUTO: 26.2 PG (ref 26–35)
MCHC RBC AUTO-ENTMCNC: 33.3 % (ref 32–34.5)
MCV RBC AUTO: 78.8 FL (ref 80–99.9)
PDW BLD-RTO: 13.3 FL (ref 11.5–15)
PLATELET # BLD: 197 E9/L (ref 130–450)
PMV BLD AUTO: 11 FL (ref 7–12)
POTASSIUM SERPL-SCNC: 3.5 MMOL/L (ref 3.5–5)
RBC # BLD: 5.42 E12/L (ref 3.8–5.8)
SODIUM BLD-SCNC: 137 MMOL/L (ref 132–146)
WBC # BLD: 8.4 E9/L (ref 4.5–11.5)

## 2022-02-24 PROCEDURE — 83605 ASSAY OF LACTIC ACID: CPT

## 2022-02-24 PROCEDURE — 6360000002 HC RX W HCPCS: Performed by: ANESTHESIOLOGY

## 2022-02-24 PROCEDURE — 85027 COMPLETE CBC AUTOMATED: CPT

## 2022-02-24 PROCEDURE — 6370000000 HC RX 637 (ALT 250 FOR IP): Performed by: STUDENT IN AN ORGANIZED HEALTH CARE EDUCATION/TRAINING PROGRAM

## 2022-02-24 PROCEDURE — C1769 GUIDE WIRE: HCPCS | Performed by: SURGERY

## 2022-02-24 PROCEDURE — 88307 TISSUE EXAM BY PATHOLOGIST: CPT

## 2022-02-24 PROCEDURE — 36415 COLL VENOUS BLD VENIPUNCTURE: CPT

## 2022-02-24 PROCEDURE — 6360000002 HC RX W HCPCS: Performed by: NURSE ANESTHETIST, CERTIFIED REGISTERED

## 2022-02-24 PROCEDURE — 6360000002 HC RX W HCPCS: Performed by: STUDENT IN AN ORGANIZED HEALTH CARE EDUCATION/TRAINING PROGRAM

## 2022-02-24 PROCEDURE — 0T7D8DZ DILATION OF URETHRA WITH INTRALUMINAL DEVICE, VIA NATURAL OR ARTIFICIAL OPENING ENDOSCOPIC: ICD-10-PCS | Performed by: UROLOGY

## 2022-02-24 PROCEDURE — 3600000013 HC SURGERY LEVEL 3 ADDTL 15MIN: Performed by: SURGERY

## 2022-02-24 PROCEDURE — 2500000003 HC RX 250 WO HCPCS: Performed by: NURSE ANESTHETIST, CERTIFIED REGISTERED

## 2022-02-24 PROCEDURE — 1200000000 HC SEMI PRIVATE

## 2022-02-24 PROCEDURE — 3700000000 HC ANESTHESIA ATTENDED CARE: Performed by: SURGERY

## 2022-02-24 PROCEDURE — 82378 CARCINOEMBRYONIC ANTIGEN: CPT

## 2022-02-24 PROCEDURE — 3700000001 HC ADD 15 MINUTES (ANESTHESIA): Performed by: SURGERY

## 2022-02-24 PROCEDURE — 3600000003 HC SURGERY LEVEL 3 BASE: Performed by: SURGERY

## 2022-02-24 PROCEDURE — 2580000003 HC RX 258: Performed by: NURSE ANESTHETIST, CERTIFIED REGISTERED

## 2022-02-24 PROCEDURE — 87081 CULTURE SCREEN ONLY: CPT

## 2022-02-24 PROCEDURE — 80048 BASIC METABOLIC PNL TOTAL CA: CPT

## 2022-02-24 PROCEDURE — 2580000003 HC RX 258: Performed by: STUDENT IN AN ORGANIZED HEALTH CARE EDUCATION/TRAINING PROGRAM

## 2022-02-24 PROCEDURE — 7100000001 HC PACU RECOVERY - ADDTL 15 MIN: Performed by: SURGERY

## 2022-02-24 PROCEDURE — 0T9B80Z DRAINAGE OF BLADDER WITH DRAINAGE DEVICE, VIA NATURAL OR ARTIFICIAL OPENING ENDOSCOPIC: ICD-10-PCS | Performed by: UROLOGY

## 2022-02-24 PROCEDURE — 2720000010 HC SURG SUPPLY STERILE: Performed by: SURGERY

## 2022-02-24 PROCEDURE — 7100000000 HC PACU RECOVERY - FIRST 15 MIN: Performed by: SURGERY

## 2022-02-24 PROCEDURE — 2709999900 HC NON-CHARGEABLE SUPPLY: Performed by: SURGERY

## 2022-02-24 PROCEDURE — 6360000002 HC RX W HCPCS

## 2022-02-24 RX ORDER — LIDOCAINE HYDROCHLORIDE ANHYDROUS AND DEXTROSE MONOHYDRATE .4; 5 G/100ML; G/100ML
3.25 INJECTION, SOLUTION INTRAVENOUS CONTINUOUS
Status: DISCONTINUED | OUTPATIENT
Start: 2022-02-24 | End: 2022-02-25

## 2022-02-24 RX ORDER — NEOSTIGMINE METHYLSULFATE 1 MG/ML
INJECTION, SOLUTION INTRAVENOUS PRN
Status: DISCONTINUED | OUTPATIENT
Start: 2022-02-24 | End: 2022-02-24 | Stop reason: SDUPTHER

## 2022-02-24 RX ORDER — SODIUM CHLORIDE 0.9 % (FLUSH) 0.9 %
5-40 SYRINGE (ML) INJECTION PRN
Status: DISCONTINUED | OUTPATIENT
Start: 2022-02-24 | End: 2022-02-25 | Stop reason: HOSPADM

## 2022-02-24 RX ORDER — SODIUM CHLORIDE 9 MG/ML
INJECTION, SOLUTION INTRAVENOUS CONTINUOUS PRN
Status: DISCONTINUED | OUTPATIENT
Start: 2022-02-24 | End: 2022-02-24 | Stop reason: SDUPTHER

## 2022-02-24 RX ORDER — SODIUM CHLORIDE, SODIUM LACTATE, POTASSIUM CHLORIDE, CALCIUM CHLORIDE 600; 310; 30; 20 MG/100ML; MG/100ML; MG/100ML; MG/100ML
INJECTION, SOLUTION INTRAVENOUS CONTINUOUS
Status: DISCONTINUED | OUTPATIENT
Start: 2022-02-24 | End: 2022-02-28

## 2022-02-24 RX ORDER — ROCURONIUM BROMIDE 10 MG/ML
INJECTION, SOLUTION INTRAVENOUS PRN
Status: DISCONTINUED | OUTPATIENT
Start: 2022-02-24 | End: 2022-02-24 | Stop reason: SDUPTHER

## 2022-02-24 RX ORDER — GLYCOPYRROLATE 1 MG/5 ML
SYRINGE (ML) INTRAVENOUS PRN
Status: DISCONTINUED | OUTPATIENT
Start: 2022-02-24 | End: 2022-02-24 | Stop reason: SDUPTHER

## 2022-02-24 RX ORDER — ONDANSETRON 2 MG/ML
4 INJECTION INTRAMUSCULAR; INTRAVENOUS EVERY 6 HOURS PRN
Status: DISCONTINUED | OUTPATIENT
Start: 2022-02-24 | End: 2022-03-02 | Stop reason: HOSPADM

## 2022-02-24 RX ORDER — DIPHENHYDRAMINE HYDROCHLORIDE 50 MG/ML
INJECTION INTRAMUSCULAR; INTRAVENOUS
Status: COMPLETED
Start: 2022-02-24 | End: 2022-02-24

## 2022-02-24 RX ORDER — MEPERIDINE HYDROCHLORIDE 25 MG/ML
12.5 INJECTION INTRAMUSCULAR; INTRAVENOUS; SUBCUTANEOUS EVERY 5 MIN PRN
Status: DISCONTINUED | OUTPATIENT
Start: 2022-02-24 | End: 2022-02-25 | Stop reason: HOSPADM

## 2022-02-24 RX ORDER — SUCCINYLCHOLINE/SOD CL,ISO/PF 200MG/10ML
SYRINGE (ML) INTRAVENOUS PRN
Status: DISCONTINUED | OUTPATIENT
Start: 2022-02-24 | End: 2022-02-24 | Stop reason: SDUPTHER

## 2022-02-24 RX ORDER — DEXAMETHASONE SODIUM PHOSPHATE 4 MG/ML
INJECTION, SOLUTION INTRA-ARTICULAR; INTRALESIONAL; INTRAMUSCULAR; INTRAVENOUS; SOFT TISSUE PRN
Status: DISCONTINUED | OUTPATIENT
Start: 2022-02-24 | End: 2022-02-24 | Stop reason: SDUPTHER

## 2022-02-24 RX ORDER — SODIUM CHLORIDE 9 MG/ML
25 INJECTION, SOLUTION INTRAVENOUS PRN
Status: DISCONTINUED | OUTPATIENT
Start: 2022-02-24 | End: 2022-02-25 | Stop reason: HOSPADM

## 2022-02-24 RX ORDER — ONDANSETRON 2 MG/ML
INJECTION INTRAMUSCULAR; INTRAVENOUS PRN
Status: DISCONTINUED | OUTPATIENT
Start: 2022-02-24 | End: 2022-02-24 | Stop reason: SDUPTHER

## 2022-02-24 RX ORDER — DIPHENHYDRAMINE HYDROCHLORIDE 50 MG/ML
12.5 INJECTION INTRAMUSCULAR; INTRAVENOUS
Status: COMPLETED | OUTPATIENT
Start: 2022-02-24 | End: 2022-02-24

## 2022-02-24 RX ORDER — SODIUM CHLORIDE 0.9 % (FLUSH) 0.9 %
5-40 SYRINGE (ML) INJECTION EVERY 12 HOURS SCHEDULED
Status: DISCONTINUED | OUTPATIENT
Start: 2022-02-24 | End: 2022-02-25 | Stop reason: HOSPADM

## 2022-02-24 RX ORDER — LIDOCAINE HYDROCHLORIDE 20 MG/ML
INJECTION, SOLUTION EPIDURAL; INFILTRATION; INTRACAUDAL; PERINEURAL PRN
Status: DISCONTINUED | OUTPATIENT
Start: 2022-02-24 | End: 2022-02-24 | Stop reason: SDUPTHER

## 2022-02-24 RX ORDER — PHENYLEPHRINE HCL IN 0.9% NACL 1 MG/10 ML
SYRINGE (ML) INTRAVENOUS PRN
Status: DISCONTINUED | OUTPATIENT
Start: 2022-02-24 | End: 2022-02-24 | Stop reason: SDUPTHER

## 2022-02-24 RX ORDER — FENTANYL CITRATE 50 UG/ML
INJECTION, SOLUTION INTRAMUSCULAR; INTRAVENOUS PRN
Status: DISCONTINUED | OUTPATIENT
Start: 2022-02-24 | End: 2022-02-24 | Stop reason: SDUPTHER

## 2022-02-24 RX ORDER — LANOLIN ALCOHOL/MO/W.PET/CERES
3 CREAM (GRAM) TOPICAL NIGHTLY
Status: DISCONTINUED | OUTPATIENT
Start: 2022-02-24 | End: 2022-02-24

## 2022-02-24 RX ORDER — PROPOFOL 10 MG/ML
INJECTION, EMULSION INTRAVENOUS PRN
Status: DISCONTINUED | OUTPATIENT
Start: 2022-02-24 | End: 2022-02-24 | Stop reason: SDUPTHER

## 2022-02-24 RX ADMIN — Medication 100 MG: at 09:13

## 2022-02-24 RX ADMIN — ONDANSETRON 4 MG: 2 INJECTION INTRAMUSCULAR; INTRAVENOUS at 22:25

## 2022-02-24 RX ADMIN — Medication 200 MCG: at 20:18

## 2022-02-24 RX ADMIN — ROCURONIUM BROMIDE 10 MG: 10 INJECTION INTRAVENOUS at 21:53

## 2022-02-24 RX ADMIN — ROCURONIUM BROMIDE 10 MG: 10 INJECTION INTRAVENOUS at 20:40

## 2022-02-24 RX ADMIN — Medication 3 MG: at 23:05

## 2022-02-24 RX ADMIN — METOPROLOL SUCCINATE 25 MG: 25 TABLET, FILM COATED, EXTENDED RELEASE ORAL at 09:14

## 2022-02-24 RX ADMIN — ROCURONIUM BROMIDE 30 MG: 10 INJECTION INTRAVENOUS at 20:21

## 2022-02-24 RX ADMIN — HYDROMORPHONE HYDROCHLORIDE 0.5 MG: 1 INJECTION, SOLUTION INTRAMUSCULAR; INTRAVENOUS; SUBCUTANEOUS at 23:30

## 2022-02-24 RX ADMIN — Medication 100 MCG: at 20:22

## 2022-02-24 RX ADMIN — DIPHENHYDRAMINE HYDROCHLORIDE 12.5 MG: 50 INJECTION INTRAMUSCULAR; INTRAVENOUS at 23:35

## 2022-02-24 RX ADMIN — LIDOCAINE HYDROCHLORIDE 100 MG: 20 INJECTION, SOLUTION EPIDURAL; INFILTRATION; INTRACAUDAL; PERINEURAL at 20:07

## 2022-02-24 RX ADMIN — CEFOXITIN SODIUM 2000 MG: 2 POWDER, FOR SOLUTION INTRAVENOUS at 19:58

## 2022-02-24 RX ADMIN — SODIUM CHLORIDE: 9 INJECTION, SOLUTION INTRAVENOUS at 21:18

## 2022-02-24 RX ADMIN — FOLIC ACID 1 MG: 1 TABLET ORAL at 09:14

## 2022-02-24 RX ADMIN — DEXAMETHASONE SODIUM PHOSPHATE 8 MG: 4 INJECTION, SOLUTION INTRAMUSCULAR; INTRAVENOUS at 20:22

## 2022-02-24 RX ADMIN — HYDROMORPHONE HYDROCHLORIDE 0.5 MG: 1 INJECTION, SOLUTION INTRAMUSCULAR; INTRAVENOUS; SUBCUTANEOUS at 23:25

## 2022-02-24 RX ADMIN — CEFOXITIN SODIUM 2000 MG: 2 POWDER, FOR SOLUTION INTRAVENOUS at 21:58

## 2022-02-24 RX ADMIN — LIDOCAINE HYDROCHLORIDE 3.25 MG/MIN: 4 INJECTION, SOLUTION INTRAVENOUS at 20:13

## 2022-02-24 RX ADMIN — FENTANYL CITRATE 100 MCG: 50 INJECTION, SOLUTION INTRAMUSCULAR; INTRAVENOUS at 20:06

## 2022-02-24 RX ADMIN — DIPHENHYDRAMINE HYDROCHLORIDE 12.5 MG: 50 INJECTION, SOLUTION INTRAMUSCULAR; INTRAVENOUS at 23:35

## 2022-02-24 RX ADMIN — ROCURONIUM BROMIDE 10 MG: 10 INJECTION INTRAVENOUS at 21:18

## 2022-02-24 RX ADMIN — Medication 0.6 MG: at 23:05

## 2022-02-24 RX ADMIN — PROPOFOL 120 MG: 10 INJECTION, EMULSION INTRAVENOUS at 20:07

## 2022-02-24 RX ADMIN — SODIUM CHLORIDE: 9 INJECTION, SOLUTION INTRAVENOUS at 19:55

## 2022-02-24 RX ADMIN — ASPIRIN 81 MG 81 MG: 81 TABLET ORAL at 09:16

## 2022-02-24 RX ADMIN — Medication 120 MG: at 20:08

## 2022-02-24 ASSESSMENT — PULMONARY FUNCTION TESTS
PIF_VALUE: 24
PIF_VALUE: 22
PIF_VALUE: 22
PIF_VALUE: 23
PIF_VALUE: 25
PIF_VALUE: 23
PIF_VALUE: 25
PIF_VALUE: 25
PIF_VALUE: 24
PIF_VALUE: 1
PIF_VALUE: 24
PIF_VALUE: 23
PIF_VALUE: 23
PIF_VALUE: 25
PIF_VALUE: 23
PIF_VALUE: 24
PIF_VALUE: 23
PIF_VALUE: 26
PIF_VALUE: 26
PIF_VALUE: 24
PIF_VALUE: 25
PIF_VALUE: 26
PIF_VALUE: 24
PIF_VALUE: 26
PIF_VALUE: 23
PIF_VALUE: 22
PIF_VALUE: 20
PIF_VALUE: 22
PIF_VALUE: 0
PIF_VALUE: 23
PIF_VALUE: 24
PIF_VALUE: 23
PIF_VALUE: 23
PIF_VALUE: 1
PIF_VALUE: 25
PIF_VALUE: 25
PIF_VALUE: 24
PIF_VALUE: 24
PIF_VALUE: 27
PIF_VALUE: 23
PIF_VALUE: 23
PIF_VALUE: 24
PIF_VALUE: 23
PIF_VALUE: 24
PIF_VALUE: 3
PIF_VALUE: 23
PIF_VALUE: 24
PIF_VALUE: 2
PIF_VALUE: 25
PIF_VALUE: 24
PIF_VALUE: 24
PIF_VALUE: 23
PIF_VALUE: 26
PIF_VALUE: 23
PIF_VALUE: 24
PIF_VALUE: 4
PIF_VALUE: 24
PIF_VALUE: 23
PIF_VALUE: 22
PIF_VALUE: 20
PIF_VALUE: 20
PIF_VALUE: 24
PIF_VALUE: 22
PIF_VALUE: 24
PIF_VALUE: 2
PIF_VALUE: 25
PIF_VALUE: 24
PIF_VALUE: 22
PIF_VALUE: 18
PIF_VALUE: 23
PIF_VALUE: 20
PIF_VALUE: 23
PIF_VALUE: 23
PIF_VALUE: 24
PIF_VALUE: 24
PIF_VALUE: 11
PIF_VALUE: 23
PIF_VALUE: 23
PIF_VALUE: 25
PIF_VALUE: 22
PIF_VALUE: 23
PIF_VALUE: 1
PIF_VALUE: 23
PIF_VALUE: 24
PIF_VALUE: 23
PIF_VALUE: 23
PIF_VALUE: 22
PIF_VALUE: 23
PIF_VALUE: 22
PIF_VALUE: 23
PIF_VALUE: 24
PIF_VALUE: 23
PIF_VALUE: 24
PIF_VALUE: 23
PIF_VALUE: 25
PIF_VALUE: 23
PIF_VALUE: 24
PIF_VALUE: 23
PIF_VALUE: 1
PIF_VALUE: 23
PIF_VALUE: 22
PIF_VALUE: 4
PIF_VALUE: 25
PIF_VALUE: 23
PIF_VALUE: 24
PIF_VALUE: 1
PIF_VALUE: 0
PIF_VALUE: 0
PIF_VALUE: 23
PIF_VALUE: 24
PIF_VALUE: 22
PIF_VALUE: 1
PIF_VALUE: 22
PIF_VALUE: 22
PIF_VALUE: 24
PIF_VALUE: 23
PIF_VALUE: 23
PIF_VALUE: 25
PIF_VALUE: 23
PIF_VALUE: 24
PIF_VALUE: 22
PIF_VALUE: 24
PIF_VALUE: 23
PIF_VALUE: 22
PIF_VALUE: 28
PIF_VALUE: 24
PIF_VALUE: 25
PIF_VALUE: 24
PIF_VALUE: 23
PIF_VALUE: 25
PIF_VALUE: 24
PIF_VALUE: 24
PIF_VALUE: 23
PIF_VALUE: 23
PIF_VALUE: 25
PIF_VALUE: 26
PIF_VALUE: 23
PIF_VALUE: 23
PIF_VALUE: 25
PIF_VALUE: 24
PIF_VALUE: 25
PIF_VALUE: 27
PIF_VALUE: 24
PIF_VALUE: 23
PIF_VALUE: 23
PIF_VALUE: 22
PIF_VALUE: 24
PIF_VALUE: 24
PIF_VALUE: 21
PIF_VALUE: 23
PIF_VALUE: 24
PIF_VALUE: 25
PIF_VALUE: 24
PIF_VALUE: 27
PIF_VALUE: 1
PIF_VALUE: 23
PIF_VALUE: 24
PIF_VALUE: 23
PIF_VALUE: 24
PIF_VALUE: 22
PIF_VALUE: 22
PIF_VALUE: 23
PIF_VALUE: 23
PIF_VALUE: 24
PIF_VALUE: 25
PIF_VALUE: 22
PIF_VALUE: 24
PIF_VALUE: 22
PIF_VALUE: 22
PIF_VALUE: 23
PIF_VALUE: 24
PIF_VALUE: 24
PIF_VALUE: 23
PIF_VALUE: 22
PIF_VALUE: 23
PIF_VALUE: 23
PIF_VALUE: 24
PIF_VALUE: 23

## 2022-02-24 ASSESSMENT — ENCOUNTER SYMPTOMS
ABDOMINAL DISTENTION: 0
BLOOD IN STOOL: 0
WHEEZING: 0
NAUSEA: 0
SHORTNESS OF BREATH: 0
EYE ITCHING: 0
DIARRHEA: 1
EYE REDNESS: 0
BACK PAIN: 0
ABDOMINAL PAIN: 1
CONSTIPATION: 0
RHINORRHEA: 0
VOMITING: 0
EYE PAIN: 0
CHEST TIGHTNESS: 0
COUGH: 0

## 2022-02-24 ASSESSMENT — PAIN SCALES - GENERAL
PAINLEVEL_OUTOF10: 0
PAINLEVEL_OUTOF10: 9
PAINLEVEL_OUTOF10: 0
PAINLEVEL_OUTOF10: 0

## 2022-02-24 NOTE — PROGRESS NOTES
Consult for Pt for colectomy this afternoon possible OSTOMY, request markings, L back wound. Awake and verbal. Asking same questions repetitively. Bilateral lower quads marked. Education started. Left lower back open area drainage large amount brown tinged liquid. Pours out intermittently  Back pouched with 2 piece flex flat with high output. Barrier ring and barrier strips. Can be attached to CD if needed.   Will follow

## 2022-02-24 NOTE — PROGRESS NOTES
Called nurse at [facility] and verified patient medications and diet and code status full code up with one assist no known drug allergies updated patients medication list

## 2022-02-24 NOTE — ANESTHESIA PRE PROCEDURE
Department of Anesthesiology  Preprocedure Note       Name:  Roshan Blake   Age:  62 y.o.  :  1965                                          MRN:  [id number]         Date:  2022      Surgeon: Osiel Danielle):  MD Paulette Lopez MD    Procedure: Procedure(s):  OPEN SIGMOIDECTOMY WITH POSSIBLE COLOSTOMY  DEBRIDEMENT OF FISTULA    Medications prior to admission:   Prior to Admission medications    Medication Sig Start Date End Date Taking? Authorizing Provider   folic acid (FOLVITE) 1 MG tablet Take 1 mg by mouth daily    Historical Provider, MD   melatonin 3 MG TABS tablet Take 3 mg by mouth daily Take 2 tabs by mouth nightly for sleep    Historical Provider, MD   docusate sodium (COLACE) 100 MG capsule Take 100 mg by mouth 2 times daily    Historical Provider, MD   albuterol sulfate HFA (VENTOLIN HFA) 108 (90 Base) MCG/ACT inhaler Inhale 2 puffs into the lungs every 6 hours as needed for Wheezing    Historical Provider, MD   LORazepam (ATIVAN) 1 MG tablet Take 1 mg by mouth every 6 hours as needed for Anxiety. Historical Provider, MD   folic acid (FOLVITE) 1 MG tablet Take 1 tablet by mouth daily 10/18/21 11/17/21  Juwan Sharma MD   melatonin 3 MG TABS tablet Take 2 tablets by mouth daily 10/16/21 11/15/21  Maria Luisa Jordan MD   aspirin 81 MG chewable tablet Take 1 tablet by mouth daily 21   Kristian Zelaya MD   atorvastatin (LIPITOR) 40 MG tablet Take 1 tablet by mouth nightly 21   Kristian Zelaya MD   metoprolol succinate (TOPROL XL) 25 MG extended release tablet Take 1 tablet by mouth daily 21   Kristian Zelaya MD   thiamine mononitrate (THIAMINE) 100 MG tablet Take 1 tablet by mouth daily 5/15/21   Yamini Chavarria MD       Current medications:    No current facility-administered medications for this visit. No current outpatient medications on file.      Facility-Administered Medications Ordered in Other Visits   Medication Dose Route Frequency Provider Last Rate Last Admin    cefOXitin (MEFOXIN) 2000 mg in dextrose 5% 50 mL (mini-bag)  2,000 mg IntraVENous On Call to [address], MD        lidocaine (CARDIAC INFUSION) 2000 mg in dextrose 5% 500 mL infusion  3.25 mg/min IntraVENous Continuous Rachel Burnett MD        folic acid (FOLVITE) tablet 1 mg  1 mg Oral Daily Ciaran Hoffman MD   1 mg at 02/24/22 0914    LORazepam (ATIVAN) tablet 1 mg  1 mg Oral Q6H PRN Ciaran Hoffman MD        metoprolol succinate (TOPROL XL) extended release tablet 25 mg  25 mg Oral Daily Ciaran Hoffman MD   25 mg at 02/24/22 9446    thiamine tablet 100 mg  100 mg Oral Daily Ciaran Hoffman MD   100 mg at 02/24/22 0913    albuterol (PROVENTIL) nebulizer solution 2.5 mg  2.5 mg Nebulization Q6H PRN Luana Michael,            Allergies:  No Known Allergies    Problem List:    Patient Active Problem List   Diagnosis Code    Lightheadedness R42    Acute cystitis with hematuria N30.01    Chest pain R07.9    Acute psychosis ([facility] [address] F23    Cognitive disorder F09    CAD (coronary artery disease) I25.10    Hypertensive urgency I16.0    MARLEE (obstructive sleep apnea) G47.33    Class 1 obesity in adult E66.9    Abnormal stress test R94.39    LV dysfunction I51.9    Thrombocytopenia (HCC) D69.6    Hyperbilirubinemia E80.6    Alcohol abuse F10.10    HLD (hyperlipidemia) E78.5    Sigmoid diverticulitis K57.32    Mild protein-calorie malnutrition (HCC) E44.1    Status post incision and drainage Z98.890    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       Past Medical History:        Diagnosis Date    CAD (coronary artery disease)     HTN (hypertension)     Hypercholesteremia     Hypertension     Sleep apnea        Past Surgical History:        Procedure Laterality Date    ABDOMEN SURGERY Left 10/14/2021    ABDOMEN INCISION AND DRAINAGE performed by Christel Pennington MD at [address] [place] [address]       Social History:    Social History     Tobacco Use    Smoking status: Former Smoker     Packs/day: 1.00     Years: 6.00     Pack years: 6.00     Quit date:      Years since quittin.1    Smokeless tobacco: Never Used   Substance Use Topics    Alcohol use: Yes     Alcohol/week: 5.0 standard drinks     Types: 5 Cans of beer per week                                Counseling given: Not Answered      Vital Signs (Current): There were no vitals filed for this visit. BP Readings from Last 3 Encounters:   22 124/79   22 (!) 168/92   11/15/21 116/78       NPO Status:   NPO                                                                                ECHO 7/15/2021   Left Ventricle   Left ventricle is normal in size . Mild concentric left ventricular hypertrophy. Possible basal inferior wall hypokinesis. Ejection fraction is visually estimated at 55%. There is doppler evidence of stage I diastolic dysfunction. Right Ventricle   Normal right ventricular size and function. Left Atrium   Normal sized left atrium. Interatrial septum appears intact. Right Atrium   Normal right atrium size. Mitral Valve   Focal calcification mitral valve leaflets. Physiologic and/or trace mitral regurgitation. Tricuspid Valve   Normal tricuspid valve structure and function. Physiologic and/or trace tricuspid regurgitation. RVSP could not be estimated. Aortic Valve   Normal aortic valve structure and function. Pulmonic Valve   Normal pulmonic valve structure and function. Pericardial Effusion   No evidence of pericardial effusion. Aorta   Aortic root dimension within normal limits. Conclusions      Summary   Left ventricle is normal in size . Mild concentric left ventricular hypertrophy. Possible basal inferior wall hypokinesis. Ejection fraction is visually estimated at 55%. There is doppler evidence of stage I diastolic dysfunction.    Normal right ventricular size and function. Focal calcification mitral valve leaflets. BMI:   Wt Readings from Last 3 Encounters:   02/24/22 215 lb (97.5 kg)   02/01/22 235 lb (106.6 kg)   11/15/21 235 lb (106.6 kg)     There is no height or weight on file to calculate BMI.    CBC:   Lab Results   Component Value Date    WBC 8.4 02/24/2022    RBC 5.42 02/24/2022    HGB 14.2 02/24/2022    HCT 42.7 02/24/2022    MCV 78.8 02/24/2022    RDW 13.3 02/24/2022     02/24/2022       CMP:   Lab Results   Component Value Date     02/24/2022    K 3.5 02/24/2022    K 3.1 02/23/2022     02/24/2022    CO2 23 02/24/2022    BUN 11 02/24/2022    CREATININE 0.7 02/24/2022    GFRAA >60 02/24/2022    LABGLOM >60 02/24/2022    GLUCOSE 128 02/24/2022    GLUCOSE 110 01/07/2011    PROT 8.1 02/23/2022    CALCIUM 9.3 02/24/2022    BILITOT 1.6 02/23/2022    ALKPHOS 108 02/23/2022    AST 13 02/23/2022    ALT 10 02/23/2022       POC Tests: No results for input(s): POCGLU, POCNA, POCK, POCCL, POCBUN, POCHEMO, POCHCT in the last 72 hours.     Coags:   Lab Results   Component Value Date    PROTIME 14.5 07/15/2021    INR 1.3 07/15/2021       HCG (If Applicable): No results found for: PREGTESTUR, PREGSERUM, HCG, HCGQUANT     ABGs: No results found for: PHART, PO2ART, BEX5ZXL, MZX8GSX, BEART, A0VAAKEQ     Type & Screen (If Applicable):  No results found for: LABABO, LABRH    Drug/Infectious Status (If Applicable):  No results found for: HIV, HEPCAB    COVID-19 Screening (If Applicable):   Lab Results   Component Value Date    COVID19 Not Detected 10/17/2021    COVID19 NOT DETECTED 06/05/2021           Anesthesia Evaluation  Patient summary reviewed and Nursing notes reviewed no history of anesthetic complications:   Airway: Mallampati: II  TM distance: >3 FB   Neck ROM: full  Mouth opening: > = 3 FB Dental:          Pulmonary: breath sounds clear to auscultation  (+) sleep apnea: on noncompliant,                             Cardiovascular:  Exercise tolerance: poor (<4 METS),   (+) hypertension:, CAD:, CHF (LVEF 61%): systolic, hyperlipidemia      ECG reviewed  Rhythm: regular  Rate: normal  Echocardiogram reviewed         Beta Blocker:  Dose within 24 Hrs         Neuro/Psych:   (+) psychiatric history (Cognitive disorder):depression/anxiety              ROS comment: ETOH abuse - patient drinks between 1-3 40 oz. Containers of beer daily GI/Hepatic/Renal:   (+) GERD: well controlled,          ROS comment: Obstructing mass descending colon. Endo/Other: Negative Endo/Other ROS                    Abdominal:       Abdomen: tender. Vascular: negative vascular ROS. Other Findings:             Anesthesia Plan      general     ASA 4 - emergent     (Glidescope)  Induction: intravenous. arterial line  MIPS: Postoperative opioids intended, Prophylactic antiemetics administered and Postoperative trial extubation. Anesthetic plan and risks discussed with patient. Use of blood products discussed with patient whom consented to blood products. Maryellen Boss RN   2/24/2022    DOS STAFF ADDENDUM:    Patient seen and examined, physical exam updated as needed, chart reviewed. NPO status confirmed. Anesthetic options and risks discussed with patient/legal guardian. Patient/legal guardian verbalized understanding and agrees to proceed. Allyn Elizabeth MD  Staff Anesthesiologist  February 24, 2022  3:39 PM      Pt seen and evaluated pre-procedure. Risks and benefits of anesthetic plan discussed as per custom.    WINTER Pappas - CRNA

## 2022-02-24 NOTE — CARE COORDINATION
Social Work / Discharge Planning:    Patient admitted for entero-colonic fistula from [facility]. Plan at discharge is for patient to return to facility. Per liaison Tom Judd, patient is not a bed hold, will need PT/OT along with COVID test on day of discharge and can return to facility with a pending auth. Plan is surgical intervention likely tomorrow for colectomy with end colostomy. Social work will continue to follow.  Electronically signed by EDWARD Hilario on 2/24/22 at 12:55 PM EST

## 2022-02-24 NOTE — PROGRESS NOTES
[facility] to try and get home medication list. With no answer. Called brother, Devonte Mar, to ask about home med. Devonte Mar was unsure of what meds pt is taking. Will try [facility] again.

## 2022-02-24 NOTE — H&P
GENERAL SURGERY  HISTORY AND PHYSICAL NOTE    Patient's Name/Date of Birth: Carley Novoa /1965 (5 y.o.)    Date: February 24, 2022     Uus 6 wound and abdominal pain    HPI:  Carley Novoa is a 62 y.o. male who presents with draining left posterior flank wound and abdominal pain. Patient presented on 2/2/2022 for a left flank cyst and small amount of purulent drainage. He had a history of an I&D at that same spot in October. Back in October there was concern that this was the beginning of a possible fistulous track from a neoplastic process. Multiple recommendations for colonoscopy had been made back in October earlier this month however patient continued to refuse and left. Patient now is having diffuse abdominal pain no nausea or vomiting and continued drainage from his left flank wound. On CT imaging patient has dilated proximal colon and decompressed distal colon at there the inflammation of the sigmoid colon concerning for near obstruction. Patient last had a bowel movement 4 days ago and said it was only consistent of liquid diarrhea. He denies any recent weight loss.     Past Medical History:   Diagnosis Date    CAD (coronary artery disease)     HTN (hypertension)     Hypercholesteremia     Hypertension     Sleep apnea        Past Surgical History:   Procedure Laterality Date    ABDOMEN SURGERY Left 10/14/2021    ABDOMEN INCISION AND DRAINAGE performed by Greer Darden MD at [facility] OR       Current Facility-Administered Medications   Medication Dose Route Frequency Provider Last Rate Last Admin    melatonin tablet 3 mg  3 mg Oral Nightly Harrison Woodard MD        aspirin chewable tablet 81 mg  81 mg Oral Daily Harrison Woodard MD   81 mg at 02/24/22 0916    atorvastatin (LIPITOR) tablet 40 mg  40 mg Oral Nightly Harrison Woodard MD        docusate sodium (COLACE) capsule 100 mg  100 mg Oral BID Harrison Woodard MD        folic acid (FOLVITE) tablet 1 mg  1 mg Oral Daily Evangelista Sims MD   1 mg at 22 0914    LORazepam (ATIVAN) tablet 1 mg  1 mg Oral Q6H PRN Evangelista Sims MD        metoprolol succinate (TOPROL XL) extended release tablet 25 mg  25 mg Oral Daily Evangelista Sims MD   25 mg at 22 2834    thiamine tablet 100 mg  100 mg Oral Daily Evangelista Sims MD   100 mg at 22 0913    albuterol (PROVENTIL) nebulizer solution 2.5 mg  2.5 mg Nebulization Q6H PRN Jada Parkinson DO           No Known Allergies    History reviewed. No pertinent family history. Social History     Socioeconomic History    Marital status: Legally      Spouse name: Not on file    Number of children: Not on file    Years of education: Not on file    Highest education level: Not on file   Occupational History    Not on file   Tobacco Use    Smoking status: Former Smoker     Packs/day: 1.00     Years: 6.00     Pack years: 6.00     Quit date:      Years since quittin.1    Smokeless tobacco: Never Used   Vaping Use    Vaping Use: Never used   Substance and Sexual Activity    Alcohol use: Yes     Alcohol/week: 5.0 standard drinks     Types: 5 Cans of beer per week    Drug use: Not Currently    Sexual activity: Not Currently   Other Topics Concern    Not on file   Social History Narrative    Not on file     Social Determinants of Health     Financial Resource Strain:     Difficulty of Paying Living Expenses: Not on file   Food Insecurity:     Worried About Running Out of Food in the Last Year: Not on file    Donta of Food in the Last Year: Not on file   Transportation Needs:     Lack of Transportation (Medical): Not on file    Lack of Transportation (Non-Medical):  Not on file   Physical Activity:     Days of Exercise per Week: Not on file    Minutes of Exercise per Session: Not on file   Stress:     Feeling of Stress : Not on file   Social Connections:     Frequency of Communication with Friends and Family: Not on file  Frequency of Social Gatherings with Friends and Family: Not on file    Attends Jainism Services: Not on file    Active Member of Clubs or Organizations: Not on file    Attends Club or Organization Meetings: Not on file    Marital Status: Not on file   Intimate Partner Violence:     Fear of Current or Ex-Partner: Not on file    Emotionally Abused: Not on file    Physically Abused: Not on file    Sexually Abused: Not on file   Housing Stability:     Unable to Pay for Housing in the Last Year: Not on file    Number of [place] in the Last Year: Not on file    Unstable Housing in the Last Year: Not on file       ROS:   Review of Systems   Constitutional: Negative for chills, fatigue, fever and unexpected weight change. HENT: Negative for nosebleeds, rhinorrhea and sneezing. Eyes: Negative for pain, redness and itching. Respiratory: Negative for cough, chest tightness, shortness of breath and wheezing. Cardiovascular: Negative for chest pain and leg swelling. Gastrointestinal: Positive for abdominal pain and diarrhea. Negative for abdominal distention, blood in stool, constipation, nausea and vomiting. Endocrine: Negative for polydipsia, polyphagia and polyuria. Genitourinary: Negative for difficulty urinating, dysuria and hematuria. Musculoskeletal: Negative for arthralgias, back pain and neck pain. Skin: Positive for wound. Negative for pallor and rash. Neurological: Negative for dizziness, weakness and headaches. Psychiatric/Behavioral: Negative for agitation, confusion and hallucinations. Physical Exam:  Vitals:    02/24/22 0800   BP: 119/73   Pulse: 93   Resp: 20   Temp: 98.6 °F (37 °C)   SpO2: 95%       PSYCH: mood and affect normal, alert and oriented x 3: No apparent distress, comfortable  EYES: Sclera white, pupils equal round and reactive to light  ENMT:  Hearing normal, trachea midline, ears externally intact  LYMPH: no lympadenopathy in neck.  Nolympadenopathy in groins  RESP: No increased work of breathing  CV: Regular rate  GI/ Abdomen: Soft, moderately distended,, diffusely mildly tender, no rebound or guarding, no peritoneal signs, left posterior flank wound approximately 1.5 cm with significant amount of purulent drainage  MSK: no clubbing/ no cyanosis/ gait normal    LABS:    CBC  Recent Labs     02/24/22  0639   WBC 8.4   HGB 14.2   HCT 42.7        BMP  Recent Labs     02/24/22  0639      K 3.5      CO2 23   BUN 11   CREATININE 0.7   CALCIUM 9.3     Liver Function  Recent Labs     02/23/22  1504   LIPASE 36   BILITOT 1.6*   AST 13   ALT 10   ALKPHOS 108   PROT 8.1   LABALBU 4.3     No results for input(s): LACTATE in the last 72 hours. No results for input(s): INR, PTT in the last 72 hours. Invalid input(s): PT    RADIOLOGY    CT ABDOMEN PELVIS W IV CONTRAST Additional Contrast? None    Result Date: 2/23/2022  EXAMINATION: CT OF THE ABDOMEN AND PELVIS WITH CONTRAST 2/23/2022 4:34 pm TECHNIQUE: CT of the abdomen and pelvis was performed with the administration of intravenous contrast. Multiplanar reformatted images are provided for review. Dose modulation, iterative reconstruction, and/or weight based adjustment of the mA/kV was utilized to reduce the radiation dose to as low as reasonably achievable. COMPARISON: CT abdomen and pelvis 02/01/2022 HISTORY: ORDERING SYSTEM PROVIDED HISTORY: abdominal discomfort, concern for left sided fistula TECHNOLOGIST PROVIDED HISTORY: Additional Contrast?->None Reason for exam:->abdominal discomfort, concern for left sided fistula Decision Support Exception - unselect if not a suspected or confirmed emergency medical condition->Emergency Medical Condition (MA) FINDINGS: There is a peripheral hypodensity in the right hepatic lobe which measures approximately 10 mm in size. This likely represents a benign, incidental finding. The liver is otherwise unremarkable in appearance.   The spleen is enlarged, measuring approximately 14.5 cm in length. There is no evidence of focal splenic lesion. The pancreas and adrenal glands are unremarkable. There is left nonobstructive nephrolithiasis. There are left renal cysts. The right kidney is unremarkable in appearance. The gallbladder is intact without evidence of biliary ductal dilatation. There is marked fluid distention of the colon. The cecum measures approximately 11.2 cm in diameter. The ascending colon measures approximately 8.5 cm in diameter. The distal transverse colon measures approximately 7.0 cm in diameter. There is masslike appearance in the descending colon causing obstruction. This extends into the sigmoid colon. There is colonic diverticulosis without evidence of diverticulitis. There is fluid within the rectum. The rectosigmoid colon is relatively collapsed distal to the descending colon mass. The appendix is unremarkable in appearance. There is no significant small bowel distension. There is skin thickening with subcutaneous fat stranding and air in the subcutaneous fat of the inferior left flank. There is also irregular soft tissue in the retroperitoneum with possible fistula arising from the colonic mass. There is linear atelectasis/scarring within the right lung base. There are degenerative changes about the osseous structures. There is trace pericardial effusion. 1. Persistent mass in the distal descending colon and proximal sigmoid colon now causing obstruction. 2. Colonic diverticulosis without evidence of diverticulitis. 3. Splenomegaly. 4. Left nonobstructive nephrolithiasis. 5. Possible fistula from the colonic mass into the subcutaneous fat of the inferior left flank.           Assessment/Plan:  62 y.o. 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  CT imaging concerning for partially if not fully obstructing process as the cecum is significantly dilated compared to 20 days prior  IV fluids   Patient will need a surgical intervention likely colectomy with end colostomy, likely tomorrow and as such we will place patient on clear liquids and start him on a bowel regimen  D/w Dr. Nayeli Ovalle M.D., Ph.D., PGY-4  2/24/2022  10:35 AM

## 2022-02-25 LAB
ALBUMIN SERPL-MCNC: 3.6 G/DL (ref 3.5–5.2)
ALP BLD-CCNC: 91 U/L (ref 40–129)
ALT SERPL-CCNC: 8 U/L (ref 0–40)
ANION GAP SERPL CALCULATED.3IONS-SCNC: 14 MMOL/L (ref 7–16)
AST SERPL-CCNC: 11 U/L (ref 0–39)
BASOPHILS ABSOLUTE: 0.02 E9/L (ref 0–0.2)
BASOPHILS RELATIVE PERCENT: 0.3 % (ref 0–2)
BILIRUB SERPL-MCNC: 2.6 MG/DL (ref 0–1.2)
BUN BLDV-MCNC: 18 MG/DL (ref 6–20)
BURR CELLS: ABNORMAL
CALCIUM SERPL-MCNC: 8.9 MG/DL (ref 8.6–10.2)
CHLORIDE BLD-SCNC: 103 MMOL/L (ref 98–107)
CO2: 22 MMOL/L (ref 22–29)
CREAT SERPL-MCNC: 0.9 MG/DL (ref 0.7–1.2)
DOHLE BODIES: ABNORMAL
EOSINOPHILS ABSOLUTE: 0 E9/L (ref 0.05–0.5)
EOSINOPHILS RELATIVE PERCENT: 0 % (ref 0–6)
GFR AFRICAN AMERICAN: >60
GFR NON-AFRICAN AMERICAN: >60 ML/MIN/1.73
GLUCOSE BLD-MCNC: 192 MG/DL (ref 74–99)
HCT VFR BLD CALC: 42.9 % (ref 37–54)
HEMOGLOBIN: 14.1 G/DL (ref 12.5–16.5)
IMMATURE GRANULOCYTES #: 0.02 E9/L
IMMATURE GRANULOCYTES %: 0.3 % (ref 0–5)
LACTIC ACID: 1.2 MMOL/L (ref 0.5–2.2)
LYMPHOCYTES ABSOLUTE: 0.27 E9/L (ref 1.5–4)
LYMPHOCYTES RELATIVE PERCENT: 4.6 % (ref 20–42)
MAGNESIUM: 2 MG/DL (ref 1.6–2.6)
MCH RBC QN AUTO: 26.1 PG (ref 26–35)
MCHC RBC AUTO-ENTMCNC: 32.9 % (ref 32–34.5)
MCV RBC AUTO: 79.3 FL (ref 80–99.9)
MONOCYTES ABSOLUTE: 0.84 E9/L (ref 0.1–0.95)
MONOCYTES RELATIVE PERCENT: 14.2 % (ref 2–12)
NEUTROPHILS ABSOLUTE: 4.75 E9/L (ref 1.8–7.3)
NEUTROPHILS RELATIVE PERCENT: 80.6 % (ref 43–80)
OVALOCYTES: ABNORMAL
PAPPENHEIMER BODIES: ABNORMAL
PDW BLD-RTO: 13.6 FL (ref 11.5–15)
PHOSPHORUS: 3.1 MG/DL (ref 2.5–4.5)
PLATELET # BLD: 202 E9/L (ref 130–450)
PMV BLD AUTO: 11.1 FL (ref 7–12)
POIKILOCYTES: ABNORMAL
POTASSIUM SERPL-SCNC: 4.1 MMOL/L (ref 3.5–5)
RBC # BLD: 5.41 E12/L (ref 3.8–5.8)
ROULEAUX: ABNORMAL
SODIUM BLD-SCNC: 139 MMOL/L (ref 132–146)
TARGET CELLS: ABNORMAL
TEAR DROP CELLS: ABNORMAL
TOTAL PROTEIN: 7.1 G/DL (ref 6.4–8.3)
WBC # BLD: 5.9 E9/L (ref 4.5–11.5)

## 2022-02-25 PROCEDURE — 6360000002 HC RX W HCPCS: Performed by: STUDENT IN AN ORGANIZED HEALTH CARE EDUCATION/TRAINING PROGRAM

## 2022-02-25 PROCEDURE — 84100 ASSAY OF PHOSPHORUS: CPT

## 2022-02-25 PROCEDURE — 6370000000 HC RX 637 (ALT 250 FOR IP): Performed by: STUDENT IN AN ORGANIZED HEALTH CARE EDUCATION/TRAINING PROGRAM

## 2022-02-25 PROCEDURE — 1200000000 HC SEMI PRIVATE

## 2022-02-25 PROCEDURE — 6360000002 HC RX W HCPCS: Performed by: INTERNAL MEDICINE

## 2022-02-25 PROCEDURE — 83735 ASSAY OF MAGNESIUM: CPT

## 2022-02-25 PROCEDURE — 6360000002 HC RX W HCPCS: Performed by: SURGERY

## 2022-02-25 PROCEDURE — 2580000003 HC RX 258: Performed by: STUDENT IN AN ORGANIZED HEALTH CARE EDUCATION/TRAINING PROGRAM

## 2022-02-25 PROCEDURE — C9113 INJ PANTOPRAZOLE SODIUM, VIA: HCPCS | Performed by: INTERNAL MEDICINE

## 2022-02-25 PROCEDURE — 2580000003 HC RX 258: Performed by: INTERNAL MEDICINE

## 2022-02-25 PROCEDURE — 87081 CULTURE SCREEN ONLY: CPT

## 2022-02-25 PROCEDURE — 80053 COMPREHEN METABOLIC PANEL: CPT

## 2022-02-25 PROCEDURE — 85025 COMPLETE CBC W/AUTO DIFF WBC: CPT

## 2022-02-25 PROCEDURE — 83605 ASSAY OF LACTIC ACID: CPT

## 2022-02-25 PROCEDURE — 36415 COLL VENOUS BLD VENIPUNCTURE: CPT

## 2022-02-25 RX ORDER — SODIUM CHLORIDE 0.9 % (FLUSH) 0.9 %
10 SYRINGE (ML) INJECTION EVERY 12 HOURS
Status: DISCONTINUED | OUTPATIENT
Start: 2022-02-25 | End: 2022-03-02 | Stop reason: HOSPADM

## 2022-02-25 RX ORDER — PANTOPRAZOLE SODIUM 40 MG/10ML
40 INJECTION, POWDER, LYOPHILIZED, FOR SOLUTION INTRAVENOUS DAILY
Status: DISCONTINUED | OUTPATIENT
Start: 2022-02-25 | End: 2022-03-02 | Stop reason: HOSPADM

## 2022-02-25 RX ORDER — SODIUM CHLORIDE 9 MG/ML
10 INJECTION INTRAVENOUS DAILY
Status: DISCONTINUED | OUTPATIENT
Start: 2022-02-25 | End: 2022-03-02 | Stop reason: HOSPADM

## 2022-02-25 RX ORDER — SODIUM CHLORIDE 0.9 % (FLUSH) 0.9 %
10 SYRINGE (ML) INJECTION AS NEEDED
Status: DISCONTINUED | OUTPATIENT
Start: 2022-02-25 | End: 2022-03-02 | Stop reason: HOSPADM

## 2022-02-25 RX ADMIN — METOPROLOL SUCCINATE 25 MG: 25 TABLET, FILM COATED, EXTENDED RELEASE ORAL at 08:44

## 2022-02-25 RX ADMIN — HYDROMORPHONE HYDROCHLORIDE 0.5 MG: 1 INJECTION, SOLUTION INTRAMUSCULAR; INTRAVENOUS; SUBCUTANEOUS at 06:03

## 2022-02-25 RX ADMIN — SODIUM CHLORIDE, POTASSIUM CHLORIDE, SODIUM LACTATE AND CALCIUM CHLORIDE: 600; 310; 30; 20 INJECTION, SOLUTION INTRAVENOUS at 20:41

## 2022-02-25 RX ADMIN — Medication 10 ML: at 08:44

## 2022-02-25 RX ADMIN — CEFOXITIN SODIUM 1000 MG: 1 POWDER, FOR SOLUTION INTRAVENOUS at 22:07

## 2022-02-25 RX ADMIN — SODIUM CHLORIDE, PRESERVATIVE FREE 10 ML: 5 INJECTION INTRAVENOUS at 00:34

## 2022-02-25 RX ADMIN — SODIUM CHLORIDE, POTASSIUM CHLORIDE, SODIUM LACTATE AND CALCIUM CHLORIDE: 600; 310; 30; 20 INJECTION, SOLUTION INTRAVENOUS at 00:33

## 2022-02-25 RX ADMIN — Medication 10 ML: at 20:51

## 2022-02-25 RX ADMIN — HYDROMORPHONE HYDROCHLORIDE 0.5 MG: 1 INJECTION, SOLUTION INTRAMUSCULAR; INTRAVENOUS; SUBCUTANEOUS at 00:34

## 2022-02-25 RX ADMIN — CEFOXITIN SODIUM 1000 MG: 1 POWDER, FOR SOLUTION INTRAVENOUS at 06:12

## 2022-02-25 RX ADMIN — HYDROMORPHONE HYDROCHLORIDE 0.5 MG: 1 INJECTION, SOLUTION INTRAMUSCULAR; INTRAVENOUS; SUBCUTANEOUS at 18:35

## 2022-02-25 RX ADMIN — PANTOPRAZOLE SODIUM 40 MG: 40 INJECTION, POWDER, FOR SOLUTION INTRAVENOUS at 08:45

## 2022-02-25 RX ADMIN — SODIUM CHLORIDE, PRESERVATIVE FREE 10 ML: 5 INJECTION INTRAVENOUS at 08:46

## 2022-02-25 RX ADMIN — SODIUM CHLORIDE, POTASSIUM CHLORIDE, SODIUM LACTATE AND CALCIUM CHLORIDE: 600; 310; 30; 20 INJECTION, SOLUTION INTRAVENOUS at 08:13

## 2022-02-25 RX ADMIN — CEFOXITIN SODIUM 1000 MG: 1 POWDER, FOR SOLUTION INTRAVENOUS at 13:48

## 2022-02-25 ASSESSMENT — PAIN DESCRIPTION - LOCATION
LOCATION: ABDOMEN

## 2022-02-25 ASSESSMENT — PAIN SCALES - GENERAL
PAINLEVEL_OUTOF10: 0
PAINLEVEL_OUTOF10: 7
PAINLEVEL_OUTOF10: 0
PAINLEVEL_OUTOF10: 7
PAINLEVEL_OUTOF10: 0
PAINLEVEL_OUTOF10: 6

## 2022-02-25 ASSESSMENT — PAIN DESCRIPTION - ONSET
ONSET: ON-GOING
ONSET: GRADUAL
ONSET: ON-GOING

## 2022-02-25 ASSESSMENT — PAIN DESCRIPTION - PAIN TYPE
TYPE: SURGICAL PAIN

## 2022-02-25 ASSESSMENT — PAIN - FUNCTIONAL ASSESSMENT
PAIN_FUNCTIONAL_ASSESSMENT: PREVENTS OR INTERFERES SOME ACTIVE ACTIVITIES AND ADLS

## 2022-02-25 ASSESSMENT — PAIN DESCRIPTION - DESCRIPTORS
DESCRIPTORS: DISCOMFORT

## 2022-02-25 ASSESSMENT — PAIN DESCRIPTION - FREQUENCY
FREQUENCY: INTERMITTENT

## 2022-02-25 ASSESSMENT — PAIN DESCRIPTION - PROGRESSION
CLINICAL_PROGRESSION: GRADUALLY WORSENING
CLINICAL_PROGRESSION: NOT CHANGED
CLINICAL_PROGRESSION: NOT CHANGED
CLINICAL_PROGRESSION: GRADUALLY WORSENING

## 2022-02-25 NOTE — PROGRESS NOTES
Department of General Surgery - Adult  Surgical Service   Attending Progress Note      SUBJECTIVE:      No overnight events, transferred out of ICU today. Pain reasonably controlled. NG tube removed at bedside. OBJECTIVE      Physical    VITALS:  BP (!) 123/90   Pulse 108   Temp 98.4 °F (36.9 °C) (Oral)   Resp 20   Ht 6' 2\" (1.88 m)   Wt 193 lb 9 oz (87.8 kg)   SpO2 98%   BMI 24.85 kg/m²     General: No acute distress, AAOx3  Eyes: Extraocular movements intact, no scleral icterus  Respiratory: Normal work of breathing, no cyanosis  Cardiovascular: Normal capillary refill, 2+ radial pulse  Abdomen: Soft ileostomy healthy and viable, no gas in bag brown stool in bag and brown stool and mucous fistula bag.   Skin: Normal skin turgor, no rashes  Extremities: No deformities, no contractures  Neurologic: No focal neurologic deficits, AAO x3    Data  CBC with Differential:    Lab Results   Component Value Date    WBC 5.9 02/25/2022    RBC 5.41 02/25/2022    HGB 14.1 02/25/2022    HCT 42.9 02/25/2022     02/25/2022    MCV 79.3 02/25/2022    MCH 26.1 02/25/2022    MCHC 32.9 02/25/2022    RDW 13.6 02/25/2022    NRBC 0.9 05/07/2021    METASPCT 0.9 10/17/2021    LYMPHOPCT 4.6 02/25/2022    MONOPCT 14.2 02/25/2022    BASOPCT 0.3 02/25/2022    MONOSABS 0.84 02/25/2022    LYMPHSABS 0.27 02/25/2022    EOSABS 0.00 02/25/2022    BASOSABS 0.02 02/25/2022     BMP:    Lab Results   Component Value Date     02/25/2022    K 4.1 02/25/2022    K 3.1 02/23/2022     02/25/2022    CO2 22 02/25/2022    BUN 18 02/25/2022    LABALBU 3.6 02/25/2022    LABALBU 4.1 01/07/2011    CREATININE 0.9 02/25/2022    CALCIUM 8.9 02/25/2022    GFRAA >60 02/25/2022    LABGLOM >60 02/25/2022    GLUCOSE 192 02/25/2022    GLUCOSE 110 01/07/2011       ASSESSMENT AND PLAN    75-year-old male with large bowel obstruction status post extended right hemicolectomy with ileostomy and mucous fistula  -NG tube removed at bedside started on clear liquid diet  -Continue pain medications as needed  -Ileostomy functioning healthy and viable we will continue to monitor at this time.   No gas in bag

## 2022-02-25 NOTE — PROGRESS NOTES
2/25/2022  9:14 AM      Comprehensive Nutrition Assessment    Type and Reason for Visit:  Initial,Positive Nutrition Screen    Nutrition Recommendations/Plan: ADAT when medically advisable (return of bowel function)    Nutrition Assessment:  Pt currently in ICU s/p 2/24 Ex lap w/right hemicolectomy, end ileostomy and mucus fistula 2/2 large colon mass and colocutaneous fistula. Will continue to monitor diet progression postop and for diet ed re: new Ileostomy nutrition guidelines when appropriate    Malnutrition Assessment:  Malnutrition Status:  Severe malnutrition    Context:  Acute Illness     Findings of the 6 clinical characteristics of malnutrition:  Energy Intake:  7 - 50% or less of estimated energy requirements for 5 or more days  Weight Loss:  7 - Greater than 7.5% over 3 months     Body Fat Loss:  1 - Mild body fat loss     Muscle Mass Loss:  No significant muscle mass loss    Fluid Accumulation:  No significant fluid accumulation     Strength:  Not Performed    Estimated Daily Nutrient Needs:  Energy (kcal):   (2305 arabella); Weight Used for Energy Requirements:  Current     Protein (g):  125-135 (1.4-1.6 g/kg); Weight Used for Protein Requirements:  Current        Fluid (ml/day):  per Critical Care; Method Used for Fluid Requirements:  Other (Comment)      Nutrition Related Findings:  NGT to LIS, tender abd w/absent BS, ileostomy, I/O WNL, +1 gen edema      Wounds:  Surgical Incision       Current Nutrition Therapies:    Diet NPO Exceptions are: Sips of Water with Meds    Anthropometric Measures:  · Height: 6' 2\" (188 cm)  · Current Body Weight: 193 lb 9 oz (87.8 kg) (2/25 bedscale)   · Admission Body Weight: 215 lb (97.5 kg) (2/23 first measured wt)    · Usual Body Weight: 240 lb (108.9 kg) (per EMR x 3 mo ago)     · Ideal Body Weight: 190 lbs; % Ideal Body Weight 101.9 %   · BMI: 24.8  · BMI Categories: Normal Weight (BMI 18.5-24. 9)       Nutrition Diagnosis:   · Severe malnutrition,In context

## 2022-02-25 NOTE — OP NOTE
[id number] [facility]  Urology Post-operative Note    Sol Cantrell  YOB: 1965  [id number]    Pre-operative Diagnosis: Difficult Molina insertion, urinary retention    Post-operative Diagnosis:  Same    Procedure: Cystourethroscopy, urethral dilation with metal Graciela dilators, difficult Molina insertion, rectal exam    Surgeon: Ke Lyman MD    Anesthesia:   General minimal    Estimated Blood Loss:   Minimal    Complications: None    Drains: 21 [facility] tip Molina to gravity drainage    Specimen(s): None    Clinical Note:   42-year-old male asleep on the operating room table undergoing open bowel exploration with creation of a transverse colostomy. I was asked to place Molina catheter after multiple attempts were made unsuccessfully. He is unable to give any medical history at this time and his medical chart was reviewed in detail    Operative Description:   He was on the OR table in the supine position with his legs in stirrups. His abdomen genitalia and perineum had been prepped and draped sterilely. An open laparotomy midline incision was made. The bladder appeared to be normal on inspection. A 21 [place] cystoscope with a 30 degree lens was inserted per urethra. There were no false passages or tumors. There is a dense mid bulbous urethral stricture which was encountered. A sensor wire was inserted through the scope and passed through the stricture into the bladder. I was then able to forcibly pass the cystoscope over the wire and into the membranous urethra, then the prostatic urethra and then the bladder. There was a high riding bladder neck but no bladder neck contracture. The prostatic urethra appeared to be patent. No other strictures or false passages were noted. The bladder was inspected. There were no clots, stones, tumors, foreign bodies, fistulas.   Both ureteral openings were in the usual position on the trigone and there was clear reflux of urine from each.  The sensor wire was advanced and coiled copiously through the scope and into the bladder. The cystoscope was removed. Metal Graciela dilators were then utilized to dilate the urethra from 25 [place] to 25 [place] without difficulty. A 20 [place] tip catheter was then passed over the wire and into the bladder. The wire was removed. The balloon was inflated with 20 cc of sterile water and connected to gravity drainage. There were no complications. The urine was light pink in color at the completion of this portion of the procedure.       Brandon Amaro MD  2/24/2022  9:48 PM

## 2022-02-25 NOTE — BRIEF OP NOTE
Brief Postoperative Note      Patient: Matthew Norwood  YOB: 1965  MRN: [id number]    Date of Procedure: 2/24/2022    Pre-Op Diagnosis: large bowel obstruction, abnormal CT, colocutaneous fistula,     Post-Op Diagnosis: large bowel obstruction, ischemic right colon, descending colon mass invading retroperitoneum, colocutaneous fistula       Procedure(s):  exploratory laparotomy, extended right hemicolectomy, creation of mucus fistula and end ileostomy  CYSTOSCOPY, urethral dilation, perry insertion    Surgeon(s):  MD Chinedu Ordonez MD - assisted as there was no resident available    Assistant:  * No surgical staff found *    Anesthesia: General    Estimated Blood Loss (mL): 752RE    Complications: None    Specimens:   ID Type Source Tests Collected by Time Destination   A : right colon / transverse colon Tissue Tissue SURGICAL PATHOLOGY Erlin Correa MD 2/24/2022 2223        Implants:  * No implants in log *      Drains:   Urethral Catheter 20 fr (Active)       Findings: large bowel obstruction from large dense mass in descending colon,  Mass invading retroperitoneum through the left flank with obvious colocutaneous fistula, severely adhered and invaded into the side wall and RP making mobilization of left colon very difficult and unsafe to proceed. severely dilated right colon with ischemic changes. Decision made to perform extended right hemicolectomy with transverse colon mucous fistula proximal to the mass as well as creation of end ileostomy. Liver felt smooth. Urology performed cysto with perry placement secondary to inability to place perry preop. Full op note to follow. He will be going to ICU. Discussed with intensivist.       Dictation #: [id number]    Dickson Gosselin MD  Minimally Invasive General Surgery and Endoscopy  [address] [place] [address].  [address]  [address] [place] [address] Street: [phone]  F: [phone]      Electronically signed by Erlin Correa MD on 2/24/2022 at 11:15 PM

## 2022-02-25 NOTE — CONSULTS
INPATIENT CONSULTATION RECORD FOR  2/24/2022      [facility]  [address] [facility]. [place], [address] [facility]  [phone]        REASON FOR CONSULTATION: Difficult Molina insertion/Left renal calculus/Left renal cyst      HISTORY OF PRESENT ILLNESS:      The patient is a 62 y.o. male patient who resides at [facility]. He has a history of medical noncompliance. He presented to the ER with worsening abdominal pain, nausea, and constipation. He has not had a bowel movement in 4 days. Colonoscopy has been recommended on multiple occasions and he has been noncompliant with follow-up. CT scan yesterday showed a mass in the distal descending colon and proximal sigmoid colon with obstruction, splenomegaly, diverticulosis, a left renal cyst, a small nonobstructive left renal stone, and a clear cutaneous fistula. He is currently in the operating room under anesthesia undergoing exploratory laparotomy with transverse loop colostomy formation by Dr. Beny Benz. Attempts at placement of the Molina catheter by the nursing staff and Dr. Beny Benz were unsuccessful. There is no urologic history in his hospital chart.       Past Medical History:   Diagnosis Date    CAD (coronary artery disease)     HTN (hypertension)     Hypercholesteremia     Hypertension     Sleep apnea          Past Surgical History:   Procedure Laterality Date    ABDOMEN SURGERY Left 10/14/2021    ABDOMEN INCISION AND DRAINAGE performed by Janet Avery MD at [address] [place] [address]       Medications Prior to Admission:    Medications Prior to Admission: folic acid (FOLVITE) 1 MG tablet, Take 1 mg by mouth daily  melatonin 3 MG TABS tablet, Take 3 mg by mouth daily Take 2 tabs by mouth nightly for sleep  docusate sodium (COLACE) 100 MG capsule, Take 100 mg by mouth 2 times daily  albuterol sulfate HFA (VENTOLIN HFA) 108 (90 Base) MCG/ACT inhaler, Inhale 2 puffs into the lungs every 6 hours as needed for Wheezing  LORazepam (ATIVAN) 1 MG tablet, Take 1 mg by mouth every 6 hours as needed for Anxiety. aspirin 81 MG chewable tablet, Take 1 tablet by mouth daily  atorvastatin (LIPITOR) 40 MG tablet, Take 1 tablet by mouth nightly  metoprolol succinate (TOPROL XL) 25 MG extended release tablet, Take 1 tablet by mouth daily  thiamine mononitrate (THIAMINE) 100 MG tablet, Take 1 tablet by mouth daily  folic acid (FOLVITE) 1 MG tablet, Take 1 tablet by mouth daily  melatonin 3 MG TABS tablet, Take 2 tablets by mouth daily    Allergies:    Patient has no known allergies. Social History:   He  reports that he quit smoking about 21 years ago. He has a 6.00 pack-year smoking history. He has never used smokeless tobacco. He reports current alcohol use of about 5.0 standard drinks of alcohol per week. He reports previous drug use. He is . He lives at [facility]. He works for [employer]    Family History:   Non-contributory to this Urological problem  family history is not on file. REVIEW OF SYSTEMS:  Unable to be obtained    PHYSICAL EXAM:    Vitals:  BP (!) 137/97   Pulse 115   Temp 97.7 °F (36.5 °C) (Temporal)   Resp 18   Ht 6' 2\" (1.88 m)   Wt 215 lb (97.5 kg)   SpO2 95%   BMI 27.60 kg/m²     General:  Well developed, well nourished, well groomed. HEENT:  Normocephalic, atraumatic. Pupils equal, round. No scleral icterus. No conjunctival injection. Normal lips, teeth, and gums. No nasal discharge. Neck:  Supple, no masses. Heart:  RRR. Lungs:  No audible wheezing. Respirations symmetric and non-labored. Clear bilaterally. Intubated  Abdomen: Midline laparotomy incision with colonic dilatation. There is a left flank colocutaneous fistula. There is a mass within the left retroperitoneum. The bladder appears to be intact  Extremities:  No clubbing, cyanosis, or edema. Brisk peripheral pulses. Skin:  Warm and dry, no open lesions or rashes. Neuro: Sedated  Rectal: 30 g smooth nonnodular prostate.  There are no rectal masses. His seminal vesicles are normal  Genitalia: Circumcised male with glans vitiligo. His testes are both normal in size and consistency and are without masses or abnormalities bilaterally    LABS:    Lab Results   Component Value Date    WBC 8.4 02/24/2022    HGB 14.2 02/24/2022    HCT 42.7 02/24/2022    MCV 78.8 (L) 02/24/2022     02/24/2022       Lab Results   Component Value Date    BUN 11 02/24/2022    CREATININE 0.7 02/24/2022       Lab Results   Component Value Date    PSA 0.53 10/15/2021         ASSESSMENT / PLAN:    Difficult Molina insertion/Left renal calculus/Left renal cyst  He is undergoing exploratory laparotomy by Dr. Jack Funes currently. Has asked to place a Molina after multiple failed attempts. There is no urologic history documented in his chart. The small left renal stone appears to be insignificant and will be monitored and managed nonoperatively. I will place a Molina while he is under anesthesia. A separate operative report will follow. We will continue to follow him during his hospital stay. Thank you for allow me to assist in his care.   Sincerely,      Marjan Vogel MD  9:24 PM  2/24/2022

## 2022-02-25 NOTE — PROGRESS NOTES
Patient admitted from OR to 204, with the following belongings (NIKE shoes (Black and Red), jeans, belt, glasses, underwear, white t shirt, light jacket, black long sleeve shirt, phone , and cell phone), placed on monitor, patient oriented to room and unit visiting hours. Patient guide at bedside, reviewed patient rights and responsibilities. MRSA nasal swab obtained. Bed alarm on. Call light within reach. Patient resting in bed with eyes open and television on.

## 2022-02-25 NOTE — PATIENT CARE CONFERENCE
Intensive Care Daily Quality Rounding Checklist      ICU Team Members: Dr. Ryan Waller, Guanakito Nicolas (residents), charge nurse, bedside nurse, clinical pharmacist, respiratory therapist    ICU Day #: NUMBER: 1    Intubation Date: N/A    Ventilator Day #: N/A    Central Line Insertion Date: N/A        Day #: N/A     Arterial Line Insertion Date: N/A      Day #: N/A    Temporary Hemodialysis Catheter Insertion Date: N/A      Day # N/A    DVT Prophylaxis: PCDs    GI Prophylaxis: Protonix    Perry Catheter Insertion Date: February 24       Day #: 2      Continued need (if yes, reason documented and discussed with physician): yes, accurate intake and output in a critically ill patient. Dr. Elizabeth Baez placed perry in OR. DO NOT REMOVE    Skin Issues/ Wounds and ordered treatment discussed on rounds: Yes, wound care following.     Goals/ Plans for the Day: Daily labs, advance diet and activity once OK with surgery, transfer to floor

## 2022-02-25 NOTE — BRIEF OP NOTE
Brief Postoperative Note      Patient: Twan Heller  YOB: 1965  MRN: [id number]    Date of Procedure: 2/24/2022    Pre-Op Diagnosis: Difficult Molina insertion    Post-Op Diagnosis: Urethral stricture       Procedure: Cystourethroscopy, urethral dilation, Molina insertion, rectal exam    Surgeon(s):  Star Fabry, MD Josepha Dunn, MD Johanna Gerold, MD    Assistant:  * No surgical staff found *    Anesthesia: General    Estimated Blood Loss (mL): Minimal    Complications: None    Specimens:   * No specimens in log *    Implants:  * No implants in log *      Drains:   Urethral Catheter 20 fr (Active)       Findings: Urethral stricture    Electronically signed by Luz Elena Cha MD on 2/24/2022 at 9:45 PM

## 2022-02-25 NOTE — PROGRESS NOTES
Notified patient's brothers Lalita Velazquez and Thedore Gowers of new room number.  All questions answered

## 2022-02-25 NOTE — CONSULTS
Critical Care Admit/Consult Note         Patient - Brenda Tsai   MRN -  [id number]   Acct # - [de-identified]   - 1965      Date of Admission -  2022  2:30 PM  Date of evaluation -  2022  0204/0204-A   Hospital Day - 2            ADMIT/CONSULT DETAILS     Reason for Admit/Consult   Colonic fistula    Consulting Tyler Pickering MD  Primary Care Physician - DO JOSE R Jackson   The patient is a 62 y.o. male with significant past medical history of CAD, HTN, HLD presenting with draining left posterior flank wound and abdominal pain. Patient states that he otherwise feels well. Patient is alert and oriented, intermittently confused but based on ED notes this seems to be his baseline. On arrival to ED he had a fistula over the left side. No surrounding erythema but significant fecal material was expressed. Patient is otherwise afebrile, normal white count and lactic. On CT imaging patient has dilated proximal colon and decompressed distal colon at there the inflammation of the sigmoid colon concerning for near obstruction. He had an exploratory laparotomy on  where large dense mass was found in descending colon invading retroperitoneum, he had right hemicolectomy, creation of fistula and end ileostomy. Admitted to ICU for further management.        Past Medical History         Diagnosis Date    CAD (coronary artery disease)     HTN (hypertension)     Hypercholesteremia     Hypertension     Sleep apnea         Past Surgical History           Procedure Laterality Date    ABDOMEN SURGERY Left 10/14/2021    ABDOMEN INCISION AND DRAINAGE performed by Ed Yi MD at [facility] N/A 2022    CYSTOSCOPY, urethral dilation, perry insertion performed by Ed Yi MD at [facility] N/A 2022    exploratory laparotomy, extended right hemicolectomy, creation of mucus fistula and end ileostomy performed by Urvashi Jones MD at [facility] OR         Current Medications   Current Medications    cefOXitin  1,000 mg IntraVENous 3 times per day    sodium chloride flush  10 mL IntraVENous Q12H    pantoprazole  40 mg IntraVENous Daily    And    sodium chloride (PF)  10 mL IntraVENous Daily    folic acid  1 mg Oral Daily    metoprolol succinate  25 mg Oral Daily    thiamine  100 mg Oral Daily     sodium chloride flush, HYDROmorphone, ondansetron, LORazepam, albuterol  IV Drips/Infusions   lidocaine Stopped (02/24/22 4263)    lactated ringers 125 mL/hr at 02/25/22 0033     Home Medications  Medications Prior to Admission: folic acid (FOLVITE) 1 MG tablet, Take 1 mg by mouth daily  melatonin 3 MG TABS tablet, Take 3 mg by mouth daily Take 2 tabs by mouth nightly for sleep  docusate sodium (COLACE) 100 MG capsule, Take 100 mg by mouth 2 times daily  albuterol sulfate HFA (VENTOLIN HFA) 108 (90 Base) MCG/ACT inhaler, Inhale 2 puffs into the lungs every 6 hours as needed for Wheezing  LORazepam (ATIVAN) 1 MG tablet, Take 1 mg by mouth every 6 hours as needed for Anxiety. aspirin 81 MG chewable tablet, Take 1 tablet by mouth daily  atorvastatin (LIPITOR) 40 MG tablet, Take 1 tablet by mouth nightly  metoprolol succinate (TOPROL XL) 25 MG extended release tablet, Take 1 tablet by mouth daily  thiamine mononitrate (THIAMINE) 100 MG tablet, Take 1 tablet by mouth daily  folic acid (FOLVITE) 1 MG tablet, Take 1 tablet by mouth daily  melatonin 3 MG TABS tablet, Take 2 tablets by mouth daily    Diet/Nutrition   Diet NPO Exceptions are: Sips of Water with Meds    Allergies   Patient has no known allergies. Social History   Tobacco   reports that he quit smoking about 21 years ago. He has a 6.00 pack-year smoking history. He has never used smokeless tobacco.    Alcohol     reports current alcohol use of about 5.0 standard drinks of alcohol per week. Occupational history :    Family History   History reviewed.  No pertinent family history. ROS     REVIEW OF SYSTEMS:  Constitutional: Negative for chills, fatigue, fever and unexpected weight change. HENT: Negative for nosebleeds, rhinorrhea and sneezing. Eyes: Negative for pain, redness and itching. Respiratory: Negative for cough, chest tightness, shortness of breath and wheezing. Cardiovascular: Negative for chest pain and leg swelling. Gastrointestinal: Negative for abdominal pain, distention, blood in stool, constipation, nausea and vomiting. Endocrine: Negative for polydipsia, polyphagia and polyuria. Genitourinary: Negative for difficulty urinating, dysuria and hematuria. Musculoskeletal: Negative for arthralgias, back pain and neck pain. Skin: Positive for wound. Negative for pallor and rash. Neurological: Negative for dizziness, weakness and headaches. Psychiatric/Behavioral: Negative for agitation, confusion and hallucinations. Lines and Devices    2 peripheral    Mechanical Ventilation Data   VENT SETTINGS (Comprehensive)  Vent Information  SpO2: 100 %  Additional Respiratory  Assessments  Pulse: 96  Resp: 15  SpO2: 100 %  Oral Care: Mouth swabbed    ABG  No results found for: PH, PCO2, PO2, HCO3, O2SAT  No results found for: IFIO2, MODE, SETTIDVOL, SETPEEP        Vitals    height is 6' 2\" (1.88 m) and weight is 193 lb 9 oz (87.8 kg). His oral temperature is 97.6 °F (36.4 °C). His blood pressure is 119/88 and his pulse is 96. His respiration is 15 and oxygen saturation is 100%.        Temperature Range: Temp: 97.6 °F (36.4 °C) Temp  Av °F (37.8 °C)  Min: 97.6 °F (36.4 °C)  Max: 100.4 °F (38 °C)  BP Range:  Systolic (07PPG), AEI:039 , Min:71 , ZQB:753     Diastolic (96ZFB), FQD:65, Min:50, Max:131    Pulse Range: Pulse  Av.5  Min: 93  Max: 115  Respiration Range: Resp  Av.5  Min: 15  Max: 26  Current Pulse Ox[de-identified]  SpO2: 100 %  24HR Pulse Ox Range:  SpO2  Av.2 %  Min: 94 %  Max: 100 %  Oxygen Amount and Delivery: O2 Flow Rate (L/min): 3 L/min      I/O (24 Hours)    Patient Vitals for the past 8 hrs:   BP Temp Temp src Pulse Resp SpO2 Height Weight   02/25/22 0600 119/88 -- -- 96 15 100 % -- --   02/25/22 0500 (!) 136/94 -- -- 102 24 99 % -- --   02/25/22 0400 (!) 149/102 97.6 °F (36.4 °C) Oral 99 26 99 % -- --   02/25/22 0300 (!) 141/94 -- -- 97 26 99 % -- --   02/25/22 0200 (!) 149/95 -- -- 100 18 99 % -- --   02/25/22 0100 (!) 140/85 -- -- 103 22 99 % -- --   02/25/22 0034 -- -- -- -- -- 98 % -- --   02/25/22 0018 (!) 140/85 98 °F (36.7 °C) Oral 102 17 99 % 6' 2\" (1.88 m) 193 lb 9 oz (87.8 kg)   02/24/22 2355 127/82 -- -- 105 20 97 % -- --   02/24/22 2335 (!) 156/96 -- -- 108 22 96 % -- --       Intake/Output Summary (Last 24 hours) at 2/25/2022 0721  Last data filed at 2/25/2022 0600  Gross per 24 hour   Intake 1700 ml   Output 1275 ml   Net 425 ml     I/O last 3 completed shifts: In: 1760 [P.O.:60; I.V.:1700]  Out: 3585 [Urine:1175; Blood:100]   Date 02/25/22 0000 - 02/25/22 2359   Shift 1465-4598 4286-6155 0696-1469 24 Hour Total   INTAKE   Shift Total(mL/kg)       OUTPUT   Urine(mL/kg/hr) 675   675   Shift Total(mL/kg) 675(7.7)   675(7.7)   Weight (kg) 87.8 87.8 87.8 87.8     Patient Vitals for the past 96 hrs (Last 3 readings):   Weight   02/25/22 0018 193 lb 9 oz (87.8 kg)   02/24/22 0232 215 lb (97.5 kg)   02/23/22 1944 215 lb (97.5 kg)         Drains/Tubes Outputs  Molina  Exam         PHYSICAL EXAM:  CONSTITUTIONAL:  awake, alert, cooperative, no apparent distress, and appears stated age  EYES:  Lids and lashes normal, pupils equal, round and reactive to light, extra ocular muscles intact, sclera clear, conjunctiva normal  ENT:  Normocephalic, without obvious abnormality, atraumatic, sinuses nontender on palpation, external ears without lesions, oral pharynx with moist mucus membranes, tonsils without erythema or exudates, gums normal and good dentition.   NECK:  Supple, symmetrical, trachea midline, no adenopathy, thyroid symmetric, not enlarged and no tenderness, skin normal  HEMATOLOGIC/LYMPHATICS:  no cervical lymphadenopathy  LUNGS:  No increased work of breathing, good air exchange, clear to auscultation bilaterally, no crackles or wheezing  CARDIOVASCULAR:  Normal apical impulse, regular rate and rhythm, normal S1 and S2, no S3 or S4, and no murmur noted  ABDOMEN: ileostomy in place, soft, non-distended, mildly tender to palpation, no masses palpated, no hepatosplenomegally  NEUROLOGIC:  Awake, alert, oriented to name, place and time. Data   Old records and images have been reviewed    Lab Results   CBC     Lab Results   Component Value Date    WBC 5.9 02/25/2022    RBC 5.41 02/25/2022    HGB 14.1 02/25/2022    HCT 42.9 02/25/2022     02/25/2022    MCV 79.3 02/25/2022    MCH 26.1 02/25/2022    MCHC 32.9 02/25/2022    RDW 13.6 02/25/2022    NRBC 0.9 05/07/2021    METASPCT 0.9 10/17/2021    LYMPHOPCT 6.3 02/23/2022    MONOPCT 11.0 02/23/2022    BASOPCT 0.4 02/23/2022    MONOSABS 1.22 02/23/2022    LYMPHSABS 0.70 02/23/2022    EOSABS 0.04 02/23/2022    BASOSABS 0.04 02/23/2022       BMP   Lab Results   Component Value Date     02/25/2022    K 4.1 02/25/2022    K 3.1 02/23/2022     02/25/2022    CO2 22 02/25/2022    BUN 18 02/25/2022    CREATININE 0.9 02/25/2022    GLUCOSE 192 02/25/2022    GLUCOSE 110 01/07/2011    CALCIUM 8.9 02/25/2022       LFTS  Lab Results   Component Value Date    ALKPHOS 91 02/25/2022    ALT 8 02/25/2022    AST 11 02/25/2022    PROT 7.1 02/25/2022    BILITOT 2.6 02/25/2022    LABALBU 3.6 02/25/2022    LABALBU 4.1 01/07/2011       INR  No results for input(s): PROTIME, INR in the last 72 hours. APTT  No results for input(s): APTT in the last 72 hours. Lactic Acid  Lab Results   Component Value Date    LACTA 1.2 02/25/2022    LACTA 1.0 02/24/2022    LACTA 1.4 02/01/2022        BNP   No results for input(s): BNP in the last 72 hours.      Cultures     Recent Labs     02/23/22  1506 BC 24 Hours no growth     Recent Labs     02/23/22  1615   BLOODCULT2 24 Hours no growth       No results for input(s): LABURIN in the last 72 hours. Radiology     CT Scans  Impression   1. Persistent mass in the distal descending colon and proximal sigmoid colon   now causing obstruction. 2. Colonic diverticulosis without evidence of diverticulitis. 3. Splenomegaly. 4. Left nonobstructive nephrolithiasis. 5. Possible fistula from the colonic mass into the subcutaneous fat of the   inferior left flank. SYSTEMS ASSESSMENT    Neuro   AAO x3  Pain- dilaudid  Anxiety- Ativan q6PRN    Respiratory   On RA  Keep O2 sat 90-92%  Albuterol q6PRN    Cardiovascular   HTN- [id number]    Gastrointestinal   s/p 2/24 Ex lap w/right hemicolectomy, end ileostomy and mucus fistula 2/2 large colon mass and colocutaneous fistula  NPO   Protonix ppx    Renal   Urethral stricture- sp cystourethroscopy, urethral dilation with metal Graciela dilators   ml/hr     Infectious Disease   Surgical ppx-Cefoxitin  Follow cultures    Hematology/Oncology   DVT ppx per surgery    Endocrine   Fasting Glucose 192- Continue to monitor    Social/Spiritual/DNR/Other   Full Code  Stable for downgrade    The patient was seen and evaluated by myself and Dr. Ronda Regalado MD PGY-1  2/25/2022  7:21 AM             I personally saw, examined and provided care for the patient. Radiographs, labs and medication list were reviewed by me independently. I spoke with bedside nursing, therapists and consultants. Critical care services and times documented are independent of procedures and multidisciplinary rounds with Residents. Additionally comprehensive, multidisciplinary rounds were conducted with the MICU team. The case was discussed in detail and plans for care were established. Review of Residents documentation was conducted and revisions were made as appropriate.  I agree with the above documented exam, problem list and plan of care with the following additions:    Admitted to the ICU after ex lap, extended R hemicolectomy, creation of mucus fistula, creation of end ileostomy  Hemodynamically stable today on no vasopressors  mentating well  IVF  May transfer.   Critical care will sign off    Kesha Breaux MD

## 2022-02-25 NOTE — OP NOTE
[address]                                OPERATIVE REPORT    PATIENT NAME: Mohit Smith                  :        1965  MED REC NO:   [id number]                            ROOM:       [id number]  ACCOUNT NO:   [de-identified]                           ADMIT DATE: 2022  PROVIDER:     Eduar Moody MD    DATE OF PROCEDURE:  2022    PREOPERATIVE DIAGNOSES:  Large bowel obstruction, abnormal CT,  colocutaneous fistula. POSTOPERATIVE DIAGNOSES:  Large bowel obstruction, ischemic right colon,  descending colon mass invading the retroperitoneum and abdominal  sidewall, colocutaneous fistula. PROCEDURES PERFORMED:  1. Exploratory laparotomy. 2.  Extended right hemicolectomy. 3.  Creation of mucous fistula. 4.  Creation of end ileostomy. SURGEON:  Eduar Moody MD.    ASSISTANT:  Dr. Laura Espino as there was no available resident to  assist.    ANESTHESIA:  General.    ESTIMATED BLOOD LOSS:  150 mL. COMPLICATIONS:  None. SPECIMENS:  Right colon and proximal transverse colon. FINDINGS:  Large bowel obstruction from a very large dense mass  involving the descending colon. The mass was encompassing almost the entire descending colon and invading the  Retroperitoneum, psoas, and sidewall of the abdomen. This mass had invaded  through the left flank with an obvious colocutaneous fistula draining  through the patient's left flank. This mass was severely adhered and  plastered to the retroperitoneum and sidewall, which made mobilization  of the left colon nearly impossible and unsafe to proceed. He had a  severely dilated right colon and transverse colon. The right colon was  ischemic with severe dilation and large serosal tears from overdistension.   Secondary to being unable to fully  dissect out the left colon secondary to its invasive nature, decision  was made to perform an extended right hemicolectomy with transverse  colon mucous fistula. The mucous fistula was created at the distal  transverse colon. We also created an end ileostomy. The liver felt  smooth without any evidence of metastatic disease. There was no  peritoneal implants noted. Urology did assist with cystoscopy and Molina  placement secondary to inability to place a Molina preoperatively. They  will dictate their operative note separately. DESCRIPTION OF PROCEDURE:  After risks, benefits, and alternatives were  explained to the patient, the patient was brought to the operating room  and placed in the supine position. Abdomen was prepped and draped in  the normal sterile fashion. Anesthesia was induced by the Anesthesia  Team.  The Molina was unable to be placed by the OR staff including  myself and Dr. Rozina Haas, so Urology was consulted, who came and did a  cysto with Molina placement. A time-out was performed prior to the  procedure to ensure correct patient, correct location, and correct  procedure and was agreed upon by the entire OR staff. A midline  laparotomy was created with a knife and Bovie cautery was utilized to  take the dissection down to the fascia. The abdomen was entered without  any complication. Immediately noted was a severely dilated right colon  that was ischemic in nature with multiple large serosal tears secondary  to severe dilation. Transverse colon was distended as well. The area  in question was the descending colon and sigmoid colon based on his CT  scans. Upon palpation, there was a very large firm mass that was  encompassing the entirety of the descending colon and the proximal  sigmoid colon. This mass was invading the retroperitoneum and psoas  muscle as well as the lateral sidewall of the abdomen. We know there is  a colocutaneous fistula secondary to output from his left flank.   This  mass was so severely distended and adhered and invaded into all  surrounding structures, dissection was nearly impossible. We explored  the rest of the abdomen noting the findings as noted above. Secondary  to the ischemic nature of the right colon, we decided to perform an  extended right hemicolectomy. The white line of Toldt was taken down  with a combination of electrocautery and blunt dissection. The hepatic  flexure was taken down in the same manner. The mesentery was taken with  a LigaSure device. The distal terminal ileum approximately 5 cm from  the ileocecal valve was identified and a mesenteric window was created  with a Bovie. BARBRA blue load stapler was inserted and the terminal ileum  was transected without complication. The mesentery was taken up to the  hepatic flexure with the LigaSure device paying careful attention to  protect the duodenum throughout the dissection. We attempted to do a  transverse loop colostomy, but secondary to the dilation of the colon,  this was not able to be completed. We did create a colotomy to  help decompress the colon after placing a purse-string suture, which was  closed after suctioning out the colon. The mid  transverse colon was ischemic appearing by this time, so we decided to  perform a mucous fistula with the distal transverse colon and include this mid transverse portion in our specimen. A  spot of healthy distal transverse colon was identified and mesenteric  window was created. A second load of a BARBRA blue load 75 stapler was  passed and the distal transverse colon was transected without  complication. The mesentery was then taken with the LigaSure device. The gastrocolic ligament was incised and the lesser sac was entered and  this was taken with the LigaSure device and ran to the hepatic flexure  as well. The colon was removed without complication and passed off as  specimen. We then irrigated the abdomen copiously. We felt the Molina  to be in place. We felt the NG tube to be in place.   The liver felt  smooth on palpation. After copious irrigation with warm saline, we  proceeded with closure. Prior to doing so, we identified our spots for  our mucous fistula and our end ileostomy. An Allis clamp was placed in  the left upper quadrant and a disc of skin was excised with the Bovie  cautery and dissected down to the fascia. The aperture was created  large enough to pass two fingers easily. The distal transverse colon  staple line was passed through this aperture without complication and  noted to be healthy with bleeding edges. We then identified a spot in  the right lower quadrant for end ileostomy. The same manner of  dissection was completed with an aperture large enough to pass two  fingers. The terminal ileum was passed through this opening with  sufficient length and noted to be healthy as well. Once we had our  ostomies where we would like them, we then proceeded with closure. The  abdomen was closed with 0 looped PDS in a bidirectional fashion tying in  the middle. The skin was closed with staples. Once the skin was  closed, it was covered with a dry sterile towel and we proceeded to  create our mucous fistula and end ileostomy. The staple line at the  distal transverse colon was removed with cautery and the mucous fistula  was created with interrupted 3-0 Vicryl. We then turned our attention  to the end ileostomy. The staple line was removed with electrocautery  and the end ileostomy was created in a standard Brooking fashion with  interrupted Vicryl sutures. Both ostomies had healthy bleeding edges  and were opened at the fascia on digital palpation. Once this was  completed, ostomy bags were placed over each ostomy. Prior to closing  the abdomen, a final sweep of the abdomen revealed no retained surgical  instrument or foreign body. Final instrument, sponge, and needle counts  were correct before and after the procedure. Molina catheter was left in  place as well as NG tube.   Again, Dr. Christean Hodgkin assisted in the case as  there was no resident available for assistance. The patient will be  going to the ICU to be monitored overnight.         Ghulam Pace MD    D: 02/24/2022 23:38:27       T: 02/25/2022 2:54:55     [email or web address]  Job#: [id number]     Doc#: [id number]    CC:

## 2022-02-26 LAB
ALBUMIN SERPL-MCNC: 3.2 G/DL (ref 3.5–5.2)
ALP BLD-CCNC: 86 U/L (ref 40–129)
ALT SERPL-CCNC: 9 U/L (ref 0–40)
ANION GAP SERPL CALCULATED.3IONS-SCNC: 11 MMOL/L (ref 7–16)
AST SERPL-CCNC: 14 U/L (ref 0–39)
BASOPHILS ABSOLUTE: 0.01 E9/L (ref 0–0.2)
BASOPHILS RELATIVE PERCENT: 0.2 % (ref 0–2)
BILIRUB SERPL-MCNC: 3.2 MG/DL (ref 0–1.2)
BUN BLDV-MCNC: 13 MG/DL (ref 6–20)
BURR CELLS: ABNORMAL
CALCIUM SERPL-MCNC: 8.8 MG/DL (ref 8.6–10.2)
CHLORIDE BLD-SCNC: 99 MMOL/L (ref 98–107)
CO2: 25 MMOL/L (ref 22–29)
CREAT SERPL-MCNC: 0.9 MG/DL (ref 0.7–1.2)
EOSINOPHILS ABSOLUTE: 0 E9/L (ref 0.05–0.5)
EOSINOPHILS RELATIVE PERCENT: 0 % (ref 0–6)
GFR AFRICAN AMERICAN: >60
GFR NON-AFRICAN AMERICAN: >60 ML/MIN/1.73
GLUCOSE BLD-MCNC: 149 MG/DL (ref 74–99)
HCT VFR BLD CALC: 39.2 % (ref 37–54)
HEMOGLOBIN: 12.8 G/DL (ref 12.5–16.5)
IMMATURE GRANULOCYTES #: 0.03 E9/L
IMMATURE GRANULOCYTES %: 0.6 % (ref 0–5)
LYMPHOCYTES ABSOLUTE: 0.44 E9/L (ref 1.5–4)
LYMPHOCYTES RELATIVE PERCENT: 8.6 % (ref 20–42)
MAGNESIUM: 1.9 MG/DL (ref 1.6–2.6)
MCH RBC QN AUTO: 26.3 PG (ref 26–35)
MCHC RBC AUTO-ENTMCNC: 32.7 % (ref 32–34.5)
MCV RBC AUTO: 80.7 FL (ref 80–99.9)
MONOCYTES ABSOLUTE: 0.96 E9/L (ref 0.1–0.95)
MONOCYTES RELATIVE PERCENT: 18.9 % (ref 2–12)
MRSA CULTURE ONLY: NORMAL
MRSA CULTURE ONLY: NORMAL
NEUTROPHILS ABSOLUTE: 3.65 E9/L (ref 1.8–7.3)
NEUTROPHILS RELATIVE PERCENT: 71.7 % (ref 43–80)
PDW BLD-RTO: 13.6 FL (ref 11.5–15)
PHOSPHORUS: 1.7 MG/DL (ref 2.5–4.5)
PLATELET # BLD: 184 E9/L (ref 130–450)
PMV BLD AUTO: 10.6 FL (ref 7–12)
POIKILOCYTES: ABNORMAL
POTASSIUM SERPL-SCNC: 3.5 MMOL/L (ref 3.5–5)
RBC # BLD: 4.86 E12/L (ref 3.8–5.8)
SODIUM BLD-SCNC: 135 MMOL/L (ref 132–146)
TOTAL PROTEIN: 6.9 G/DL (ref 6.4–8.3)
TOXIC GRANULATION: ABNORMAL
WBC # BLD: 5.1 E9/L (ref 4.5–11.5)

## 2022-02-26 PROCEDURE — 83735 ASSAY OF MAGNESIUM: CPT

## 2022-02-26 PROCEDURE — 80053 COMPREHEN METABOLIC PANEL: CPT

## 2022-02-26 PROCEDURE — 85025 COMPLETE CBC W/AUTO DIFF WBC: CPT

## 2022-02-26 PROCEDURE — 0D1B0Z4 BYPASS ILEUM TO CUTANEOUS, OPEN APPROACH: ICD-10-PCS | Performed by: SURGERY

## 2022-02-26 PROCEDURE — 6360000002 HC RX W HCPCS: Performed by: INTERNAL MEDICINE

## 2022-02-26 PROCEDURE — 1200000000 HC SEMI PRIVATE

## 2022-02-26 PROCEDURE — 84100 ASSAY OF PHOSPHORUS: CPT

## 2022-02-26 PROCEDURE — C9113 INJ PANTOPRAZOLE SODIUM, VIA: HCPCS | Performed by: INTERNAL MEDICINE

## 2022-02-26 PROCEDURE — 6370000000 HC RX 637 (ALT 250 FOR IP): Performed by: INTERNAL MEDICINE

## 2022-02-26 PROCEDURE — 36415 COLL VENOUS BLD VENIPUNCTURE: CPT

## 2022-02-26 PROCEDURE — 0DTF0ZZ RESECTION OF RIGHT LARGE INTESTINE, OPEN APPROACH: ICD-10-PCS | Performed by: SURGERY

## 2022-02-26 PROCEDURE — 2580000003 HC RX 258: Performed by: INTERNAL MEDICINE

## 2022-02-26 RX ORDER — LANOLIN ALCOHOL/MO/W.PET/CERES
3 CREAM (GRAM) TOPICAL ONCE
Status: COMPLETED | OUTPATIENT
Start: 2022-02-26 | End: 2022-03-01

## 2022-02-26 RX ADMIN — CEFOXITIN SODIUM 1000 MG: 1 POWDER, FOR SOLUTION INTRAVENOUS at 13:35

## 2022-02-26 RX ADMIN — FOLIC ACID 1 MG: 1 TABLET ORAL at 08:51

## 2022-02-26 RX ADMIN — METOPROLOL SUCCINATE 25 MG: 25 TABLET, FILM COATED, EXTENDED RELEASE ORAL at 08:51

## 2022-02-26 RX ADMIN — SODIUM CHLORIDE, PRESERVATIVE FREE 10 ML: 5 INJECTION INTRAVENOUS at 08:51

## 2022-02-26 RX ADMIN — Medication 100 MG: at 08:51

## 2022-02-26 RX ADMIN — CEFOXITIN SODIUM 1000 MG: 1 POWDER, FOR SOLUTION INTRAVENOUS at 20:43

## 2022-02-26 RX ADMIN — ONDANSETRON 4 MG: 2 INJECTION INTRAMUSCULAR; INTRAVENOUS at 13:06

## 2022-02-26 RX ADMIN — CEFOXITIN SODIUM 1000 MG: 1 POWDER, FOR SOLUTION INTRAVENOUS at 05:42

## 2022-02-26 RX ADMIN — SODIUM CHLORIDE, POTASSIUM CHLORIDE, SODIUM LACTATE AND CALCIUM CHLORIDE: 600; 310; 30; 20 INJECTION, SOLUTION INTRAVENOUS at 13:04

## 2022-02-26 RX ADMIN — HYDROMORPHONE HYDROCHLORIDE 0.5 MG: 1 INJECTION, SOLUTION INTRAMUSCULAR; INTRAVENOUS; SUBCUTANEOUS at 15:10

## 2022-02-26 RX ADMIN — Medication 10 ML: at 20:43

## 2022-02-26 RX ADMIN — Medication 10 ML: at 08:50

## 2022-02-26 RX ADMIN — PANTOPRAZOLE SODIUM 40 MG: 40 INJECTION, POWDER, FOR SOLUTION INTRAVENOUS at 08:51

## 2022-02-26 ASSESSMENT — PAIN SCALES - GENERAL
PAINLEVEL_OUTOF10: 0
PAINLEVEL_OUTOF10: 0
PAINLEVEL_OUTOF10: 5

## 2022-02-26 NOTE — PROGRESS NOTES
Department of General Surgery - Adult  Surgical Service   Attending Progress Note      SUBJECTIVE:      naeo  Tolerating diet  No complaints  Pain well controlled  afebrile    OBJECTIVE      Physical    VITALS:  /80   Pulse 105   Temp 99.9 °F (37.7 °C) (Oral)   Resp 16   Ht 6' 2\" (1.88 m)   Wt 205 lb 11.2 oz (93.3 kg)   SpO2 96%   BMI 26.41 kg/m²     General: No acute distress, AAOx3  Eyes: Extraocular movements intact, no scleral icterus  Respiratory: Normal work of breathing, no cyanosis  Cardiovascular: Normal capillary refill, 2+ radial pulse  Abdomen: Soft ileostomy healthy and viable with output, MF healthy with output, midline c/d/i  Skin: Normal skin turgor, no rashes  Extremities: No deformities, no contractures  Neurologic: No focal neurologic deficits, AAO x3    Data  CBC with Differential:    Lab Results   Component Value Date    WBC 5.1 02/26/2022    RBC 4.86 02/26/2022    HGB 12.8 02/26/2022    HCT 39.2 02/26/2022     02/26/2022    MCV 80.7 02/26/2022    MCH 26.3 02/26/2022    MCHC 32.7 02/26/2022    RDW 13.6 02/26/2022    NRBC 0.9 05/07/2021    METASPCT 0.9 10/17/2021    LYMPHOPCT 8.6 02/26/2022    MONOPCT 18.9 02/26/2022    BASOPCT 0.2 02/26/2022    MONOSABS 0.96 02/26/2022    LYMPHSABS 0.44 02/26/2022    EOSABS 0.00 02/26/2022    BASOSABS 0.01 02/26/2022     BMP:    Lab Results   Component Value Date     02/26/2022    K 3.5 02/26/2022    K 3.1 02/23/2022    CL 99 02/26/2022    CO2 25 02/26/2022    BUN 13 02/26/2022    LABALBU 3.2 02/26/2022    LABALBU 4.1 01/07/2011    CREATININE 0.9 02/26/2022    CALCIUM 8.8 02/26/2022    GFRAA >60 02/26/2022    LABGLOM >60 02/26/2022    GLUCOSE 149 02/26/2022    GLUCOSE 110 01/07/2011       ASSESSMENT AND PLAN    55-year-old male with large bowel obstruction status post extended right hemicolectomy with ileostomy and mucous fistula  -keep on CLD  -Continue pain medications as needed  -Ileostomy functioning healthy and viable we will continue to monitor at this time.    -will need scope via MF while inpatient for tissue diagnosis of this descending colon mass ; plan for early next week    NORMA Norman MD  Minimally Invasive General Surgery and Endoscopy  [address] [facility].  [address]: [phone]  F: [phone]   Electronically signed by Sherel Krabbe, MD on 2/26/2022 at 9:40 AM

## 2022-02-26 NOTE — PROGRESS NOTES
New order from Dr. Akira Perez for melatonin to help the patient sleep. Dr. Corey Simon is covering for Dr. Akira Perez this weekend. Page Dr. Corey Simon with any further issues.

## 2022-02-26 NOTE — PROGRESS NOTES
Went into patient's room to give one time melatonin dose that was ordered and the patient was fast asleep. Held the melatonin in [facility]

## 2022-02-27 LAB
ALBUMIN SERPL-MCNC: 2.8 G/DL (ref 3.5–5.2)
ALP BLD-CCNC: 93 U/L (ref 40–129)
ALT SERPL-CCNC: 10 U/L (ref 0–40)
ANION GAP SERPL CALCULATED.3IONS-SCNC: 14 MMOL/L (ref 7–16)
AST SERPL-CCNC: 14 U/L (ref 0–39)
ATYPICAL LYMPHOCYTE RELATIVE PERCENT: 1 % (ref 0–4)
BASOPHILS ABSOLUTE: 0 E9/L (ref 0–0.2)
BASOPHILS RELATIVE PERCENT: 0 % (ref 0–2)
BILIRUB SERPL-MCNC: 2.2 MG/DL (ref 0–1.2)
BUN BLDV-MCNC: 9 MG/DL (ref 6–20)
BURR CELLS: ABNORMAL
CALCIUM SERPL-MCNC: 8.8 MG/DL (ref 8.6–10.2)
CHLORIDE BLD-SCNC: 102 MMOL/L (ref 98–107)
CO2: 24 MMOL/L (ref 22–29)
CREAT SERPL-MCNC: 0.8 MG/DL (ref 0.7–1.2)
EOSINOPHILS ABSOLUTE: 0.09 E9/L (ref 0.05–0.5)
EOSINOPHILS RELATIVE PERCENT: 2 % (ref 0–6)
GFR AFRICAN AMERICAN: >60
GFR NON-AFRICAN AMERICAN: >60 ML/MIN/1.73
GLUCOSE BLD-MCNC: 124 MG/DL (ref 74–99)
HCT VFR BLD CALC: 40.2 % (ref 37–54)
HEMOGLOBIN: 11.9 G/DL (ref 12.5–16.5)
LYMPHOCYTES ABSOLUTE: 0.84 E9/L (ref 1.5–4)
LYMPHOCYTES RELATIVE PERCENT: 18 % (ref 20–42)
MAGNESIUM: 2 MG/DL (ref 1.6–2.6)
MCH RBC QN AUTO: 26.3 PG (ref 26–35)
MCHC RBC AUTO-ENTMCNC: 29.6 % (ref 32–34.5)
MCV RBC AUTO: 88.7 FL (ref 80–99.9)
METAMYELOCYTES RELATIVE PERCENT: 1 % (ref 0–1)
MONOCYTES ABSOLUTE: 0.31 E9/L (ref 0.1–0.95)
MONOCYTES RELATIVE PERCENT: 7 % (ref 2–12)
MYELOCYTE PERCENT: 1 % (ref 0–0)
NEUTROPHILS ABSOLUTE: 3.17 E9/L (ref 1.8–7.3)
NEUTROPHILS RELATIVE PERCENT: 70 % (ref 43–80)
PDW BLD-RTO: 13.6 FL (ref 11.5–15)
PHOSPHORUS: 1.8 MG/DL (ref 2.5–4.5)
PLATELET # BLD: 106 E9/L (ref 130–450)
PMV BLD AUTO: 12 FL (ref 7–12)
POIKILOCYTES: ABNORMAL
POTASSIUM SERPL-SCNC: 3.6 MMOL/L (ref 3.5–5)
RBC # BLD: 4.53 E12/L (ref 3.8–5.8)
SODIUM BLD-SCNC: 140 MMOL/L (ref 132–146)
TOTAL PROTEIN: 6.7 G/DL (ref 6.4–8.3)
WBC # BLD: 4.4 E9/L (ref 4.5–11.5)

## 2022-02-27 PROCEDURE — 84100 ASSAY OF PHOSPHORUS: CPT

## 2022-02-27 PROCEDURE — 6360000002 HC RX W HCPCS: Performed by: INTERNAL MEDICINE

## 2022-02-27 PROCEDURE — 80053 COMPREHEN METABOLIC PANEL: CPT

## 2022-02-27 PROCEDURE — 2580000003 HC RX 258: Performed by: INTERNAL MEDICINE

## 2022-02-27 PROCEDURE — 1200000000 HC SEMI PRIVATE

## 2022-02-27 PROCEDURE — 36415 COLL VENOUS BLD VENIPUNCTURE: CPT

## 2022-02-27 PROCEDURE — C9113 INJ PANTOPRAZOLE SODIUM, VIA: HCPCS | Performed by: INTERNAL MEDICINE

## 2022-02-27 PROCEDURE — 85025 COMPLETE CBC W/AUTO DIFF WBC: CPT

## 2022-02-27 PROCEDURE — 83735 ASSAY OF MAGNESIUM: CPT

## 2022-02-27 PROCEDURE — 6370000000 HC RX 637 (ALT 250 FOR IP): Performed by: INTERNAL MEDICINE

## 2022-02-27 RX ADMIN — SODIUM CHLORIDE, PRESERVATIVE FREE 10 ML: 5 INJECTION INTRAVENOUS at 07:52

## 2022-02-27 RX ADMIN — FOLIC ACID 1 MG: 1 TABLET ORAL at 07:52

## 2022-02-27 RX ADMIN — Medication 10 ML: at 07:52

## 2022-02-27 RX ADMIN — CEFOXITIN SODIUM 1000 MG: 1 POWDER, FOR SOLUTION INTRAVENOUS at 13:32

## 2022-02-27 RX ADMIN — Medication 100 MG: at 07:52

## 2022-02-27 RX ADMIN — HYDROMORPHONE HYDROCHLORIDE 0.5 MG: 1 INJECTION, SOLUTION INTRAMUSCULAR; INTRAVENOUS; SUBCUTANEOUS at 17:15

## 2022-02-27 RX ADMIN — METOPROLOL SUCCINATE 25 MG: 25 TABLET, FILM COATED, EXTENDED RELEASE ORAL at 07:52

## 2022-02-27 RX ADMIN — SODIUM CHLORIDE, POTASSIUM CHLORIDE, SODIUM LACTATE AND CALCIUM CHLORIDE: 600; 310; 30; 20 INJECTION, SOLUTION INTRAVENOUS at 10:41

## 2022-02-27 RX ADMIN — CEFOXITIN SODIUM 1000 MG: 1 POWDER, FOR SOLUTION INTRAVENOUS at 05:41

## 2022-02-27 RX ADMIN — CEFOXITIN SODIUM 1000 MG: 1 POWDER, FOR SOLUTION INTRAVENOUS at 21:33

## 2022-02-27 RX ADMIN — Medication 10 ML: at 20:24

## 2022-02-27 RX ADMIN — SODIUM CHLORIDE, POTASSIUM CHLORIDE, SODIUM LACTATE AND CALCIUM CHLORIDE: 600; 310; 30; 20 INJECTION, SOLUTION INTRAVENOUS at 19:21

## 2022-02-27 RX ADMIN — PANTOPRAZOLE SODIUM 40 MG: 40 INJECTION, POWDER, FOR SOLUTION INTRAVENOUS at 07:52

## 2022-02-27 ASSESSMENT — PAIN SCALES - GENERAL
PAINLEVEL_OUTOF10: 5
PAINLEVEL_OUTOF10: 0
PAINLEVEL_OUTOF10: 0

## 2022-02-27 NOTE — PROGRESS NOTES
Department of General Surgery - Adult  Surgical Service   Attending Progress Note      SUBJECTIVE:      naeo  Tolerating diet  No complaints  Pain well controlled  afebrile    OBJECTIVE      Physical    VITALS:  BP (!) 162/93   Pulse 87   Temp 97.7 °F (36.5 °C) (Oral)   Resp 14   Ht 6' 2\" (1.88 m)   Wt 237 lb 11.2 oz (107.8 kg)   SpO2 97%   BMI 30.52 kg/m²     General: No acute distress, AAOx3  Eyes: Extraocular movements intact, no scleral icterus  Respiratory: Normal work of breathing, no cyanosis  Cardiovascular: Normal capillary refill, 2+ radial pulse  Abdomen: Soft, ileostomy healthy, pink and viable with output but appliance is off leaking into the midline, MF healthy with output, midline c/d/i  Skin: Normal skin turgor, no rashes  Extremities: No deformities, no contractures  Neurologic: No focal neurologic deficits, AAO x3    Data  CBC with Differential:    Lab Results   Component Value Date    WBC 4.4 02/27/2022    RBC 4.53 02/27/2022    HGB 11.9 02/27/2022    HCT 40.2 02/27/2022     02/27/2022    MCV 88.7 02/27/2022    MCH 26.3 02/27/2022    MCHC 29.6 02/27/2022    RDW 13.6 02/27/2022    NRBC 0.9 05/07/2021    METASPCT 1.0 02/27/2022    LYMPHOPCT 18.0 02/27/2022    MONOPCT 7.0 02/27/2022    MYELOPCT 1.0 02/27/2022    BASOPCT 0.0 02/27/2022    MONOSABS 0.31 02/27/2022    LYMPHSABS 0.84 02/27/2022    EOSABS 0.09 02/27/2022    BASOSABS 0.00 02/27/2022     BMP:    Lab Results   Component Value Date     02/27/2022    K 3.6 02/27/2022    K 3.1 02/23/2022     02/27/2022    CO2 24 02/27/2022    BUN 9 02/27/2022    LABALBU 2.8 02/27/2022    LABALBU 4.1 01/07/2011    CREATININE 0.8 02/27/2022    CALCIUM 8.8 02/27/2022    GFRAA >60 02/27/2022    LABGLOM >60 02/27/2022    GLUCOSE 124 02/27/2022    GLUCOSE 110 01/07/2011       ASSESSMENT AND PLAN    78-year-old male with large bowel obstruction status post extended right hemicolectomy with ileostomy and mucous fistula  -advance to reg diet  -Continue pain medications as needed  -ileostomy appliance off, discussed with bedside RN  -will need scope via  while inpatient for tissue diagnosis of this descending colon mass ; plan for early next week    Lupillo Hay MD  Minimally Invasive General Surgery and Endoscopy  [address] [place] [address].  [address]: [phone]  F: [phone]     Electronically signed by Star Fabry, MD on 2/27/2022 at 10:44 AM

## 2022-02-27 NOTE — PROGRESS NOTES
GENERAL SURGERY  DAILY PROGRESS NOTE  2/28/2022    No chief complaint on file. Subjective:  Pt states he is feeling good. Still has the same amount of pain. Denies any nausea or vomiting. Objective:  BP (!) 155/90   Pulse 83   Temp 98.5 °F (36.9 °C) (Oral)   Resp 16   Ht 6' 2\" (1.88 m)   Wt 237 lb 11.2 oz (107.8 kg)   SpO2 95%   BMI 30.52 kg/m²     GENERAL:  Laying in bed, awake, alert, cooperative, no apparent distress  HEAD: Normocephalic, atraumatic  EYES: No sclera icterus, pupils equal  LUNGS:  No increased work of breathing on room air  CARDIOVASCULAR:  RR and normotensive  ABDOMEN:  Soft, non-tender, non-distended, ileostomy pink and patent with 1.3L output. Mucous fistula in LLQ pink and patent. EXTREMITIES: No edema or swelling  SKIN: Warm and dry    Assessment/Plan:  62 y.o. male with left flank mass/obstructing colon cancer s/p extended right hemicolectomy with ileostomy and mucous fistula 2/24    - Okay for diet as tolerated  - Will start fiber  - Pain control PRN  - Monitor ostomy output  - Will need scope via mucous fistula for tissue diagnosis of descending colon mass, timing TBD    Electronically signed by Saleem Dean MD on 2/28/2022 at 6:37 AM    Patient seen and examined at bedside agree with above. Plan for colonoscopy tomorrow to obtain tissue diagnosis for colon mass.     Shira Duarte MD

## 2022-02-27 NOTE — PLAN OF CARE
Problem: Infection:  Goal: Will remain free from infection  Description: Will remain free from infection  2/26/2022 2321 by Woody Ceballos RN  Outcome: Met This Shift  2/26/2022 1634 by Woody Shi RN  Outcome: Met This Shift     Problem: Safety:  Goal: Free from intentional harm  Description: Free from intentional harm  2/26/2022 2321 by Woody Ceballos RN  Outcome: Met This Shift  2/26/2022 1634 by Woody Shi RN  Outcome: Met This Shift     Problem: Safety:  Goal: Free from accidental physical injury  Description: Free from accidental physical injury  2/26/2022 2321 by Woody Ceballos RN  Outcome: Met This Shift  2/26/2022 1634 by Woody Shi RN  Outcome: Met This Shift

## 2022-02-28 LAB
ALBUMIN SERPL-MCNC: 2.8 G/DL (ref 3.5–5.2)
ALP BLD-CCNC: 116 U/L (ref 40–129)
ALT SERPL-CCNC: 10 U/L (ref 0–40)
ANION GAP SERPL CALCULATED.3IONS-SCNC: 9 MMOL/L (ref 7–16)
AST SERPL-CCNC: 13 U/L (ref 0–39)
BASOPHILS ABSOLUTE: 0.02 E9/L (ref 0–0.2)
BASOPHILS RELATIVE PERCENT: 0.4 % (ref 0–2)
BILIRUB SERPL-MCNC: 2 MG/DL (ref 0–1.2)
BLOOD CULTURE, ROUTINE: NORMAL
BUN BLDV-MCNC: 6 MG/DL (ref 6–20)
CALCIUM SERPL-MCNC: 8.5 MG/DL (ref 8.6–10.2)
CHLORIDE BLD-SCNC: 101 MMOL/L (ref 98–107)
CO2: 27 MMOL/L (ref 22–29)
CREAT SERPL-MCNC: 0.7 MG/DL (ref 0.7–1.2)
CULTURE, BLOOD 2: NORMAL
EOSINOPHILS ABSOLUTE: 0.18 E9/L (ref 0.05–0.5)
EOSINOPHILS RELATIVE PERCENT: 3.3 % (ref 0–6)
GFR AFRICAN AMERICAN: >60
GFR NON-AFRICAN AMERICAN: >60 ML/MIN/1.73
GLUCOSE BLD-MCNC: 120 MG/DL (ref 74–99)
HCT VFR BLD CALC: 35.3 % (ref 37–54)
HEMOGLOBIN: 11.3 G/DL (ref 12.5–16.5)
IMMATURE GRANULOCYTES #: 0.13 E9/L
IMMATURE GRANULOCYTES %: 2.4 % (ref 0–5)
LYMPHOCYTES ABSOLUTE: 0.5 E9/L (ref 1.5–4)
LYMPHOCYTES RELATIVE PERCENT: 9.1 % (ref 20–42)
MAGNESIUM: 2 MG/DL (ref 1.6–2.6)
MCH RBC QN AUTO: 25.7 PG (ref 26–35)
MCHC RBC AUTO-ENTMCNC: 32 % (ref 32–34.5)
MCV RBC AUTO: 80.4 FL (ref 80–99.9)
MONOCYTES ABSOLUTE: 0.6 E9/L (ref 0.1–0.95)
MONOCYTES RELATIVE PERCENT: 10.9 % (ref 2–12)
NEUTROPHILS ABSOLUTE: 4.09 E9/L (ref 1.8–7.3)
NEUTROPHILS RELATIVE PERCENT: 73.9 % (ref 43–80)
PDW BLD-RTO: 13 FL (ref 11.5–15)
PHOSPHORUS: 1.8 MG/DL (ref 2.5–4.5)
PLATELET # BLD: 146 E9/L (ref 130–450)
PMV BLD AUTO: 11.5 FL (ref 7–12)
POTASSIUM SERPL-SCNC: 3.2 MMOL/L (ref 3.5–5)
RBC # BLD: 4.39 E12/L (ref 3.8–5.8)
RBC # BLD: NORMAL 10*6/UL
SODIUM BLD-SCNC: 137 MMOL/L (ref 132–146)
TOTAL PROTEIN: 6.4 G/DL (ref 6.4–8.3)
WBC # BLD: 5.5 E9/L (ref 4.5–11.5)

## 2022-02-28 PROCEDURE — 6360000002 HC RX W HCPCS: Performed by: INTERNAL MEDICINE

## 2022-02-28 PROCEDURE — 97161 PT EVAL LOW COMPLEX 20 MIN: CPT

## 2022-02-28 PROCEDURE — 2580000003 HC RX 258: Performed by: INTERNAL MEDICINE

## 2022-02-28 PROCEDURE — C9113 INJ PANTOPRAZOLE SODIUM, VIA: HCPCS | Performed by: INTERNAL MEDICINE

## 2022-02-28 PROCEDURE — 6370000000 HC RX 637 (ALT 250 FOR IP): Performed by: INTERNAL MEDICINE

## 2022-02-28 PROCEDURE — 85025 COMPLETE CBC W/AUTO DIFF WBC: CPT

## 2022-02-28 PROCEDURE — 1200000000 HC SEMI PRIVATE

## 2022-02-28 PROCEDURE — 36415 COLL VENOUS BLD VENIPUNCTURE: CPT

## 2022-02-28 PROCEDURE — 83735 ASSAY OF MAGNESIUM: CPT

## 2022-02-28 PROCEDURE — 84100 ASSAY OF PHOSPHORUS: CPT

## 2022-02-28 PROCEDURE — 80053 COMPREHEN METABOLIC PANEL: CPT

## 2022-02-28 PROCEDURE — 6370000000 HC RX 637 (ALT 250 FOR IP): Performed by: STUDENT IN AN ORGANIZED HEALTH CARE EDUCATION/TRAINING PROGRAM

## 2022-02-28 RX ADMIN — METOPROLOL SUCCINATE 25 MG: 25 TABLET, FILM COATED, EXTENDED RELEASE ORAL at 09:13

## 2022-02-28 RX ADMIN — FOLIC ACID 1 MG: 1 TABLET ORAL at 09:13

## 2022-02-28 RX ADMIN — CEFOXITIN SODIUM 1000 MG: 1 POWDER, FOR SOLUTION INTRAVENOUS at 14:03

## 2022-02-28 RX ADMIN — ONDANSETRON 4 MG: 2 INJECTION INTRAMUSCULAR; INTRAVENOUS at 14:03

## 2022-02-28 RX ADMIN — HYDROMORPHONE HYDROCHLORIDE 0.5 MG: 1 INJECTION, SOLUTION INTRAMUSCULAR; INTRAVENOUS; SUBCUTANEOUS at 14:04

## 2022-02-28 RX ADMIN — POTASSIUM & SODIUM PHOSPHATES POWDER PACK 280-160-250 MG 250 MG: 280-160-250 PACK at 21:17

## 2022-02-28 RX ADMIN — Medication 100 MG: at 09:13

## 2022-02-28 RX ADMIN — CEFOXITIN SODIUM 1000 MG: 1 POWDER, FOR SOLUTION INTRAVENOUS at 06:58

## 2022-02-28 RX ADMIN — POTASSIUM & SODIUM PHOSPHATES POWDER PACK 280-160-250 MG 250 MG: 280-160-250 PACK at 14:04

## 2022-02-28 RX ADMIN — POTASSIUM & SODIUM PHOSPHATES POWDER PACK 280-160-250 MG 250 MG: 280-160-250 PACK at 18:18

## 2022-02-28 RX ADMIN — Medication 10 ML: at 21:45

## 2022-02-28 RX ADMIN — PSYLLIUM HUSK 1 PACKET: 3.4 GRANULE ORAL at 21:17

## 2022-02-28 RX ADMIN — CEFOXITIN SODIUM 1000 MG: 1 POWDER, FOR SOLUTION INTRAVENOUS at 21:17

## 2022-02-28 RX ADMIN — PSYLLIUM HUSK 1 PACKET: 3.4 GRANULE ORAL at 14:04

## 2022-02-28 RX ADMIN — POTASSIUM & SODIUM PHOSPHATES POWDER PACK 280-160-250 MG 250 MG: 280-160-250 PACK at 09:13

## 2022-02-28 RX ADMIN — PANTOPRAZOLE SODIUM 40 MG: 40 INJECTION, POWDER, FOR SOLUTION INTRAVENOUS at 09:20

## 2022-02-28 ASSESSMENT — PAIN SCALES - GENERAL
PAINLEVEL_OUTOF10: 1
PAINLEVEL_OUTOF10: 1
PAINLEVEL_OUTOF10: 6
PAINLEVEL_OUTOF10: 0

## 2022-02-28 NOTE — PLAN OF CARE
Problem: Infection:  Goal: Will remain free from infection  Description: Will remain free from infection  2/27/2022 2238 by Aldo Ramos RN  Outcome: Met This Shift  2/27/2022 1423 by Negar Landin RN  Outcome: Met This Shift     Problem: Safety:  Goal: Free from accidental physical injury  Description: Free from accidental physical injury  2/27/2022 2238 by Aldo Ramos RN  Outcome: Met This Shift  2/27/2022 1423 by Negar Landin RN  Outcome: Met This Shift     Problem: Safety:  Goal: Free from intentional harm  Description: Free from intentional harm  2/27/2022 2238 by Aldo Ramos RN  Outcome: Met This Shift  2/27/2022 1423 by Negar Landin RN  Outcome: Met This Shift

## 2022-02-28 NOTE — DISCHARGE SUMMARY
Physician Discharge Summary     Patient ID:  César Villarreal  [id number]  47 y.o.  1965    Admit date: 2/23/2022    Discharge date and time: 3/2/2022  3:41 PM     Admitting Physician: William Sexton MD     Admission Diagnoses: Entero-colonic fistula [K63.2]  Colonic pseudoobstruction [K59.81]  Colonic fistula [K63.2]    Discharge Diagnoses: Principal Problem:    Colonic fistula  Resolved Problems:    * No resolved hospital problems. *      Admission Condition: fair    Discharged Condition: stable    Indication for Admission: Left flank EC fistula and closed loop LBO    Hospital Course/Procedures/Operation/treatments:   324: 62year old male with left flank wound and colonic dilation concerning for LBO secondary to neoplastic process vs inflammatory. Admitted, okay for CLD, started IVFs. Plan for OR tomorrow. 2/25: Taken to OR for ex-lap extended right hemicolectomy with creation of end ileostomy and mucous fistula. Admitted to ICU post-op. Removed NG tube, started CLD. Ostomy functioning  2/26: Doing well, continue CLD, will need scope through mucous fistula, timing TBD.  2/27: Tolerating diet. Doing well. Advanced to regular diet. Will need scope, timing TBD.  2/28: Doing well. Same amount of pain that is controlled. Tolerating diet. Started fiber. Scope tomorrow. 3/1: Doing well. Cscope today showed stricture, but no mass. Restarted diet. 3/2: Doing well. Tolerating diet. Plan to DC today. Consults:   IP CONSULT TO GENERAL SURGERY  IP CONSULT TO FAMILY MEDICINE  IP CONSULT TO IV TEAM  IP CONSULT TO CRITICAL CARE    Significant Diagnostic Studies:   CT ABDOMEN PELVIS W IV CONTRAST Additional Contrast? None    Result Date: 2/23/2022  EXAMINATION: CT OF THE ABDOMEN AND PELVIS WITH CONTRAST 2/23/2022 4:34 pm TECHNIQUE: CT of the abdomen and pelvis was performed with the administration of intravenous contrast. Multiplanar reformatted images are provided for review.  Dose modulation, iterative reconstruction, and/or weight based adjustment of the mA/kV was utilized to reduce the radiation dose to as low as reasonably achievable. COMPARISON: CT abdomen and pelvis 02/01/2022 HISTORY: ORDERING SYSTEM PROVIDED HISTORY: abdominal discomfort, concern for left sided fistula TECHNOLOGIST PROVIDED HISTORY: Additional Contrast?->None Reason for exam:->abdominal discomfort, concern for left sided fistula Decision Support Exception - unselect if not a suspected or confirmed emergency medical condition->Emergency Medical Condition (MA) FINDINGS: There is a peripheral hypodensity in the right hepatic lobe which measures approximately 10 mm in size. This likely represents a benign, incidental finding. The liver is otherwise unremarkable in appearance. The spleen is enlarged, measuring approximately 14.5 cm in length. There is no evidence of focal splenic lesion. The pancreas and adrenal glands are unremarkable. There is left nonobstructive nephrolithiasis. There are left renal cysts. The right kidney is unremarkable in appearance. The gallbladder is intact without evidence of biliary ductal dilatation. There is marked fluid distention of the colon. The cecum measures approximately 11.2 cm in diameter. The ascending colon measures approximately 8.5 cm in diameter. The distal transverse colon measures approximately 7.0 cm in diameter. There is masslike appearance in the descending colon causing obstruction. This extends into the sigmoid colon. There is colonic diverticulosis without evidence of diverticulitis. There is fluid within the rectum. The rectosigmoid colon is relatively collapsed distal to the descending colon mass. The appendix is unremarkable in appearance. There is no significant small bowel distension. There is skin thickening with subcutaneous fat stranding and air in the subcutaneous fat of the inferior left flank.   There is also irregular soft tissue in the retroperitoneum with possible fistula arising from the colonic mass. There is linear atelectasis/scarring within the right lung base. There are degenerative changes about the osseous structures. There is trace pericardial effusion. 1. Persistent mass in the distal descending colon and proximal sigmoid colon now causing obstruction. 2. Colonic diverticulosis without evidence of diverticulitis. 3. Splenomegaly. 4. Left nonobstructive nephrolithiasis. 5. Possible fistula from the colonic mass into the subcutaneous fat of the inferior left flank. Discharge Exam:    GENERAL:  Laying in bed, awake, alert, cooperative, no apparent distress  HEAD: Normocephalic, atraumatic  EYES: No sclera icterus, pupils equal  LUNGS:  No increased work of breathing on room air  CARDIOVASCULAR:  RR and normotensive  ABDOMEN:  Soft, non-tender, non-distended, ileostomy pink and patent with stool in bag. Mucous fistula in LLQ pink and patent. EXTREMITIES: No edema or swelling  SKIN: Warm and dry    Disposition: long term care facility    In process/preliminary results:  Outstanding Order Results     No orders found from 1/25/2022 to 2/24/2022. Patient Instructions:   Discharge Medication List as of 3/2/2022 11:29 AM           Details   psyllium (KONSYL) 28.3 % PACK Take 1 packet by mouth 3 times daily, Disp-90 packet, R-0Normal      oxyCODONE (ROXICODONE) 5 MG immediate release tablet Take 1 tablet by mouth every 6 hours as needed for Pain for up to 3 days. Intended supply: 3 days.  Take lowest dose possible to manage pain, Disp-12 tablet, R-0Normal              Details   folic acid (FOLVITE) 1 MG tablet Take 1 mg by mouth dailyHistorical Med      melatonin 3 MG TABS tablet Take 3 mg by mouth daily Take 2 tabs by mouth nightly for sleepHistorical Med      docusate sodium (COLACE) 100 MG capsule Take 100 mg by mouth 2 times dailyHistorical Med      albuterol sulfate HFA (VENTOLIN HFA) 108 (90 Base) MCG/ACT inhaler Inhale 2 puffs into the lungs every 6 hours as needed for WheezingHistorical Med      LORazepam (ATIVAN) 1 MG tablet Take 1 mg by mouth every 6 hours as needed for Anxiety. Historical Med      aspirin 81 MG chewable tablet Take 1 tablet by mouth daily, Disp-30 tablet, R-3Normal      atorvastatin (LIPITOR) 40 MG tablet Take 1 tablet by mouth nightly, Disp-30 tablet, R-3Normal      metoprolol succinate (TOPROL XL) 25 MG extended release tablet Take 1 tablet by mouth daily, Disp-30 tablet, R-3Normal      thiamine mononitrate (THIAMINE) 100 MG tablet Take 1 tablet by mouth daily, Disp-90 tablet, R-0Normal           Follow up with [facility] (Dr. Iverson/Davie/Darrel) in 4-6 [phone]. Post op Abdominal Surgery Instructions    1. May shower daily with Dial soap and water. Pat incisions dry. No lotions, creams or powders. No tub baths/hot tubs or pools. 2. No lifting [id number]; no pushing/pulling. No driving until after seen by surgeon in the office. May go up and down a flight of stairs 1-2X daily. Walk at least 3x daily. No sexual activity until after seen in the office. 3.  Okay for Diet    4. Take all medications as listed on your Discharge Instruction sheet    5. Call Your Doctor If Any of the Following Occurs     · Signs of infection, including fever and chills   · Redness, swelling, increasing pain, excessive bleeding, or discharge at the incision site   · Cough, shortness of breath, chest pain   · Increased abdominal pain   · Nausea and/or vomiting that does not resolve off narcotics. · Pain, burning, urgency or frequency of urination, or blood in the urine   · Pain and/or swelling in your feet, calves, or legs   · Dark urine, light stools, or evidence of jaundice (yellowing of the skin or eyes). In case of an emergency,    CALL 911  immediately. 6. Follow up with Dr. Eric Holt in 2 weeks, please call his office upon discharge to schedule an appointment.      Follow up:   Gray Joseph MD  [address] [place] [address] [facility]    Schedule an appointment as soon as possible for a visit in 2 weeks  For wound re-check    [address] [phone]    Schedule an appointment as soon as possible for a visit  follow up    Nancy Vargas MD  [address] [address]  [place] [phone]    In 2 weeks         Signed:  Giuseppe Arrington MD  3/2/2022  3:55 PM

## 2022-02-28 NOTE — PROGRESS NOTES
Occupational Therapy  Date:2/28/2022  Patient Name: Sidney So  Room: 3865/7913-O     Occupational Therapy (OT) order received, patient's medical record reviewed, and OT evaluation attempted this morning with RN approval; patient declined to participate in [address] [place] [address] activities, despite provision of verbal encouragement and patient education on the importance of OOB activities. OT evaluation to be re-attempted at later time/date, as able/appropriate. Miriam Martel OTR/L  License Number: [id number]

## 2022-02-28 NOTE — PROGRESS NOTES
Consult new ileostomy, mucous fistula. Seen last week for ECF left lower back. Patient alert and oriented. Midline incision drainage purulent mauve collored drainage. Upper end and mid area. Per nurse surgery was notified  Ileostomy pouch intact. Pouch changed, stoma pink, small separation 7 o'clock. Peristomal skin intact. 2 piece flex pouch with high output applied with barrier ring. .200 loose green/brown  Left side mucous fistula pouch changed large amount mucous. Some brown. skin intact. Stoma red. Left lower back pouch removed. 2 open areas not. Not visible drainage at this time. Areas dressed with 4x4 s. Tape. Sacral buttocks intact. Cesar Cruz.  Tori Clifton, CNS, Wound Care

## 2022-02-28 NOTE — PROGRESS NOTES
Physician Progress Note      PATIENT:               Krystle Meeks  CSN #:                  [id number]  :                       1965  ADMIT DATE:       2022 2:30 PM  [address] [place] DATE:  RESPONDING  PROVIDER #:        Lola Newton MD          QUERY TEXT:    Patient admitted with abdominal pain. If possible, please document in progress   notes and discharge summary if you are evaluating and /or treating any of the   following: The medical record reflects the following:  Risk Factors: colon cancer  Clinical Indicators: per dietitian \". Greg Case Greg Case Severe malnutrition,In context of acute   illness or injury related to altered GI structure as evidenced by NPO or   clear liquid status due to medical condition,wounds,weight loss 7.5% in 3   months,poor intake prior to admission,mild loss of subcutaneous fat. Greg Case Greg Case \"  Treatment: high fiber diet as per surgery    ASPEN Criteria:    https://aspenjournals. onlinelibrary. estrada. com/doi/full/10.1177/179024073375527  5    Thank you,  Rivka Rodriguez RN, BSN, CDIS  Clinical Documentation Improvement  [email or web address]  Options provided:  -- Protein calorie malnutrition severe  -- Protein calorie malnutrition unspecified  -- Other - I will add my own diagnosis  -- Disagree - Not applicable / Not valid  -- Disagree - Clinically unable to determine / Unknown  -- Refer to Clinical Documentation Reviewer    PROVIDER RESPONSE TEXT:    Moderate protein calorie malnutrition    Query created by: James Richardson on 2022 11:40 AM      Electronically signed by:  Lola Newton MD 2022 11:45 AM

## 2022-02-28 NOTE — PROGRESS NOTES
Called to bedside to assess wound as it had some increased drainage. Upon assessment, there is no erythema or induration around the superior aspect of the incision. With significant manipulation, there is minimal serosanguinous output. No elevation in WBC at this time and afebrile. Continue local wound care and will continue to monitor for the need to open up superior aspect of incision.     Electronically signed by Zac Clark MD on 2/28/2022 at 10:06 AM

## 2022-02-28 NOTE — CARE COORDINATION
On IV cefoxin; s/p expl lap ileostomy 2/25; showing fecal leak from abd incision; wound care following. referral made to [facility]; will follow. Await surcal evaluation. Akbar Nelson.

## 2022-03-01 ENCOUNTER — ANESTHESIA EVENT (OUTPATIENT)
Dept: ENDOSCOPY | Age: 57
DRG: 230 | End: 2022-03-01
Payer: MEDICAID

## 2022-03-01 ENCOUNTER — ANESTHESIA (OUTPATIENT)
Dept: ENDOSCOPY | Age: 57
DRG: 230 | End: 2022-03-01
Payer: MEDICAID

## 2022-03-01 VITALS
DIASTOLIC BLOOD PRESSURE: 80 MMHG | SYSTOLIC BLOOD PRESSURE: 136 MMHG | RESPIRATION RATE: 73 BRPM | OXYGEN SATURATION: 100 %

## 2022-03-01 LAB
ALBUMIN SERPL-MCNC: 2.9 G/DL (ref 3.5–5.2)
ALP BLD-CCNC: 235 U/L (ref 40–129)
ALT SERPL-CCNC: 26 U/L (ref 0–40)
ANION GAP SERPL CALCULATED.3IONS-SCNC: 10 MMOL/L (ref 7–16)
AST SERPL-CCNC: 28 U/L (ref 0–39)
BASOPHILS ABSOLUTE: 0 E9/L (ref 0–0.2)
BASOPHILS RELATIVE PERCENT: 0 % (ref 0–2)
BILIRUB SERPL-MCNC: 1.7 MG/DL (ref 0–1.2)
BUN BLDV-MCNC: 5 MG/DL (ref 6–20)
CALCIUM SERPL-MCNC: 8.6 MG/DL (ref 8.6–10.2)
CHLORIDE BLD-SCNC: 102 MMOL/L (ref 98–107)
CO2: 27 MMOL/L (ref 22–29)
CREAT SERPL-MCNC: 0.7 MG/DL (ref 0.7–1.2)
EOSINOPHILS ABSOLUTE: 0.37 E9/L (ref 0.05–0.5)
EOSINOPHILS RELATIVE PERCENT: 5.2 % (ref 0–6)
GFR AFRICAN AMERICAN: >60
GFR NON-AFRICAN AMERICAN: >60 ML/MIN/1.73
GLUCOSE BLD-MCNC: 117 MG/DL (ref 74–99)
HCT VFR BLD CALC: 34.5 % (ref 37–54)
HEMOGLOBIN: 11.4 G/DL (ref 12.5–16.5)
LYMPHOCYTES ABSOLUTE: 0.28 E9/L (ref 1.5–4)
LYMPHOCYTES RELATIVE PERCENT: 3.5 % (ref 20–42)
MAGNESIUM: 1.7 MG/DL (ref 1.6–2.6)
MCH RBC QN AUTO: 26.1 PG (ref 26–35)
MCHC RBC AUTO-ENTMCNC: 33 % (ref 32–34.5)
MCV RBC AUTO: 78.9 FL (ref 80–99.9)
MONOCYTES ABSOLUTE: 0.64 E9/L (ref 0.1–0.95)
MONOCYTES RELATIVE PERCENT: 8.7 % (ref 2–12)
NEUTROPHILS ABSOLUTE: 5.89 E9/L (ref 1.8–7.3)
NEUTROPHILS RELATIVE PERCENT: 82.6 % (ref 43–80)
NUCLEATED RED BLOOD CELLS: 0.9 /100 WBC
PDW BLD-RTO: 13 FL (ref 11.5–15)
PHOSPHORUS: 2.3 MG/DL (ref 2.5–4.5)
PLATELET # BLD: 158 E9/L (ref 130–450)
PMV BLD AUTO: 10.4 FL (ref 7–12)
POTASSIUM SERPL-SCNC: 3.4 MMOL/L (ref 3.5–5)
RBC # BLD: 4.37 E12/L (ref 3.8–5.8)
SODIUM BLD-SCNC: 139 MMOL/L (ref 132–146)
TOTAL PROTEIN: 6.5 G/DL (ref 6.4–8.3)
WBC # BLD: 7.1 E9/L (ref 4.5–11.5)

## 2022-03-01 PROCEDURE — 6360000002 HC RX W HCPCS: Performed by: INTERNAL MEDICINE

## 2022-03-01 PROCEDURE — 3700000000 HC ANESTHESIA ATTENDED CARE: Performed by: STUDENT IN AN ORGANIZED HEALTH CARE EDUCATION/TRAINING PROGRAM

## 2022-03-01 PROCEDURE — 2580000003 HC RX 258: Performed by: INTERNAL MEDICINE

## 2022-03-01 PROCEDURE — 2709999900 HC NON-CHARGEABLE SUPPLY: Performed by: STUDENT IN AN ORGANIZED HEALTH CARE EDUCATION/TRAINING PROGRAM

## 2022-03-01 PROCEDURE — 6370000000 HC RX 637 (ALT 250 FOR IP): Performed by: INTERNAL MEDICINE

## 2022-03-01 PROCEDURE — 2580000003 HC RX 258: Performed by: NURSE ANESTHETIST, CERTIFIED REGISTERED

## 2022-03-01 PROCEDURE — 80053 COMPREHEN METABOLIC PANEL: CPT

## 2022-03-01 PROCEDURE — 97165 OT EVAL LOW COMPLEX 30 MIN: CPT

## 2022-03-01 PROCEDURE — 85025 COMPLETE CBC W/AUTO DIFF WBC: CPT

## 2022-03-01 PROCEDURE — 36415 COLL VENOUS BLD VENIPUNCTURE: CPT

## 2022-03-01 PROCEDURE — 3700000001 HC ADD 15 MINUTES (ANESTHESIA): Performed by: STUDENT IN AN ORGANIZED HEALTH CARE EDUCATION/TRAINING PROGRAM

## 2022-03-01 PROCEDURE — 7100000010 HC PHASE II RECOVERY - FIRST 15 MIN: Performed by: STUDENT IN AN ORGANIZED HEALTH CARE EDUCATION/TRAINING PROGRAM

## 2022-03-01 PROCEDURE — 0DJD8ZZ INSPECTION OF LOWER INTESTINAL TRACT, VIA NATURAL OR ARTIFICIAL OPENING ENDOSCOPIC: ICD-10-PCS | Performed by: STUDENT IN AN ORGANIZED HEALTH CARE EDUCATION/TRAINING PROGRAM

## 2022-03-01 PROCEDURE — 84100 ASSAY OF PHOSPHORUS: CPT

## 2022-03-01 PROCEDURE — C9113 INJ PANTOPRAZOLE SODIUM, VIA: HCPCS | Performed by: INTERNAL MEDICINE

## 2022-03-01 PROCEDURE — 83735 ASSAY OF MAGNESIUM: CPT

## 2022-03-01 PROCEDURE — 6370000000 HC RX 637 (ALT 250 FOR IP): Performed by: STUDENT IN AN ORGANIZED HEALTH CARE EDUCATION/TRAINING PROGRAM

## 2022-03-01 PROCEDURE — 7100000011 HC PHASE II RECOVERY - ADDTL 15 MIN: Performed by: STUDENT IN AN ORGANIZED HEALTH CARE EDUCATION/TRAINING PROGRAM

## 2022-03-01 PROCEDURE — 1200000000 HC SEMI PRIVATE

## 2022-03-01 PROCEDURE — 3609027000 HC COLONOSCOPY: Performed by: STUDENT IN AN ORGANIZED HEALTH CARE EDUCATION/TRAINING PROGRAM

## 2022-03-01 PROCEDURE — 6360000002 HC RX W HCPCS: Performed by: NURSE ANESTHETIST, CERTIFIED REGISTERED

## 2022-03-01 RX ORDER — SODIUM CHLORIDE 9 MG/ML
INJECTION, SOLUTION INTRAVENOUS CONTINUOUS PRN
Status: DISCONTINUED | OUTPATIENT
Start: 2022-03-01 | End: 2022-03-01 | Stop reason: SDUPTHER

## 2022-03-01 RX ORDER — PROPOFOL 10 MG/ML
INJECTION, EMULSION INTRAVENOUS PRN
Status: DISCONTINUED | OUTPATIENT
Start: 2022-03-01 | End: 2022-03-01 | Stop reason: SDUPTHER

## 2022-03-01 RX ADMIN — PSYLLIUM HUSK 1 PACKET: 3.4 GRANULE ORAL at 21:10

## 2022-03-01 RX ADMIN — POTASSIUM & SODIUM PHOSPHATES POWDER PACK 280-160-250 MG 500 MG: 280-160-250 PACK at 21:10

## 2022-03-01 RX ADMIN — Medication 10 ML: at 21:11

## 2022-03-01 RX ADMIN — POTASSIUM & SODIUM PHOSPHATES POWDER PACK 280-160-250 MG 500 MG: 280-160-250 PACK at 18:26

## 2022-03-01 RX ADMIN — CEFOXITIN SODIUM 1000 MG: 1 POWDER, FOR SOLUTION INTRAVENOUS at 21:10

## 2022-03-01 RX ADMIN — PROPOFOL 400 MG: 10 INJECTION, EMULSION INTRAVENOUS at 15:57

## 2022-03-01 RX ADMIN — POTASSIUM & SODIUM PHOSPHATES POWDER PACK 280-160-250 MG 500 MG: 280-160-250 PACK at 13:00

## 2022-03-01 RX ADMIN — SODIUM CHLORIDE, PRESERVATIVE FREE 10 ML: 5 INJECTION INTRAVENOUS at 10:57

## 2022-03-01 RX ADMIN — CEFOXITIN SODIUM 1000 MG: 1 POWDER, FOR SOLUTION INTRAVENOUS at 06:42

## 2022-03-01 RX ADMIN — POTASSIUM & SODIUM PHOSPHATES POWDER PACK 280-160-250 MG 500 MG: 280-160-250 PACK at 09:00

## 2022-03-01 RX ADMIN — CEFOXITIN SODIUM 1000 MG: 1 POWDER, FOR SOLUTION INTRAVENOUS at 14:56

## 2022-03-01 RX ADMIN — SODIUM CHLORIDE: 9 INJECTION, SOLUTION INTRAVENOUS at 15:55

## 2022-03-01 RX ADMIN — Medication 3 MG: at 01:07

## 2022-03-01 RX ADMIN — METOPROLOL SUCCINATE 25 MG: 25 TABLET, FILM COATED, EXTENDED RELEASE ORAL at 09:00

## 2022-03-01 RX ADMIN — PANTOPRAZOLE SODIUM 40 MG: 40 INJECTION, POWDER, FOR SOLUTION INTRAVENOUS at 10:57

## 2022-03-01 RX ADMIN — Medication 10 ML: at 09:00

## 2022-03-01 ASSESSMENT — LIFESTYLE VARIABLES: SMOKING_STATUS: 0

## 2022-03-01 ASSESSMENT — PAIN SCALES - GENERAL
PAINLEVEL_OUTOF10: 0

## 2022-03-01 NOTE — PROGRESS NOTES
[facility]  PROGRESS NOTE    Chief Complaint:   Difficult Perry insertion/Left renal calculus/Left renal cyst    HPI:   He is feeling better. His pain is controlled. He is to have a colonoscopy today. He denies any catheter discomfort. He claims he typically voids well    Vitals:    22 0815   BP: (!) 163/92   Pulse: 72   Resp: 16   Temp: 98.5 °F (36.9 °C)   SpO2: 95%       Allergies: Patient has no known allergies. PAST MEDICAL HISTORY:   Past Medical History:   Diagnosis Date    CAD (coronary artery disease)     HTN (hypertension)     Hypercholesteremia     Hypertension     Sleep apnea    Urethral stricture    PAST SURGICAL HISTORY:   Past Surgical History:   Procedure Laterality Date    ABDOMEN SURGERY Left 10/14/2021    ABDOMEN INCISION AND DRAINAGE performed by Carolee Carmen MD at [address] N/A 2022    CYSTOSCOPY, urethral dilation, perry insertion performed by Carolee Carmen MD at [facility] N/A 2022    exploratory laparotomy, extended right hemicolectomy, creation of mucus fistula and end ileostomy performed by Carolee Carmen MD at [address] [place] [address] W:  History reviewed. No pertinent family history. PAST SOCIAL HISTORY:    Social History     Socioeconomic History    Marital status: Legally      Spouse name: None    Number of children: None    Years of education: None    Highest education level: None   Occupational History    None   Tobacco Use    Smoking status: Former Smoker     Packs/day: 1.00     Years: 6.00     Pack years: 6.00     Quit date:      Years since quittin.1    Smokeless tobacco: Never Used   Vaping Use    Vaping Use: Never used   Substance and Sexual Activity    Alcohol use:  Yes     Alcohol/week: 5.0 standard drinks     Types: 5 Cans of beer per week    Drug use: Not Currently    Sexual activity: Not Currently   Other Topics Concern    None   Social History Narrative    None Social Determinants of Health     Financial Resource Strain:     Difficulty of Paying Living Expenses: Not on file   Food Insecurity:     Worried About Running Out of Food in the Last Year: Not on file    Donta of Food in the Last Year: Not on file   Transportation Needs:     Lack of Transportation (Medical): Not on file    Lack of Transportation (Non-Medical):  Not on file   Physical Activity:     Days of Exercise per Week: Not on file    Minutes of Exercise per Session: Not on file   Stress:     Feeling of Stress : Not on file   Social Connections:     Frequency of Communication with Friends and Family: Not on file    Frequency of Social Gatherings with Friends and Family: Not on file    Attends Temple Services: Not on file    Active Member of Clubs or Organizations: Not on file    Attends Club or Organization Meetings: Not on file    Marital Status: Not on file   Intimate Partner Violence:     Fear of Current or Ex-Partner: Not on file    Emotionally Abused: Not on file    Physically Abused: Not on file    Sexually Abused: Not on file   Housing Stability:     Unable to Pay for Housing in the Last Year: Not on file    Number of Places Lived in the Last Year: Not on file    Unstable Housing in the Last Year: Not on file       IV:     PRN: sodium chloride flush, HYDROmorphone, ondansetron, LORazepam, albuterol    Scheduled:    potassium & sodium phosphates  2 packet Oral 4x Daily    psyllium  1 packet Oral TID    cefOXitin  1,000 mg IntraVENous 3 times per day    sodium chloride flush  10 mL IntraVENous Q12H    pantoprazole  40 mg IntraVENous Daily    And    sodium chloride (PF)  10 mL IntraVENous Daily    folic acid  1 mg Oral Daily    metoprolol succinate  25 mg Oral Daily    thiamine  100 mg Oral Daily       Lab Results   Component Value Date     03/01/2022    K 3.4 03/01/2022    K 3.1 02/23/2022    BUN 5 03/01/2022    CREATININE 0.7 03/01/2022        Lab Results Component Value Date    HGB 11.4 03/01/2022    HCT 34.5 03/01/2022       Lab Results   Component Value Date    PSA 0.53 10/15/2021       Review Of Systems:  Constitutional: Sore  Eyes: negative  Ears, nose, mouth, throat, and face: negative  Respiratory: Sleep apnea  Cardiovascular: Hypertension  Gastrointestinal: Colon obstruction  Genitourinary: Urethral stricture  Musculoskeletal: negative  Neurological: negative  Behavioral/Psych: negative  Endocrine: negative    Physical Exam:  Skin is dry, and without rashes  Respirations are non-labored, intact  Abdomen is soft, non-tender, non-distended. Active bowel sounds are present. No rebound or guarding. Right lower quadrant mucous fistula in left lower quadrant end ileostomy are pink and viable  Alert and oriented x 3. No focal motor/sensory deficits  Perry catheter is draining clear, yellow urine    Assessment and Plan:  POD#5--S/P Cysto/urethral dilation/perry insertion  -CT scan on 2/1/2022 with extensive bowel wall thickening and dilation of the left colon, splenomegaly, bibasilar atelectasis, and a tiny nonobstructive left renal stone which will be managed nonoperatively  -Continue the Perry upon discharge. He will be given a voiding trial at [address] [place] [address] or [facility] whenever he is fully ambulatory and medically stable  -Stable for discharge from urology standpoint.   He will need follow-up in 4 to 6 weeks    Luz Elena Cha MD  3/1/2022  3:37 PM

## 2022-03-01 NOTE — BRIEF OP NOTE
Brief Postoperative Note      Patient: Ed Vick  YOB: 1965  MRN: [id number]    Date of Procedure: 3/1/2022    Pre-Op Diagnosis: LBO, Colon mass    Post-Op Diagnosis: Descending colon stricture       Procedure(s):  COLONOSCOPY DIAGNOSTIC    Surgeon(s):  Ruma Carver MD    Assistant:  * No surgical staff found *    Anesthesia: Monitor Anesthesia Care    Estimated Blood Loss (mL): Minimal    Complications: None    Specimens:   * No specimens in log *    Implants:  * No implants in log *      Drains:   Ileostomy Ileostomy RLQ (Active)   Stomal Appliance 1 piece 02/27/22 2219   Stoma  Assessment Red;Moist 03/01/22 0900   Mucocutaneous Junction Intact 03/01/22 0900   Treatment Bag change 02/26/22 1534   Stool Color Brown 03/01/22 0900   Stool Appearance Watery 03/01/22 0900   Stool Amount Large 02/27/22 1350   Output (mL) 400 ml 02/28/22 0900       Urethral Catheter 20 fr (Active)   Catheter Indications Urinary retention (acute or chronic), continuous bladder irrigation or bladder outlet obstruction 03/01/22 0900   Site Assessment No urethral drainage 03/01/22 0900   Urine Color Dianne;Tea 03/01/22 0900   Urine Appearance Clear 03/01/22 0900   Urine Odor Malodorous 03/01/22 0900   Output (mL) 450 mL 03/01/22 0049       Mucous Fistula LLQ (Active)   Stomal Appliance 1 piece 02/27/22 2219   Stoma  Assessment Red;Moist 02/25/22 1630   Mucocutaneous Junction Intact 02/26/22 0443   Stool Appearance Bloody 02/27/22 2219   Stool Color Brown 02/27/22 2219   Stool Amount Small 02/27/22 1813   Output (mL) 100 ml 03/01/22 1614       [REMOVED] NG/OG/NJ/NE Tube Nasogastric 16 fr Right nostril (Removed)   Surrounding Skin Dry; Intact 02/25/22 0100   Securement device Yes 02/25/22 0100   Status Suction-low intermittent 02/25/22 0100   Placement Verified by Gastric Contents 02/25/22 0100   NG/OG/NJ/NE External Measurement (cm) 65 cm 02/25/22 0100   Drainage Appearance Brown 02/25/22 0100   Output (mL) 0 ml 02/25/22 1421       [REMOVED] Colostomy LLQ (Removed)   Stomal Appliance 1 piece; Intact;Dry 02/25/22 0018   Stoma  Assessment Bleeding;Retracted 02/25/22 0018   Peristomal Assessment Intact 02/25/22 1600   Stool Color Red 02/25/22 0018   Output (mL) 150 ml 02/25/22 1400       [REMOVED] Colostomy RLQ (Removed)   Stomal Appliance 1 piece;Dry; Intact 02/25/22 0018   Stoma  Assessment Bleeding 02/25/22 0018   Peristomal Assessment Red 02/25/22 0018   Output (mL) 10 ml 02/25/22 1400       Findings: No mucosal abnormalities identified. Scope above and below stricture from mucous fistula and anus. Smooth stricture-like narrowing noted that was very difficult to be able to be traversed however it was traversed.   No evidence of malignancy    Nuzhat Almanza MD      Electronically signed by Nuzhat Almanza MD on 3/1/2022 at 4:22 PM

## 2022-03-01 NOTE — CARE COORDINATION
S/p expl lap /ileostomy not a bed hold; need covid day of d/c if returning to [facility]; referral made to Paul Downs; advised to initiate precert today. Iv atb's /wound care for c-scope today. Chiara Romero.

## 2022-03-01 NOTE — PROGRESS NOTES
GENERAL SURGERY  DAILY PROGRESS NOTE  3/1/2022    Subjective:  No new complaints or overnight events. Still has the same amount of pain. Denies any nausea or vomiting. Consent obtained by patient's brother for colonoscopy. The patient also agrees    Objective:  /78   Pulse 80   Temp 98.6 °F (37 °C) (Oral)   Resp 14   Ht 6' 2\" (1.88 m)   Wt 238 lb 9.6 oz (108.2 kg)   SpO2 97%   BMI 30.63 kg/m²     GENERAL:  Laying in bed, awake, alert, cooperative, no apparent distress  HEAD: Normocephalic, atraumatic  EYES: No sclera icterus, pupils equal  LUNGS:  No increased work of breathing on room air  CARDIOVASCULAR:  RR and normotensive  ABDOMEN:  Soft, non-tender, non-distended, ileostomy pink and patent. Mucous fistula in LLQ pink and patent. EXTREMITIES: No edema or swelling  SKIN: Warm and dry    Assessment/Plan:  62 y.o. male with left flank mass/obstructing colon cancer s/p extended right hemicolectomy with ileostomy and mucous fistula 2/24    - colonoscopy today  - NPO for procedure  - Pain control PRN  - Monitor ostomy output    Electronically signed by Melissa Velez DO on 3/1/2022 at 7:27 AM    Patient seen and examined at bedside. Agree with above.      Zach Novoa MD

## 2022-03-01 NOTE — PROGRESS NOTES
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 Exploratory laparotomy. 2.  Extended right hemicolectomy. 3.  Creation of mucous fistula. 4.  Creation of end ileostomy. Pertinent Medical History: CAD, sleep apnea, HTN     Precautions: fall risk, new ileostomy, bed alarm, skin integrity    Assessment of Current Deficits:    [x] Functional mobility   [x]ADLs  [x] Strength               [x]Cognition   [x] Functional transfers   [x] IADLs         [x] Safety Awareness   [x]Endurance   [] Fine Coordination              [x] Balance      [] Vision/perception   [x]Sensation    []Gross Motor Coordination  [] ROM  [] Delirium                   [] Motor Control     OT PLAN OF CARE   OT POC is based on physician orders, patient diagnosis, and results of clinical assessment.   Frequency/Duration 2-5 days/week for 2 weeks PRN   Specific OT Treatment Interventions to Include:   * Instruction/training on adapted ADL techniques and AE recommendations to increase functional independence within precautions       * Training on energy conservation strategies, correct breathing pattern and techniques to improve independence/tolerance for self-care routine  * Functional transfer/mobility training/DME recommendations for increased independence, safety, and fall prevention  * Patient/Family education to increase follow through with safety techniques and functional independence  * Recommendation of environmental modifications for increased safety with functional transfers/mobility and ADLs  * Therapeutic exercise to improve motor endurance, ROM, and functional strength for ADLs/functional transfers  * Therapeutic activities to facilitate/challenge dynamic balance, stand tolerance for increased safety and independence with ADLs  * Neuro-muscular re-education: facilitation of righting/equilibrium reactions, midline orientation, scapular stability/mobility, normalization of muscle tone, and facilitation of volitional active controled movement  * Positioning to improve skin integrity, interaction with environment and functional independence    Recommended Adaptive Equipment: TBD    Home Living: Patient admitted from [facility]. Prior Level of Function (PLOF): Patient reported he was independent with ADLs and functional transfers/mobilty prior to this hospitalization. Patient is a questionable historian; no family/caregiver present to verify information gathered. Pain Level: Patient reported experiencing abdominal pain with bed mobility and static sitting at EOB, but did not rate his pain. Cognition: Patient alert and oriented to person and grossly to place; confusion demonstrated. WFL command follow demonstrated. Decreased insight to current abilities/limitations demonstrated. Much encouragement needed to maximize patient's willingness to participate in EOB/OOB activities. Memory: Impaired  Sequencing: Impaired  Problem Solving: Impaired  Judgement/Safety: Impaired    Functional Assessment:  AM-PAC Daily Activity Raw Score: 14/24   Initial Eval Status  Date: 3/1/2022 Treatment Status  Date:  Short Term Goals = Long Term Goals   Feeding Patient currently NPO for procedure.   Independent  (as appropriate)   Grooming SBA  Mod I  (seated/standing at sink)   UB Dressing Min A  Setup   LB Dressing Max A  Min A - with use of AE, as needed/appropriate   Bathing Max A  Min A - with use of AE/DME, as needed/appropriate   Toileting Dependent  Patient with new ileostomy and perry catheter currently. Mod A   Bed Mobility  Supine-to-Sit: Min A  Sit-to-Supine: SBA   Independent in order to maximize patient's independence with ADLs, re-positioning, and other functional tasks. Functional Transfers Patient declined to attempt sit-to-stand secondary to abdominal pain. Supervision   Functional Mobility Not assessed. Supervision with functional mobility (with device, as needed/appropriate) in order to maximize independence with ADLs/IADLs and other functional tasks. Balance Sitting: Good-  (at EOB)  Good dynamic standing balance during completion of ADLs/IADLs and other functional tasks. Activity Tolerance Limited secondary to abdominal pain. Patient will demonstrate Good understanding and consistent implementation of energy conservation techniques and work simplification techniques into ADL/IADL routines. Visual/  Perceptual WFL     N/A     Additional Long-Term Goal: Patient will increase functional independence to PLOF in order to allow patient to live in least restrictive environment. Strength: ROM: Additional Information:    R UE  WFL WFL    L UE WFL WFL      Hearing: WFL  Sensation: No complaints of numbness/tingling in B UEs. Tone: WFL  Edema: No    Comments: RN approved patient's participation in [address] [place] [address] activities. Upon arrival, patient supine in bed. At end of session, patient supine in bed with call light and phone within reach, bed alarm activated, and all lines and tubes intact. Patient would benefit from continued skilled OT to increase safety and independence with completion of ADL/IADL tasks for functional independence and quality of life. Rehab Potential: Good for established goals. Patient / Family Goal: Patient wants to be able to return to his PLOF. Patient and/or family were instructed on functional diagnosis, prognosis/goals, and OT plan of care.  Demonstrated limited understanding. Eval Complexity: Low    Time In: 1400  Time Out: 1420  Total Treatment Time: 0 minutes      Minutes Units   OT Eval Low 86174 20 1   OT Eval Medium 15540     OT Eval High 81490     OT Re-Eval [id number]     Therapeutic Ex 47075     Therapeutic Activities 84149     ADL/Self Care 61613     Orthotic Management 36176     Neuro Re-Ed 05848     Non-Billable Time N/A ---     Evaluation time includes thorough review of current medical information, gathering information on past medical history/social history and prior level of function, completion of standardized testing/informal observation of tasks, assessment of data, and education on plan of care and goals. Miriam Hope, OTR/L  License Number: [id number]

## 2022-03-01 NOTE — OP NOTE
Operative Note      Patient: Lulu Gordon  YOB: 1965  MRN: [id number]    Date of Procedure: 3/1/2022    Pre-Op Diagnosis: LB, colon mass    Post-Op Diagnosis: descending/sigmoid colon stricture       Procedure(s):  COLONOSCOPY DIAGNOSTIC    Surgeon(s):  Edwin Fall MD    Assistant:   * No surgical staff found *    Anesthesia: Monitor Anesthesia Care    Estimated Blood Loss (mL): Minimal    Complications: None    Specimens:   * No specimens in log *    Implants:  * No implants in log *      Drains:   Ileostomy Ileostomy RLQ (Active)   Stomal Appliance 1 piece 02/27/22 2219   Stoma  Assessment Red;Moist 03/01/22 0900   Mucocutaneous Junction Intact 03/01/22 0900   Treatment Bag change 02/26/22 1534   Stool Color Brown 03/01/22 0900   Stool Appearance Watery 03/01/22 0900   Stool Amount Large 02/27/22 1350   Output (mL) 400 ml 02/28/22 0900       Urethral Catheter 20 fr (Active)   Catheter Indications Urinary retention (acute or chronic), continuous bladder irrigation or bladder outlet obstruction 03/01/22 0900   Site Assessment No urethral drainage 03/01/22 0900   Urine Color Dianne;Tea 03/01/22 0900   Urine Appearance Clear 03/01/22 0900   Urine Odor Malodorous 03/01/22 0900   Output (mL) 450 mL 03/01/22 0049       Mucous Fistula LLQ (Active)   Stomal Appliance 1 piece 02/27/22 2219   Stoma  Assessment Red;Moist 02/25/22 1630   Mucocutaneous Junction Intact 02/26/22 0443   Stool Appearance Bloody 02/27/22 2219   Stool Color Brown 02/27/22 2219   Stool Amount Small 02/27/22 1813   Output (mL) 100 ml 03/01/22 1614       [REMOVED] NG/OG/NJ/NE Tube Nasogastric 16 fr Right nostril (Removed)   Surrounding Skin Dry; Intact 02/25/22 0100   Securement device Yes 02/25/22 0100   Status Suction-low intermittent 02/25/22 0100   Placement Verified by Gastric Contents 02/25/22 0100   NG/OG/NJ/NE External Measurement (cm) 65 cm 02/25/22 0100   Drainage Appearance Brown 02/25/22 0100   Output (mL) 0 ml 02/25/22 1421       [REMOVED] Colostomy LLQ (Removed)   Stomal Appliance 1 piece; Intact;Dry 02/25/22 0018   Stoma  Assessment Bleeding;Retracted 02/25/22 0018   Peristomal Assessment Intact 02/25/22 1600   Stool Color Red 02/25/22 0018   Output (mL) 150 ml 02/25/22 1400       [REMOVED] Colostomy RLQ (Removed)   Stomal Appliance 1 piece;Dry; Intact 02/25/22 0018   Stoma  Assessment Bleeding 02/25/22 0018   Peristomal Assessment Red 02/25/22 0018   Output (mL) 10 ml 02/25/22 1400       Findings: No mucosal abnormalities identified. Scope above and below stricture from mucous fistula and anus. Smooth stricture-like narrowing noted that was very difficult to be able to be traversed however it was traversed. No evidence of malignancy    Detailed Description of Procedure:   After risk-benefit alternative discussed the patient patient was taken to the endoscopy suite in the left lateral semi-Guzman position. Anesthesia was induced timeout conducted a well-lubricated colonoscope was inserted in the anus transverse to the rectum and sigmoid colon to the descending colon. There was noted to be very tight area that was unable to be traversed. No mucosal abnormalities were identified. Following this we repositioned the patient to supine and attempted the scope through the mucous fistula. We were able to negotiate the stricture noted at the descending sigmoid colon came out on the other end. No mucosal abnormalities were identified. Several diverticulosis were identified as well. Following this the scope was removed and patient was awakened from anesthesia.     Neelam Barrett MD      Electronically signed by Neelam Barrett MD on 3/1/2022 at 4:23 PM

## 2022-03-01 NOTE — ANESTHESIA POSTPROCEDURE EVALUATION
Department of Anesthesiology  Postprocedure Note    Patient: Marlene Lal  MRN: [id number]  YOB: 1965  Date of evaluation: 3/1/2022  Time:  4:24 PM     Procedure Summary     Date: 03/01/22 Room / Location: [address]    Anesthesia Start: 6675 Anesthesia Stop: 1624    Procedure: COLONOSCOPY DIAGNOSTIC (N/A ) Diagnosis: (/)    Surgeons: Raul Pollock MD Responsible Provider: Angelo Hoskins MD    Anesthesia Type: MAC ASA Status: 3          Anesthesia Type: MAC    Sophia Phase I: Sophia Score: 9    Sophia Phase II:      Last vitals: Reviewed and per EMR flowsheets.        Anesthesia Post Evaluation    Patient location during evaluation: PACU  Patient participation: complete - patient participated  Level of consciousness: awake  Airway patency: patent  Nausea & Vomiting: no nausea and no vomiting  Complications: no  Cardiovascular status: hemodynamically stable  Respiratory status: acceptable  Hydration status: euvolemic

## 2022-03-01 NOTE — PROGRESS NOTES
Comprehensive Nutrition Assessment    Type and Reason for Visit:  Reassess    Nutrition Recommendations/Plan: Initiated new ileostomy diet, unable to educate at this time. Will monitor for nutrition progression. Nutrition Assessment:  Pt remains at nutritional risk w/ minimal PO since adm, severe malnutrition, currently NPO pending Cscope for dx colon mass. Adm w/ LBO w/ colon mass s/p extended right hemicolectomy w/ ileostomy & mucous fistula 2/24. Will monitor for nutrition progression. Malnutrition Assessment:  Malnutrition Status:  Severe malnutrition    Context:  Acute Illness     Findings of the 6 clinical characteristics of malnutrition:  Energy Intake:  7 - 50% or less of estimated energy requirements for 5 or more days  Weight Loss:  7 - Greater than 7.5% over 3 months     Body Fat Loss:  1 - Mild body fat loss     Muscle Mass Loss:  No significant muscle mass loss    Fluid Accumulation:  No significant fluid accumulation     Strength:  Not Performed    Estimated Daily Nutrient Needs:  Energy (kcal):   (2305 arabella); Weight Used for Energy Requirements:  Current     Protein (g):  125-135 (1.4-1.6 g/kg);  Weight Used for Protein Requirements:  Current        Fluid (ml/day):  5776-4642; Method Used for Fluid Requirements:  1 ml/kcal      Nutrition Related Findings:  A&Ox2, present BS, round/tender abd, new ileostomy RLQ, Mucous fistula LLQ, -6.9L I/Os      Wounds:  Surgical Incision       Current Nutrition Therapies:    Diet NPO Exceptions are: Sips of Water with Meds    Anthropometric Measures:  · Height: 6' 2\" (188 cm)  · Current Body Weight: 193 lb 9 oz (87.8 kg) (2/25 CBW likely elevated)   · Admission Body Weight: 215 lb (97.5 kg) (2/23 first measured wt)    · Usual Body Weight: 240 lb (108.9 kg) (per EMR x 3 mo ago)     · Ideal Body Weight: 190 lbs; % Ideal Body Weight 101.9 %   · BMI: 24.8    · BMI Categories: Normal Weight (BMI 18.5-24.9) (Admit weight 2/25)       Nutrition Diagnosis:   · Severe malnutrition,In context of acute illness or injury related to altered GI structure as evidenced by weight loss 7.5% in 3 months,poor intake prior to admission,mild loss of subcutaneous fat    Nutrition Interventions:   Food and/or Nutrient Delivery:  Continue NPO (ADAT)  Nutrition Education/Counseling:  Education initiated (Pt asleep upon assessment, new ileostomy education not completed)   Coordination of Nutrition Care:  Continue to monitor while inpatient    Goals:  Nutrition progression       Nutrition Monitoring and Evaluation:   Behavioral-Environmental Outcomes:  None Identified   Food/Nutrient Intake Outcomes:  Diet Advancement/Tolerance  Physical Signs/Symptoms Outcomes:  Biochemical Data,Nutrition Focused Physical Findings,Skin,Weight,Chewing or Swallowing,GI Status,Fluid Status or Edema     Discharge Planning:     Too soon to determine     Electronically signed by Gabby Arango, MS, RD, LD on 3/1/22 at 9:56 AM EST    Contact: [phone]

## 2022-03-01 NOTE — PROGRESS NOTES
Claudette Cabrera, pt brother, to verify consent for colonoscopy today. Yenny Del Castillo consented and verified with second RN.  See consent form placed in soft chart

## 2022-03-01 NOTE — ANESTHESIA PRE PROCEDURE
Department of Anesthesiology  Preprocedure Note       Name:  Lulu Gordon   Age:  62 y.o.  :  1965                                          MRN:  [id number]         Date:  3/1/2022      Surgeon: Florence Erwin):  Edwin Fall MD    Procedure: Procedure(s):  COLONOSCOPY DIAGNOSTIC    Medications prior to admission:   Prior to Admission medications    Medication Sig Start Date End Date Taking? Authorizing Provider   folic acid (FOLVITE) 1 MG tablet Take 1 mg by mouth daily    Historical Provider, MD   melatonin 3 MG TABS tablet Take 3 mg by mouth daily Take 2 tabs by mouth nightly for sleep    Historical Provider, MD   docusate sodium (COLACE) 100 MG capsule Take 100 mg by mouth 2 times daily    Historical Provider, MD   albuterol sulfate HFA (VENTOLIN HFA) 108 (90 Base) MCG/ACT inhaler Inhale 2 puffs into the lungs every 6 hours as needed for Wheezing    Historical Provider, MD   LORazepam (ATIVAN) 1 MG tablet Take 1 mg by mouth every 6 hours as needed for Anxiety. Historical Provider, MD   folic acid (FOLVITE) 1 MG tablet Take 1 tablet by mouth daily 10/18/21 11/17/21  Pamela Jimenez MD   melatonin 3 MG TABS tablet Take 2 tablets by mouth daily 10/16/21 11/15/21  Maycol Wallace MD   aspirin 81 MG chewable tablet Take 1 tablet by mouth daily 21   Monica Munoz MD   atorvastatin (LIPITOR) 40 MG tablet Take 1 tablet by mouth nightly 21   Monica Munoz MD   metoprolol succinate (TOPROL XL) 25 MG extended release tablet Take 1 tablet by mouth daily 21   Monica Munoz MD   thiamine mononitrate (THIAMINE) 100 MG tablet Take 1 tablet by mouth daily 5/15/21   Leyla Clifton MD       Current medications:    No current facility-administered medications for this visit. No current outpatient medications on file.      Facility-Administered Medications Ordered in Other Visits   Medication Dose Route Frequency Provider Last Rate Last Admin    potassium & sodium phosphates (PHOS-NAK) 280-160-250 MG packet 500 mg  2 packet Oral 4x Daily Camryn Cash MD        psyllium (KONSYL) 28.3 % packet 1 packet  1 packet Oral TID Camryn Cash MD   1 packet at 02/28/22 2117    cefOXitin (MEFOXIN) 1000 mg in dextrose 5% 50 mL (mini-bag)  1,000 mg IntraVENous 3 times per day Sam Aguilar  mL/hr at 03/01/22 0642 1,000 mg at 03/01/22 2330    sodium chloride flush 0.9 % injection 10 mL  10 mL IntraVENous Q12H Sam Aguilar MD   10 mL at 02/28/22 2145    sodium chloride flush 0.9 % injection 10 mL  10 mL IntraVENous PRN Sam Aguilar MD   10 mL at 02/25/22 0034    HYDROmorphone (DILAUDID) injection 0.5 mg  0.5 mg IntraVENous Q3H PRN Sam Aguilar MD   0.5 mg at 02/28/22 1404    pantoprazole (PROTONIX) injection 40 mg  40 mg IntraVENous Daily Sam Aguilar MD   40 mg at 02/28/22 0920    And    sodium chloride (PF) 0.9 % injection 10 mL  10 mL IntraVENous Daily Sam Aguilar MD   10 mL at 02/27/22 0752    ondansetron (ZOFRAN) injection 4 mg  4 mg IntraVENous Q6H PRN Sam Aguilar MD   4 mg at 24/18/60 1148    folic acid (FOLVITE) tablet 1 mg  1 mg Oral Daily Sam Aguilar MD   1 mg at 02/28/22 0913    LORazepam (ATIVAN) tablet 1 mg  1 mg Oral Q6H PRN Sam Aguilar MD        metoprolol succinate (TOPROL XL) extended release tablet 25 mg  25 mg Oral Daily Sam Aguilar MD   25 mg at 02/28/22 0913    thiamine tablet 100 mg  100 mg Oral Daily Sam Aguilar MD   100 mg at 02/28/22 0913    albuterol (PROVENTIL) nebulizer solution 2.5 mg  2.5 mg Nebulization Q6H PRN Sam Aguilar MD           Allergies:  No Known Allergies    Problem List:    Patient Active Problem List   Diagnosis Code    Lightheadedness R42    Acute cystitis with hematuria N30.01    Chest pain R07.9    Acute psychosis ([address] F23    Cognitive disorder F09    CAD (coronary artery disease) I25.10    Hypertensive urgency I16.0    MARLEE (obstructive sleep apnea) G47.33    Class 1 obesity in adult E66.9  Abnormal stress test R94.39    LV dysfunction I51.9    Thrombocytopenia (HCC) D69.6    Hyperbilirubinemia E80.6    Alcohol abuse F10.10    HLD (hyperlipidemia) E78.5    Sigmoid diverticulitis K57.32    Mild protein-calorie malnutrition (HCC) E44.1    Status post incision and drainage Z98.890    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       Past Medical History:        Diagnosis Date    CAD (coronary artery disease)     HTN (hypertension)     Hypercholesteremia     Hypertension     Sleep apnea        Past Surgical History:        Procedure Laterality Date    ABDOMEN SURGERY Left 10/14/2021    ABDOMEN INCISION AND DRAINAGE performed by Lyubov Vera MD at [address] [place] [address] N/A 2022    CYSTOSCOPY, urethral dilation, perry insertion performed by Lyubov Vera MD at [place] [address] N/A 2022    exploratory laparotomy, extended right hemicolectomy, creation of mucus fistula and end ileostomy performed by Lyubov Vera MD at [facility] OR       Social History:    Social History     Tobacco Use    Smoking status: Former Smoker     Packs/day: 1.00     Years: 6.00     Pack years: 6.00     Quit date:      Years since quittin.1    Smokeless tobacco: Never Used   Substance Use Topics    Alcohol use: Yes     Alcohol/week: 5.0 standard drinks     Types: 5 Cans of beer per week                                Counseling given: Not Answered      Vital Signs (Current): There were no vitals filed for this visit. BP Readings from Last 3 Encounters:   22 136/78   22 117/72   22 (!) 168/92       NPO Status:   NPO                                                                                ECHO 7/15/2021   Left Ventricle   Left ventricle is normal in size . Mild concentric left ventricular hypertrophy. Possible basal inferior wall hypokinesis. Ejection fraction is visually estimated at 55%.    There is doppler evidence of stage I diastolic dysfunction. Right Ventricle   Normal right ventricular size and function. Left Atrium   Normal sized left atrium. Interatrial septum appears intact. Right Atrium   Normal right atrium size. Mitral Valve   Focal calcification mitral valve leaflets. Physiologic and/or trace mitral regurgitation. Tricuspid Valve   Normal tricuspid valve structure and function. Physiologic and/or trace tricuspid regurgitation. RVSP could not be estimated. Aortic Valve   Normal aortic valve structure and function. Pulmonic Valve   Normal pulmonic valve structure and function. Pericardial Effusion   No evidence of pericardial effusion. Aorta   Aortic root dimension within normal limits. Conclusions      Summary   Left ventricle is normal in size . Mild concentric left ventricular hypertrophy. Possible basal inferior wall hypokinesis. Ejection fraction is visually estimated at 55%. There is doppler evidence of stage I diastolic dysfunction. Normal right ventricular size and function. Focal calcification mitral valve leaflets.           BMI:   Wt Readings from Last 3 Encounters:   03/01/22 238 lb 9.6 oz (108.2 kg)   02/01/22 235 lb (106.6 kg)   11/15/21 235 lb (106.6 kg)     There is no height or weight on file to calculate BMI.    CBC:   Lab Results   Component Value Date    WBC 7.1 03/01/2022    RBC 4.37 03/01/2022    HGB 11.4 03/01/2022    HCT 34.5 03/01/2022    MCV 78.9 03/01/2022    RDW 13.0 03/01/2022     03/01/2022       CMP:   Lab Results   Component Value Date     03/01/2022    K 3.4 03/01/2022    K 3.1 02/23/2022     03/01/2022    CO2 27 03/01/2022    BUN 5 03/01/2022    CREATININE 0.7 03/01/2022    GFRAA >60 03/01/2022    LABGLOM >60 03/01/2022    GLUCOSE 117 03/01/2022    GLUCOSE 110 01/07/2011    PROT 6.5 03/01/2022    CALCIUM 8.6 03/01/2022    BILITOT 1.7 03/01/2022    ALKPHOS 235 03/01/2022    AST 28 03/01/2022    ALT 26 03/01/2022       POC Tests: No results for input(s): POCGLU, POCNA, POCK, POCCL, POCBUN, POCHEMO, POCHCT in the last 72 hours. Coags:   Lab Results   Component Value Date    PROTIME 14.5 07/15/2021    INR 1.3 07/15/2021       HCG (If Applicable): No results found for: PREGTESTUR, PREGSERUM, HCG, HCGQUANT     ABGs: No results found for: PHART, PO2ART, RTQ1LHU, NFE0AMA, BEART, O8IYWORC     Type & Screen (If Applicable):  No results found for: LABABO, LABRH    Drug/Infectious Status (If Applicable):  No results found for: HIV, HEPCAB    COVID-19 Screening (If Applicable):   Lab Results   Component Value Date    COVID19 Not Detected 10/17/2021    COVID19 NOT DETECTED 06/05/2021           Anesthesia Evaluation  Patient summary reviewed and Nursing notes reviewed no history of anesthetic complications:   Airway: Mallampati: II  TM distance: >3 FB   Neck ROM: full  Mouth opening: > = 3 FB Dental:          Pulmonary: breath sounds clear to auscultation  (+) sleep apnea: on noncompliant,      (-) not a current smoker                           Cardiovascular:  Exercise tolerance: good (>4 METS),   (+) hypertension (History of hypertensive urgency): mild, angina: no interval change, CAD: obstructive, CHF (LVEF 75%): systolic, hyperlipidemia      ECG reviewed  Rhythm: regular  Rate: normal  Echocardiogram reviewed  Stress test reviewed       Beta Blocker:  Dose within 24 Hrs      ROS comment: EKG:  Normal sinus rhythm  Nonspecific ST and T wave abnormality  Abnormal ECG  When compared with ECG of 16-JUL-2021 07:30,  Nonspecific ST and T wave abnormality present     Neuro/Psych:   (+) psychiatric history (Acute psychosis):depression/anxiety              ROS comment: ETOH abuse - patient drinks between 1-3 40 oz.  Containers of beer daily GI/Hepatic/Renal:   (+) GERD: well controlled,          ROS comment: EtOH abuse  Acute cystitis with hematuria  Sigmoid diverticulitis  Colonic fistula  Hyperbilirubinemia    Per surgery:  57 y.o. male with left flank mass/obstructing colon cancer s/p extended right hemicolectomy with ileostomy and mucous fistula 2/24   .   Endo/Other:    (+) blood dyscrasia (11.5/34. 5): thrombocytopenia and anemia:., electrolyte abnormalities (Hypokalemia (3.4 mmol/L)), . Pt had no PAT visit       Abdominal:       Abdomen: tender. Vascular: negative vascular ROS. Other Findings:               Anesthesia Plan      MAC     ASA 3     (Note copied from a previous encounter with the appropriate addendums and changes)  Induction: intravenous. MIPS: Prophylactic antiemetics administered. Anesthetic plan and risks discussed with patient. Plan discussed with CRNA. pt seen and evaluated preop. WINTER Russell - CRNA      Lloyd Alcala DO   3/1/2022    History, data, and pertinent studies from chart review. Above represents information available via the shared medical record including previous anesthetic, medication, and allergy history. Confirmation of above and final disposition per DOS anesthesiologist.    Lloyd Alcala DO  Staff Anesthesiologist  March 1, 2022  8:19 AM    DOS STAFF ADDENDUM:    Pt seen and examined, chart reviewed (including anesthesia, drug and allergy history). Anesthetic plan, risks, benefits, alternatives, and personnel involved discussed with patient. Patient verbalized an understanding and agrees to proceed. Plan discussed with care team members and agreed upon.     Juana Greene MD  Staff Anesthesiologist  3:49 PM

## 2022-03-02 VITALS
BODY MASS INDEX: 30.18 KG/M2 | SYSTOLIC BLOOD PRESSURE: 156 MMHG | RESPIRATION RATE: 18 BRPM | WEIGHT: 235.2 LBS | HEART RATE: 81 BPM | OXYGEN SATURATION: 98 % | HEIGHT: 74 IN | TEMPERATURE: 98.1 F | DIASTOLIC BLOOD PRESSURE: 88 MMHG

## 2022-03-02 LAB
ALBUMIN SERPL-MCNC: 3.2 G/DL (ref 3.5–5.2)
ALP BLD-CCNC: 194 U/L (ref 40–129)
ALT SERPL-CCNC: 16 U/L (ref 0–40)
ANION GAP SERPL CALCULATED.3IONS-SCNC: 12 MMOL/L (ref 7–16)
AST SERPL-CCNC: 13 U/L (ref 0–39)
BASOPHILS ABSOLUTE: 0.03 E9/L (ref 0–0.2)
BASOPHILS RELATIVE PERCENT: 0.4 % (ref 0–2)
BILIRUB SERPL-MCNC: 1 MG/DL (ref 0–1.2)
BUN BLDV-MCNC: 5 MG/DL (ref 6–20)
CALCIUM SERPL-MCNC: 8.7 MG/DL (ref 8.6–10.2)
CHLORIDE BLD-SCNC: 99 MMOL/L (ref 98–107)
CO2: 27 MMOL/L (ref 22–29)
CREAT SERPL-MCNC: 0.7 MG/DL (ref 0.7–1.2)
EOSINOPHILS ABSOLUTE: 0.26 E9/L (ref 0.05–0.5)
EOSINOPHILS RELATIVE PERCENT: 3.2 % (ref 0–6)
GFR AFRICAN AMERICAN: >60
GFR NON-AFRICAN AMERICAN: >60 ML/MIN/1.73
GLUCOSE BLD-MCNC: 116 MG/DL (ref 74–99)
HCT VFR BLD CALC: 36.2 % (ref 37–54)
HEMOGLOBIN: 11.9 G/DL (ref 12.5–16.5)
IMMATURE GRANULOCYTES #: 0.41 E9/L
IMMATURE GRANULOCYTES %: 5 % (ref 0–5)
LYMPHOCYTES ABSOLUTE: 0.82 E9/L (ref 1.5–4)
LYMPHOCYTES RELATIVE PERCENT: 10 % (ref 20–42)
MAGNESIUM: 1.9 MG/DL (ref 1.6–2.6)
MCH RBC QN AUTO: 26 PG (ref 26–35)
MCHC RBC AUTO-ENTMCNC: 32.9 % (ref 32–34.5)
MCV RBC AUTO: 79.2 FL (ref 80–99.9)
MONOCYTES ABSOLUTE: 0.66 E9/L (ref 0.1–0.95)
MONOCYTES RELATIVE PERCENT: 8 % (ref 2–12)
NEUTROPHILS ABSOLUTE: 6.06 E9/L (ref 1.8–7.3)
NEUTROPHILS RELATIVE PERCENT: 73.4 % (ref 43–80)
PDW BLD-RTO: 13 FL (ref 11.5–15)
PHOSPHORUS: 3 MG/DL (ref 2.5–4.5)
PLATELET # BLD: 178 E9/L (ref 130–450)
PMV BLD AUTO: 10.8 FL (ref 7–12)
POTASSIUM SERPL-SCNC: 3.2 MMOL/L (ref 3.5–5)
RBC # BLD: 4.57 E12/L (ref 3.8–5.8)
SARS-COV-2, NAAT: NOT DETECTED
SODIUM BLD-SCNC: 138 MMOL/L (ref 132–146)
TOTAL PROTEIN: 6.9 G/DL (ref 6.4–8.3)
WBC # BLD: 8.2 E9/L (ref 4.5–11.5)

## 2022-03-02 PROCEDURE — 84100 ASSAY OF PHOSPHORUS: CPT

## 2022-03-02 PROCEDURE — 83735 ASSAY OF MAGNESIUM: CPT

## 2022-03-02 PROCEDURE — 6370000000 HC RX 637 (ALT 250 FOR IP): Performed by: STUDENT IN AN ORGANIZED HEALTH CARE EDUCATION/TRAINING PROGRAM

## 2022-03-02 PROCEDURE — 80053 COMPREHEN METABOLIC PANEL: CPT

## 2022-03-02 PROCEDURE — 6360000002 HC RX W HCPCS: Performed by: INTERNAL MEDICINE

## 2022-03-02 PROCEDURE — 6370000000 HC RX 637 (ALT 250 FOR IP): Performed by: INTERNAL MEDICINE

## 2022-03-02 PROCEDURE — 36415 COLL VENOUS BLD VENIPUNCTURE: CPT

## 2022-03-02 PROCEDURE — 87635 SARS-COV-2 COVID-19 AMP PRB: CPT

## 2022-03-02 PROCEDURE — 2580000003 HC RX 258: Performed by: INTERNAL MEDICINE

## 2022-03-02 PROCEDURE — A4216 STERILE WATER/SALINE, 10 ML: HCPCS | Performed by: INTERNAL MEDICINE

## 2022-03-02 PROCEDURE — C9113 INJ PANTOPRAZOLE SODIUM, VIA: HCPCS | Performed by: INTERNAL MEDICINE

## 2022-03-02 PROCEDURE — 85025 COMPLETE CBC W/AUTO DIFF WBC: CPT

## 2022-03-02 RX ORDER — OXYCODONE HYDROCHLORIDE 5 MG/1
5 TABLET ORAL EVERY 6 HOURS PRN
Qty: 12 TABLET | Refills: 0 | Status: SHIPPED | OUTPATIENT
Start: 2022-03-02 | End: 2022-03-03 | Stop reason: ALTCHOICE

## 2022-03-02 RX ADMIN — SODIUM CHLORIDE, PRESERVATIVE FREE 10 ML: 5 INJECTION INTRAVENOUS at 08:35

## 2022-03-02 RX ADMIN — METOPROLOL SUCCINATE 25 MG: 25 TABLET, FILM COATED, EXTENDED RELEASE ORAL at 08:42

## 2022-03-02 RX ADMIN — PANTOPRAZOLE SODIUM 40 MG: 40 INJECTION, POWDER, FOR SOLUTION INTRAVENOUS at 08:34

## 2022-03-02 RX ADMIN — CEFOXITIN SODIUM 1000 MG: 1 POWDER, FOR SOLUTION INTRAVENOUS at 06:20

## 2022-03-02 RX ADMIN — Medication 100 MG: at 08:42

## 2022-03-02 RX ADMIN — FOLIC ACID 1 MG: 1 TABLET ORAL at 08:42

## 2022-03-02 RX ADMIN — Medication 10 ML: at 08:35

## 2022-03-02 RX ADMIN — POTASSIUM & SODIUM PHOSPHATES POWDER PACK 280-160-250 MG 250 MG: 280-160-250 PACK at 09:00

## 2022-03-02 ASSESSMENT — PAIN SCALES - GENERAL: PAINLEVEL_OUTOF10: 0

## 2022-03-02 NOTE — PROGRESS NOTES
[facility] to transfer to nurse. No answered. Wendy took message for nurse to return my call. Wendy provide this nurse with fax# to fax over AVS. Fax went through successful [phone]. Wendy aware [de-identified] ambulance arrival time here is 12:30p. This nurse will attempt to place another call.      Patient's bother Shukri Banegas called and made aware as well

## 2022-03-02 NOTE — CARE COORDINATION
Pt d/c orders noted; covid pended; to transport to [facility] via [facility] 12:30 PM VIA STRETCHER. TRANSPORT FORMS ON CHART. N-N [phone]; jenelle at [facility] [place]; mair alexander staff aware. Referral to Meagan Borjas cancelled. Rose Mary Moyer.

## 2022-03-02 NOTE — PLAN OF CARE
Problem: Infection:  Goal: Will remain free from infection  Description: Will remain free from infection  3/2/2022 0005 by Alicia Lang RN  Outcome: Met This Shift  3/1/2022 1256 by Luciano Moraes RN  Outcome: Ongoing     Problem: Safety:  Goal: Free from accidental physical injury  Description: Free from accidental physical injury  3/2/2022 0005 by Alicia Lang RN  Outcome: Met This Shift  3/1/2022 1256 by Lucaino Moraes RN  Outcome: Ongoing  Goal: Free from intentional harm  Description: Free from intentional harm  3/2/2022 0005 by Alicia Lang RN  Outcome: Met This Shift  3/1/2022 1256 by Luciano Moraes RN  Outcome: Ongoing

## 2022-03-02 NOTE — PROGRESS NOTES
[facility] confirm that she has the message and has not been able to speak to a nurse as of yet. She attempted to transfer call again and no answer. Wendy provided read back of this nurse contact number and name.

## 2022-03-02 NOTE — PROGRESS NOTES
GENERAL SURGERY  DAILY PROGRESS NOTE  3/2/2022    Subjective:  Pt states he is feeling good. Denies any nausea or vomiting. Tolerating a diet. Objective:  BP (!) 172/89   Pulse 79   Temp 98.5 °F (36.9 °C) (Oral)   Resp 16   Ht 6' 2\" (1.88 m)   Wt 235 lb 3.2 oz (106.7 kg)   SpO2 98%   BMI 30.20 kg/m²     GENERAL:  Laying in bed, awake, alert, cooperative, no apparent distress  HEAD: Normocephalic, atraumatic  EYES: No sclera icterus, pupils equal  LUNGS:  No increased work of breathing on room air  CARDIOVASCULAR:  RR and normotensive  ABDOMEN:  Soft, non-tender, non-distended, ileostomy pink and patent with stool in bag. Mucous fistula in LLQ pink and patent. EXTREMITIES: No edema or swelling  SKIN: Warm and dry    Assessment/Plan:  62 y.o. male with left flank mass/obstructing colon cancer s/p extended right hemicolectomy with ileostomy and mucous fistula 2/24. Colonoscopy 3/1 with descending colonic stricture    - Okay for high fiber diet  - Continue fiber  - Pain control PRN  - Monitor ostomy output  - DC planning    Electronically signed by Roel Oliva MD on 3/2/2022 at 7:09 AM     Patient seen and examined at bedside. Agree with above. Discussed with patient that this is likely secondary to diverticular stricture and diverticulitis. Patient doing well otherwise. Plan for d/c, will have patient follow up with Dr. Allan Marx in 2 weeks for staple removal further operative discussion and planning.     Ld Tejada MD

## 2022-03-02 NOTE — PROGRESS NOTES
Physical Therapy    Facility/Department: [address] MED SURG/TELE    NAME: Rosas Craft  : 1965  MRN: [id number]    Date of Service: 3/2/2022    PT treatment was attempted this afternoon and pt was found in bed. He reports he was just getting comfortable in bed and does not want to get up and walk at this time. Pt also reports he is supposed to be discharged today. Will respect his wishes and if he does not get discharged today will re-attempt PT another time. Jamal Marmolejo., P.T.   License Number: [id number]

## 2022-03-02 NOTE — DISCHARGE INSTR - COC
Continuity of Care Form    Patient Name: Rosas Craft   :  1965  MRN:  [id number]    Admit date:  2022  Discharge date:  3/2/2022    Code Status Order: Full Code   Advance Directives:   [facility] Documentation       Date/Time Healthcare Directive Type of Healthcare Directive Copy in [facility] [address] Agent's Name Healthcare Agent's Phone Number    22 5013 No, patient does not have an advance directive for healthcare treatment -- -- -- -- --            Admitting Physician:  Cherrie Roberts MD  PCP: Nancy Miller DO    Discharging Nurse: Darek Tapia Unit/Room#: 9844/1823-L  Discharging Unit Phone Number: [phone]    Emergency Contact:   Extended Emergency Contact Information  Primary Emergency Contact: Reinaldo KraftRUSTrajat Phone: [phone]  Relation: Brother/Sister  Preferred language: English   needed? No  Secondary Emergency Contact: [address] [place] [address], [facility] Phone: [phone]  Relation: Brother/Sister    Past Surgical History:  Past Surgical History:   Procedure Laterality Date    ABDOMEN SURGERY Left 10/14/2021    ABDOMEN INCISION AND DRAINAGE performed by Janet Avery MD at [facility] N/A 3/1/2022    COLONOSCOPY DIAGNOSTIC performed by Cherrie Roberts MD at [address] [place] [address] N/A 2022    CYSTOSCOPY, urethral dilation, perry insertion performed by Janet Avery MD at [address] [place] [address] N/A 2022    exploratory laparotomy, extended right hemicolectomy, creation of mucus fistula and end ileostomy performed by Janet Avery MD at [facility] History: There is no immunization history on file for this patient.     Active Problems:  Patient Active Problem List   Diagnosis Code    Lightheadedness R42    Acute cystitis with hematuria N30.01    Chest pain R07.9    Acute psychosis ([facility] [address] F23    Cognitive disorder F09    CAD (coronary artery disease) I25.10    Hypertensive urgency I16.0    MARLEE (obstructive sleep apnea) G47.33    Class 1 obesity in adult E66.9    Abnormal stress test R94.39    LV dysfunction I51.9    Thrombocytopenia (HCC) D69.6    Hyperbilirubinemia E80.6    Alcohol abuse F10.10    HLD (hyperlipidemia) E78.5    Sigmoid diverticulitis K57.32    Mild protein-calorie malnutrition (HCC) E44.1    Status post incision and drainage Z98.890    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       Isolation/Infection:   Isolation            No Isolation          Patient Infection Status       Infection Onset Added Last Indicated Last Indicated By Review Planned Expiration Resolved Resolved By    None active    Resolved    COVID-19 (Rule Out) 06/05/21 06/05/21 06/05/21 COVID-19 & Influenza Combo (Ordered)   06/05/21 Rule-Out Test Resulted            Nurse Assessment:  Last Vital Signs: BP (!) 156/88   Pulse 81   Temp 98.1 °F (36.7 °C) (Oral)   Resp 18   Ht 6' 2\" (1.88 m)   Wt 235 lb 3.2 oz (106.7 kg)   SpO2 98%   BMI 30.20 kg/m²     Last documented pain score (0-10 scale): Pain Level: 0  Last Weight:   Wt Readings from Last 1 Encounters:   03/02/22 235 lb 3.2 oz (106.7 kg)     Mental Status:  oriented, alert, coherent, and has short term memory    IV Access:  - None    Nursing Mobility/ADLs:  Walking   Assisted  Transfer  Assisted  Bathing  Assisted  Dressing  Assisted  Toileting  Assisted  Feeding  Assisted  Med Admin  Independent  Med Delivery   whole    Wound Care Documentation and Therapy:  Wound 02/23/22 Back Lateral;Left;Lower (Active)   Wound Etiology Surgical 03/02/22 1000   Dressing Status New dressing applied;Dry; Intact 03/02/22 1000   Wound Cleansed Cleansed with saline 03/02/22 1000   Dressing/Treatment ABD 03/02/22 1000   Wound Length (cm) 0.4 cm 02/23/22 2312   Wound Width (cm) 1.2 cm 02/23/22 2312   Wound Depth (cm) 1 cm 02/23/22 2312   Wound Surface Area (cm^2) 0.48 cm^2 02/23/22 2312   Wound Volume (cm^3) 0.48 cm^3 02/23/22 2312   Wound Assessment Other (Comment) 03/02/22 1000   Drainage Amount Small 02/28/22 0900   Drainage Description Brown;Purulent 02/28/22 0900   Odor Fecal 03/02/22 1000   Bianka-wound Assessment Hyperpigmented; Warm 02/24/22 0417   Margins Defined edges 02/24/22 0417   Number of days: 6        Elimination:  Continence: Bowel: Ostomy  Bladder: Molina  Urinary Catheter: Insertion Date: 2/24/2022  Colostomy/Ileostomy/Ileal Conduit: Yes  Mucous Fistula LLQ-Stomal Appliance: Clean,Dry,Intact  Ileostomy Ileostomy RLQ-Stomal Appliance: 2 piece  [REMOVED] Colostomy LLQ-Stomal Appliance: 1 piece,Intact,Dry  [REMOVED] Colostomy RLQ-Stomal Appliance: 1 piece,Dry,Intact  Mucous Fistula LLQ-Stoma  Assessment: Pink  Ileostomy Ileostomy RLQ-Stoma  Assessment: Red,Moist  [REMOVED] Colostomy LLQ-Stoma  Assessment: Bleeding,Retracted  [REMOVED] Colostomy RLQ-Stoma  Assessment: Bleeding  Mucous Fistula LLQ-Mucocutaneous Junction: Intact  Ileostomy Ileostomy RLQ-Mucocutaneous Junction: Intact  Ileostomy Ileostomy RLQ-Peristomal Assessment: Clean,Intact  [REMOVED] Colostomy LLQ-Peristomal Assessment: Intact  [REMOVED] Colostomy RLQ-Peristomal Assessment: Red  Ileostomy Ileostomy RLQ-Treatment: Bag change,Stoma powder  Mucous Fistula LLQ-Stool Appearance: Watery  Ileostomy Ileostomy RLQ-Stool Appearance: Watery  Mucous Fistula LLQ-Stool Color: Brown  Ileostomy Ileostomy RLQ-Stool Color: Brown  [REMOVED] Colostomy LLQ-Stool Color: Red  Mucous Fistula LLQ-Stool Amount: Small  Ileostomy Ileostomy RLQ-Stool Amount: Large  Mucous Fistula LLQ-Output (mL): 100 ml  Ileostomy Ileostomy RLQ-Output (mL): 200 ml  [REMOVED] Colostomy LLQ-Output (mL): 150 ml  [REMOVED] Colostomy RLQ-Output (mL): 10 ml    Date of Last BM: 3/2/2022 from ostomy    Intake/Output Summary (Last 24 hours) at 3/2/2022 1124  Last data filed at 3/2/2022 1115  Gross per 24 hour   Intake 1350 ml   Output 2100 ml   Net -750 ml     I/O last 3 completed shifts:   In: 250 [I.V.:250]  Out: 3610

## 2022-03-03 ENCOUNTER — HOSPITAL ENCOUNTER (EMERGENCY)
Age: 57
Discharge: NURSING, REHAB OR LONG-TERM CARE | End: 2022-03-03
Attending: EMERGENCY MEDICINE
Payer: MEDICAID

## 2022-03-03 VITALS
OXYGEN SATURATION: 100 % | DIASTOLIC BLOOD PRESSURE: 94 MMHG | HEART RATE: 64 BPM | SYSTOLIC BLOOD PRESSURE: 162 MMHG | RESPIRATION RATE: 16 BRPM | BODY MASS INDEX: 28.23 KG/M2 | WEIGHT: 220 LBS | TEMPERATURE: 98.1 F | HEIGHT: 74 IN

## 2022-03-03 DIAGNOSIS — L24.A9 WOUND DRAINAGE: ICD-10-CM

## 2022-03-03 DIAGNOSIS — T14.8XXA SURGICAL WOUND PRESENT: Primary | ICD-10-CM

## 2022-03-03 LAB
ALBUMIN SERPL-MCNC: 3.3 G/DL (ref 3.5–5.2)
ALP BLD-CCNC: 163 U/L (ref 40–129)
ALT SERPL-CCNC: 12 U/L (ref 0–40)
ANION GAP SERPL CALCULATED.3IONS-SCNC: 12 MMOL/L (ref 7–16)
AST SERPL-CCNC: 10 U/L (ref 0–39)
BASOPHILS ABSOLUTE: 0.04 E9/L (ref 0–0.2)
BASOPHILS RELATIVE PERCENT: 0.5 % (ref 0–2)
BILIRUB SERPL-MCNC: 0.9 MG/DL (ref 0–1.2)
BUN BLDV-MCNC: 4 MG/DL (ref 6–20)
CALCIUM SERPL-MCNC: 8.7 MG/DL (ref 8.6–10.2)
CHLORIDE BLD-SCNC: 101 MMOL/L (ref 98–107)
CO2: 27 MMOL/L (ref 22–29)
CREAT SERPL-MCNC: 0.8 MG/DL (ref 0.7–1.2)
EOSINOPHILS ABSOLUTE: 0.21 E9/L (ref 0.05–0.5)
EOSINOPHILS RELATIVE PERCENT: 2.5 % (ref 0–6)
GFR AFRICAN AMERICAN: >60
GFR NON-AFRICAN AMERICAN: >60 ML/MIN/1.73
GLUCOSE BLD-MCNC: 123 MG/DL (ref 74–99)
HCT VFR BLD CALC: 35.1 % (ref 37–54)
HEMOGLOBIN: 11.6 G/DL (ref 12.5–16.5)
IMMATURE GRANULOCYTES #: 0.75 E9/L
IMMATURE GRANULOCYTES %: 8.8 % (ref 0–5)
LACTIC ACID: 1 MMOL/L (ref 0.5–2.2)
LYMPHOCYTES ABSOLUTE: 0.76 E9/L (ref 1.5–4)
LYMPHOCYTES RELATIVE PERCENT: 8.9 % (ref 20–42)
MAGNESIUM: 1.8 MG/DL (ref 1.6–2.6)
MCH RBC QN AUTO: 26.3 PG (ref 26–35)
MCHC RBC AUTO-ENTMCNC: 33 % (ref 32–34.5)
MCV RBC AUTO: 79.6 FL (ref 80–99.9)
MONOCYTES ABSOLUTE: 0.74 E9/L (ref 0.1–0.95)
MONOCYTES RELATIVE PERCENT: 8.6 % (ref 2–12)
NEUTROPHILS ABSOLUTE: 6.06 E9/L (ref 1.8–7.3)
NEUTROPHILS RELATIVE PERCENT: 70.7 % (ref 43–80)
PDW BLD-RTO: 13.1 FL (ref 11.5–15)
PLATELET # BLD: 177 E9/L (ref 130–450)
PMV BLD AUTO: 10.5 FL (ref 7–12)
POLYCHROMASIA: ABNORMAL
POTASSIUM REFLEX MAGNESIUM: 3.3 MMOL/L (ref 3.5–5)
RBC # BLD: 4.41 E12/L (ref 3.8–5.8)
SODIUM BLD-SCNC: 140 MMOL/L (ref 132–146)
TOTAL PROTEIN: 7 G/DL (ref 6.4–8.3)
WBC # BLD: 8.6 E9/L (ref 4.5–11.5)

## 2022-03-03 PROCEDURE — 83735 ASSAY OF MAGNESIUM: CPT

## 2022-03-03 PROCEDURE — 99285 EMERGENCY DEPT VISIT HI MDM: CPT

## 2022-03-03 PROCEDURE — 85025 COMPLETE CBC W/AUTO DIFF WBC: CPT

## 2022-03-03 PROCEDURE — 80053 COMPREHEN METABOLIC PANEL: CPT

## 2022-03-03 PROCEDURE — 83605 ASSAY OF LACTIC ACID: CPT

## 2022-03-03 RX ORDER — POLYETHYLENE GLYCOL 3350 17 G/17G
17 POWDER, FOR SOLUTION ORAL PRN
COMMUNITY

## 2022-03-03 RX ORDER — AMOXICILLIN AND CLAVULANATE POTASSIUM 875; 125 MG/1; MG/1
1 TABLET, FILM COATED ORAL 2 TIMES DAILY
Qty: 14 TABLET | Refills: 0 | Status: SHIPPED | OUTPATIENT
Start: 2022-03-03 | End: 2022-03-13

## 2022-03-03 RX ORDER — AMOXICILLIN 250 MG
2 CAPSULE ORAL 2 TIMES DAILY
COMMUNITY

## 2022-03-03 ASSESSMENT — ENCOUNTER SYMPTOMS
COUGH: 0
NAUSEA: 0
WHEEZING: 0
SHORTNESS OF BREATH: 0
EYE DISCHARGE: 0
DIARRHEA: 0
ABDOMINAL PAIN: 0
EYE REDNESS: 0
SORE THROAT: 0
BACK PAIN: 0
VOMITING: 0
EYE PAIN: 0
SINUS PRESSURE: 0

## 2022-03-03 NOTE — ED NOTES
Pt arrived to ed from [facility] after being d/c from this hospital for abd surgery. Pt denies any more pain than before surgery. Pt RLQ colostomy was leaking and was replaced by this RN. Pt has clear drainage coming from his midline abd incision that is dehissed on assessment. ELENO.  At bedside. Iv access obtained and labs sent.       Lauren Moura RN  03/03/22 3796

## 2022-03-03 NOTE — ED PROVIDER NOTES
HPI   71-year-old male patient presents to emergency department for evaluation of wound. Complaint sudden onset, mild in severity, not improved with anything. Patient recently seen here and discharged on 3/2. Patient is status post ex lap right hemicolectomy with creation of end ileostomy. Patient coming from [facility] rehab facility. According to nursing report patient's ileostomy bag came apart, wound covered in stool concern for missing staple. Patient not complaining of any pain at this time no fevers or chills. Review of Systems   Constitutional: Negative for chills, fatigue and fever. HENT: Negative for ear pain, sinus pressure and sore throat. Eyes: Negative for pain, discharge and redness. Respiratory: Negative for cough, shortness of breath and wheezing. Cardiovascular: Negative for chest pain. Gastrointestinal: Negative for abdominal pain, diarrhea, nausea and vomiting. Genitourinary: Negative for dysuria and frequency. Musculoskeletal: Negative for arthralgias and back pain. Skin: Negative for rash and wound. Wound concern   Neurological: Negative for weakness and headaches. Hematological: Negative for adenopathy. All other systems reviewed and are negative. Physical Exam  Vitals and nursing note reviewed. Constitutional:       General: He is not in acute distress. HENT:      Head: Normocephalic and atraumatic. Nose: Nose normal. No congestion. Mouth/Throat:      Mouth: Mucous membranes are moist.      Pharynx: Oropharynx is clear. Eyes:      General: No scleral icterus. Conjunctiva/sclera: Conjunctivae normal.   Cardiovascular:      Rate and Rhythm: Normal rate and regular rhythm. Pulses: Normal pulses. Heart sounds: Normal heart sounds. Pulmonary:      Effort: Pulmonary effort is normal.      Breath sounds: Normal breath sounds. Abdominal:      Comments: Midline staples in place, questionably 1 staple may be missing. Ileostomy bag present area covered in stool. Musculoskeletal:      Cervical back: Neck supple. Right lower leg: No edema. Left lower leg: No edema. Skin:     General: Skin is warm and dry. Neurological:      General: No focal deficit present. Mental Status: He is alert. Procedures     MDM   77-year-old male here for wound check. Wound cleaned and ostomy bag was replaced. Basic lab work obtained essentially the same as labs from yesterday. No leukocytosis or other abnormalities. Patient is afebrile stable vital signs. ED Course as of 03/03/22 0640   Thu Mar 03, 2022   0440 Abdomen cleaned by the nurse ileostomy bag replaced. Call Placed to Dr. Bonita Díaz patient's surgeon and surgical residents to come evaluate wound. [FG]   [id number] Patient in no distress at this time. Rest comfortably in bed. [FG]   [id number] Surgery resident came down to evaluate patient. They explored wound and removed few excess staples. They stated that the fascia was intact. Wound was packed and asked me to prescribe Augmentin for the patient. They told patient drainage expected from the wound keep the areas clean as possible and change dressing regularly. Follow-up with his scheduled postop appointment. [FG]      ED Course User Index  [FG] Nighat Jackman, DO          --------------------------------------------- PAST HISTORY ---------------------------------------------  Past Medical History:  has a past medical history of CAD (coronary artery disease), HTN (hypertension), Hypercholesteremia, Hypertension, and Sleep apnea. Past Surgical History:  has a past surgical history that includes Abdomen surgery (Left, 10/14/2021); laparotomy (N/A, 2/24/2022); Cystoscopy (N/A, 2/24/2022); and Colonoscopy (N/A, 3/1/2022). Social History:  reports that he quit smoking about 21 years ago. He has a 6.00 pack-year smoking history.  He has never used smokeless tobacco. He reports current alcohol use of about 5.0 standard drinks of alcohol per week. He reports previous drug use. Family History: family history is not on file. The patients home medications have been reviewed. Allergies: Patient has no known allergies.     -------------------------------------------------- RESULTS -------------------------------------------------  Labs:  Results for orders placed or performed during the hospital encounter of 03/03/22   CBC with Auto Differential   Result Value Ref Range    WBC 8.6 4.5 - 11.5 E9/L    RBC 4.41 3.80 - 5.80 E12/L    Hemoglobin 11.6 (L) 12.5 - 16.5 g/dL    Hematocrit 35.1 (L) 37.0 - 54.0 %    MCV 79.6 (L) 80.0 - 99.9 fL    MCH 26.3 26.0 - 35.0 pg    MCHC 33.0 32.0 - 34.5 %    RDW 13.1 11.5 - 15.0 fL    Platelets 749 443 - 416 E9/L    MPV 10.5 7.0 - 12.0 fL    Neutrophils % 70.7 43.0 - 80.0 %    Immature Granulocytes % 8.8 (H) 0.0 - 5.0 %    Lymphocytes % 8.9 (L) 20.0 - 42.0 %    Monocytes % 8.6 2.0 - 12.0 %    Eosinophils % 2.5 0.0 - 6.0 %    Basophils % 0.5 0.0 - 2.0 %    Neutrophils Absolute 6.06 1.80 - 7.30 E9/L    Immature Granulocytes # 0.75 E9/L    Lymphocytes Absolute 0.76 (L) 1.50 - 4.00 E9/L    Monocytes Absolute 0.74 0.10 - 0.95 E9/L    Eosinophils Absolute 0.21 0.05 - 0.50 E9/L    Basophils Absolute 0.04 0.00 - 0.20 E9/L    Polychromasia 1+    Comprehensive Metabolic Panel w/ Reflex to MG   Result Value Ref Range    Sodium 140 132 - 146 mmol/L    Potassium reflex Magnesium 3.3 (L) 3.5 - 5.0 mmol/L    Chloride 101 98 - 107 mmol/L    CO2 27 22 - 29 mmol/L    Anion Gap 12 7 - 16 mmol/L    Glucose 123 (H) 74 - 99 mg/dL    BUN 4 (L) 6 - 20 mg/dL    CREATININE 0.8 0.7 - 1.2 mg/dL    GFR Non-African American >60 >=60 mL/min/1.73    GFR African American >60     Calcium 8.7 8.6 - 10.2 mg/dL    Total Protein 7.0 6.4 - 8.3 g/dL    Albumin 3.3 (L) 3.5 - 5.2 g/dL    Total Bilirubin 0.9 0.0 - 1.2 mg/dL    Alkaline Phosphatase 163 (H) 40 - 129 U/L    ALT 12 0 - 40 U/L    AST 10 0 - 39 U/L   Lactic Acid   Result Value Ref Range    Lactic Acid 1.0 0.5 - 2.2 mmol/L   Magnesium   Result Value Ref Range    Magnesium 1.8 1.6 - 2.6 mg/dL       Radiology:  No orders to display       ------------------------- NURSING NOTES AND VITALS REVIEWED ---------------------------  Date / Time Roomed:  3/3/2022  4:10 AM  ED Bed Assignment:  14/14    The nursing notes within the ED encounter and vital signs as below have been reviewed. BP (!) 162/94   Pulse 64   Temp 98.1 °F (36.7 °C) (Oral)   Resp 16   Ht 6' 2\" (1.88 m)   Wt 220 lb (99.8 kg)   SpO2 100%   BMI 28.25 kg/m²   Oxygen Saturation Interpretation: Normal      ------------------------------------------ PROGRESS NOTES ------------------------------------------  6:18 AM EST  I have spoken with the patient and discussed todays results, in addition to providing specific details for the plan of care and counseling regarding the diagnosis and prognosis. Their questions are answered at this time and they are agreeable with the plan. I discussed at length with them reasons for immediate return here for re evaluation. Patient to follow-up with his surgeon as he has appointment scheduled in the coming weeks. --------------------------------- ADDITIONAL PROVIDER NOTES ---------------------------------  At this time the patient is without objective evidence of an acute process requiring hospitalization or inpatient management. They have remained hemodynamically stable throughout their entire ED visit and are stable for discharge with outpatient follow-up. The plan has been discussed in detail and they are aware of the specific conditions for emergent return, as well as the importance of follow-up. New Prescriptions    AMOXICILLIN-CLAVULANATE (AUGMENTIN) 875-125 MG PER TABLET    Take 1 tablet by mouth 2 times daily for 10 days       Diagnosis:  1. Surgical wound present    2.  Wound drainage        Disposition:   Patient's disposition: Discharge to nursing home  Patient's condition is stable.        Maurice Valadez DO  Resident  03/03/22 8773

## 2022-03-03 NOTE — CONSULTS
GENERAL SURGERY  CONSULT NOTE  3/3/2022    Physician Consulted: Dr. Mika Ordonez  Reason for Consult: Wound Check  Referring Physician: Dr. Emory ODELL  Pavel Rodriguez is a 62 y.o. male with PMHx CAD and HTN with recent admission for left flank fistula and abdominal pain s/p ex-lap extended right hemicolectomy with end ileostomy and mucous fistula 2/24 for concerns of a distal colon cancer but ultimately underwent a colonoscopy 3/1 showing descending/sigmoid stricture likely from chronic diverticulitis. He was discharged yesterday 3/2 to an LTAC and was sent back in due to drainage from his midline wound. He states there was minimal drainage from the incision around his umbilicus. He denies any increased pain, nausea, vomiting, fevers, or chills. He states that he feels good and is having ostomy function. Past Medical History:   Diagnosis Date    CAD (coronary artery disease)     HTN (hypertension)     Hypercholesteremia     Hypertension     Sleep apnea        Past Surgical History:   Procedure Laterality Date    ABDOMEN SURGERY Left 10/14/2021    ABDOMEN INCISION AND DRAINAGE performed by Delmis Monique MD at [place] [address] COLONOSCOPY N/A 3/1/2022    COLONOSCOPY DIAGNOSTIC performed by Mumtaz Durant MD at [address] [place] [address] N/A 2/24/2022    CYSTOSCOPY, urethral dilation, perry insertion performed by Delmis Monique MD at [place] [address] N/A 2/24/2022    exploratory laparotomy, extended right hemicolectomy, creation of mucus fistula and end ileostomy performed by Delmis Monique MD at [facility]       Medications Prior to Admission:    Prior to Admission medications    Medication Sig Start Date End Date Taking?  Authorizing Provider   amoxicillin-clavulanate (AUGMENTIN) 875-125 MG per tablet Take 1 tablet by mouth 2 times daily for 10 days 3/3/22 3/13/22 Yes Alejandra Kingdom, DO   psyllium (KONSYL) 28.3 % PACK Take 1 packet by mouth 3 times daily 3/2/22 4/1/22  Jesse Best MD   oxyCODONE (ROXICODONE) 5 MG immediate release tablet Take 1 tablet by mouth every 6 hours as needed for Pain for up to 3 days. Intended supply: 3 days. Take lowest dose possible to manage pain 3/2/22 3/5/22  Margarito Cha MD   folic acid (FOLVITE) 1 MG tablet Take 1 mg by mouth daily    Historical Provider, MD   melatonin 3 MG TABS tablet Take 3 mg by mouth daily Take 2 tabs by mouth nightly for sleep    Historical Provider, MD   docusate sodium (COLACE) 100 MG capsule Take 100 mg by mouth 2 times daily    Historical Provider, MD   albuterol sulfate HFA (VENTOLIN HFA) 108 (90 Base) MCG/ACT inhaler Inhale 2 puffs into the lungs every 6 hours as needed for Wheezing    Historical Provider, MD   LORazepam (ATIVAN) 1 MG tablet Take 1 mg by mouth every 6 hours as needed for Anxiety. Historical Provider, MD   folic acid (FOLVITE) 1 MG tablet Take 1 tablet by mouth daily 10/18/21 11/17/21  Mohinder Abdul MD   melatonin 3 MG TABS tablet Take 2 tablets by mouth daily 10/16/21 11/15/21  Devon Khan MD   aspirin 81 MG chewable tablet Take 1 tablet by mouth daily 21   Trudy Starr MD   atorvastatin (LIPITOR) 40 MG tablet Take 1 tablet by mouth nightly 21   Trudy Starr MD   metoprolol succinate (TOPROL XL) 25 MG extended release tablet Take 1 tablet by mouth daily 21   Trudy Starr MD   thiamine mononitrate (THIAMINE) 100 MG tablet Take 1 tablet by mouth daily 5/15/21   Essam Wynne Schirmer, MD       No Known Allergies    No family history on file. Social History     Tobacco Use    Smoking status: Former Smoker     Packs/day: 1.00     Years: 6.00     Pack years: 6.00     Quit date:      Years since quittin.1    Smokeless tobacco: Never Used   Vaping Use    Vaping Use: Never used   Substance Use Topics    Alcohol use:  Yes     Alcohol/week: 5.0 standard drinks     Types: 5 Cans of beer per week    Drug use: Not Currently         Review of Systems   General ROS: negative  Hematological and Lymphatic ROS: negative  Respiratory ROS: negative  Cardiovascular ROS: negative  Gastrointestinal ROS: As above  Genito-Urinary ROS: negative  Musculoskeletal ROS: negative      PHYSICAL EXAM:    Vitals:    03/03/22 0615   BP: (!) 162/94   Pulse: 64   Resp:    Temp:    SpO2: 100%       General Appearance:  awake, alert, oriented, in no acute distress  Skin:  Skin color, texture, turgor normal. No rashes or lesions. Head/face:  NCAT  Eyes:  No gross abnormalities. Lungs:  No increased work of breathing on room air  Heart:  RR and normotensive  Abdomen:  Soft, appropriate TTP around midline incision, incisions with staples in place and SS cloudy drainage localized around umbilicus but no erythema, nondistended, ileostomy in RLQ with stool and mucous fistula in LLQ  Extremities: Extremities warm to touch, pink, with no edema. LABS:    CBC  Recent Labs     03/03/22  0454   WBC 8.6   HGB 11.6*   HCT 35.1*        BMP  Recent Labs     03/03/22  0454      K 3.3*      CO2 27   BUN 4*   CREATININE 0.8   CALCIUM 8.7     Liver Function  Recent Labs     03/03/22  0454   BILITOT 0.9   AST 10   ALT 12   ALKPHOS 163*   PROT 7.0   LABALBU 3.3*     No results for input(s): LACTATE in the last 72 hours. No results for input(s): INR, PTT in the last 72 hours. Invalid input(s): PT    RADIOLOGY    No results found.       ASSESSMENT:  62 y.o. male with surgical site infection post ex-lap extended right hemicolectomy with end ileostomy and mucous fistula 2/24    PLAN:  - Okay for diet as tolerated  - Will open 3 staples around umbilicus at bedside  - Follow up cultures  - Pack wound with saline soaked wet to dry 4x4 and cover with ABD then secure with paper tape  - DC back to facility on Augmentin x 10 days  - Discussed with Dr. Oanh Tillman    Electronically signed by Murtis Frankel, MD on 3/3/22 at 8:19 AM EST

## 2022-03-07 NOTE — ANESTHESIA POSTPROCEDURE EVALUATION
Department of Anesthesiology  Postprocedure Note    Patient: Morgan Correia  MRN: [id number]  [place]: 1965  Date of evaluation: 3/7/2022  Time:  6:34 AM     Procedure Summary     Date: 02/24/22 Room / Location: [facility] 01 / [facility]    Anesthesia Start: 6790 Anesthesia Stop: 2329    Procedures:       exploratory laparotomy, extended right hemicolectomy, creation of mucus fistula and end ileostomy (N/A Abdomen)      CYSTOSCOPY, urethral dilation, perry insertion (N/A Bladder) Diagnosis: (/)    Surgeons: Jannine Prader, MD Responsible Provider: Luis Alberto Matthews MD    Anesthesia Type: general ASA Status: 4 - Emergent          Anesthesia Type: general    Sophia Phase I: Sophia Score: 9    Sophia Phase II: Sophia Score: 10    Last vitals: Reviewed and per EMR flowsheets.        Anesthesia Post Evaluation    Patient location during evaluation: PACU  Patient participation: complete - patient participated  Level of consciousness: awake and alert  Airway patency: patent  Nausea & Vomiting: no nausea and no vomiting  Complications: no  Cardiovascular status: hemodynamically stable  Respiratory status: acceptable  Hydration status: euvolemic

## 2022-11-01 ENCOUNTER — HOSPITAL ENCOUNTER (OUTPATIENT)
Dept: CT IMAGING | Age: 57
Discharge: HOME | End: 2022-11-03
Payer: MEDICAID

## 2022-11-01 DIAGNOSIS — R93.5 ABNORMAL ULTRASOUND OF ABDOMEN: ICD-10-CM

## 2022-11-01 DIAGNOSIS — R19.00 ABDOMINAL MASS, UNSPECIFIED ABDOMINAL LOCATION: ICD-10-CM

## 2022-11-01 PROCEDURE — 74160 CT ABDOMEN W/CONTRAST: CPT

## 2022-11-01 PROCEDURE — 6360000004 HC RX CONTRAST MEDICATION: Performed by: RADIOLOGY

## 2022-11-01 RX ADMIN — IOPAMIDOL 65 ML: 755 INJECTION, SOLUTION INTRAVENOUS at 11:00

## 2023-01-16 ENCOUNTER — APPOINTMENT (OUTPATIENT)
Dept: CT IMAGING | Age: 58
End: 2023-01-16
Payer: MEDICAID

## 2023-01-16 ENCOUNTER — APPOINTMENT (OUTPATIENT)
Dept: GENERAL RADIOLOGY | Age: 58
End: 2023-01-16
Payer: MEDICAID

## 2023-01-16 ENCOUNTER — HOSPITAL ENCOUNTER (INPATIENT)
Age: 58
LOS: 1 days | Discharge: NURSING, REHAB OR LONG-TERM CARE | End: 2023-01-17
Attending: EMERGENCY MEDICINE | Admitting: FAMILY MEDICINE
Payer: MEDICAID
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
## 2023-01-16 DIAGNOSIS — F10.10 ETOH ABUSE: Primary | ICD-10-CM

## 2023-01-16 DIAGNOSIS — F03.90 DEMENTIA, UNSPECIFIED DEMENTIA SEVERITY, UNSPECIFIED DEMENTIA TYPE, UNSPECIFIED WHETHER BEHAVIORAL, PSYCHOTIC, OR MOOD DISTURBANCE OR ANXIETY (HCC): ICD-10-CM

## 2023-01-16 DIAGNOSIS — S01.01XA LACERATION OF SCALP, INITIAL ENCOUNTER: ICD-10-CM

## 2023-01-16 LAB
BASOPHILS ABSOLUTE: 0.02 E9/L (ref 0–0.2)
BASOPHILS RELATIVE PERCENT: 0.3 % (ref 0–2)
EOSINOPHILS ABSOLUTE: 0.2 E9/L (ref 0.05–0.5)
EOSINOPHILS RELATIVE PERCENT: 2.6 % (ref 0–6)
HCT VFR BLD CALC: 41.2 % (ref 37–54)
HEMOGLOBIN: 13.6 G/DL (ref 12.5–16.5)
IMMATURE GRANULOCYTES #: 0.05 E9/L
IMMATURE GRANULOCYTES %: 0.6 % (ref 0–5)
LYMPHOCYTES ABSOLUTE: 0.67 E9/L (ref 1.5–4)
LYMPHOCYTES RELATIVE PERCENT: 8.6 % (ref 20–42)
MCH RBC QN AUTO: 27.1 PG (ref 26–35)
MCHC RBC AUTO-ENTMCNC: 33 % (ref 32–34.5)
MCV RBC AUTO: 82.1 FL (ref 80–99.9)
MONOCYTES ABSOLUTE: 0.49 E9/L (ref 0.1–0.95)
MONOCYTES RELATIVE PERCENT: 6.3 % (ref 2–12)
NEUTROPHILS ABSOLUTE: 6.37 E9/L (ref 1.8–7.3)
NEUTROPHILS RELATIVE PERCENT: 81.6 % (ref 43–80)
PDW BLD-RTO: 13.3 FL (ref 11.5–15)
PLATELET # BLD: 165 E9/L (ref 130–450)
PMV BLD AUTO: 11 FL (ref 7–12)
RBC # BLD: 5.02 E12/L (ref 3.8–5.8)
WBC # BLD: 7.8 E9/L (ref 4.5–11.5)

## 2023-01-16 PROCEDURE — 12001 RPR S/N/AX/GEN/TRNK 2.5CM/<: CPT

## 2023-01-16 PROCEDURE — 96372 THER/PROPH/DIAG INJ SC/IM: CPT

## 2023-01-16 PROCEDURE — 80053 COMPREHEN METABOLIC PANEL: CPT

## 2023-01-16 PROCEDURE — 82077 ASSAY SPEC XCP UR&BREATH IA: CPT

## 2023-01-16 PROCEDURE — 6360000002 HC RX W HCPCS

## 2023-01-16 PROCEDURE — 99285 EMERGENCY DEPT VISIT HI MDM: CPT

## 2023-01-16 PROCEDURE — 83735 ASSAY OF MAGNESIUM: CPT

## 2023-01-16 PROCEDURE — 85025 COMPLETE CBC W/AUTO DIFF WBC: CPT

## 2023-01-16 PROCEDURE — 81001 URINALYSIS AUTO W/SCOPE: CPT

## 2023-01-16 PROCEDURE — 71045 X-RAY EXAM CHEST 1 VIEW: CPT

## 2023-01-16 PROCEDURE — 80307 DRUG TEST PRSMV CHEM ANLYZR: CPT

## 2023-01-16 PROCEDURE — 96374 THER/PROPH/DIAG INJ IV PUSH: CPT

## 2023-01-16 PROCEDURE — 80179 DRUG ASSAY SALICYLATE: CPT

## 2023-01-16 PROCEDURE — 70450 CT HEAD/BRAIN W/O DYE: CPT

## 2023-01-16 PROCEDURE — 72125 CT NECK SPINE W/O DYE: CPT

## 2023-01-16 PROCEDURE — 80143 DRUG ASSAY ACETAMINOPHEN: CPT

## 2023-01-16 RX ORDER — THIAMINE HYDROCHLORIDE 100 MG/ML
100 INJECTION, SOLUTION INTRAMUSCULAR; INTRAVENOUS ONCE
Status: COMPLETED | OUTPATIENT
Start: 2023-01-16 | End: 2023-01-16

## 2023-01-16 RX ADMIN — THIAMINE HYDROCHLORIDE 100 MG: 100 INJECTION, SOLUTION INTRAMUSCULAR; INTRAVENOUS at 23:20

## 2023-01-16 RX ADMIN — TRIMETHOBENZAMIDE HYDROCHLORIDE 200 MG: 100 INJECTION INTRAMUSCULAR at 23:21

## 2023-01-16 ASSESSMENT — PAIN - FUNCTIONAL ASSESSMENT: PAIN_FUNCTIONAL_ASSESSMENT: ADULT NONVERBAL PAIN SCALE (NPVS)

## 2023-01-17 VITALS
OXYGEN SATURATION: 94 % | TEMPERATURE: 98.1 F | RESPIRATION RATE: 18 BRPM | HEART RATE: 83 BPM | WEIGHT: 220 LBS | DIASTOLIC BLOOD PRESSURE: 63 MMHG | SYSTOLIC BLOOD PRESSURE: 110 MMHG | HEIGHT: 74 IN | BODY MASS INDEX: 28.23 KG/M2

## 2023-01-17 PROBLEM — F10.920 ALCOHOL INTOXICATION, UNCOMPLICATED (HCC): Status: ACTIVE | Noted: 2023-01-17

## 2023-01-17 PROBLEM — F03.90 DEMENTIA (HCC): Status: ACTIVE | Noted: 2023-01-17

## 2023-01-17 PROBLEM — F10.920 ALCOHOL INTOXICATION, UNCOMPLICATED (HCC): Status: RESOLVED | Noted: 2023-01-17 | Resolved: 2023-01-17

## 2023-01-17 LAB
ACETAMINOPHEN LEVEL: <5 MCG/ML (ref 10–30)
ALBUMIN SERPL-MCNC: 3.8 G/DL (ref 3.5–5.2)
ALBUMIN SERPL-MCNC: 4 G/DL (ref 3.5–5.2)
ALP BLD-CCNC: 115 U/L (ref 40–129)
ALP BLD-CCNC: 116 U/L (ref 40–129)
ALT SERPL-CCNC: 28 U/L (ref 0–40)
ALT SERPL-CCNC: 29 U/L (ref 0–40)
AMPHETAMINE SCREEN, URINE: NOT DETECTED
ANION GAP SERPL CALCULATED.3IONS-SCNC: 14 MMOL/L (ref 7–16)
ANION GAP SERPL CALCULATED.3IONS-SCNC: 14 MMOL/L (ref 7–16)
AST SERPL-CCNC: 24 U/L (ref 0–39)
AST SERPL-CCNC: 30 U/L (ref 0–39)
BACTERIA: NORMAL /HPF
BARBITURATE SCREEN URINE: NOT DETECTED
BASOPHILS ABSOLUTE: 0.02 E9/L (ref 0–0.2)
BASOPHILS RELATIVE PERCENT: 0.3 % (ref 0–2)
BENZODIAZEPINE SCREEN, URINE: NOT DETECTED
BILIRUB SERPL-MCNC: 0.5 MG/DL (ref 0–1.2)
BILIRUB SERPL-MCNC: 0.5 MG/DL (ref 0–1.2)
BILIRUBIN URINE: NEGATIVE
BLOOD, URINE: NEGATIVE
BUN BLDV-MCNC: 11 MG/DL (ref 6–20)
BUN BLDV-MCNC: 14 MG/DL (ref 6–20)
CALCIUM SERPL-MCNC: 9.2 MG/DL (ref 8.6–10.2)
CALCIUM SERPL-MCNC: 9.2 MG/DL (ref 8.6–10.2)
CANNABINOID SCREEN URINE: NOT DETECTED
CHLORIDE BLD-SCNC: 103 MMOL/L (ref 98–107)
CHLORIDE BLD-SCNC: 105 MMOL/L (ref 98–107)
CLARITY: CLEAR
CO2: 22 MMOL/L (ref 22–29)
CO2: 24 MMOL/L (ref 22–29)
COCAINE METABOLITE SCREEN URINE: NOT DETECTED
COLOR: YELLOW
CREAT SERPL-MCNC: 1 MG/DL (ref 0.7–1.2)
CREAT SERPL-MCNC: 1.2 MG/DL (ref 0.7–1.2)
EKG ATRIAL RATE: 85 BPM
EKG P AXIS: 58 DEGREES
EKG P-R INTERVAL: 254 MS
EKG Q-T INTERVAL: 390 MS
EKG QRS DURATION: 86 MS
EKG QTC CALCULATION (BAZETT): 464 MS
EKG R AXIS: -32 DEGREES
EKG T AXIS: -11 DEGREES
EKG VENTRICULAR RATE: 85 BPM
EOSINOPHILS ABSOLUTE: 0.12 E9/L (ref 0.05–0.5)
EOSINOPHILS RELATIVE PERCENT: 1.6 % (ref 0–6)
ETHANOL: 137 MG/DL (ref 0–0.08)
ETHANOL: 208 MG/DL (ref 0–0.08)
FENTANYL SCREEN, URINE: NOT DETECTED
GFR SERPL CREATININE-BSD FRML MDRD: >60 ML/MIN/1.73
GFR SERPL CREATININE-BSD FRML MDRD: >60 ML/MIN/1.73
GLUCOSE BLD-MCNC: 124 MG/DL (ref 74–99)
GLUCOSE BLD-MCNC: 150 MG/DL (ref 74–99)
GLUCOSE URINE: NEGATIVE MG/DL
HCT VFR BLD CALC: 41.8 % (ref 37–54)
HEMOGLOBIN: 13.7 G/DL (ref 12.5–16.5)
IMMATURE GRANULOCYTES #: 0.04 E9/L
IMMATURE GRANULOCYTES %: 0.5 % (ref 0–5)
KETONES, URINE: NEGATIVE MG/DL
LEUKOCYTE ESTERASE, URINE: NEGATIVE
LYMPHOCYTES ABSOLUTE: 1.11 E9/L (ref 1.5–4)
LYMPHOCYTES RELATIVE PERCENT: 14.7 % (ref 20–42)
Lab: NORMAL
MAGNESIUM: 2 MG/DL (ref 1.6–2.6)
MAGNESIUM: 2 MG/DL (ref 1.6–2.6)
MCH RBC QN AUTO: 26.5 PG (ref 26–35)
MCHC RBC AUTO-ENTMCNC: 32.8 % (ref 32–34.5)
MCV RBC AUTO: 80.9 FL (ref 80–99.9)
METHADONE SCREEN, URINE: NOT DETECTED
MONOCYTES ABSOLUTE: 0.4 E9/L (ref 0.1–0.95)
MONOCYTES RELATIVE PERCENT: 5.3 % (ref 2–12)
NEUTROPHILS ABSOLUTE: 5.88 E9/L (ref 1.8–7.3)
NEUTROPHILS RELATIVE PERCENT: 77.6 % (ref 43–80)
NITRITE, URINE: NEGATIVE
OPIATE SCREEN URINE: NOT DETECTED
OXYCODONE URINE: NOT DETECTED
PDW BLD-RTO: 13.4 FL (ref 11.5–15)
PH UA: 5.5 (ref 5–9)
PHENCYCLIDINE SCREEN URINE: NOT DETECTED
PLATELET # BLD: 187 E9/L (ref 130–450)
PMV BLD AUTO: 10.7 FL (ref 7–12)
POTASSIUM REFLEX MAGNESIUM: 4.1 MMOL/L (ref 3.5–5)
POTASSIUM SERPL-SCNC: 3.5 MMOL/L (ref 3.5–5)
PROTEIN UA: NEGATIVE MG/DL
RBC # BLD: 5.17 E12/L (ref 3.8–5.8)
RBC UA: NORMAL /HPF (ref 0–2)
SALICYLATE, SERUM: <0.3 MG/DL (ref 0–30)
SARS-COV-2, NAAT: NOT DETECTED
SODIUM BLD-SCNC: 139 MMOL/L (ref 132–146)
SODIUM BLD-SCNC: 143 MMOL/L (ref 132–146)
SPECIFIC GRAVITY UA: 1.01 (ref 1–1.03)
TOTAL PROTEIN: 7.6 G/DL (ref 6.4–8.3)
TOTAL PROTEIN: 7.7 G/DL (ref 6.4–8.3)
TRICYCLIC ANTIDEPRESSANTS SCREEN SERUM: NEGATIVE NG/ML
UROBILINOGEN, URINE: 0.2 E.U./DL
WBC # BLD: 7.6 E9/L (ref 4.5–11.5)
WBC UA: NORMAL /HPF (ref 0–5)

## 2023-01-17 PROCEDURE — 87635 SARS-COV-2 COVID-19 AMP PRB: CPT

## 2023-01-17 PROCEDURE — 6370000000 HC RX 637 (ALT 250 FOR IP): Performed by: STUDENT IN AN ORGANIZED HEALTH CARE EDUCATION/TRAINING PROGRAM

## 2023-01-17 PROCEDURE — 1200000000 HC SEMI PRIVATE

## 2023-01-17 PROCEDURE — 93010 ELECTROCARDIOGRAM REPORT: CPT | Performed by: INTERNAL MEDICINE

## 2023-01-17 PROCEDURE — 93005 ELECTROCARDIOGRAM TRACING: CPT

## 2023-01-17 PROCEDURE — 82077 ASSAY SPEC XCP UR&BREATH IA: CPT

## 2023-01-17 PROCEDURE — 6360000002 HC RX W HCPCS: Performed by: STUDENT IN AN ORGANIZED HEALTH CARE EDUCATION/TRAINING PROGRAM

## 2023-01-17 PROCEDURE — 83735 ASSAY OF MAGNESIUM: CPT

## 2023-01-17 PROCEDURE — 85025 COMPLETE CBC W/AUTO DIFF WBC: CPT

## 2023-01-17 PROCEDURE — 36415 COLL VENOUS BLD VENIPUNCTURE: CPT

## 2023-01-17 PROCEDURE — 80053 COMPREHEN METABOLIC PANEL: CPT

## 2023-01-17 PROCEDURE — 2580000003 HC RX 258: Performed by: STUDENT IN AN ORGANIZED HEALTH CARE EDUCATION/TRAINING PROGRAM

## 2023-01-17 RX ORDER — ATORVASTATIN CALCIUM 40 MG/1
40 TABLET, FILM COATED ORAL NIGHTLY
Status: DISCONTINUED | OUTPATIENT
Start: 2023-01-17 | End: 2023-01-17 | Stop reason: HOSPADM

## 2023-01-17 RX ORDER — TRAZODONE HYDROCHLORIDE 50 MG/1
50 TABLET ORAL NIGHTLY
Status: DISCONTINUED | OUTPATIENT
Start: 2023-01-18 | End: 2023-01-17 | Stop reason: HOSPADM

## 2023-01-17 RX ORDER — ACETAMINOPHEN 325 MG/1
650 TABLET ORAL EVERY 6 HOURS PRN
Status: DISCONTINUED | OUTPATIENT
Start: 2023-01-17 | End: 2023-01-17 | Stop reason: HOSPADM

## 2023-01-17 RX ORDER — DONEPEZIL HYDROCHLORIDE 5 MG/1
5 TABLET, FILM COATED ORAL NIGHTLY
COMMUNITY

## 2023-01-17 RX ORDER — HYDROXYZINE PAMOATE 50 MG/1
50 CAPSULE ORAL DAILY
COMMUNITY

## 2023-01-17 RX ORDER — ENOXAPARIN SODIUM 100 MG/ML
40 INJECTION SUBCUTANEOUS DAILY
Status: DISCONTINUED | OUTPATIENT
Start: 2023-01-17 | End: 2023-01-17 | Stop reason: HOSPADM

## 2023-01-17 RX ORDER — DOCUSATE SODIUM 100 MG/1
100 CAPSULE, LIQUID FILLED ORAL 2 TIMES DAILY
Status: DISCONTINUED | OUTPATIENT
Start: 2023-01-17 | End: 2023-01-17 | Stop reason: HOSPADM

## 2023-01-17 RX ORDER — FOLIC ACID 1 MG/1
1 TABLET ORAL DAILY
Status: DISCONTINUED | OUTPATIENT
Start: 2023-01-17 | End: 2023-01-17 | Stop reason: HOSPADM

## 2023-01-17 RX ORDER — ACETAMINOPHEN 325 MG/1
650 TABLET ORAL EVERY 6 HOURS PRN
COMMUNITY

## 2023-01-17 RX ORDER — FOLIC ACID 1 MG/1
1 TABLET ORAL DAILY
Qty: 90 TABLET | Refills: 1 | DISCHARGE
Start: 2023-01-17

## 2023-01-17 RX ORDER — POLYETHYLENE GLYCOL 3350 17 G/17G
17 POWDER, FOR SOLUTION ORAL DAILY PRN
Status: DISCONTINUED | OUTPATIENT
Start: 2023-01-17 | End: 2023-01-17 | Stop reason: HOSPADM

## 2023-01-17 RX ORDER — WATER / MINERAL OIL / WHITE PETROLATUM 16 OZ
CREAM TOPICAL 2 TIMES DAILY
COMMUNITY

## 2023-01-17 RX ORDER — ACETAMINOPHEN 650 MG/1
650 SUPPOSITORY RECTAL EVERY 6 HOURS PRN
Status: DISCONTINUED | OUTPATIENT
Start: 2023-01-17 | End: 2023-01-17 | Stop reason: HOSPADM

## 2023-01-17 RX ORDER — SODIUM CHLORIDE 9 MG/ML
INJECTION, SOLUTION INTRAVENOUS PRN
Status: DISCONTINUED | OUTPATIENT
Start: 2023-01-17 | End: 2023-01-17 | Stop reason: HOSPADM

## 2023-01-17 RX ORDER — DONEPEZIL HYDROCHLORIDE 10 MG/1
5 TABLET, FILM COATED ORAL NIGHTLY
Status: DISCONTINUED | OUTPATIENT
Start: 2023-01-17 | End: 2023-01-17 | Stop reason: HOSPADM

## 2023-01-17 RX ORDER — ASPIRIN 81 MG/1
81 TABLET, CHEWABLE ORAL DAILY
Status: DISCONTINUED | OUTPATIENT
Start: 2023-01-17 | End: 2023-01-17 | Stop reason: HOSPADM

## 2023-01-17 RX ORDER — SODIUM CHLORIDE 0.9 % (FLUSH) 0.9 %
5-40 SYRINGE (ML) INJECTION EVERY 12 HOURS SCHEDULED
Status: DISCONTINUED | OUTPATIENT
Start: 2023-01-17 | End: 2023-01-17 | Stop reason: HOSPADM

## 2023-01-17 RX ORDER — M-VIT,TX,IRON,MINS/CALC/FOLIC 27MG-0.4MG
1 TABLET ORAL DAILY
COMMUNITY

## 2023-01-17 RX ORDER — LANOLIN ALCOHOL/MO/W.PET/CERES
100 CREAM (GRAM) TOPICAL DAILY
Status: DISCONTINUED | OUTPATIENT
Start: 2023-01-17 | End: 2023-01-17 | Stop reason: HOSPADM

## 2023-01-17 RX ORDER — ONDANSETRON 4 MG/1
4 TABLET, ORALLY DISINTEGRATING ORAL EVERY 8 HOURS PRN
Status: CANCELLED | OUTPATIENT
Start: 2023-01-17

## 2023-01-17 RX ORDER — METOPROLOL SUCCINATE 25 MG/1
25 TABLET, EXTENDED RELEASE ORAL DAILY
Status: DISCONTINUED | OUTPATIENT
Start: 2023-01-17 | End: 2023-01-17 | Stop reason: HOSPADM

## 2023-01-17 RX ORDER — ONDANSETRON 2 MG/ML
4 INJECTION INTRAMUSCULAR; INTRAVENOUS EVERY 6 HOURS PRN
Status: CANCELLED | OUTPATIENT
Start: 2023-01-17

## 2023-01-17 RX ORDER — TRAZODONE HYDROCHLORIDE 50 MG/1
50 TABLET ORAL NIGHTLY
COMMUNITY

## 2023-01-17 RX ORDER — SODIUM CHLORIDE 0.9 % (FLUSH) 0.9 %
5-40 SYRINGE (ML) INJECTION PRN
Status: DISCONTINUED | OUTPATIENT
Start: 2023-01-17 | End: 2023-01-17 | Stop reason: HOSPADM

## 2023-01-17 RX ADMIN — DOCUSATE SODIUM 100 MG: 100 CAPSULE, LIQUID FILLED ORAL at 08:46

## 2023-01-17 RX ADMIN — METOPROLOL SUCCINATE 25 MG: 25 TABLET, EXTENDED RELEASE ORAL at 08:46

## 2023-01-17 RX ADMIN — Medication 100 MG: at 08:46

## 2023-01-17 RX ADMIN — ENOXAPARIN SODIUM 40 MG: 100 INJECTION SUBCUTANEOUS at 08:46

## 2023-01-17 RX ADMIN — FOLIC ACID 1 MG: 1 TABLET ORAL at 08:46

## 2023-01-17 RX ADMIN — SODIUM CHLORIDE, PRESERVATIVE FREE 10 ML: 5 INJECTION INTRAVENOUS at 08:50

## 2023-01-17 RX ADMIN — ASPIRIN 81 MG: 81 TABLET, CHEWABLE ORAL at 08:46

## 2023-01-17 ASSESSMENT — ENCOUNTER SYMPTOMS
ABDOMINAL PAIN: 0
SHORTNESS OF BREATH: 0
NAUSEA: 0
DIARRHEA: 0
VOMITING: 0
CONSTIPATION: 0

## 2023-01-17 ASSESSMENT — PAIN SCALES - GENERAL: PAINLEVEL_OUTOF10: 0

## 2023-01-17 NOTE — PROGRESS NOTES
Perfect serve message to wound care regarding ostomy.  Electronically signed by Akila Cordero RN on 1/17/2023 at 3:57 AM

## 2023-01-17 NOTE — ED PROVIDER NOTES
19-year-old with a resident at [facility] with history of alcohol abuse but. He presents to the emergency department after he was let out for several hours when he walked back in to the facility he smelled of alcohol and he was stumbling he fell and hit his head. He is on no blood thinners. He is confused at baseline. He is alert to himself and place, but he denies falling he denies alcohol intake and he denies pain and he denies vomiting. He says he does not know why he is here and he feels fine. He denies review of systems however is unreliable due to patient's intoxicated state. Chief Complaint   Patient presents with    Fall     Patient left [place] for several hours and when walking back into the building, tripped and fell, striking the right side of his head, no thinners, pt confused at baseline, denies fall       Review of Systems   Unable to obtain due to patient's mental state. Physical Exam  Constitutional:       General: He is not in acute distress. Appearance: He is not diaphoretic. HENT:      Head: Normocephalic. Pulmonary:      Effort: Pulmonary effort is normal.      Breath sounds: Normal breath sounds. Abdominal:      Palpations: Abdomen is soft. Tenderness: There is no abdominal tenderness. Musculoskeletal:         General: No swelling, tenderness, deformity or signs of injury. Right lower leg: No edema. Left lower leg: No edema. Neurological:      Mental Status: He is alert. Comments: Patient is confused at baseline. He is able to blink his eyes and squeeze my hand. Procedures     LACERATION REPAIR  PROCEDURE NOTE:  Unless otherwise indicated, this procedure was done or directly supervised by the ED attending. Laceration #: 1. Location: scalp  Length: 1cm. The wound area was irrigated with sterile saline and draped in a sterile fashion. The wound area was anesthetized with not required.   WOUND COMPLEXITY:    Debridement: None.  Undermining: None. Wound Margins Revised: yes. Flaps Aligned: yes. The wound was explored with the following results no foreign body or tendon injury seen. The wound was closed with staples. Dressing:  gauze was placed. Total number and staples: 1     EKG: This EKG is signed by emergency department physician. Rate: 85  Rhythm: Sinus  AXIS:regular left  ST Changes: No ST elevation or hyperacute T waves. Interpretation: Normal sinus rhythm  Comparison: No acute changes. MDM     51-year-old male patient who is alert and oriented x2 at baseline and he is at [facility] facility for assisted living he is also demented has been getting worse recently. He also has a history of alcohol abuse. At the physical exam he was able to sign himself out and he came back smelling of alcohol and fell while he was getting back into the facility. This information was provided by the nurse who spoke with the nurse at the facility. Upon entering the room patient is alert to himself he knows he is in the hospital but he is not alert to the city or the year. He opens eyes and squeeze hands to command he is able to speak in full sentences. There is vomit on his gown. Patient was given Tigan and thiamine. Tigan because his previous EKG that was reviewed by me from 1/13/2021 had elevated QTC. Patient did have a vomiting episode where he was cleaned and dressed. He did have a small laceration posterior scalp that was closed with staple and wash with sterile saline. Labs were unremarkable for hyponatremia or infected white blood cell count. Serum drug screen is pending. CT of the head and cervical spine was taken to evaluate for cervical fracture and acute hemorrhaging however it was clear. Chest x-ray was clear as well. I spoke with family medicine attending to admit the patient for alcohol intoxication and placement along with consult with social work for he is not in the correct facility for his needs.   She agreed to admit the patient. My impression is alcohol intoxication due to patient's foul-smelling history of abuse. I considered brain hemorrhage however the patient CT head did not show acute brain bleeding and patient is at baseline. ED Course as of 01/17/23 0130   Mon Jan 16, 2023   2303 Is primary nurse was able to speak with a nurse at the facility at [facility] where he resides in assisted living. Patient's dementia is baseline AAO x2 but is been worsening slightly recently. Patient does have a history of alcohol abuse. He is able to sign himself out whenever he wishes. He signed himself out today was gone all day came back intoxicated and fell in his room. He is at his baseline mental status is ANO x2. He is oriented to person and place. It appears at this time he may need a higher level of care perhaps a locked dementia unit. He does have history of dementia and appears to be worsening. [KK]   Tue Jan 17, 2023   0002 CT of the head and cervical spine was ordered to evaluate for brain bleed, acute abdominal fractures and lesions. The imaging results were reviewed and read no acute intracranial abnormality. No brain bleed. [MN]   0003 Chest x-ray was ordered to review for consolidation effusions or interstitial infiltrates which were all negative on chest x-ray. [MN]   0010 Upon reevaluation patient is sleeping comfortably he had a vomiting episode and the patient was changed and washed. Nurse that the patient became aggravated when trying to take urine sample. Patient has no infectious calcium levels within normal limits. CMP and magnesium are pending   [MN]   0118 CMP is within normal limits sodium is within normal notes. [MN]   0126 EKG shows normal sinus rhythm 85 beats minute no signs of ST changes no ST elevation . No change in prior EKG.   EKG interpreted by myself [KK]      ED Course User Index  [KK] Julienne King MD  [MN] Carolann Jiang,         ED Course as of 01/17/23 0130   Mon Jan 16, 2023   2303 Is primary nurse was able to speak with a nurse at the facility at [facility] where he resides in assisted living. Patient's dementia is baseline AAO x2 but is been worsening slightly recently. Patient does have a history of alcohol abuse. He is able to sign himself out whenever he wishes. He signed himself out today was gone all day came back intoxicated and fell in his room. He is at his baseline mental status is ANO x2. He is oriented to person and place. It appears at this time he may need a higher level of care perhaps a locked dementia unit. He does have history of dementia and appears to be worsening. [KK]   Tue Jan 17, 2023   0002 CT of the head and cervical spine was ordered to evaluate for brain bleed, acute abdominal fractures and lesions. The imaging results were reviewed and read no acute intracranial abnormality. No brain bleed. [MN]   0003 Chest x-ray was ordered to review for consolidation effusions or interstitial infiltrates which were all negative on chest x-ray. [MN]   0010 Upon reevaluation patient is sleeping comfortably he had a vomiting episode and the patient was changed and washed. Nurse that the patient became aggravated when trying to take urine sample. Patient has no infectious calcium levels within normal limits. CMP and magnesium are pending   [MN]   0472 CMP is within normal limits sodium is within normal notes. [MN]   0126 EKG shows normal sinus rhythm 85 beats minute no signs of ST changes no ST elevation . No change in prior EKG.   EKG interpreted by myself [KK]      ED Course User Index  [KK] Elijah Guevara MD  [MN] Elvira Garcia, DO     Medications   thiamine (B-1) injection 100 mg (100 mg IntraVENous Given 1/16/23 2320)   trimethobenzamide (TIGAN) injection 200 mg (200 mg IntraMUSCular Given 1/16/23 2321)        --------------------------------------------- PAST HISTORY ---------------------------------------------  Past Medical History:  has a past medical history of CAD (coronary artery disease), HTN (hypertension), Hypercholesteremia, Hypertension, and Sleep apnea. Past Surgical History:  has a past surgical history that includes Abdomen surgery (Left, 10/14/2021); laparotomy (N/A, 2/24/2022); Cystoscopy (N/A, 2/24/2022); and Colonoscopy (N/A, 3/1/2022). Social History:  reports that he quit smoking about 22 years ago. He has a 6.00 pack-year smoking history. He has never used smokeless tobacco. He reports current alcohol use of about 5.0 standard drinks per week. He reports that he does not currently use drugs. Family History: family history is not on file. The patients home medications have been reviewed. Allergies: Patient has no known allergies.     -------------------------------------------------- RESULTS -------------------------------------------------    LABS:  Results for orders placed or performed during the hospital encounter of 01/16/23   CBC with Auto Differential   Result Value Ref Range    WBC 7.8 4.5 - 11.5 E9/L    RBC 5.02 3.80 - 5.80 E12/L    Hemoglobin 13.6 12.5 - 16.5 g/dL    Hematocrit 41.2 37.0 - 54.0 %    MCV 82.1 80.0 - 99.9 fL    MCH 27.1 26.0 - 35.0 pg    MCHC 33.0 32.0 - 34.5 %    RDW 13.3 11.5 - 15.0 fL    Platelets 592 531 - 535 E9/L    MPV 11.0 7.0 - 12.0 fL    Neutrophils % 81.6 (H) 43.0 - 80.0 %    Immature Granulocytes % 0.6 0.0 - 5.0 %    Lymphocytes % 8.6 (L) 20.0 - 42.0 %    Monocytes % 6.3 2.0 - 12.0 %    Eosinophils % 2.6 0.0 - 6.0 %    Basophils % 0.3 0.0 - 2.0 %    Neutrophils Absolute 6.37 1.80 - 7.30 E9/L    Immature Granulocytes # 0.05 E9/L    Lymphocytes Absolute 0.67 (L) 1.50 - 4.00 E9/L    Monocytes Absolute 0.49 0.10 - 0.95 E9/L    Eosinophils Absolute 0.20 0.05 - 0.50 E9/L    Basophils Absolute 0.02 0.00 - 0.20 E9/L   Comprehensive Metabolic Panel   Result Value Ref Range    Sodium 139 132 - 146 mmol/L    Potassium 3.5 3.5 - 5.0 mmol/L    Chloride 103 98 - 107 mmol/L    CO2 22 22 - 29 mmol/L    Anion Gap 14 7 - 16 mmol/L    Glucose 124 (H) 74 - 99 mg/dL    BUN 14 6 - 20 mg/dL    Creatinine 1.2 0.7 - 1.2 mg/dL    Est, Glom Filt Rate >60 >=60 mL/min/1.73    Calcium 9.2 8.6 - 10.2 mg/dL    Total Protein 7.7 6.4 - 8.3 g/dL    Albumin 4.0 3.5 - 5.2 g/dL    Total Bilirubin 0.5 0.0 - 1.2 mg/dL    Alkaline Phosphatase 116 40 - 129 U/L    ALT 29 0 - 40 U/L    AST 24 0 - 39 U/L   Magnesium   Result Value Ref Range    Magnesium 2.0 1.6 - 2.6 mg/dL   Urinalysis with Microscopic   Result Value Ref Range    Color, UA Yellow Straw/Yellow    Clarity, UA Clear Clear    Glucose, Ur Negative Negative mg/dL    Bilirubin Urine Negative Negative    Ketones, Urine Negative Negative mg/dL    Specific Gravity, UA 1.015 1.005 - 1.030    Blood, Urine Negative Negative    pH, UA 5.5 5.0 - 9.0    Protein, UA Negative Negative mg/dL    Urobilinogen, Urine 0.2 <2.0 E.U./dL    Nitrite, Urine Negative Negative    Leukocyte Esterase, Urine Negative Negative    WBC, UA NONE 0 - 5 /HPF    RBC, UA NONE 0 - 2 /HPF    Bacteria, UA NONE SEEN None Seen /HPF   URINE DRUG SCREEN   Result Value Ref Range    Drug Screen Comment: see below        RADIOLOGY:  CT HEAD WO CONTRAST   Final Result   No acute intracranial abnormality. CT CERVICAL SPINE WO CONTRAST   Final Result   No acute abnormality of the cervical spine. XR CHEST PORTABLE   Final Result   No acute process. ------------------------- NURSING NOTES AND VITALS REVIEWED ---------------------------  Date / Time Roomed:  1/16/2023  9:42 PM  ED Bed Assignment:  05/05    The nursing notes within the ED encounter and vital signs as below have been reviewed.      Patient Vitals for the past 24 hrs:   BP Temp Temp src Pulse Resp SpO2 Height Weight   01/17/23 0123 136/70 98.5 °F (36.9 °C) Oral 72 14 -- -- --   01/16/23 2207 126/75 98.5 °F (36.9 °C) Oral 80 14 92 % -- --   01/16/23 2204 -- 98.5 °F (36.9 °C) Oral 80 14 92 % 6' 2\" (1.88 m) 220 lb (99.8 kg)       Oxygen Saturation Interpretation: Normal      Counseling:  I have spoken with the patient and discussed todays results, in addition to providing specific details for the plan of care and counseling regarding the diagnosis and prognosis. Their questions are answered at this time and they are agreeable with the plan of admission. This patient has remained hemodynamically stable during their ED course. Diagnosis:  1. ETOH abuse    2. Laceration of scalp, initial encounter    3. Fall, initial encounter    4. Dementia, unspecified dementia severity, unspecified dementia type, unspecified whether behavioral, psychotic, or mood disturbance or anxiety ([facility] [address]        Disposition:  Patient's disposition: Admit to med/surg floor for alcohol intoxication and social work consult to place admit  Patient's condition is stable. Attending was present and available throughout encounter including all critical portions;  See Attending Note/Attestation for Final Plan      Kingsley Vieyra, DO  Resident  01/17/23 [address] Dev Leal, DO  Resident  01/17/23 0108

## 2023-01-17 NOTE — CARE COORDINATION
Discharge order noted  Received notification from [facility] that facility can accept patient  today. Non emergency transportation has been arranged with Clarke Levine w/[facility]ph Olivo,  time 4 PM.  Patient, facility liaison and bedside nurse notified of scheduled  time. Stefania Liu, MSN RN  [facility] Case Management  [phone]

## 2023-01-17 NOTE — DISCHARGE INSTR - COC
Continuity of Care Form    Patient Name: Nikolas Tavarez   :  1965  MRN:  [id number]    Admit date:  2023  Discharge date:  23    Code Status Order: Full Code   Advance Directives:     Admitting Physician:  Steven Matute MD  PCP: Miguel Warren DO    Discharging Nurse: [facility] Unit/Room#: 7872/1776-B  Discharging Unit Phone Number: [phone]    Emergency Contact:   Extended Emergency Contact Information  Primary Emergency Contact: [facility] Phone: [phone]  Mobile Phone: [phone]  Relation: Other  Preferred language: English   needed? No  Secondary Emergency Contact: [facility] Phone: [phone]  Mobile Phone: [phone]  Relation: Brother/Sister  Preferred language: English   needed? 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     Active Problems:  Patient Active Problem List   Diagnosis Code    Lightheadedness R42    Acute cystitis with hematuria N30.01    Chest pain R07.9    Acute psychosis ([facility] [address] F23    Cognitive disorder F09    CAD (coronary artery disease) I25.10    Hypertensive urgency I16.0    MARLEE (obstructive sleep apnea) G47.33    Class 1 obesity in adult E66.9    Abnormal stress test R94.39    LV dysfunction I51.9    Thrombocytopenia (HCC) D69.6 Hyperbilirubinemia E80.6    Alcohol abuse F10.10    HLD (hyperlipidemia) E78.5    Sigmoid diverticulitis K57.32    Mild protein-calorie malnutrition (HCC) E44.1    Status post incision and drainage Z98.890    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    Alcohol intoxication, uncomplicated (HCC) Y80.153    Dementia ([address] F03.90       Isolation/Infection:   Isolation            No Isolation          Patient Infection Status       Infection Onset Added Last Indicated Last Indicated By Review Planned Expiration Resolved Resolved By    None active    Resolved    COVID-19 (Rule Out) 06/05/21 06/05/21 06/05/21 COVID-19 & Influenza Combo (Ordered)   06/05/21 Rule-Out Test Resulted            Nurse Assessment:  Last Vital Signs: /63   Pulse 83   Temp 98.1 °F (36.7 °C) (Oral)   Resp 18   Ht 6' 2\" (1.88 m)   Wt 220 lb (99.8 kg)   SpO2 94%   BMI 28.25 kg/m²     Last documented pain score (0-10 scale): Pain Level: 0  Last Weight:   Wt Readings from Last 1 Encounters:   01/16/23 220 lb (99.8 kg)     Mental Status:  oriented    IV Access:  - None    Nursing Mobility/ADLs:  Walking   Independent  Transfer  Independent  Bathing  Independent  Dressing  Independent  Toileting  Independent  Feeding  Independent  Med Admin  Independent  Med Delivery   whole    Wound Care Documentation and Therapy:  Wound 01/16/23 Knee Anterior;Medial open area on left medial knee (Active)   Dressing/Treatment Open to air 01/17/23 1150   Wound Length (cm) 2.4 cm 01/17/23 0325   Wound Width (cm) 1.3 cm 01/17/23 0325   Wound Surface Area (cm^2) 3.12 cm^2 01/17/23 0325   Wound Assessment Pink/red;Dry 01/17/23 1150   Drainage Amount None 01/17/23 1150   Number of days: 0       Wound 1 cm, non-bleeding wound to right parietal skull (Active)   Dressing/Treatment Open to air 01/17/23 1150   Number of days:         Elimination:  Continence:    Bowel: osotmy  Bladder: Yes  Urinary Catheter: None   Colostomy/Ileostomy/Ileal Conduit: Yes       Date of Last BM: 1/17/23    Intake/Output Summary (Last 24 hours) at 1/17/2023 1216  Last data filed at 1/17/2023 0745  Gross per 24 hour   Intake 240 ml   Output --   Net 240 ml     No intake/output data recorded. Safety Concerns: At Risk for Falls    Impairments/Disabilities:      None    Nutrition Therapy:  Current Nutrition Therapy:   - Oral Diet:  Low Fat    Routes of Feeding: Oral  Liquids: No Restrictions  Daily Fluid Restriction: no  Last Modified Barium Swallow with Video (Video Swallowing Test): not done    Treatments at the Time of Hospital Discharge:   Respiratory Treatments: ***  Oxygen Therapy:  is not on home oxygen therapy.   Ventilator:    - No ventilator support    Rehab Therapies: Physical Therapy and Occupational Therapy  Weight Bearing Status/Restrictions: No weight bearing restrictions  Other Medical Equipment (for information only, NOT a DME order):  {EQUIPMENT:[id number]}  Other Treatments: ***    Patient's personal belongings (please select all that are sent with patient):  None    RN SIGNATURE:  Electronically signed by Clemente Parker RN on 1/17/23 at 3:07 PM EST    CASE MANAGEMENT/SOCIAL WORK SECTION    Inpatient Status Date: ***    Readmission Risk Assessment Score:  Readmission Risk              Risk of Unplanned Readmission:  11           Discharging to Facility/ Agency   Name: [address] [facility]  [address]  Fax:[phone]    Dialysis Facility (if applicable)   Name:  Address:  Dialysis Schedule:  Phone:  Fax:    / signature: Electronically signed by EDWARD Isabel on 1/17/23 at 12:17 PM EST    PHYSICIAN SECTION    Prognosis: {Prognosis:1577569859}    Condition at Discharge: Stable    Rehab Potential (if transferring to Rehab): {Prognosis:8457418361}    Recommended Labs or Other Treatments After Discharge: ***    Physician Certification: I certify the above information and transfer of Oanh Ambrose  is necessary for the continuing treatment of the diagnosis listed and that he requires Intermediate Nursing Care for greater 30 days.      Update Admission H&P: {CHP DME Changes in CKEOZ:582521292}    PHYSICIAN SIGNATURE:  {Esignature:[id number]}

## 2023-01-17 NOTE — ED NOTES
Patient has been non-compliant and combative with staff and has had several emesis of undigested food; provider notified.      [facility]  01/17/23 0764

## 2023-01-17 NOTE — ED NOTES
Patient refusing to wear cervical collar and EKG; provider aware     Maninder Kenny RN  01/16/23 5422

## 2023-01-17 NOTE — PROGRESS NOTES
[facility]  Progress Note    Chief complaint :  Chief Complaint   Patient presents with    Fall     Patient left [place] for several hours and when walking back into the building, tripped and fell, striking the right side of his head, no thinners, pt confused at baseline, denies fall       Subjective:    Patient reports he is feeling well. Denies nausea vomiting, fever or chills. Reports he does not know why he is in the hospital, he does not remember drinking yesterday. Past medical, surgical, family and social history were reviewed, non-contributory, and unchanged unless otherwise stated. Review of Systems   Constitutional:  Negative for chills and fever. Respiratory:  Negative for shortness of breath. Cardiovascular:  Negative for chest pain. Gastrointestinal:  Negative for abdominal pain, constipation, diarrhea, nausea and vomiting. Neurological:  Negative for tremors. Objective:  /63   Pulse 83   Temp 98.1 °F (36.7 °C) (Oral)   Resp 18   Ht 6' 2\" (1.88 m)   Wt 220 lb (99.8 kg)   SpO2 94%   BMI 28.25 kg/m²     Physical Exam  Constitutional:       General: He is not in acute distress. HENT:      Head: Normocephalic and atraumatic. Eyes:      Extraocular Movements: Extraocular movements intact. Cardiovascular:      Rate and Rhythm: Normal rate and regular rhythm. Heart sounds: No murmur heard. No friction rub. No gallop. Pulmonary:      Effort: No respiratory distress. Breath sounds: No wheezing, rhonchi or rales. Abdominal:      General: There is no distension. Tenderness: There is no abdominal tenderness. There is no guarding. Musculoskeletal:      Right lower leg: No edema. Left lower leg: No edema. Skin:     Comments: Patient has 1 staple visible posterior scalp.      Labs:  Recent Results (from the past 24 hour(s))   CBC with Auto Differential    Collection Time: 01/16/23 11:20 PM   Result Value Ref Range    WBC 7.8 4.5 - 11.5 E9/L    RBC 5.02 3.80 - 5.80 E12/L    Hemoglobin 13.6 12.5 - 16.5 g/dL    Hematocrit 41.2 37.0 - 54.0 %    MCV 82.1 80.0 - 99.9 fL    MCH 27.1 26.0 - 35.0 pg    MCHC 33.0 32.0 - 34.5 %    RDW 13.3 11.5 - 15.0 fL    Platelets 399 971 - 383 E9/L    MPV 11.0 7.0 - 12.0 fL    Neutrophils % 81.6 (H) 43.0 - 80.0 %    Immature Granulocytes % 0.6 0.0 - 5.0 %    Lymphocytes % 8.6 (L) 20.0 - 42.0 %    Monocytes % 6.3 2.0 - 12.0 %    Eosinophils % 2.6 0.0 - 6.0 %    Basophils % 0.3 0.0 - 2.0 %    Neutrophils Absolute 6.37 1.80 - 7.30 E9/L    Immature Granulocytes # 0.05 E9/L    Lymphocytes Absolute 0.67 (L) 1.50 - 4.00 E9/L    Monocytes Absolute 0.49 0.10 - 0.95 E9/L    Eosinophils Absolute 0.20 0.05 - 0.50 E9/L    Basophils Absolute 0.02 0.00 - 0.20 E9/L   Comprehensive Metabolic Panel    Collection Time: 01/16/23 11:20 PM   Result Value Ref Range    Sodium 139 132 - 146 mmol/L    Potassium 3.5 3.5 - 5.0 mmol/L    Chloride 103 98 - 107 mmol/L    CO2 22 22 - 29 mmol/L    Anion Gap 14 7 - 16 mmol/L    Glucose 124 (H) 74 - 99 mg/dL    BUN 14 6 - 20 mg/dL    Creatinine 1.2 0.7 - 1.2 mg/dL    Est, Glom Filt Rate >60 >=60 mL/min/1.73    Calcium 9.2 8.6 - 10.2 mg/dL    Total Protein 7.7 6.4 - 8.3 g/dL    Albumin 4.0 3.5 - 5.2 g/dL    Total Bilirubin 0.5 0.0 - 1.2 mg/dL    Alkaline Phosphatase 116 40 - 129 U/L    ALT 29 0 - 40 U/L    AST 24 0 - 39 U/L   Magnesium    Collection Time: 01/16/23 11:20 PM   Result Value Ref Range    Magnesium 2.0 1.6 - 2.6 mg/dL   Serum Drug Screen    Collection Time: 01/16/23 11:20 PM   Result Value Ref Range    Ethanol Lvl 208 mg/dL    Acetaminophen Level <5.0 (L) 10.0 - 05.9 mcg/mL    Salicylate, Serum <6.5 0.0 - 30.0 mg/dL    TCA Scrn NEGATIVE Cutoff:300 ng/mL   Urinalysis with Microscopic    Collection Time: 01/16/23 11:20 PM   Result Value Ref Range    Color, UA Yellow Straw/Yellow    Clarity, UA Clear Clear    Glucose, Ur Negative Negative mg/dL    Bilirubin Urine Negative Negative    Ketones, Urine Negative Negative mg/dL    Specific Gravity, UA 1.015 1.005 - 1.030    Blood, Urine Negative Negative    pH, UA 5.5 5.0 - 9.0    Protein, UA Negative Negative mg/dL    Urobilinogen, Urine 0.2 <2.0 E.U./dL    Nitrite, Urine Negative Negative    Leukocyte Esterase, Urine Negative Negative    WBC, UA NONE 0 - 5 /HPF    RBC, UA NONE 0 - 2 /HPF    Bacteria, UA NONE SEEN None Seen /HPF   URINE DRUG SCREEN    Collection Time: 01/16/23 11:20 PM   Result Value Ref Range    Amphetamine Screen, Urine NOT DETECTED Negative <1000 ng/mL    Barbiturate Screen, Ur NOT DETECTED Negative < 200 ng/mL    Benzodiazepine Screen, Urine NOT DETECTED Negative < 200 ng/mL    Cannabinoid Scrn, Ur NOT DETECTED Negative < 50ng/mL    Cocaine Metabolite Screen, Urine NOT DETECTED Negative < 300 ng/mL    Opiate Scrn, Ur NOT DETECTED Negative < 300ng/mL    PCP Screen, Urine NOT DETECTED Negative < 25 ng/mL    Methadone Screen, Urine NOT DETECTED Negative <300 ng/mL    Oxycodone Urine NOT DETECTED Negative <100 ng/mL    FENTANYL SCREEN, URINE NOT DETECTED Negative <1 ng/mL    Drug Screen Comment: see below    EKG 12 Lead    Collection Time: 01/17/23 12:44 AM   Result Value Ref Range    Ventricular Rate 85 BPM    Atrial Rate 85 BPM    P-R Interval 254 ms    QRS Duration 86 ms    Q-T Interval 390 ms    QTc Calculation (Bazett) 464 ms    P Axis 58 degrees    R Axis -32 degrees    T Axis -11 degrees   Comprehensive Metabolic Panel w/ Reflex to MG    Collection Time: 01/17/23  4:54 AM   Result Value Ref Range    Sodium 143 132 - 146 mmol/L    Potassium reflex Magnesium 4.1 3.5 - 5.0 mmol/L    Chloride 105 98 - 107 mmol/L    CO2 24 22 - 29 mmol/L    Anion Gap 14 7 - 16 mmol/L    Glucose 150 (H) 74 - 99 mg/dL    BUN 11 6 - 20 mg/dL    Creatinine 1.0 0.7 - 1.2 mg/dL    Est, Glom Filt Rate >60 >=60 mL/min/1.73    Calcium 9.2 8.6 - 10.2 mg/dL    Total Protein 7.6 6.4 - 8.3 g/dL    Albumin 3.8 3.5 - 5.2 g/dL    Total  Bilirubin 0.5 0.0 - 1.2 mg/dL    Alkaline Phosphatase 115 40 - 129 U/L    ALT 28 0 - 40 U/L    AST 30 0 - 39 U/L   CBC with Auto Differential    Collection Time: 01/17/23  4:54 AM   Result Value Ref Range    WBC 7.6 4.5 - 11.5 E9/L    RBC 5.17 3.80 - 5.80 E12/L    Hemoglobin 13.7 12.5 - 16.5 g/dL    Hematocrit 41.8 37.0 - 54.0 %    MCV 80.9 80.0 - 99.9 fL    MCH 26.5 26.0 - 35.0 pg    MCHC 32.8 32.0 - 34.5 %    RDW 13.4 11.5 - 15.0 fL    Platelets 123 212 - 791 E9/L    MPV 10.7 7.0 - 12.0 fL    Neutrophils % 77.6 43.0 - 80.0 %    Immature Granulocytes % 0.5 0.0 - 5.0 %    Lymphocytes % 14.7 (L) 20.0 - 42.0 %    Monocytes % 5.3 2.0 - 12.0 %    Eosinophils % 1.6 0.0 - 6.0 %    Basophils % 0.3 0.0 - 2.0 %    Neutrophils Absolute 5.88 1.80 - 7.30 E9/L    Immature Granulocytes # 0.04 E9/L    Lymphocytes Absolute 1.11 (L) 1.50 - 4.00 E9/L    Monocytes Absolute 0.40 0.10 - 0.95 E9/L    Eosinophils Absolute 0.12 0.05 - 0.50 E9/L    Basophils Absolute 0.02 0.00 - 0.20 E9/L   Magnesium    Collection Time: 01/17/23  4:54 AM   Result Value Ref Range    Magnesium 2.0 1.6 - 2.6 mg/dL   Ethanol    Collection Time: 01/17/23  4:54 AM   Result Value Ref Range    Ethanol Lvl 137 mg/dL       Radiology and other tests reviewed:  CT HEAD WO CONTRAST   Final Result   No acute intracranial abnormality. CT CERVICAL SPINE WO CONTRAST   Final Result   No acute abnormality of the cervical spine. XR CHEST PORTABLE   Final Result   No acute process.              Assessment:  Active Hospital Problems    Diagnosis Date Noted    Alcohol intoxication, uncomplicated ([facility] [E75.101] 01/17/2023     Priority: Medium    Dementia ([facility] [F03.90] 01/17/2023     Priority: Medium    Alcohol abuse [F10.10] 07/15/2021    CAD (coronary artery disease) [I25.10] 07/15/2021       Plan:    Alcohol intoxication  EtOH level 137.  -CIWA without Ativan  -Seizure precautions  -Fall precautions  -Thiamine 100 mg daily  -Social work consulted, appreciate input, needs discussion for placement particular regarding [facility] and whether or not he was in the memory care unit.      CAD  -Home Metoprolol 25 mg daily  -Home ASA 81 mg daily  -Home Lipitor 40 mg daily     Sleep disturbance  -Home Trazodone 50 mg nightly     Dementia  -Home Aricept 5 mg nightly     Constipation  -Home Colace 100 mg BID  -Miralax 17 g PRN           Pain Control:  Tylenol 650 mg Q6HR PRN for mild pain  DVT ppx: Lovenox 40 mg daily  GI ppx: None  Code Status: Full  Diet: Cardiac diet    Disposition: Discharge to facility pending clinical course    [facility]   Family Medicine Resident PGY-1  01/17/23   7:31 AM

## 2023-01-17 NOTE — DISCHARGE SUMMARY
Physician Discharge Summary  [facility]     Patient ID:  Talia Shaw  [id number]  31 y.o.  1965    Admit date: 1/16/2023    Discharge date: 1/17/2023    Admission Diagnoses:   Alcohol abuse [F10.10]  ETOH abuse [F10.10]  Fall, initial encounter [W19. XXXA]  Laceration of scalp, initial encounter [S01.01XA]  Alcohol intoxication, uncomplicated (HCC) [S53.645]  Dementia, unspecified dementia severity, unspecified dementia type, unspecified whether behavioral, psychotic, or mood disturbance or anxiety ([facility] [F03.90]    Discharge Diagnoses:  Principal Problem (Resolved):    Alcohol intoxication, uncomplicated ([facility]  Active Problems:    Dementia ([facility]    CAD (coronary artery disease)    Alcohol abuse      Consults: None  Procedures: None    Hospital Course: Talia Shaw  is a 62 y.o. male patient of [facility], DO  with a pertinent PMHx of CAD, HTN, diverticulitis, HLD, dementia, and alcohol dependence who presented to the ER from [facility] via EMS due to alcohol intoxication and was admitted for alcohol intoxication and placement. Friend picked him up yesterday evening and signed him out of the facility. The patient is a part of their dementia and memory care unit, and cannot leave on his own. However, the facility cannot stop the patient from leaving if a friend picks him up and signs them out per my discussion with social work from facility. The patient suffered a scalp laceration upon falling when he was intoxicated. Chest x-ray, CT head, CT cervical area showed no acute process. 1 staple was inserted into the scalp in the emergency department. He will need the staple removed in 7 to 10 days. The patient has problems with his memory at baseline. He does not know why he is in the hospital and does not remember drinking. A [employer] cognitive assessment (MoCA) was performed on which the patient scored a 20/30.   He has remained asymptomatic overnight with no acute signs or symptoms of alcohol withdrawal.  Vitals stable at discharge. He was continued on his home medications, and should resume taking them as well as folate and thiamine at discharge. Per past psychiatric records, it appears as though the patient was treated with Depakote previously. It may be beneficial for the patient to be treated with this again in the outpatient setting. Patient was discharged to facility in a stable and improved condition. Significant Diagnostic Studies:   CT HEAD WO CONTRAST   Final Result   No acute intracranial abnormality. CT CERVICAL SPINE WO CONTRAST   Final Result   No acute abnormality of the cervical spine. XR CHEST PORTABLE   Final Result   No acute process. Post Discharge Follow up: Schedule an appointment with PCP, Dr. Aleksandr Guzman, as soon as possible. Medication changes: See list below    Discharge Exam: See progress note from today    Disposition: [facility]  Patient condition: stable    Patient Instructions: Activity: activity as tolerated  Diet: regular diet    Discharge Medication List:    Current Discharge Medication List        CONTINUE these medications which have NOT CHANGED    Details   donepezil (ARICEPT) 5 MG tablet Take 5 mg by mouth nightly      traZODone (DESYREL) 50 MG tablet Take 50 mg by mouth nightly      hydrOXYzine pamoate (VISTARIL) 50 MG capsule Take 50 mg by mouth daily      Multiple Vitamins-Minerals (THERAPEUTIC MULTIVITAMIN-MINERALS) tablet Take 1 tablet by mouth daily      acetaminophen (TYLENOL) 325 MG tablet Take 650 mg by mouth every 6 hours as needed for Pain      Skin Protectants, Misc. (EUCERIN) cream Apply topically in the morning and at bedtime Apply topically as needed.       melatonin 3 MG TABS tablet Take 5 mg by mouth nightly      docusate sodium (COLACE) 100 MG capsule Take 100 mg by mouth every 6 hours as needed      albuterol sulfate HFA (PROVENTIL;VENTOLIN;PROAIR) 108 (90 Base) MCG/ACT inhaler Inhale 2 puffs into the lungs every 6 hours as needed for Shortness of Breath (COUGH)       aspirin 81 MG chewable tablet Take 1 tablet by mouth daily  Qty: 30 tablet, Refills: 3      atorvastatin (LIPITOR) 40 MG tablet Take 1 tablet by mouth nightly  Qty: 30 tablet, Refills: 3      metoprolol succinate (TOPROL XL) 25 MG extended release tablet Take 1 tablet by mouth daily  Qty: 30 tablet, Refills: 3      thiamine mononitrate (THIAMINE) 100 MG tablet Take 1 tablet by mouth daily  Qty: 90 tablet, Refills: 0            Current Discharge Medication List         Current Discharge Medication List        STOP taking these medications       polyethylene glycol (GLYCOLAX) 17 GM/SCOOP powder Comments:   Reason for Stopping:         senna-docusate (PERICOLACE) 8.6-50 MG per tablet Comments:   Reason for Stopping:         LORazepam (ATIVAN) 1 MG tablet Comments:   Reason for Stopping:             Current Discharge Medication List        CONTINUE these medications which have CHANGED    Details   folic acid (FOLVITE) 1 MG tablet Take 1 tablet by mouth daily  Qty: 90 tablet, Refills: 1               Follow up:  [address] [facility]  [address]        [place] [phone]    Schedule an appointment as soon as possible for a visit        Nabor Sewell DO  Family Medicine Resident PGY-1  01/17/23   2:26 PM

## 2023-01-17 NOTE — CARE COORDINATION
Social work / Discharge Planning:         Patient is a Medicaid bed hold from [facility]. Social work spoke to liaison to discuss circumstances that brought patient to the hospital.    Facility states that they have not had problems with patient leaving in the past but patient does have the right to leave for [facility]. Facility can accept back with no precert but will need negative covid result prior to return. Social work spoke to patient. He was alert and oriented. Patient states that [facility] is his home and he intends to return there. CLAUDIA and transport form completed.     Electronically signed by EDWARD Butcher on 1/17/2023 at 12:16 PM

## 2023-01-17 NOTE — PATIENT CARE CONFERENCE
P Quality Flow/Interdisciplinary Rounds Progress Note        Quality Flow Rounds held on January 17, 2023    Disciplines Attending:  Bedside Nurse, , , and Nursing Unit Renzo Oliver 14 R Angelo Duong was admitted on 1/16/2023  9:42 PM    Anticipated Discharge Date:       Disposition:    Ben Score:  Ben Scale Score: 18    Readmission Risk              Risk of Unplanned Readmission:  11           Discussed patient goal for the day, patient clinical progression, and barriers to discharge.   The following Goal(s) of the Day/Commitment(s) have been identified:  Occupational Therapy - Obtain Consult Order and Physical Therapy - Obtain Consult Order      Natalia Espino RN  January 17, 2023

## 2023-01-17 NOTE — PROGRESS NOTES
Dr. Serg Villasenor on call. Perfect serve message sent regarding home med rec updated.   Electronically signed by Cecilia Turner RN on 1/17/2023 at 3:54 AM

## 2023-01-17 NOTE — H&P
[facility]  Resident History and Physical      CHIEF COMPLAINT:    Chief Complaint   Patient presents with    Fall     Patient left [place] for several hours and when walking back into the building, tripped and fell, striking the right side of his head, no thinners, pt confused at baseline, denies fall        History of Present Illness:   Getachew العلي  is a 62 y.o. male patient of [facility]   with a pertinent PMHx of CAD, HTN, diverticulitis, HLD, dementia, alcohol dependence who presented to the ER from [facility] via EMS due to alcohol intoxication. History was obtained from EMS and nursing staff at [facility]. Patient had left the premises earlier in the evening and upon returning to [facility], he was found to be drunk. Per nursing, the residents are allowed to sign out and sign back in to the facility. He also sustained a fall and hit his head per nurse. He is not on current blood thinners. Patient denies any falls or intake of alcohol. He has some confusion at baseline. History was limited due to lack of patient participation. ED course: Vitals were unremarkable. Labs were unremarkable. UA was unremarkable. CXR was remarkable for  no acute process . CT head without contrast was unremarkable. CT cervical spine wo contrast was unremarkable. In the ER, he was treated with Thiamine 100 mg x1 and Tigan 200 mg x1. Family Medicine was consulted to admit the patient for alcohol intoxication and placement.     ROS:     Review of Systems   Unable to perform ROS: Mental status change       Past Medical History:   Diagnosis Date    CAD (coronary artery disease)     HTN (hypertension)     Hypercholesteremia     Hypertension     Sleep apnea          Past Surgical History:   Procedure Laterality Date    ABDOMINAL SURGERY Left 10/14/2021    ABDOMEN INCISION AND DRAINAGE performed by Pavan Majano MD at [address] [facility] N/A 3/1/2022    COLONOSCOPY DIAGNOSTIC performed by Rafita Hines MD at [address] N/A 2/24/2022    CYSTOSCOPY, urethral dilation, perry insertion performed by Brii Yip MD at [address] [facility] N/A 2/24/2022    exploratory laparotomy, extended right hemicolectomy, creation of mucus fistula and end ileostomy performed by Brii Yip MD at [facility] OR       Medications Prior to Admission:    Prior to Admission medications    Medication Sig Start Date End Date Taking? Authorizing Provider   polyethylene glycol (GLYCOLAX) 17 GM/SCOOP powder Take 17 g by mouth as needed (CONSTIPATION)    Historical Provider, MD   senna-docusate (Zaid Aures) 8.6-50 MG per tablet Take 2 tablets by mouth 2 times daily    Historical Provider, MD   folic acid (FOLVITE) 1 MG tablet Take 1 mg by mouth daily    Historical Provider, MD   melatonin 3 MG TABS tablet Take 6 mg by mouth nightly     Historical Provider, MD   docusate sodium (COLACE) 100 MG capsule Take 100 mg by mouth 2 times daily    Historical Provider, MD   albuterol sulfate HFA (VENTOLIN HFA) 108 (90 Base) MCG/ACT inhaler Inhale 2 puffs into the lungs every 6 hours as needed for Shortness of Breath (COUGH)     Historical Provider, MD   LORazepam (ATIVAN) 1 MG tablet Take 1 mg by mouth every 2 hours as needed for Anxiety. Indications: -NTE: 10MG/24HRS-     Historical Provider, MD   melatonin 3 MG TABS tablet Take 2 tablets by mouth daily 10/16/21 11/15/21  Lee Vasquez MD   aspirin 81 MG chewable tablet Take 1 tablet by mouth daily 7/17/21   Eloise Lake MD   atorvastatin (LIPITOR) 40 MG tablet Take 1 tablet by mouth nightly 7/16/21   Eloise Lake MD   metoprolol succinate (TOPROL XL) 25 MG extended release tablet Take 1 tablet by mouth daily 7/17/21   Eloise Lake MD   thiamine mononitrate (THIAMINE) 100 MG tablet Take 1 tablet by mouth daily 5/15/21   Erin Alvarez MD        Allergies:   Patient has no known allergies.     Social History:    reports that he quit smoking about 22 years ago. He has a 6.00 pack-year smoking history. He has never used smokeless tobacco. He reports current alcohol use of about 5.0 standard drinks per week. He reports that he does not currently use drugs. Family History:   family history is not on file. PHYSICAL EXAM:    Vitals:  /70   Pulse 72   Temp 98.5 °F (36.9 °C) (Oral)   Resp 14   Ht 6' 2\" (1.88 m)   Wt 220 lb (99.8 kg)   SpO2 92%   BMI 28.25 kg/m²     Physical Exam  Constitutional:       General: He is not in acute distress. Appearance: He is ill-appearing. He is not toxic-appearing or diaphoretic. Comments: Patient is covered in his own vomit   HENT:      Head: Laceration (Small nonbleeding laceration with staples in place) present. Cardiovascular:      Rate and Rhythm: Normal rate and regular rhythm. Pulses: Normal pulses. Heart sounds: Normal heart sounds. No murmur heard. Pulmonary:      Effort: Pulmonary effort is normal. No respiratory distress. Breath sounds: Normal breath sounds. No wheezing or rales. Abdominal:      General: There is no distension. Palpations: Abdomen is soft. Tenderness: There is no abdominal tenderness. Musculoskeletal:         General: Normal range of motion. Cervical back: Normal range of motion. Skin:     General: Skin is warm. Neurological:      Mental Status: Mental status is at baseline.       Comments: Oriented to self   Psychiatric:         Mood and Affect: Mood normal.       LABS:  Recent Results (from the past 24 hour(s))   CBC with Auto Differential    Collection Time: 01/16/23 11:20 PM   Result Value Ref Range    WBC 7.8 4.5 - 11.5 E9/L    RBC 5.02 3.80 - 5.80 E12/L    Hemoglobin 13.6 12.5 - 16.5 g/dL    Hematocrit 41.2 37.0 - 54.0 %    MCV 82.1 80.0 - 99.9 fL    MCH 27.1 26.0 - 35.0 pg    MCHC 33.0 32.0 - 34.5 %    RDW 13.3 11.5 - 15.0 fL    Platelets 586 610 - 758 E9/L    MPV 11.0 7.0 - 12.0 fL    Neutrophils % 81.6 (H) 43.0 - 80.0 %    Immature Granulocytes % 0.6 0.0 - 5.0 %    Lymphocytes % 8.6 (L) 20.0 - 42.0 %    Monocytes % 6.3 2.0 - 12.0 %    Eosinophils % 2.6 0.0 - 6.0 %    Basophils % 0.3 0.0 - 2.0 %    Neutrophils Absolute 6.37 1.80 - 7.30 E9/L    Immature Granulocytes # 0.05 E9/L    Lymphocytes Absolute 0.67 (L) 1.50 - 4.00 E9/L    Monocytes Absolute 0.49 0.10 - 0.95 E9/L    Eosinophils Absolute 0.20 0.05 - 0.50 E9/L    Basophils Absolute 0.02 0.00 - 0.20 E9/L   Comprehensive Metabolic Panel    Collection Time: 01/16/23 11:20 PM   Result Value Ref Range    Sodium 139 132 - 146 mmol/L    Potassium 3.5 3.5 - 5.0 mmol/L    Chloride 103 98 - 107 mmol/L    CO2 22 22 - 29 mmol/L    Anion Gap 14 7 - 16 mmol/L    Glucose 124 (H) 74 - 99 mg/dL    BUN 14 6 - 20 mg/dL    Creatinine 1.2 0.7 - 1.2 mg/dL    Est, Glom Filt Rate >60 >=60 mL/min/1.73    Calcium 9.2 8.6 - 10.2 mg/dL    Total Protein 7.7 6.4 - 8.3 g/dL    Albumin 4.0 3.5 - 5.2 g/dL    Total Bilirubin 0.5 0.0 - 1.2 mg/dL    Alkaline Phosphatase 116 40 - 129 U/L    ALT 29 0 - 40 U/L    AST 24 0 - 39 U/L   Magnesium    Collection Time: 01/16/23 11:20 PM   Result Value Ref Range    Magnesium 2.0 1.6 - 2.6 mg/dL   Urinalysis with Microscopic    Collection Time: 01/16/23 11:20 PM   Result Value Ref Range    Color, UA Yellow Straw/Yellow    Clarity, UA Clear Clear    Glucose, Ur Negative Negative mg/dL    Bilirubin Urine Negative Negative    Ketones, Urine Negative Negative mg/dL    Specific Gravity, UA 1.015 1.005 - 1.030    Blood, Urine Negative Negative    pH, UA 5.5 5.0 - 9.0    Protein, UA Negative Negative mg/dL    Urobilinogen, Urine 0.2 <2.0 E.U./dL    Nitrite, Urine Negative Negative    Leukocyte Esterase, Urine Negative Negative    WBC, UA NONE 0 - 5 /HPF    RBC, UA NONE 0 - 2 /HPF    Bacteria, UA NONE SEEN None Seen /HPF   URINE DRUG SCREEN    Collection Time: 01/16/23 11:20 PM   Result Value Ref Range    Drug Screen Comment: see below        CT HEAD WO CONTRAST  Final Result   No acute intracranial abnormality. CT CERVICAL SPINE WO CONTRAST   Final Result   No acute abnormality of the cervical spine. XR CHEST PORTABLE   Final Result   No acute process.              ASSESSMENT/PLAN:      Active Hospital Problems    Diagnosis Date Noted    Alcohol intoxication, uncomplicated ([facility] [address] [T42.380] 01/17/2023     Priority: Medium    Dementia ([facility] [address] [F03.90] 01/17/2023     Priority: Medium    Alcohol abuse [F10.10] 07/15/2021    CAD (coronary artery disease) [I25.10] 07/15/2021     Alcohol intoxication  -CIWA without Ativan  -Seizure precautions  -Fall precautions  -CMP daily  -Thiamine 259 mg daily  -Folic acid 577 mcg daily  -CBC daily  -Magnesium daily  -CMP daily  -Social work consulted, appreciate input     CAD  -Home Metoprolol 25 mg daily  -Home ASA 81 mg daily  -Home Lipitor 40 mg daily    Sleep disturbance  -Home Trazodone 50 mg nightly    Dementia  -Home Aricept 5 mg nightly    Constipation  -Home Colace 100 mg BID  -Miralax 17 g PRN          Pain Control:  Tylenol 650 mg Q6HR PRN for mild pain  DVT ppx: Lovenox 40 mg daily  GI ppx: None  Code Status: Full  Diet: Cardiac diet      Case discussed with attending on call Dr. Blanca Parisi MD   Family Medicine Resident Physician PGY-3  1/17/2023
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
## 2024-06-17 ENCOUNTER — HOSPITAL ENCOUNTER (INPATIENT)
Age: 59
LOS: 7 days | Discharge: NURSING, REHAB OR LONG-TERM CARE | DRG: 871 | End: 2024-06-24
Attending: EMERGENCY MEDICINE | Admitting: FAMILY MEDICINE
Payer: MEDICARE

## 2024-06-17 ENCOUNTER — APPOINTMENT (OUTPATIENT)
Dept: GENERAL RADIOLOGY | Age: 59
DRG: 871 | End: 2024-06-17
Payer: MEDICARE

## 2024-06-17 ENCOUNTER — APPOINTMENT (OUTPATIENT)
Dept: CT IMAGING | Age: 59
DRG: 871 | End: 2024-06-17
Payer: MEDICARE

## 2024-06-17 DIAGNOSIS — A41.9 SEPTICEMIA (HCC): ICD-10-CM

## 2024-06-17 DIAGNOSIS — N30.00 ACUTE CYSTITIS WITHOUT HEMATURIA: Primary | ICD-10-CM

## 2024-06-17 DIAGNOSIS — N17.9 AKI (ACUTE KIDNEY INJURY) (HCC): ICD-10-CM

## 2024-06-17 PROBLEM — R00.0 TACHYCARDIA: Status: ACTIVE | Noted: 2024-06-17

## 2024-06-17 PROBLEM — N39.0 SEPSIS SECONDARY TO UTI (HCC): Status: ACTIVE | Noted: 2024-06-17

## 2024-06-17 PROBLEM — R16.0 LIVER MASS: Status: ACTIVE | Noted: 2024-06-17

## 2024-06-17 PROBLEM — N28.9 RENAL INSUFFICIENCY: Status: ACTIVE | Noted: 2024-06-17

## 2024-06-17 LAB
ALBUMIN SERPL-MCNC: 3.8 G/DL (ref 3.5–5.2)
ALP SERPL-CCNC: 141 U/L (ref 40–129)
ALT SERPL-CCNC: 49 U/L (ref 0–40)
ANION GAP SERPL CALCULATED.3IONS-SCNC: 14 MMOL/L (ref 7–16)
AST SERPL-CCNC: 77 U/L (ref 0–39)
BACTERIA URNS QL MICRO: ABNORMAL
BASOPHILS # BLD: 0.01 K/UL (ref 0–0.2)
BASOPHILS NFR BLD: 0 % (ref 0–2)
BILIRUB SERPL-MCNC: 1.2 MG/DL (ref 0–1.2)
BILIRUB UR QL STRIP: ABNORMAL
BUN SERPL-MCNC: 23 MG/DL (ref 6–20)
CALCIUM SERPL-MCNC: 9.3 MG/DL (ref 8.6–10.2)
CHLORIDE SERPL-SCNC: 97 MMOL/L (ref 98–107)
CLARITY UR: CLEAR
CO2 SERPL-SCNC: 22 MMOL/L (ref 22–29)
COLOR UR: YELLOW
CREAT SERPL-MCNC: 2.5 MG/DL (ref 0.7–1.2)
CREAT UR-MCNC: 311.5 MG/DL (ref 40–278)
EOSINOPHIL # BLD: 0.01 K/UL (ref 0.05–0.5)
EOSINOPHILS RELATIVE PERCENT: 0 % (ref 0–6)
ERYTHROCYTE [DISTWIDTH] IN BLOOD BY AUTOMATED COUNT: 13.8 % (ref 11.5–15)
GFR, ESTIMATED: 29 ML/MIN/1.73M2
GLUCOSE SERPL-MCNC: 295 MG/DL (ref 74–99)
GLUCOSE UR STRIP-MCNC: NEGATIVE MG/DL
HCT VFR BLD AUTO: 40.8 % (ref 37–54)
HGB BLD-MCNC: 13.2 G/DL (ref 12.5–16.5)
HGB UR QL STRIP.AUTO: ABNORMAL
IMM GRANULOCYTES # BLD AUTO: 0.09 K/UL (ref 0–0.58)
IMM GRANULOCYTES NFR BLD: 1 % (ref 0–5)
INFLUENZA A BY PCR: NOT DETECTED
INFLUENZA B BY PCR: NOT DETECTED
KETONES UR STRIP-MCNC: ABNORMAL MG/DL
LACTATE BLDV-SCNC: 0.8 MMOL/L (ref 0.5–1.9)
LACTATE BLDV-SCNC: 2.4 MMOL/L (ref 0.5–1.9)
LEUKOCYTE ESTERASE UR QL STRIP: ABNORMAL
LYMPHOCYTES NFR BLD: 0.57 K/UL (ref 1.5–4)
LYMPHOCYTES RELATIVE PERCENT: 5 % (ref 20–42)
MCH RBC QN AUTO: 26.6 PG (ref 26–35)
MCHC RBC AUTO-ENTMCNC: 32.4 G/DL (ref 32–34.5)
MCV RBC AUTO: 82.1 FL (ref 80–99.9)
MONOCYTES NFR BLD: 1.08 K/UL (ref 0.1–0.95)
MONOCYTES NFR BLD: 10 % (ref 2–12)
NEUTROPHILS NFR BLD: 85 % (ref 43–80)
NEUTS SEG NFR BLD: 9.65 K/UL (ref 1.8–7.3)
NITRITE UR QL STRIP: POSITIVE
PH UR STRIP: 6 [PH] (ref 5–9)
PLATELET # BLD AUTO: 187 K/UL (ref 130–450)
PMV BLD AUTO: 11.1 FL (ref 7–12)
POTASSIUM SERPL-SCNC: 4.3 MMOL/L (ref 3.5–5)
PROCALCITONIN SERPL-MCNC: 2.62 NG/ML (ref 0–0.08)
PROT SERPL-MCNC: 8.9 G/DL (ref 6.4–8.3)
PROT UR STRIP-MCNC: 100 MG/DL
RBC # BLD AUTO: 4.97 M/UL (ref 3.8–5.8)
RBC # BLD: ABNORMAL 10*6/UL
RBC #/AREA URNS HPF: ABNORMAL /HPF
SARS-COV-2 RDRP RESP QL NAA+PROBE: NOT DETECTED
SODIUM SERPL-SCNC: 133 MMOL/L (ref 132–146)
SODIUM UR-SCNC: <20 MMOL/L
SP GR UR STRIP: 1.02 (ref 1–1.03)
SPECIMEN DESCRIPTION: NORMAL
UROBILINOGEN UR STRIP-ACNC: 0.2 EU/DL (ref 0–1)
WBC #/AREA URNS HPF: ABNORMAL /HPF
WBC OTHER # BLD: 11.4 K/UL (ref 4.5–11.5)

## 2024-06-17 PROCEDURE — 84300 ASSAY OF URINE SODIUM: CPT

## 2024-06-17 PROCEDURE — 74176 CT ABD & PELVIS W/O CONTRAST: CPT

## 2024-06-17 PROCEDURE — 96361 HYDRATE IV INFUSION ADD-ON: CPT

## 2024-06-17 PROCEDURE — 83605 ASSAY OF LACTIC ACID: CPT

## 2024-06-17 PROCEDURE — 6360000002 HC RX W HCPCS

## 2024-06-17 PROCEDURE — 82570 ASSAY OF URINE CREATININE: CPT

## 2024-06-17 PROCEDURE — 2060000000 HC ICU INTERMEDIATE R&B

## 2024-06-17 PROCEDURE — 99285 EMERGENCY DEPT VISIT HI MDM: CPT

## 2024-06-17 PROCEDURE — 84145 PROCALCITONIN (PCT): CPT

## 2024-06-17 PROCEDURE — 2580000003 HC RX 258

## 2024-06-17 PROCEDURE — 87502 INFLUENZA DNA AMP PROBE: CPT

## 2024-06-17 PROCEDURE — 87635 SARS-COV-2 COVID-19 AMP PRB: CPT

## 2024-06-17 PROCEDURE — 87086 URINE CULTURE/COLONY COUNT: CPT

## 2024-06-17 PROCEDURE — 81001 URINALYSIS AUTO W/SCOPE: CPT

## 2024-06-17 PROCEDURE — 71045 X-RAY EXAM CHEST 1 VIEW: CPT

## 2024-06-17 PROCEDURE — 87088 URINE BACTERIA CULTURE: CPT

## 2024-06-17 PROCEDURE — 6370000000 HC RX 637 (ALT 250 FOR IP)

## 2024-06-17 PROCEDURE — 87040 BLOOD CULTURE FOR BACTERIA: CPT

## 2024-06-17 PROCEDURE — 93005 ELECTROCARDIOGRAM TRACING: CPT | Performed by: EMERGENCY MEDICINE

## 2024-06-17 PROCEDURE — 80053 COMPREHEN METABOLIC PANEL: CPT

## 2024-06-17 PROCEDURE — 96374 THER/PROPH/DIAG INJ IV PUSH: CPT

## 2024-06-17 PROCEDURE — 85025 COMPLETE CBC W/AUTO DIFF WBC: CPT

## 2024-06-17 PROCEDURE — 93005 ELECTROCARDIOGRAM TRACING: CPT

## 2024-06-17 RX ORDER — SODIUM CHLORIDE 9 MG/ML
30 INJECTION, SOLUTION INTRAVENOUS ONCE
Status: COMPLETED | OUTPATIENT
Start: 2024-06-17 | End: 2024-06-17

## 2024-06-17 RX ORDER — HYDROXYZINE PAMOATE 25 MG/1
25 CAPSULE ORAL DAILY
Status: DISCONTINUED | OUTPATIENT
Start: 2024-06-18 | End: 2024-06-24 | Stop reason: HOSPADM

## 2024-06-17 RX ORDER — LORAZEPAM 1 MG/1
3 TABLET ORAL
Status: DISCONTINUED | OUTPATIENT
Start: 2024-06-17 | End: 2024-06-17

## 2024-06-17 RX ORDER — LORAZEPAM 2 MG/ML
2 INJECTION INTRAMUSCULAR
Status: DISCONTINUED | OUTPATIENT
Start: 2024-06-17 | End: 2024-06-17

## 2024-06-17 RX ORDER — SODIUM CHLORIDE 9 MG/ML
INJECTION, SOLUTION INTRAVENOUS CONTINUOUS
Status: DISCONTINUED | OUTPATIENT
Start: 2024-06-17 | End: 2024-06-18

## 2024-06-17 RX ORDER — ATORVASTATIN CALCIUM 20 MG/1
20 TABLET, FILM COATED ORAL NIGHTLY
Status: DISCONTINUED | OUTPATIENT
Start: 2024-06-17 | End: 2024-06-24 | Stop reason: HOSPADM

## 2024-06-17 RX ORDER — LORAZEPAM 1 MG/1
4 TABLET ORAL
Status: DISCONTINUED | OUTPATIENT
Start: 2024-06-17 | End: 2024-06-17

## 2024-06-17 RX ORDER — SODIUM CHLORIDE 9 MG/ML
INJECTION, SOLUTION INTRAVENOUS PRN
Status: DISCONTINUED | OUTPATIENT
Start: 2024-06-17 | End: 2024-06-24 | Stop reason: HOSPADM

## 2024-06-17 RX ORDER — SODIUM CHLORIDE 0.9 % (FLUSH) 0.9 %
5-40 SYRINGE (ML) INJECTION EVERY 12 HOURS SCHEDULED
Status: DISCONTINUED | OUTPATIENT
Start: 2024-06-17 | End: 2024-06-24 | Stop reason: HOSPADM

## 2024-06-17 RX ORDER — ASPIRIN 81 MG/1
81 TABLET, CHEWABLE ORAL DAILY
Status: DISCONTINUED | OUTPATIENT
Start: 2024-06-18 | End: 2024-06-24 | Stop reason: HOSPADM

## 2024-06-17 RX ORDER — 0.9 % SODIUM CHLORIDE 0.9 %
1000 INTRAVENOUS SOLUTION INTRAVENOUS ONCE
Status: DISCONTINUED | OUTPATIENT
Start: 2024-06-17 | End: 2024-06-17

## 2024-06-17 RX ORDER — ATORVASTATIN CALCIUM 20 MG/1
20 TABLET, FILM COATED ORAL NIGHTLY
COMMUNITY

## 2024-06-17 RX ORDER — LORAZEPAM 2 MG/ML
3 INJECTION INTRAMUSCULAR
Status: DISCONTINUED | OUTPATIENT
Start: 2024-06-17 | End: 2024-06-17

## 2024-06-17 RX ORDER — LANOLIN ALCOHOL/MO/W.PET/CERES
100 CREAM (GRAM) TOPICAL DAILY
Status: DISCONTINUED | OUTPATIENT
Start: 2024-06-17 | End: 2024-06-24 | Stop reason: HOSPADM

## 2024-06-17 RX ORDER — POLYETHYLENE GLYCOL 3350 17 G/17G
17 POWDER, FOR SOLUTION ORAL DAILY PRN
Status: DISCONTINUED | OUTPATIENT
Start: 2024-06-17 | End: 2024-06-24 | Stop reason: HOSPADM

## 2024-06-17 RX ORDER — LORAZEPAM 2 MG/ML
1 INJECTION INTRAMUSCULAR
Status: DISCONTINUED | OUTPATIENT
Start: 2024-06-17 | End: 2024-06-17

## 2024-06-17 RX ORDER — ACETAMINOPHEN 650 MG/1
650 SUPPOSITORY RECTAL EVERY 6 HOURS PRN
Status: DISCONTINUED | OUTPATIENT
Start: 2024-06-17 | End: 2024-06-24 | Stop reason: HOSPADM

## 2024-06-17 RX ORDER — LORAZEPAM 1 MG/1
1 TABLET ORAL
Status: DISCONTINUED | OUTPATIENT
Start: 2024-06-17 | End: 2024-06-17

## 2024-06-17 RX ORDER — SENNOSIDES 8.6 MG
650 CAPSULE ORAL EVERY 6 HOURS PRN
COMMUNITY

## 2024-06-17 RX ORDER — ENOXAPARIN SODIUM 100 MG/ML
30 INJECTION SUBCUTANEOUS 2 TIMES DAILY
Status: DISCONTINUED | OUTPATIENT
Start: 2024-06-17 | End: 2024-06-24 | Stop reason: HOSPADM

## 2024-06-17 RX ORDER — ONDANSETRON 2 MG/ML
4 INJECTION INTRAMUSCULAR; INTRAVENOUS EVERY 6 HOURS PRN
Status: DISCONTINUED | OUTPATIENT
Start: 2024-06-17 | End: 2024-06-24 | Stop reason: HOSPADM

## 2024-06-17 RX ORDER — LORAZEPAM 2 MG/ML
4 INJECTION INTRAMUSCULAR
Status: DISCONTINUED | OUTPATIENT
Start: 2024-06-17 | End: 2024-06-17

## 2024-06-17 RX ORDER — DONEPEZIL HYDROCHLORIDE 10 MG/1
10 TABLET, FILM COATED ORAL NIGHTLY
Status: DISCONTINUED | OUTPATIENT
Start: 2024-06-17 | End: 2024-06-24 | Stop reason: HOSPADM

## 2024-06-17 RX ORDER — ACETAMINOPHEN 325 MG/1
650 TABLET ORAL EVERY 6 HOURS PRN
Status: DISCONTINUED | OUTPATIENT
Start: 2024-06-17 | End: 2024-06-24 | Stop reason: HOSPADM

## 2024-06-17 RX ORDER — SODIUM CHLORIDE 9 MG/ML
INJECTION, SOLUTION INTRAVENOUS PRN
Status: DISCONTINUED | OUTPATIENT
Start: 2024-06-17 | End: 2024-06-17

## 2024-06-17 RX ORDER — EZETIMIBE 10 MG/1
10 TABLET ORAL DAILY
COMMUNITY

## 2024-06-17 RX ORDER — LORAZEPAM 1 MG/1
2 TABLET ORAL
Status: DISCONTINUED | OUTPATIENT
Start: 2024-06-17 | End: 2024-06-17

## 2024-06-17 RX ORDER — METOPROLOL SUCCINATE 25 MG/1
37.5 TABLET, EXTENDED RELEASE ORAL DAILY
Status: DISCONTINUED | OUTPATIENT
Start: 2024-06-18 | End: 2024-06-24 | Stop reason: HOSPADM

## 2024-06-17 RX ORDER — SODIUM CHLORIDE 0.9 % (FLUSH) 0.9 %
5-40 SYRINGE (ML) INJECTION PRN
Status: DISCONTINUED | OUTPATIENT
Start: 2024-06-17 | End: 2024-06-24 | Stop reason: HOSPADM

## 2024-06-17 RX ORDER — ACETAMINOPHEN 500 MG
1000 TABLET ORAL
Status: COMPLETED | OUTPATIENT
Start: 2024-06-17 | End: 2024-06-17

## 2024-06-17 RX ORDER — EZETIMIBE 10 MG/1
10 TABLET ORAL DAILY
Status: DISCONTINUED | OUTPATIENT
Start: 2024-06-18 | End: 2024-06-24 | Stop reason: HOSPADM

## 2024-06-17 RX ORDER — ONDANSETRON 4 MG/1
4 TABLET, ORALLY DISINTEGRATING ORAL EVERY 8 HOURS PRN
Status: DISCONTINUED | OUTPATIENT
Start: 2024-06-17 | End: 2024-06-24 | Stop reason: HOSPADM

## 2024-06-17 RX ORDER — TRAZODONE HYDROCHLORIDE 50 MG/1
150 TABLET ORAL NIGHTLY
Status: DISCONTINUED | OUTPATIENT
Start: 2024-06-17 | End: 2024-06-24 | Stop reason: HOSPADM

## 2024-06-17 RX ORDER — POLYVINYL ALCOHOL 14 MG/ML
1 SOLUTION/ DROPS OPHTHALMIC PRN
COMMUNITY

## 2024-06-17 RX ORDER — METOPROLOL SUCCINATE 25 MG/1
37.5 TABLET, EXTENDED RELEASE ORAL DAILY
COMMUNITY

## 2024-06-17 RX ADMIN — WATER 1000 MG: 1 INJECTION INTRAMUSCULAR; INTRAVENOUS; SUBCUTANEOUS at 19:41

## 2024-06-17 RX ADMIN — ACETAMINOPHEN 1000 MG: 500 TABLET ORAL at 16:36

## 2024-06-17 RX ADMIN — SODIUM CHLORIDE 3810 ML: 9 INJECTION, SOLUTION INTRAVENOUS at 16:37

## 2024-06-17 RX ADMIN — SODIUM CHLORIDE, PRESERVATIVE FREE 10 ML: 5 INJECTION INTRAVENOUS at 21:57

## 2024-06-17 RX ADMIN — Medication 100 MG: at 19:07

## 2024-06-17 ASSESSMENT — PAIN - FUNCTIONAL ASSESSMENT
PAIN_FUNCTIONAL_ASSESSMENT: NONE - DENIES PAIN
PAIN_FUNCTIONAL_ASSESSMENT: NONE - DENIES PAIN

## 2024-06-17 ASSESSMENT — LIFESTYLE VARIABLES
HOW OFTEN DO YOU HAVE A DRINK CONTAINING ALCOHOL: NEVER
HOW MANY STANDARD DRINKS CONTAINING ALCOHOL DO YOU HAVE ON A TYPICAL DAY: PATIENT DOES NOT DRINK

## 2024-06-17 NOTE — ED NOTES
Notified Dr. Wells that patient is diaphoretic but otherwise asymptomatic. Repeat EKG obtained, shows ST, Dr. Wells given EKG, notified Dr. Hart as well. Will straight cath patient to obtain urine sample.

## 2024-06-17 NOTE — H&P
PM    Specimen: Blood   Result Value Ref Range    Specimen Description .BLOOD .ARM     Special Requests Site: Blood     Culture NO GROWTH <24 HRS    CBC with Auto Differential    Collection Time: 06/17/24  4:18 PM   Result Value Ref Range    WBC 11.4 4.5 - 11.5 k/uL    RBC 4.97 3.80 - 5.80 m/uL    Hemoglobin 13.2 12.5 - 16.5 g/dL    Hematocrit 40.8 37.0 - 54.0 %    MCV 82.1 80.0 - 99.9 fL    MCH 26.6 26.0 - 35.0 pg    MCHC 32.4 32.0 - 34.5 g/dL    RDW 13.8 11.5 - 15.0 %    Platelets 187 130 - 450 k/uL    MPV 11.1 7.0 - 12.0 fL    Neutrophils % 85 (H) 43.0 - 80.0 %    Lymphocytes % 5 (L) 20.0 - 42.0 %    Monocytes % 10 2.0 - 12.0 %    Eosinophils % 0 0 - 6 %    Basophils % 0 0.0 - 2.0 %    Immature Granulocytes % 1 0.0 - 5.0 %    Neutrophils Absolute 9.65 (H) 1.80 - 7.30 k/uL    Lymphocytes Absolute 0.57 (L) 1.50 - 4.00 k/uL    Monocytes Absolute 1.08 (H) 0.10 - 0.95 k/uL    Eosinophils Absolute 0.01 (L) 0.05 - 0.50 k/uL    Basophils Absolute 0.01 0.00 - 0.20 k/uL    Immature Granulocytes Absolute 0.09 0.00 - 0.58 k/uL    RBC Morphology 1+ ANISOCYTOSIS    CMP w/ Reflex to MG    Collection Time: 06/17/24  4:18 PM   Result Value Ref Range    Sodium 133 132 - 146 mmol/L    Potassium 4.3 3.5 - 5.0 mmol/L    Chloride 97 (L) 98 - 107 mmol/L    CO2 22 22 - 29 mmol/L    Anion Gap 14 7 - 16 mmol/L    Glucose 295 (H) 74 - 99 mg/dL    BUN 23 (H) 6 - 20 mg/dL    Creatinine 2.5 (H) 0.70 - 1.20 mg/dL    Est, Glom Filt Rate 29 (L) >60 mL/min/1.73m2    Calcium 9.3 8.6 - 10.2 mg/dL    Total Protein 8.9 (H) 6.4 - 8.3 g/dL    Albumin 3.8 3.5 - 5.2 g/dL    Total Bilirubin 1.2 0.0 - 1.2 mg/dL    Alkaline Phosphatase 141 (H) 40 - 129 U/L    ALT 49 (H) 0 - 40 U/L    AST 77 (H) 0 - 39 U/L   Lactate, Sepsis    Collection Time: 06/17/24  4:18 PM   Result Value Ref Range    Lactic Acid, Sepsis 2.4 (H) 0.5 - 1.9 mmol/L   Procalcitonin    Collection Time: 06/17/24  4:18 PM   Result Value Ref Range    Procalcitonin 2.62 (H) 0.00 - 0.08 ng/mL  department  Continue Rocephin 1 g daily  Urine and blood cultures ordered  Telemetry monitoring    Tachycardia  Likely secondary to above.  Continue home Toprol XL 37.5 mg daily  Continue IV fluid rehydration at 100 cc/h    Renal insufficiency  Past baseline appears to be around 1, however has been over 2 years since last labs were checked  Check urine electrolyte  Continue normal saline at 100 cc/h    Liver mass  CT abdomen pelvis without contrast read as: Presence of a 6.2 x 4.8 cm focal lesion in the peripheral diaphragma surface of the right lobe liver segments 8/7.  This lesion was not seen previously.  Metastatic liver disease considered.  Further evaluation with multiphase IV contrast CT or MRI of the liver recommended.  Consider consultation with hepatobiliary surgery  Defer placing consult to the morning    Hx CAD  Continue home ASA 81  Continue Lipitor 20 mg  Continue home Zetia 10 mg    Dementia  Continue home Aricept 10 mg  Continue home Vistaril 25 mg daily  Continue home Trazodone 150 mg nightly    Alcohol use  Reported abstinence for many months  Continue home thiamine 100 mg daily  CIWA without Ativan      Pain Control:  Tylenol 650 mg Q6HR PRN for mild pain  DVT ppx: Lovenox 30 twice daily  GI ppx: None  Code Status: Full  Diet: NPO after midnight      Case discussed with attending on call Dr. Fifi Agarwal MD   Family Medicine Resident Physician PGY-2  6/17/2024

## 2024-06-17 NOTE — ED PROVIDER NOTES
[facility]  Department of Emergency Medicine   EM Physician ECTOR&Andres Chaney 59 y.o. male PMHx of dementia, acute cystitis, psychosis, cognitive disorder, CAD, hypertension, MARLEE, alcohol use presents to the ED c/o concern for elevated heart rate infection, patient has elevated heart rate at the nursing home facility. Onset: Today. Location/Radiation: Neurolyse all over. Duration: Persistent. Characterization: Elevated heart rate per the nursing home. Aggravating Factors: Unknown. Relieving Factors: Unknown. Severity: Moderate.  Patient is very poor historian unable to get history from patient resolved..              Review of Systems   Constitutional:  Positive for activity change, appetite change, chills, fatigue and fever.   Respiratory:  Negative for shortness of breath.    Cardiovascular:  Negative for chest pain.   Genitourinary:  Positive for dysuria and urgency.        Physical Exam  Constitutional:       Appearance: Normal appearance. He is obese. He is not ill-appearing.   HENT:      Head: Normocephalic and atraumatic.      Right Ear: External ear normal.      Left Ear: External ear normal.      Nose: Nose normal.      Mouth/Throat:      Mouth: Mucous membranes are dry.   Eyes:      Extraocular Movements: Extraocular movements intact.      Pupils: Pupils are equal, round, and reactive to light.   Cardiovascular:      Rate and Rhythm: Regular rhythm. Tachycardia present.      Pulses: Normal pulses.      Heart sounds: Normal heart sounds.   Pulmonary:      Effort: Pulmonary effort is normal.      Breath sounds: Normal breath sounds.   Abdominal:      General: There is no distension.      Palpations: Abdomen is soft.      Tenderness: There is no abdominal tenderness.   Musculoskeletal:      Cervical back: Normal range of motion and neck supple.   Skin:     General: Skin is warm and dry.      Capillary Refill: Capillary refill takes less than 2 seconds.  (A) None   EKG 12 Lead   Result Value Ref Range    Ventricular Rate 119 BPM    Atrial Rate 119 BPM    P-R Interval 140 ms    QRS Duration 88 ms    Q-T Interval 320 ms    QTc Calculation (Bazett) 450 ms    P Axis 30 degrees    R Axis -52 degrees    T Axis 68 degrees       RADIOLOGY:  XR CHEST PORTABLE   Final Result   No acute process.               ------------------------- NURSING NOTES AND VITALS REVIEWED ---------------------------  Date / Time Roomed:  6/17/2024  2:55 PM  ED Bed Assignment:  09/09    The nursing notes within the ED encounter and vital signs as below have been reviewed.     Patient Vitals for the past 24 hrs:   BP Temp Temp src Pulse Resp SpO2 Height Weight   06/17/24 1803 (!) 130/91 -- -- (!) 105 27 93 % -- --   06/17/24 1603 (!) 121/96 -- -- (!) 106 24 93 % -- --   06/17/24 1503 109/74 100.1 °F (37.8 °C) Oral (!) 125 15 93 % 1.88 m (6' 2\") 127 kg (280 lb)       SEP-1 CORE MEASURE DATA      Sepsis Criteria   Severe Sepsis Criteria   Septic Shock Criteria     Must be confirmed or suspected to move forward with diagnosis of sepsis.    Must meet 2:    [x] Temperature > 100.9 F (38.3 C)        or < 96.8 F (36 C)  [] HR > 90  [] RR > 20  [x] WBC > 12 or < 4 or 10% bands      AND:      [x] Infection Confirmed or        Suspected.     Must meet 1:    [x] Lactate > 2       or   [] Signs of Organ Dysfunction:    - SBP < 90 or MAP < 65  - Altered mental status  - Creatinine > 2 or increased from      baseline  - Urine Output < 0.5 ml/kg/hr  - Bilirubin > 2  - INR > 1.5 (not anticoagulated)  - Platelets < 100,000  - Acute Respiratory Failure as     evidenced by new need for NIPPV     or mechanical ventilation      [] No criteria met for Severe Sepsis.   Must meet 1:    [] Lactate > 4        or   [] SBP < 90 or MAP < 65 for at        least two readings in the first        hour after fluid bolus        administration      [] Vasopressors initiated (if hypotension persists after fluid resuscitation)        []

## 2024-06-18 ENCOUNTER — APPOINTMENT (OUTPATIENT)
Dept: ULTRASOUND IMAGING | Age: 59
DRG: 871 | End: 2024-06-18
Payer: MEDICARE

## 2024-06-18 ENCOUNTER — APPOINTMENT (OUTPATIENT)
Dept: CT IMAGING | Age: 59
DRG: 871 | End: 2024-06-18
Payer: MEDICARE

## 2024-06-18 ENCOUNTER — APPOINTMENT (OUTPATIENT)
Dept: NUCLEAR MEDICINE | Age: 59
DRG: 871 | End: 2024-06-18
Payer: MEDICARE

## 2024-06-18 PROBLEM — Z93.9 HISTORY OF CREATION OF OSTOMY (HCC): Status: ACTIVE | Noted: 2024-06-18

## 2024-06-18 PROBLEM — R79.89 POSITIVE D DIMER: Status: ACTIVE | Noted: 2024-06-18

## 2024-06-18 LAB
ALBUMIN SERPL-MCNC: 3.4 G/DL (ref 3.5–5.2)
ALP SERPL-CCNC: 109 U/L (ref 40–129)
ALT SERPL-CCNC: 57 U/L (ref 0–40)
ANION GAP SERPL CALCULATED.3IONS-SCNC: 11 MMOL/L (ref 7–16)
AST SERPL-CCNC: 95 U/L (ref 0–39)
B PARAP IS1001 DNA NPH QL NAA+NON-PROBE: NOT DETECTED
B PERT DNA SPEC QL NAA+PROBE: NOT DETECTED
BASOPHILS # BLD: 0 K/UL (ref 0–0.2)
BASOPHILS NFR BLD: 0 % (ref 0–2)
BILIRUB SERPL-MCNC: 0.9 MG/DL (ref 0–1.2)
BUN SERPL-MCNC: 21 MG/DL (ref 6–20)
C PNEUM DNA NPH QL NAA+NON-PROBE: NOT DETECTED
CALCIUM SERPL-MCNC: 8.7 MG/DL (ref 8.6–10.2)
CEA SERPL-MCNC: 3.2 NG/ML (ref 0–5.2)
CHLORIDE SERPL-SCNC: 103 MMOL/L (ref 98–107)
CO2 SERPL-SCNC: 22 MMOL/L (ref 22–29)
CREAT SERPL-MCNC: 1.8 MG/DL (ref 0.7–1.2)
D-DIMER QUANTITATIVE: 1238 NG/ML DDU (ref 0–230)
EKG ATRIAL RATE: 107 BPM
EKG ATRIAL RATE: 119 BPM
EKG P AXIS: 30 DEGREES
EKG P AXIS: 37 DEGREES
EKG P-R INTERVAL: 140 MS
EKG P-R INTERVAL: 146 MS
EKG Q-T INTERVAL: 320 MS
EKG Q-T INTERVAL: 348 MS
EKG QRS DURATION: 88 MS
EKG QRS DURATION: 88 MS
EKG QTC CALCULATION (BAZETT): 450 MS
EKG QTC CALCULATION (BAZETT): 464 MS
EKG R AXIS: -52 DEGREES
EKG R AXIS: -54 DEGREES
EKG T AXIS: 57 DEGREES
EKG T AXIS: 68 DEGREES
EKG VENTRICULAR RATE: 107 BPM
EKG VENTRICULAR RATE: 119 BPM
EOSINOPHIL # BLD: 0 K/UL (ref 0.05–0.5)
EOSINOPHILS RELATIVE PERCENT: 0 % (ref 0–6)
ERYTHROCYTE [DISTWIDTH] IN BLOOD BY AUTOMATED COUNT: 14 % (ref 11.5–15)
FLUAV RNA NPH QL NAA+NON-PROBE: NOT DETECTED
FLUBV RNA NPH QL NAA+NON-PROBE: NOT DETECTED
GFR, ESTIMATED: 44 ML/MIN/1.73M2
GLUCOSE SERPL-MCNC: 179 MG/DL (ref 74–99)
HADV DNA NPH QL NAA+NON-PROBE: NOT DETECTED
HBA1C MFR BLD: 6.9 % (ref 4–5.6)
HCOV 229E RNA NPH QL NAA+NON-PROBE: NOT DETECTED
HCOV HKU1 RNA NPH QL NAA+NON-PROBE: NOT DETECTED
HCOV NL63 RNA NPH QL NAA+NON-PROBE: NOT DETECTED
HCOV OC43 RNA NPH QL NAA+NON-PROBE: NOT DETECTED
HCT VFR BLD AUTO: 38 % (ref 37–54)
HGB BLD-MCNC: 12.1 G/DL (ref 12.5–16.5)
HMPV RNA NPH QL NAA+NON-PROBE: NOT DETECTED
HPIV1 RNA NPH QL NAA+NON-PROBE: NOT DETECTED
HPIV2 RNA NPH QL NAA+NON-PROBE: NOT DETECTED
HPIV3 RNA NPH QL NAA+NON-PROBE: NOT DETECTED
HPIV4 RNA NPH QL NAA+NON-PROBE: NOT DETECTED
IRON SATN MFR SERPL: 13 % (ref 20–55)
IRON SERPL-MCNC: 23 UG/DL (ref 59–158)
LACTATE BLDV-SCNC: 0.9 MMOL/L (ref 0.5–2.2)
LYMPHOCYTES NFR BLD: 0.61 K/UL (ref 1.5–4)
LYMPHOCYTES RELATIVE PERCENT: 6 % (ref 20–42)
M PNEUMO DNA NPH QL NAA+NON-PROBE: NOT DETECTED
MCH RBC QN AUTO: 26.4 PG (ref 26–35)
MCHC RBC AUTO-ENTMCNC: 31.8 G/DL (ref 32–34.5)
MCV RBC AUTO: 82.8 FL (ref 80–99.9)
MONOCYTES NFR BLD: 1.01 K/UL (ref 0.1–0.95)
MONOCYTES NFR BLD: 10 % (ref 2–12)
NEUTROPHILS NFR BLD: 84 % (ref 43–80)
NEUTS SEG NFR BLD: 8.48 K/UL (ref 1.8–7.3)
PLATELET # BLD AUTO: 187 K/UL (ref 130–450)
PMV BLD AUTO: 11.2 FL (ref 7–12)
POTASSIUM SERPL-SCNC: 4.2 MMOL/L (ref 3.5–5)
PROT SERPL-MCNC: 8 G/DL (ref 6.4–8.3)
RBC # BLD AUTO: 4.59 M/UL (ref 3.8–5.8)
RBC # BLD: ABNORMAL 10*6/UL
RBC # BLD: ABNORMAL 10*6/UL
RSV RNA NPH QL NAA+NON-PROBE: NOT DETECTED
RV+EV RNA NPH QL NAA+NON-PROBE: NOT DETECTED
SARS-COV-2 RNA NPH QL NAA+NON-PROBE: NOT DETECTED
SODIUM SERPL-SCNC: 136 MMOL/L (ref 132–146)
SPECIMEN DESCRIPTION: NORMAL
TIBC SERPL-MCNC: 171 UG/DL (ref 250–450)
WBC OTHER # BLD: 10.1 K/UL (ref 4.5–11.5)

## 2024-06-18 PROCEDURE — 74175 CTA ABDOMEN W/CONTRAST: CPT

## 2024-06-18 PROCEDURE — 85025 COMPLETE CBC W/AUTO DIFF WBC: CPT

## 2024-06-18 PROCEDURE — 99222 1ST HOSP IP/OBS MODERATE 55: CPT | Performed by: CLINICAL NURSE SPECIALIST

## 2024-06-18 PROCEDURE — 6360000002 HC RX W HCPCS

## 2024-06-18 PROCEDURE — 83605 ASSAY OF LACTIC ACID: CPT

## 2024-06-18 PROCEDURE — 82105 ALPHA-FETOPROTEIN SERUM: CPT

## 2024-06-18 PROCEDURE — 6360000004 HC RX CONTRAST MEDICATION: Performed by: RADIOLOGY

## 2024-06-18 PROCEDURE — 71260 CT THORAX DX C+: CPT

## 2024-06-18 PROCEDURE — 99222 1ST HOSP IP/OBS MODERATE 55: CPT | Performed by: FAMILY MEDICINE

## 2024-06-18 PROCEDURE — 83540 ASSAY OF IRON: CPT

## 2024-06-18 PROCEDURE — 0202U NFCT DS 22 TRGT SARS-COV-2: CPT

## 2024-06-18 PROCEDURE — 83036 HEMOGLOBIN GLYCOSYLATED A1C: CPT

## 2024-06-18 PROCEDURE — 78580 LUNG PERFUSION IMAGING: CPT

## 2024-06-18 PROCEDURE — 2060000000 HC ICU INTERMEDIATE R&B

## 2024-06-18 PROCEDURE — A9540 TC99M MAA: HCPCS | Performed by: RADIOLOGY

## 2024-06-18 PROCEDURE — 36415 COLL VENOUS BLD VENIPUNCTURE: CPT

## 2024-06-18 PROCEDURE — 93010 ELECTROCARDIOGRAM REPORT: CPT | Performed by: INTERNAL MEDICINE

## 2024-06-18 PROCEDURE — 85379 FIBRIN DEGRADATION QUANT: CPT

## 2024-06-18 PROCEDURE — 86316 IMMUNOASSAY TUMOR OTHER: CPT

## 2024-06-18 PROCEDURE — 2580000003 HC RX 258

## 2024-06-18 PROCEDURE — 6370000000 HC RX 637 (ALT 250 FOR IP)

## 2024-06-18 PROCEDURE — 82378 CARCINOEMBRYONIC ANTIGEN: CPT

## 2024-06-18 PROCEDURE — 80053 COMPREHEN METABOLIC PANEL: CPT

## 2024-06-18 PROCEDURE — 83550 IRON BINDING TEST: CPT

## 2024-06-18 PROCEDURE — 93970 EXTREMITY STUDY: CPT

## 2024-06-18 PROCEDURE — 2580000003 HC RX 258: Performed by: STUDENT IN AN ORGANIZED HEALTH CARE EDUCATION/TRAINING PROGRAM

## 2024-06-18 PROCEDURE — 3430000000 HC RX DIAGNOSTIC RADIOPHARMACEUTICAL: Performed by: RADIOLOGY

## 2024-06-18 RX ORDER — SODIUM CHLORIDE, SODIUM LACTATE, POTASSIUM CHLORIDE, CALCIUM CHLORIDE 600; 310; 30; 20 MG/100ML; MG/100ML; MG/100ML; MG/100ML
INJECTION, SOLUTION INTRAVENOUS CONTINUOUS
Status: DISCONTINUED | OUTPATIENT
Start: 2024-06-18 | End: 2024-06-23 | Stop reason: ALTCHOICE

## 2024-06-18 RX ADMIN — EZETIMIBE 10 MG: 10 TABLET ORAL at 08:22

## 2024-06-18 RX ADMIN — TRAZODONE HYDROCHLORIDE 150 MG: 50 TABLET ORAL at 21:16

## 2024-06-18 RX ADMIN — Medication 100 MG: at 08:20

## 2024-06-18 RX ADMIN — SODIUM CHLORIDE, POTASSIUM CHLORIDE, SODIUM LACTATE AND CALCIUM CHLORIDE: 600; 310; 30; 20 INJECTION, SOLUTION INTRAVENOUS at 17:23

## 2024-06-18 RX ADMIN — HYDROXYZINE PAMOATE 25 MG: 25 CAPSULE ORAL at 08:21

## 2024-06-18 RX ADMIN — SODIUM CHLORIDE, PRESERVATIVE FREE 10 ML: 5 INJECTION INTRAVENOUS at 21:16

## 2024-06-18 RX ADMIN — ATORVASTATIN CALCIUM 20 MG: 20 TABLET, FILM COATED ORAL at 21:16

## 2024-06-18 RX ADMIN — ENOXAPARIN SODIUM 30 MG: 100 INJECTION SUBCUTANEOUS at 08:24

## 2024-06-18 RX ADMIN — SODIUM CHLORIDE, PRESERVATIVE FREE 10 ML: 5 INJECTION INTRAVENOUS at 08:27

## 2024-06-18 RX ADMIN — SODIUM CHLORIDE, PRESERVATIVE FREE 10 ML: 5 INJECTION INTRAVENOUS at 21:25

## 2024-06-18 RX ADMIN — DONEPEZIL HYDROCHLORIDE 10 MG: 10 TABLET, FILM COATED ORAL at 00:01

## 2024-06-18 RX ADMIN — SODIUM CHLORIDE: 9 INJECTION, SOLUTION INTRAVENOUS at 00:04

## 2024-06-18 RX ADMIN — WATER 1000 MG: 1 INJECTION INTRAMUSCULAR; INTRAVENOUS; SUBCUTANEOUS at 21:16

## 2024-06-18 RX ADMIN — SODIUM CHLORIDE, PRESERVATIVE FREE 10 ML: 5 INJECTION INTRAVENOUS at 09:29

## 2024-06-18 RX ADMIN — METOPROLOL SUCCINATE 37.5 MG: 25 TABLET, EXTENDED RELEASE ORAL at 08:22

## 2024-06-18 RX ADMIN — ASPIRIN 81 MG CHEWABLE TABLET 81 MG: 81 TABLET CHEWABLE at 08:21

## 2024-06-18 RX ADMIN — SODIUM CHLORIDE, PRESERVATIVE FREE 10 ML: 5 INJECTION INTRAVENOUS at 00:01

## 2024-06-18 RX ADMIN — IOPAMIDOL 100 ML: 755 INJECTION, SOLUTION INTRAVENOUS at 13:50

## 2024-06-18 RX ADMIN — SODIUM CHLORIDE, POTASSIUM CHLORIDE, SODIUM LACTATE AND CALCIUM CHLORIDE: 600; 310; 30; 20 INJECTION, SOLUTION INTRAVENOUS at 09:28

## 2024-06-18 RX ADMIN — TRAZODONE HYDROCHLORIDE 150 MG: 50 TABLET ORAL at 00:01

## 2024-06-18 RX ADMIN — ENOXAPARIN SODIUM 30 MG: 100 INJECTION SUBCUTANEOUS at 21:17

## 2024-06-18 RX ADMIN — ATORVASTATIN CALCIUM 20 MG: 20 TABLET, FILM COATED ORAL at 00:01

## 2024-06-18 RX ADMIN — Medication 6 MILLICURIE: at 15:07

## 2024-06-18 RX ADMIN — DONEPEZIL HYDROCHLORIDE 10 MG: 10 TABLET, FILM COATED ORAL at 21:16

## 2024-06-18 NOTE — PROGRESS NOTES
[facility]  Progress Note    Chief Complaint   Patient presents with    Tachycardia     Per nursing home patient had elevated HR          Subjective:    Andres Staton was seen this morning playfully joking.  Awake alert answering questions generally appropriately.  Not expressing any acute complaints. Did report some burning with urinating says he has a history of urinary tract infections.  Informed him of the findings on the CT abdomen pelvis which were concerning for mass in the liver.           Review of Systems   All other systems reviewed and are negative.        Objective:    /80   Pulse (!) 110   Temp 98 °F (36.7 °C) (Oral)   Resp 18   Ht 1.88 m (6' 2\")   Wt 127 kg (280 lb)   SpO2 95%   BMI 35.95 kg/m²       Physical Exam  Constitutional:       General: He is not in acute distress.     Appearance: Normal appearance. He is not ill-appearing.   HENT:      Head: Normocephalic and atraumatic.   Eyes:      General: No scleral icterus.  Cardiovascular:      Rate and Rhythm: Normal rate and regular rhythm.      Heart sounds: No murmur heard.  Pulmonary:      Effort: No respiratory distress.      Breath sounds: No wheezing, rhonchi or rales.   Abdominal:      General: There is distension.      Palpations: Abdomen is soft.      Tenderness: There is no abdominal tenderness. There is no guarding.      Comments: Right lower quadrant ostomy with bag in place with liquidy stool output   Musculoskeletal:      Right lower leg: No edema.      Left lower leg: No edema.   Skin:     General: Skin is warm and dry.      Coloration: Skin is not jaundiced or pale.   Neurological:      Mental Status: He is alert. Mental status is at baseline.               Assessment:    Active Hospital Problems    Diagnosis Date Noted    Dementia (HCC) [F03.90] 01/17/2023     Priority: Medium    Sepsis secondary to UTI (HCC) [A41.9, N39.0] 06/17/2024    Tachycardia [R00.0] 06/17/2024    Renal

## 2024-06-18 NOTE — PLAN OF CARE
Problem: Discharge Planning  Goal: Discharge to home or other facility with appropriate resources  6/18/2024 0745 by Nataliia Jones RN  Outcome: Progressing  6/18/2024 0554 by Trent Garsia RN  Outcome: Progressing     Problem: Safety - Adult  Goal: Free from fall injury  6/18/2024 0745 by Nataliia Jones RN  Outcome: Progressing  6/18/2024 0554 by Trent Garsia RN  Outcome: Not Progressing     Problem: Skin/Tissue Integrity  Goal: Absence of new skin breakdown  Description: 1.  Monitor for areas of redness and/or skin breakdown  2.  Assess vascular access sites hourly  3.  Every 4-6 hours minimum:  Change oxygen saturation probe site  4.  Every 4-6 hours:  If on nasal continuous positive airway pressure, respiratory therapy assess nares and determine need for appliance change or resting period.  Outcome: Progressing     Problem: Safety - Adult  Goal: Free from fall injury  6/18/2024 0745 by Nataliia Jones RN  Outcome: Progressing  6/18/2024 0554 by Trent Garsia RN  Outcome: Not Progressing

## 2024-06-18 NOTE — CARE COORDINATION
Social Work/Discharge Planning:  Social Work consult noted.  Met with patient and completed initial assessment.  Explained Social Work role.  He states he admitted from [facility] and plans to return to facility at discharge.  PTA he is independent with no adaptive device.  Called liaison Amelia with [facility] and confirmed patient is a bed hold and no pre-cert needed to return.  Electronic N-17 in epic and ambulette form in soft chart.  Will continue to follow and assist with discharge planning.  Electronically signed by EDWARD Perez on 6/18/2024 at 11:30 AM

## 2024-06-18 NOTE — PROGRESS NOTES
Triphasic CTA shows appearance of abscess, IR Consulted for abscess drainage and drain placement, if solid, biopsy ordered.     If cannot be done today, ok for patient to eat then NPO after midnight.     Discussed with Dr Rebolledo.    Jordan Rivera, APRN - CNP 6/18/2024 3:38 PM

## 2024-06-18 NOTE — CONSULTS
Attending Physician Statement:    Chief Complaint:   Chief Complaint   Patient presents with    Tachycardia     Per nursing home patient had elevated HR        I have examined the patient and performed the key aspects of physical exam, reviewed the record (including all pertinent and new radiology images and laboratory findings), and discussed the case with the surgical team.  I agree with the assessment and plan with the following additions, corrections, and changes. 14pt review of symptoms completed and negative except as mentioned.    Patient is a 59-year-old male with history of dementia, alcohol abuse, nursing home dependent ([facility]) he is a poor historian as he keeps expressing that he does not remember a lot.  However he does admit to occasional left lower quadrant pain.  Appears to have colon surgery in the past.  In February 2022 Dr. Duarte performed exploratory laparotomy, right hemicolectomy with mucous fistula and end ileostomy for large bowel obstruction with ischemic right colon and a descending colon mass invading the retroperitoneum and abdominal sidewall with a colocutaneous fistula.  Pathology was benign consistent with dilated colon with vascular congestion, hemorrhage and mural thrombus tubular adenomas and 12 benign lymph nodes.  Apparently had a colonoscopy through his mucous fistula during the hospitalization that showed a stricture but not a mass.  This was felt to be from chronic diverticulitis.  After his discharge him unable to see from the chart if he has had any follow-up.  The patient does not remember.  States his ileostomy is functioning.  Does not remember why he is in the hospital.  Per records apparently was tachycardic at the nursing home.  He had a CT abdomen pelvis which showed concern for a lesion within the liver and the right lobe.  ALP slightly elevated transaminases slightly elevated.  A CT triphasic liver protocol showed a rim-enhancing lesion with central

## 2024-06-18 NOTE — PROGRESS NOTES
Spoke with Nirav webb on over the phone. Updated and answered any questions pertaining to Andres' care.

## 2024-06-18 NOTE — PROGRESS NOTES
4 Eyes Skin Assessment     NAME:  Andres Staton  YOB: 1965  MEDICAL RECORD NUMBER:  [id number]    The patient is being assessed for  Admission    I agree that at least one RN has performed a thorough Head to Toe Skin Assessment on the patient. ALL assessment sites listed below have been assessed.      Areas assessed by both nurses:    Head, Face, Ears and Under Medical Devices         Does the Patient have a Wound? Yes wound(s) were present on assessment. LDA wound assessment was Initiated and completed by RN       Ben Prevention initiated by RN: Yes  Wound Care Orders initiated by RN: Yes    Pressure Injury (Stage 3,4, Unstageable, DTI, NWPT, and Complex wounds) if present, place Wound referral order by RN under : No    New Ostomies, if present place, Ostomy referral order under : Yes     Nurse 1 eSignature: Electronically signed by DAVID LILLY RN on 6/18/24 at 5:59 AM EDT    **SHARE this note so that the co-signing nurse can place an eSignature**    Nurse 2 eSignature: Electronically signed by Lorena Palmer RN on 6/18/24 at 6:07 AM EDT

## 2024-06-18 NOTE — ED NOTES
ED to Inpatient Handoff Report    Notified floor that electronic handoff available and patient ready for transport to room 404.    Safety Risks: Risk of falls    Patient in Restraints: no    Constant Observer or Patient : no    Telemetry Monitoring Ordered :Yes      Cardiac Rhythm: Sinus tachy    Order to transfer to unit without monitor:YES    Last MEWS: 2 Time completed: 2157    Deterioration Index Score:   Predictive Model Details          30  Factor Value    Calculated 6/17/2024 22:06 33% Age 59 years old    Deterioration Index Model 22% Respiratory rate 21     13% Cardiac rhythm Sinus tachy     7% WBC count 11.4 k/uL     6% Pulse 99     6% Potassium 4.3 mmol/L     4% Systolic 133     3% Pulse oximetry 93 %     3% Sodium 133 mmol/L     3% BUN abnormal (23 mg/dL)     0% Temperature 98.1 °F (36.7 °C)     0% Hematocrit 40.8 %        Vitals:    06/17/24 1603 06/17/24 1803 06/17/24 1945 06/17/24 2157   BP: (!) 121/96 (!) 130/91 105/81 133/86   Pulse: (!) 106 (!) 105 98 99   Resp: 24 27 21   Temp:    98.1 °F (36.7 °C)   TempSrc:    Oral   SpO2: 93% 93% 94% 93%   Weight:       Height:             Opportunity for questions and clarification was provided.

## 2024-06-18 NOTE — PLAN OF CARE
Problem: Discharge Planning  Goal: Discharge to home or other facility with appropriate resources  Outcome: Progressing     Problem: Safety - Adult  Goal: Free from fall injury  Outcome: Not Progressing     Problem: Safety - Adult  Goal: Free from fall injury  Outcome: Not Progressing

## 2024-06-18 NOTE — FLOWSHEET NOTE
Inpatient Wound Care (initial consult) 404    Admit Date: 6/17/2024  2:55 PM    Reason for consult:  ileostomy leaking    Patient sitting up in bed, awake, alert and oriented. Patient swapping back and forth speaking between a high pitch voice and a low pitch voice. High pitch voice characterized with being unaware of situation of changing pouch and low pitch voice was aware of situation. Patient ambulates in room independently and to bathroom. Patient in low voice reported living at a nursing home and the staff changed pouch.    Significant history:  per H&P    Chief Complaint   Patient presents with    Tachycardia       Per nursing home patient had elevated HR          History of Present Illness:   Andres Staton  is a 59 y.o. male patient of Woody Parkinson DO  with a pertinent PMHx of dementia, CAD, HLD, ETOH abuse who presented to the ER from [facility] with chief complaint of tachycardia.  Per nurse and ED providers, patient was sent in from facility after being found to have tachycardia.  Per my discussion with the patient, he has been at baseline health without any symptoms and without any complaints.  He states he does not know why he was brought to the emergency department because he has been here too long and has forgotten.  He states that he does not smoke, does not drink alcohol in many months, and did not use any other substances.  Review of systems is as below.    Past Medical History:   Diagnosis Date    CAD (coronary artery disease)     HTN (hypertension)     Hypercholesteremia     Hypertension     Sleep apnea      Findings:     06/18/24 1028   Ileostomy Ileostomy RLQ   Placement Date: 02/24/22   Pre-existing: No  Inserted by: /dr. Duarte  Ileostomy Type: Ileostomy  Location: RLQ   Stomal Appliance 2 piece;Convex;Changed   Flange Size (inches)   (45mm)   Stoma  Assessment   Red;Moist;Flush   Peristomal Assessment Other (Comment)  (white intact)   Mucocutaneous

## 2024-06-18 NOTE — ACP (ADVANCE CARE PLANNING)
Advance Care Planning   Healthcare Decision Maker:    Primary Decision Maker: Nirav Staton - Brother/Sister - [phone]    Click here to complete Healthcare Decision Makers including selection of the Healthcare Decision Maker Relationship (ie \"Primary\").

## 2024-06-19 ENCOUNTER — APPOINTMENT (OUTPATIENT)
Dept: CT IMAGING | Age: 59
DRG: 871 | End: 2024-06-19
Payer: MEDICARE

## 2024-06-19 PROBLEM — K75.0 LIVER ABSCESS: Status: ACTIVE | Noted: 2024-06-19

## 2024-06-19 PROBLEM — K57.20 DIVERTICULITIS OF LARGE INTESTINE WITH ABSCESS WITHOUT BLEEDING: Status: ACTIVE | Noted: 2024-06-19

## 2024-06-19 LAB
AFP SERPL-MCNC: <1.8 UG/L
ALBUMIN SERPL-MCNC: 3.1 G/DL (ref 3.5–5.2)
ALP SERPL-CCNC: 124 U/L (ref 40–129)
ALT SERPL-CCNC: 75 U/L (ref 0–40)
ANION GAP SERPL CALCULATED.3IONS-SCNC: 10 MMOL/L (ref 7–16)
AST SERPL-CCNC: 91 U/L (ref 0–39)
BASOPHILS # BLD: 0 K/UL (ref 0–0.2)
BASOPHILS NFR BLD: 0 % (ref 0–2)
BILIRUB SERPL-MCNC: 0.7 MG/DL (ref 0–1.2)
BUN SERPL-MCNC: 14 MG/DL (ref 6–20)
CALCIUM SERPL-MCNC: 8.7 MG/DL (ref 8.6–10.2)
CHLORIDE SERPL-SCNC: 104 MMOL/L (ref 98–107)
CO2 SERPL-SCNC: 24 MMOL/L (ref 22–29)
CREAT SERPL-MCNC: 1.3 MG/DL (ref 0.7–1.2)
EOSINOPHIL # BLD: 0.13 K/UL (ref 0.05–0.5)
EOSINOPHILS RELATIVE PERCENT: 2 % (ref 0–6)
ERYTHROCYTE [DISTWIDTH] IN BLOOD BY AUTOMATED COUNT: 14 % (ref 11.5–15)
FERRITIN SERPL-MCNC: 861 NG/ML
FOLATE SERPL-MCNC: 13.7 NG/ML (ref 4.8–24.2)
GFR, ESTIMATED: 62 ML/MIN/1.73M2
GLUCOSE SERPL-MCNC: 152 MG/DL (ref 74–99)
HCT VFR BLD AUTO: 34.5 % (ref 37–54)
HGB BLD-MCNC: 11.1 G/DL (ref 12.5–16.5)
INR PPP: 1.3
LYMPHOCYTES NFR BLD: 0.6 K/UL (ref 1.5–4)
LYMPHOCYTES RELATIVE PERCENT: 8 % (ref 20–42)
MCH RBC QN AUTO: 26.6 PG (ref 26–35)
MCHC RBC AUTO-ENTMCNC: 32.2 G/DL (ref 32–34.5)
MCV RBC AUTO: 82.7 FL (ref 80–99.9)
METAMYELOCYTES ABSOLUTE COUNT: 0.07 K/UL (ref 0–0.12)
METAMYELOCYTES: 1 % (ref 0–1)
MONOCYTES NFR BLD: 0.67 K/UL (ref 0.1–0.95)
MONOCYTES NFR BLD: 9 % (ref 2–12)
NEUTROPHILS NFR BLD: 81 % (ref 43–80)
NEUTS SEG NFR BLD: 6.23 K/UL (ref 1.8–7.3)
NUCLEATED RED BLOOD CELLS: 1 PER 100 WBC
PARTIAL THROMBOPLASTIN TIME: 28.5 SEC (ref 24.5–35.1)
PLATELET # BLD AUTO: 169 K/UL (ref 130–450)
PMV BLD AUTO: 10.6 FL (ref 7–12)
POTASSIUM SERPL-SCNC: 4.2 MMOL/L (ref 3.5–5)
PROT SERPL-MCNC: 7.6 G/DL (ref 6.4–8.3)
PROTHROMBIN TIME: 14.8 SEC (ref 9.3–12.4)
RBC # BLD AUTO: 4.17 M/UL (ref 3.8–5.8)
RBC # BLD: ABNORMAL 10*6/UL
SODIUM SERPL-SCNC: 138 MMOL/L (ref 132–146)
VIT B12 SERPL-MCNC: 550 PG/ML (ref 211–946)
WBC OTHER # BLD: 7.7 K/UL (ref 4.5–11.5)

## 2024-06-19 PROCEDURE — 85610 PROTHROMBIN TIME: CPT

## 2024-06-19 PROCEDURE — 87075 CULTR BACTERIA EXCEPT BLOOD: CPT

## 2024-06-19 PROCEDURE — 2060000000 HC ICU INTERMEDIATE R&B

## 2024-06-19 PROCEDURE — 99232 SBSQ HOSP IP/OBS MODERATE 35: CPT | Performed by: FAMILY MEDICINE

## 2024-06-19 PROCEDURE — 85730 THROMBOPLASTIN TIME PARTIAL: CPT

## 2024-06-19 PROCEDURE — 80053 COMPREHEN METABOLIC PANEL: CPT

## 2024-06-19 PROCEDURE — 36415 COLL VENOUS BLD VENIPUNCTURE: CPT

## 2024-06-19 PROCEDURE — 99232 SBSQ HOSP IP/OBS MODERATE 35: CPT | Performed by: CLINICAL NURSE SPECIALIST

## 2024-06-19 PROCEDURE — 0F913ZZ DRAINAGE OF RIGHT LOBE LIVER, PERCUTANEOUS APPROACH: ICD-10-PCS | Performed by: PODIATRIST

## 2024-06-19 PROCEDURE — 2709999900 CT ABSCESS DRAINAGE W CATH PLACEMENT S&I

## 2024-06-19 PROCEDURE — 85025 COMPLETE CBC W/AUTO DIFF WBC: CPT

## 2024-06-19 PROCEDURE — 6360000002 HC RX W HCPCS: Performed by: SPECIALIST

## 2024-06-19 PROCEDURE — 82728 ASSAY OF FERRITIN: CPT

## 2024-06-19 PROCEDURE — 87077 CULTURE AEROBIC IDENTIFY: CPT

## 2024-06-19 PROCEDURE — 99232 SBSQ HOSP IP/OBS MODERATE 35: CPT | Performed by: STUDENT IN AN ORGANIZED HEALTH CARE EDUCATION/TRAINING PROGRAM

## 2024-06-19 PROCEDURE — 6360000002 HC RX W HCPCS: Performed by: RADIOLOGY

## 2024-06-19 PROCEDURE — 87205 SMEAR GRAM STAIN: CPT

## 2024-06-19 PROCEDURE — 2500000003 HC RX 250 WO HCPCS: Performed by: RADIOLOGY

## 2024-06-19 PROCEDURE — 2580000003 HC RX 258

## 2024-06-19 PROCEDURE — 6360000002 HC RX W HCPCS

## 2024-06-19 PROCEDURE — 2580000003 HC RX 258: Performed by: SPECIALIST

## 2024-06-19 PROCEDURE — 82746 ASSAY OF FOLIC ACID SERUM: CPT

## 2024-06-19 PROCEDURE — 6370000000 HC RX 637 (ALT 250 FOR IP)

## 2024-06-19 PROCEDURE — 82607 VITAMIN B-12: CPT

## 2024-06-19 PROCEDURE — 87070 CULTURE OTHR SPECIMN AEROBIC: CPT

## 2024-06-19 PROCEDURE — 6360000002 HC RX W HCPCS: Performed by: FAMILY MEDICINE

## 2024-06-19 RX ORDER — LIDOCAINE HYDROCHLORIDE 20 MG/ML
INJECTION, SOLUTION INFILTRATION; PERINEURAL PRN
Status: COMPLETED | OUTPATIENT
Start: 2024-06-19 | End: 2024-06-19

## 2024-06-19 RX ORDER — MIDAZOLAM HYDROCHLORIDE 2 MG/2ML
INJECTION, SOLUTION INTRAMUSCULAR; INTRAVENOUS PRN
Status: COMPLETED | OUTPATIENT
Start: 2024-06-19 | End: 2024-06-19

## 2024-06-19 RX ORDER — METRONIDAZOLE 500 MG/100ML
500 INJECTION, SOLUTION INTRAVENOUS EVERY 8 HOURS
Status: DISCONTINUED | OUTPATIENT
Start: 2024-06-19 | End: 2024-06-19

## 2024-06-19 RX ORDER — FENTANYL CITRATE 50 UG/ML
INJECTION, SOLUTION INTRAMUSCULAR; INTRAVENOUS PRN
Status: COMPLETED | OUTPATIENT
Start: 2024-06-19 | End: 2024-06-19

## 2024-06-19 RX ADMIN — METRONIDAZOLE 500 MG: 500 INJECTION, SOLUTION INTRAVENOUS at 09:34

## 2024-06-19 RX ADMIN — Medication 100 MG: at 08:00

## 2024-06-19 RX ADMIN — ATORVASTATIN CALCIUM 20 MG: 20 TABLET, FILM COATED ORAL at 22:05

## 2024-06-19 RX ADMIN — FENTANYL CITRATE 50 MCG: 50 INJECTION, SOLUTION INTRAMUSCULAR; INTRAVENOUS at 11:40

## 2024-06-19 RX ADMIN — TRAZODONE HYDROCHLORIDE 150 MG: 50 TABLET ORAL at 22:05

## 2024-06-19 RX ADMIN — HYDROXYZINE PAMOATE 25 MG: 25 CAPSULE ORAL at 08:00

## 2024-06-19 RX ADMIN — LIDOCAINE HYDROCHLORIDE 10 ML: 20 INJECTION, SOLUTION INFILTRATION; PERINEURAL at 11:38

## 2024-06-19 RX ADMIN — SODIUM CHLORIDE, PRESERVATIVE FREE 10 ML: 5 INJECTION INTRAVENOUS at 09:19

## 2024-06-19 RX ADMIN — METOPROLOL SUCCINATE 37.5 MG: 25 TABLET, EXTENDED RELEASE ORAL at 08:00

## 2024-06-19 RX ADMIN — MIDAZOLAM HYDROCHLORIDE 1 MG: 1 INJECTION, SOLUTION INTRAMUSCULAR; INTRAVENOUS at 11:40

## 2024-06-19 RX ADMIN — AMPICILLIN SODIUM, SULBACTAM SODIUM 3000 MG: 2; 1 INJECTION, POWDER, FOR SOLUTION INTRAMUSCULAR; INTRAVENOUS at 22:05

## 2024-06-19 RX ADMIN — SODIUM CHLORIDE, PRESERVATIVE FREE 10 ML: 5 INJECTION INTRAVENOUS at 14:39

## 2024-06-19 RX ADMIN — ENOXAPARIN SODIUM 30 MG: 100 INJECTION SUBCUTANEOUS at 22:06

## 2024-06-19 RX ADMIN — EZETIMIBE 10 MG: 10 TABLET ORAL at 08:00

## 2024-06-19 RX ADMIN — AMPICILLIN SODIUM, SULBACTAM SODIUM 3000 MG: 2; 1 INJECTION, POWDER, FOR SOLUTION INTRAMUSCULAR; INTRAVENOUS at 15:09

## 2024-06-19 RX ADMIN — SODIUM CHLORIDE, PRESERVATIVE FREE 10 ML: 5 INJECTION INTRAVENOUS at 22:02

## 2024-06-19 RX ADMIN — SODIUM CHLORIDE, PRESERVATIVE FREE 10 ML: 5 INJECTION INTRAVENOUS at 23:36

## 2024-06-19 RX ADMIN — ACETAMINOPHEN 650 MG: 325 TABLET ORAL at 13:01

## 2024-06-19 RX ADMIN — DONEPEZIL HYDROCHLORIDE 10 MG: 10 TABLET, FILM COATED ORAL at 22:05

## 2024-06-19 NOTE — DISCHARGE INSTRUCTIONS
An Interventional Radiology drainage tube has been placed.Output needs to be measured and recorded daily, please bring this record with you for your follow up tube checks in Radiology. Please flush tube with 10 cc sterile saline once a day. Notify Radiology if unable to flush.   On discharge, notify Radiology if transferred to a Facility, if not, patient may need Home Care. Flushing instructions and follow up tube check date and time are ordered.  Any questions or concerns or  please call [phone].       Tube check in radiology at [facility] on 7/3/24 at 0900.

## 2024-06-19 NOTE — PROGRESS NOTES
[facility]  Progress Note    Chief Complaint   Patient presents with    Tachycardia     Per nursing home patient had elevated HR          Subjective:    Andres Staton was seen this morning pleasantly confused.  Oriented to person and place but not to time.  More appropriate today.  Did have to reexplained to him that he is to have an IR biopsy today for the mass in his liver which could be abscess versus neoplasm.  Patient not expressing any complaints this morning.  Did report that he is urinating frequently.         Review of Systems   All other systems reviewed and are negative.        Objective:    BP (!) 140/90   Pulse 92   Temp 99.8 °F (37.7 °C) (Oral)   Resp 18   Ht 1.88 m (6' 2\")   Wt 127 kg (280 lb)   SpO2 94%   BMI 35.95 kg/m²       Physical Exam  Constitutional:       General: He is not in acute distress.     Appearance: Normal appearance. He is not ill-appearing.   HENT:      Head: Normocephalic and atraumatic.   Eyes:      General: No scleral icterus.  Cardiovascular:      Rate and Rhythm: Normal rate and regular rhythm.      Heart sounds: No murmur heard.  Pulmonary:      Effort: No respiratory distress.      Breath sounds: No wheezing, rhonchi or rales.   Abdominal:      General: There is no distension.      Palpations: Abdomen is soft.      Tenderness: There is no abdominal tenderness. There is no guarding.      Comments: Right lower quadrant ostomy with bag in place with liquidy stool output   Musculoskeletal:      Right lower leg: No edema.      Left lower leg: No edema.   Skin:     General: Skin is warm and dry.      Coloration: Skin is not jaundiced or pale.   Neurological:      Mental Status: He is alert. Mental status is at baseline.               Assessment:    Active Hospital Problems    Diagnosis Date Noted    Dementia (HCC) [F03.90] 01/17/2023     Priority: Medium    Presence of ostomy (HCC) [Z93.9] 06/18/2024    Positive D dimer [R79.89]  06/18/2024    Sepsis secondary to UTI (HCC) [A41.9, N39.0] 06/17/2024    Tachycardia [R00.0] 06/17/2024    Renal insufficiency [N28.9] 06/17/2024    Liver mass [R16.0] 06/17/2024    Colonic fistula [K63.2] 02/23/2022    CAD (coronary artery disease) [I25.10] 07/15/2021    MARLEE (obstructive sleep apnea) [G47.33] 07/15/2021    Cognitive disorder [F09] 06/08/2021       Plan:     Sepsis 2/2 UTI, sepsis criteria no longer met  UA positive for nitrite, leuk esterase, 3+ bacteria.  Pro-Osiel 2.62.  Reported fever up to 101.9 undocumented.  Received Rocephin 1 g x 1 in the emergency department  Continue Rocephin 1 g daily  Urine and blood cultures ordered  Telemetry monitoring  Infectious disease consulted, appreciate recommendations     Tachycardia  Likely secondary to above.  Continue home Toprol XL 37.5 mg daily  CInfectious disease consulted, appreciate recommendationsontinue IV fluid rehydration at 150 cc/h     Renal insufficiency  Past baseline appears to be around 1, however has been over 2 years since last labs were checked  Check urine electrolyte  Continue normal saline at 150 cc/h  -Creatinine on 6/19 improved to 1.3 down from 1.8 the day before and 2.5 on admission     Liver mass  CT abdomen pelvis without contrast read as: Presence of a 6.2 x 4.8 cm focal lesion in the peripheral diaphragma surface of the right lobe liver segments 8/7.  This lesion was not seen previously.  Metastatic liver disease considered.  Further evaluation with multiphase IV contrast CT or MRI of the liver recommended.  -Hepatobiliary consulted, appreciate recommendations  -Ordered CTA triphasic of the liver which demonstrated rim-enhancing lesion could potentially represent an abscess   -IR guided biopsy today  -Resume diabetic diet and start anticoagulation PPx after procedure    Type 2 diabetes, newly discovered  Hyperglycemic on presentation, Hemoglobin A1c 6.9 on 6/18  -Continue to monitor blood glucose  -Diabetic diet after procedure

## 2024-06-19 NOTE — PROGRESS NOTES
Pt has been instructed to use graduate when emptying colostomy, so we can get accurate output, he states he only empties it once a day and he empties straight into commode

## 2024-06-19 NOTE — OR NURSING
Patient taken to Cat Scan, positioned on table supine wiith oxygen and monitoring equipment attached. Patient scanned and images reviewed by Dr Slaughter . Patient prepped and draped per protocol. #10 [place] tube placed RUQ with Cat Scan guidance by Dr Slaughter, specimen obtained and sent to lab for culture and gram stain. Patient re-scanned. Puncture site cleansed, tube sutured in place with 2.0 Ethilon to secure tube and tube connected to drain bag. Patient tolerated well. Procedure complete and patient transported to floor. Report called to RN with instructions.

## 2024-06-19 NOTE — CONSULTS
LAPAROTOMY N/A 2022    exploratory laparotomy, extended right hemicolectomy, creation of mucus fistula and end ileostomy performed by Tin Duarte MD at [facility] OR     Current Medications:   Scheduled Meds:   metroNIDAZOLE  500 mg IntraVENous Q8H    sodium chloride flush  5-40 mL IntraVENous 2 times per day    thiamine  100 mg Oral Daily    aspirin  81 mg Oral Daily    atorvastatin  20 mg Oral Nightly    donepezil  10 mg Oral Nightly    ezetimibe  10 mg Oral Daily    hydrOXYzine pamoate  25 mg Oral Daily    metoprolol succinate  37.5 mg Oral Daily    traZODone  150 mg Oral Nightly    sodium chloride flush  5-40 mL IntraVENous 2 times per day    enoxaparin  30 mg SubCUTAneous BID    cefTRIAXone (ROCEPHIN) IV  1,000 mg IntraVENous Q24H     Continuous Infusions:   lactated ringers IV soln 150 mL/hr at 24 1723    sodium chloride      sodium chloride       PRN Meds:sodium chloride flush, sodium chloride, sodium chloride flush, sodium chloride, ondansetron **OR** ondansetron, polyethylene glycol, acetaminophen **OR** acetaminophen    Allergies:  Patient has no known allergies.    Social History:   Social History     Socioeconomic History    Marital status:      Spouse name: None    Number of children: None    Years of education: None    Highest education level: None   Tobacco Use    Smoking status: Former     Current packs/day: 0.00     Average packs/day: 1 pack/day for 6.0 years (6.0 ttl pk-yrs)     Types: Cigarettes     Start date:      Quit date:      Years since quittin.4    Smokeless tobacco: Never   Vaping Use    Vaping Use: Never used   Substance and Sexual Activity    Alcohol use: Yes     Alcohol/week: 5.0 standard drinks of alcohol     Types: 5 Cans of beer per week    Drug use: Not Currently    Sexual activity: Not Currently     Social Determinants of Health     Food Insecurity: No Food Insecurity (2024)    Hunger Vital Sign     Worried About Running Out of Food in the  Last Year: Never true     Ran Out of Food in the Last Year: Never true   Transportation Needs: No Transportation Needs (6/18/2024)    PRAPARE - Transportation     Lack of Transportation (Medical): No     Lack of Transportation (Non-Medical): No   Housing Stability: Low Risk  (6/18/2024)    Housing Stability Vital Sign     Unable to Pay for Housing in the Last Year: No     Number of Places Lived in the Last Year: 1     Unstable Housing in the Last Year: No      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 No pertinent family history.. Otherwise non-pertinent to the chief complaint.    REVIEW OF SYSTEMS:    Constitutional: Positive for fevers, chills, diaphoresis  Neurologic: History of dementia  Psychiatric: Negative  Rheumatologic: Negative   Endocrine: Negative  Hematologic: Negative  Immunologic: Negative  ENT: Negative  Respiratory: Negative   Cardiovascular: Negative  GI: As in the HPI  : Negative  Musculoskeletal: Negative  Skin: No rashes.     PHYSICAL EXAM:    Vitals:   /63   Pulse 92   Temp 98.5 °F (36.9 °C) (Oral)   Resp 18   Ht 1.88 m (6' 2\")   Wt 127 kg (280 lb)   SpO2 95%   BMI 35.95 kg/m²   Constitutional: The patient is awake, alert, and oriented to person and place.  He is shivering..   Skin: Warm and dry. No rashes were noted.   HEENT: Eyes show round, and reactive pupils. No jaundice. Moist mucous membranes, no ulcerations, no thrush.   Neck: Supple to movements. No lymphadenopathy.   Chest: No use of accessory muscles to breathe. Symmetrical expansion. Auscultation reveals no wheezing, crackles, or rhonchi.   Cardiovascular: S1 and S2 are rhythmic and regular. No murmurs appreciated.   Abdomen: Positive bowel sounds to auscultation.  Well-healed surgical wounds.  Right lower colostomy.  Right upper quadrant pigtail catheter with bloody fluid.  Extremities: No clubbing, no cyanosis, no edema.  Lines: Peripheral.    Lab Results   Component

## 2024-06-19 NOTE — CONSULTS
GENERAL SURGERY  CONSULT NOTE    Patient's Name/Date of Birth: Andres Staton / 1965    Date: June 19, 2024     PCP: Woody Parkinson DO     Chief Complaint:   Chief Complaint   Patient presents with    Tachycardia     Per nursing home patient had elevated HR        Physician Consulted: Dr. Duarte  Reason for Consult: known to you, recurrent diverticulitis  Referring Physician: Dr. Emmanuelle ODELL  Andres Staton is a 59 y.o. male with hx of HTN, CAD, dementia, alcohol abuse who presents from his nursing facility for tachycardia. Patient has complex surgical hx secondary to recurrent diverticulitis. Initially patient presented with descending right colon mass with retroperitoneal and abdominal sidewall involvement; patient underwent exploratory laparotomy with right hemicolectomy and end ileostomy with colon mucous fistula. Pathology from that surgery shown to be a tubular adenoma with negative nodes. Patient has also had previous colonoscopies via mucous fistula that showed some stricture but no other masses.     General surgery consulted for diverticulitis in setting of new liver abscess/mass for which HPB surgery has been consulted and is following. Patient is a poor historian and is unsure why he is here. Continues to have good ileostomy output. CT a/p completed in ED on arrival shows some non-specific inflammatory changes around the sigmoid with repeat CTA showing similar minimal perisigmoid stranding without evidence of perforation or abscess. Labs show no leukocytosis, hyperglycemia, Cr 2.5 down to 1.8, LA 0.9. UA consistent with UTI. Patient was tachycardic on arrival which has since resolved with IV fluids; currently afebrile and hemodynamically stable.       Past Medical History:   Diagnosis Date    CAD (coronary artery disease)     HTN (hypertension)     Hypercholesteremia     Hypertension     Sleep apnea        Past Surgical History:   Procedure Laterality Date    ABDOMEN SURGERY Left

## 2024-06-19 NOTE — PROGRESS NOTES
Hepatobiliary and Pancreatic Surgery Progress Note    CC:   Chief Complaint   Patient presents with    Tachycardia       Per nursing home patient had elevated HR      Subjective: Patient states he is feeling good today.  He remains forgetful but oriented x 3.  Tmax 99.8 °F overnight.  Denies abdominal pain, nausea, or vomiting.  Discussed abscess drain procedure with patient for today, all his questions answered, he verbalized understanding.  States he just emptied his ostomy, currently with small amount loose green/tan stool.    OBJECTIVE      Physical    BP (!) 140/90   Pulse 92   Temp 99.8 °F (37.7 °C) (Oral)   Resp 18   Ht 1.88 m (6' 2\")   Wt 127 kg (280 lb)   SpO2 94%   BMI 35.95 kg/m²       General appearance: appears in no acute distress  Lungs:respiratory effort normal without accessory numbers  Heart: no pedal edema  Abdomen:  Soft, nondistended, nontender, ostomy with green/tan watery output, no guarding, no peritoneal signs   Extremities: ROM normal    ASSESSMENT/PLAN:  59-year-old male who presents with tachycardia. Intermittent left lower quadrant abdominal pain.    - Hx colon surgery, February 2022 Dr. Duarte performed exploratory laparotomy, right hemicolectomy with mucous fistula and end ileostomy for large bowel obstruction with ischemic right colon and a descending colon mass invading the retroperitoneum and abdominal sidewall with a colocutaneous fistula.  Pathology was benign consistent with dilated colon with vascular congestion, hemorrhage and mural thrombus tubular adenomas and 12 benign lymph nodes.  Apparently had a colonoscopy through his mucous fistula during the hospitalization that showed a stricture but not a mass.  This was felt to be from chronic diverticulitis.   - Antibiotics per ID  - CT triphasic liver protocol showed a rim-enhancing lesion with central necrosis and fluid consistent with an abscess with resolving inflammation in the sigmoid colon.   - Given these findings

## 2024-06-19 NOTE — PRE SEDATION
Sedation Pre-Procedure Note    Patient Name: Andres Staton   YOB: 1965  Room/Bed: 0404/0404-A  Medical Record Number: [id number]  Date: 6/19/2024   Time: 11:10 AM       Indication:  Image guided hepatic abscess drain insertion with possible conscious sedation.     Consent: IDr. Slaughter have discussed with the patient and/or the patient representative the indication, alternatives, and the possible risks and/or complications of the planned procedure and the anesthesia methods. The patient and/or patient representative appear to understand and agree to proceed.    Vital Signs:   Vitals:    06/19/24 0745   BP: (!) 140/90   Pulse: 92   Resp: 18   Temp: 99.8 °F (37.7 °C)   SpO2: 94%       Past Medical History:   has a past medical history of CAD (coronary artery disease), HTN (hypertension), Hypercholesteremia, Hypertension, and Sleep apnea.    Past Surgical History:   has a past surgical history that includes Abdomen surgery (Left, 10/14/2021); laparotomy (N/A, 2/24/2022); Cystoscopy (N/A, 2/24/2022); and Colonoscopy (N/A, 3/1/2022).    Medications:   Scheduled Meds:    metroNIDAZOLE  500 mg IntraVENous Q8H    sodium chloride flush  5-40 mL IntraVENous 2 times per day    thiamine  100 mg Oral Daily    aspirin  81 mg Oral Daily    atorvastatin  20 mg Oral Nightly    donepezil  10 mg Oral Nightly    ezetimibe  10 mg Oral Daily    hydrOXYzine pamoate  25 mg Oral Daily    metoprolol succinate  37.5 mg Oral Daily    traZODone  150 mg Oral Nightly    sodium chloride flush  5-40 mL IntraVENous 2 times per day    enoxaparin  30 mg SubCUTAneous BID    cefTRIAXone (ROCEPHIN) IV  1,000 mg IntraVENous Q24H     Continuous Infusions:    lactated ringers IV soln 150 mL/hr at 06/18/24 1723    sodium chloride      sodium chloride       PRN Meds: sodium chloride flush, sodium chloride, sodium chloride flush, sodium chloride, ondansetron **OR** ondansetron, polyethylene glycol, acetaminophen **OR** acetaminophen  Home

## 2024-06-20 PROBLEM — B35.3 TINEA PEDIS OF BOTH FEET: Status: ACTIVE | Noted: 2024-06-20

## 2024-06-20 PROBLEM — B35.1 ONYCHOMYCOSIS: Status: ACTIVE | Noted: 2024-06-20

## 2024-06-20 PROBLEM — L84 HEEL CALLUS: Status: ACTIVE | Noted: 2024-06-20

## 2024-06-20 LAB
ALBUMIN SERPL-MCNC: 3.3 G/DL (ref 3.5–5.2)
ALP SERPL-CCNC: 188 U/L (ref 40–129)
ALT SERPL-CCNC: 91 U/L (ref 0–40)
ANION GAP SERPL CALCULATED.3IONS-SCNC: 16 MMOL/L (ref 7–16)
AST SERPL-CCNC: 108 U/L (ref 0–39)
BASOPHILS # BLD: 0.09 K/UL (ref 0–0.2)
BASOPHILS NFR BLD: 1 % (ref 0–2)
BILIRUB SERPL-MCNC: 0.7 MG/DL (ref 0–1.2)
BUN SERPL-MCNC: 13 MG/DL (ref 6–20)
CALCIUM SERPL-MCNC: 8.9 MG/DL (ref 8.6–10.2)
CHLORIDE SERPL-SCNC: 101 MMOL/L (ref 98–107)
CO2 SERPL-SCNC: 22 MMOL/L (ref 22–29)
CREAT SERPL-MCNC: 1.3 MG/DL (ref 0.7–1.2)
CRP SERPL HS-MCNC: 187 MG/L (ref 0–5)
EOSINOPHIL # BLD: 0.09 K/UL (ref 0.05–0.5)
EOSINOPHILS RELATIVE PERCENT: 1 % (ref 0–6)
ERYTHROCYTE [DISTWIDTH] IN BLOOD BY AUTOMATED COUNT: 14.1 % (ref 11.5–15)
ERYTHROCYTE [SEDIMENTATION RATE] IN BLOOD BY WESTERGREN METHOD: 118 MM/HR (ref 0–15)
GFR, ESTIMATED: 66 ML/MIN/1.73M2
GLUCOSE SERPL-MCNC: 123 MG/DL (ref 74–99)
HCT VFR BLD AUTO: 35.8 % (ref 37–54)
HGB BLD-MCNC: 11.4 G/DL (ref 12.5–16.5)
LYMPHOCYTES NFR BLD: 0.27 K/UL (ref 1.5–4)
LYMPHOCYTES RELATIVE PERCENT: 3 % (ref 20–42)
MCH RBC QN AUTO: 26 PG (ref 26–35)
MCHC RBC AUTO-ENTMCNC: 31.8 G/DL (ref 32–34.5)
MCV RBC AUTO: 81.7 FL (ref 80–99.9)
MICROORGANISM SPEC CULT: ABNORMAL
MICROORGANISM SPEC CULT: NO GROWTH
MICROORGANISM/AGENT SPEC: NORMAL
MONOCYTES NFR BLD: 0.72 K/UL (ref 0.1–0.95)
MONOCYTES NFR BLD: 7 % (ref 2–12)
NEUTROPHILS NFR BLD: 89 % (ref 43–80)
NEUTS SEG NFR BLD: 9.04 K/UL (ref 1.8–7.3)
PLATELET # BLD AUTO: 180 K/UL (ref 130–450)
PMV BLD AUTO: 10.7 FL (ref 7–12)
POTASSIUM SERPL-SCNC: 4.4 MMOL/L (ref 3.5–5)
PROT SERPL-MCNC: 7.7 G/DL (ref 6.4–8.3)
RBC # BLD AUTO: 4.38 M/UL (ref 3.8–5.8)
RBC # BLD: ABNORMAL 10*6/UL
SERVICE CMNT-IMP: ABNORMAL
SERVICE CMNT-IMP: NORMAL
SODIUM SERPL-SCNC: 139 MMOL/L (ref 132–146)
SPECIMEN DESCRIPTION: ABNORMAL
SPECIMEN DESCRIPTION: NORMAL
WBC OTHER # BLD: 10.2 K/UL (ref 4.5–11.5)

## 2024-06-20 PROCEDURE — 0HBRXZZ EXCISION OF TOE NAIL, EXTERNAL APPROACH: ICD-10-PCS | Performed by: PODIATRIST

## 2024-06-20 PROCEDURE — 2580000003 HC RX 258

## 2024-06-20 PROCEDURE — 6370000000 HC RX 637 (ALT 250 FOR IP)

## 2024-06-20 PROCEDURE — 2060000000 HC ICU INTERMEDIATE R&B

## 2024-06-20 PROCEDURE — 6360000002 HC RX W HCPCS: Performed by: SPECIALIST

## 2024-06-20 PROCEDURE — 80053 COMPREHEN METABOLIC PANEL: CPT

## 2024-06-20 PROCEDURE — 86140 C-REACTIVE PROTEIN: CPT

## 2024-06-20 PROCEDURE — 99232 SBSQ HOSP IP/OBS MODERATE 35: CPT | Performed by: CLINICAL NURSE SPECIALIST

## 2024-06-20 PROCEDURE — 99232 SBSQ HOSP IP/OBS MODERATE 35: CPT | Performed by: FAMILY MEDICINE

## 2024-06-20 PROCEDURE — 6360000002 HC RX W HCPCS

## 2024-06-20 PROCEDURE — 6370000000 HC RX 637 (ALT 250 FOR IP): Performed by: STUDENT IN AN ORGANIZED HEALTH CARE EDUCATION/TRAINING PROGRAM

## 2024-06-20 PROCEDURE — 85025 COMPLETE CBC W/AUTO DIFF WBC: CPT

## 2024-06-20 PROCEDURE — 2580000003 HC RX 258: Performed by: SPECIALIST

## 2024-06-20 PROCEDURE — 85652 RBC SED RATE AUTOMATED: CPT

## 2024-06-20 RX ORDER — SODIUM CHLORIDE 0.9 % (FLUSH) 0.9 %
5-40 SYRINGE (ML) INJECTION PRN
Status: DISCONTINUED | OUTPATIENT
Start: 2024-06-20 | End: 2024-06-24 | Stop reason: HOSPADM

## 2024-06-20 RX ORDER — SODIUM CHLORIDE 0.9 % (FLUSH) 0.9 %
5-40 SYRINGE (ML) INJECTION EVERY 12 HOURS SCHEDULED
Status: DISCONTINUED | OUTPATIENT
Start: 2024-06-20 | End: 2024-06-24 | Stop reason: HOSPADM

## 2024-06-20 RX ORDER — SODIUM CHLORIDE 9 MG/ML
INJECTION, SOLUTION INTRAVENOUS PRN
Status: DISCONTINUED | OUTPATIENT
Start: 2024-06-20 | End: 2024-06-24 | Stop reason: HOSPADM

## 2024-06-20 RX ADMIN — AMPICILLIN SODIUM, SULBACTAM SODIUM 3000 MG: 2; 1 INJECTION, POWDER, FOR SOLUTION INTRAMUSCULAR; INTRAVENOUS at 05:42

## 2024-06-20 RX ADMIN — SODIUM CHLORIDE, PRESERVATIVE FREE 10 ML: 5 INJECTION INTRAVENOUS at 11:41

## 2024-06-20 RX ADMIN — AMPICILLIN SODIUM, SULBACTAM SODIUM 3000 MG: 2; 1 INJECTION, POWDER, FOR SOLUTION INTRAMUSCULAR; INTRAVENOUS at 11:07

## 2024-06-20 RX ADMIN — METOPROLOL SUCCINATE 37.5 MG: 25 TABLET, EXTENDED RELEASE ORAL at 10:58

## 2024-06-20 RX ADMIN — ASPIRIN 81 MG CHEWABLE TABLET 81 MG: 81 TABLET CHEWABLE at 10:56

## 2024-06-20 RX ADMIN — Medication 100 MG: at 10:56

## 2024-06-20 RX ADMIN — ENOXAPARIN SODIUM 30 MG: 100 INJECTION SUBCUTANEOUS at 10:57

## 2024-06-20 RX ADMIN — ENOXAPARIN SODIUM 30 MG: 100 INJECTION SUBCUTANEOUS at 22:09

## 2024-06-20 RX ADMIN — ACETAMINOPHEN 650 MG: 325 TABLET ORAL at 14:38

## 2024-06-20 RX ADMIN — SODIUM CHLORIDE, PRESERVATIVE FREE 10 ML: 5 INJECTION INTRAVENOUS at 22:10

## 2024-06-20 RX ADMIN — SODIUM CHLORIDE, PRESERVATIVE FREE 10 ML: 5 INJECTION INTRAVENOUS at 14:31

## 2024-06-20 RX ADMIN — HYDROXYZINE PAMOATE 25 MG: 25 CAPSULE ORAL at 10:56

## 2024-06-20 RX ADMIN — DONEPEZIL HYDROCHLORIDE 10 MG: 10 TABLET, FILM COATED ORAL at 22:10

## 2024-06-20 RX ADMIN — AMPICILLIN SODIUM, SULBACTAM SODIUM 3000 MG: 2; 1 INJECTION, POWDER, FOR SOLUTION INTRAMUSCULAR; INTRAVENOUS at 22:13

## 2024-06-20 RX ADMIN — TRAZODONE HYDROCHLORIDE 150 MG: 50 TABLET ORAL at 22:09

## 2024-06-20 RX ADMIN — ATORVASTATIN CALCIUM 20 MG: 20 TABLET, FILM COATED ORAL at 22:10

## 2024-06-20 RX ADMIN — MICONAZOLE NITRATE: 20 CREAM TOPICAL at 14:36

## 2024-06-20 RX ADMIN — EZETIMIBE 10 MG: 10 TABLET ORAL at 10:56

## 2024-06-20 RX ADMIN — AMPICILLIN SODIUM, SULBACTAM SODIUM 3000 MG: 2; 1 INJECTION, POWDER, FOR SOLUTION INTRAMUSCULAR; INTRAVENOUS at 16:22

## 2024-06-20 RX ADMIN — MICONAZOLE NITRATE: 20 CREAM TOPICAL at 22:20

## 2024-06-20 RX ADMIN — SODIUM CHLORIDE, PRESERVATIVE FREE 10 ML: 5 INJECTION INTRAVENOUS at 22:21

## 2024-06-20 RX ADMIN — SODIUM CHLORIDE, PRESERVATIVE FREE 10 ML: 5 INJECTION INTRAVENOUS at 10:00

## 2024-06-20 ASSESSMENT — PAIN SCALES - GENERAL
PAINLEVEL_OUTOF10: 4
PAINLEVEL_OUTOF10: 0

## 2024-06-20 ASSESSMENT — PAIN DESCRIPTION - LOCATION: LOCATION: HEAD

## 2024-06-20 ASSESSMENT — PAIN DESCRIPTION - ORIENTATION: ORIENTATION: RIGHT;LEFT

## 2024-06-20 ASSESSMENT — PAIN DESCRIPTION - DESCRIPTORS: DESCRIPTORS: ACHING

## 2024-06-20 NOTE — PROGRESS NOTES
Hepatobiliary and Pancreatic Surgery Progress Note    CC:   Chief Complaint   Patient presents with    Tachycardia       Per nursing home patient had elevated HR      Subjective: Patient states he is feeling \"better, glad to be on the living side of the planet.\" Denies abd pain, n/v. States he ate well for breakfast.     OBJECTIVE      Physical    BP (!) 141/95   Pulse 93   Temp 97.9 °F (36.6 °C) (Oral)   Resp 18   Ht 1.88 m (6' 2\")   Wt 127 kg (280 lb)   SpO2 94%   BMI 35.95 kg/m²       General appearance: appears in no acute distress  Lungs:respiratory effort normal without accessory numbers  Heart: no pedal edema  Abdomen:  Soft, nondistended, nontender, ostomy with green/tan watery output, KYLE with purulent bloody drainage 160 ml out over past 24 hr, site with dry dressing, no guarding, no peritoneal signs   Extremities: ROM normal    ASSESSMENT/PLAN:  59-year-old male who presents with tachycardia. Intermittent left lower quadrant abdominal pain.    - Hx colon surgery, February 2022 Dr. Duarte performed exploratory laparotomy, right hemicolectomy with mucous fistula and end ileostomy for large bowel obstruction with ischemic right colon and a descending colon mass invading the retroperitoneum and abdominal sidewall with a colocutaneous fistula.  Pathology was benign consistent with dilated colon with vascular congestion, hemorrhage and mural thrombus tubular adenomas and 12 benign lymph nodes.  Apparently had a colonoscopy through his mucous fistula during the hospitalization that showed a stricture but not a mass.  This was felt to be from chronic diverticulitis. S/P IR drainage liver abscess, purulence was aspirated and sent for culture.   -Drain is thick bloody purulent. Culture pending  -Will need to keep drain until it dries up. Flush BID.   - Antibiotics per ID  - CT triphasic liver protocol showed a rim-enhancing lesion with central necrosis and fluid consistent with an abscess with resolving

## 2024-06-20 NOTE — CONSULTS
Podiatry Consult H&P  2024   Andres Staton     HISTORY OF PRESENT ILLNESS: This is a 59 y.o. male seen bedside for nailcare. Patient complains that his nails have grown to be very long, thick. Patient has difficulty cutting them on his own. Patient has no other pedal complaints. Patient denies n,v,f,c,d at this time.      Past Medical History:   Diagnosis Date    CAD (coronary artery disease)     HTN (hypertension)     Hypercholesteremia     Hypertension     Sleep apnea         Past Surgical History:   Procedure Laterality Date    ABDOMEN SURGERY Left 10/14/2021    ABDOMEN INCISION AND DRAINAGE [place] [facility] [place]    COLONOSCOPY N/A 3/1/2022    COLONOSCOPY DIAGNOSTIC performed by Demetrio Jalloh MD at [facility] ENDOSCOPY    CYSTOSCOPY N/A 2022    CYSTOSCOPY, urethral dilation, perry insertion performed by Tin Duarte MD at [facility] OR    LAPAROTOMY N/A 2022    exploratory laparotomy, extended right hemicolectomy, creation of mucus fistula and end ileostomy performed by Tin Duarte MD at [facility] OR         History reviewed. No pertinent family history.     Social History     Tobacco Use    Smoking status: Former     Current packs/day: 0.00     Average packs/day: 1 pack/day for 6.0 years (6.0 ttl pk-yrs)     Types: Cigarettes     Start date:      Quit date:      Years since quittin.4    Smokeless tobacco: Never   Substance Use Topics    Alcohol use: Yes     Alcohol/week: 5.0 standard drinks of alcohol     Types: 5 Cans of beer per week        Prior to Admission medications    Medication Sig Start Date End Date Taking? Authorizing Provider   acetaminophen (TYLENOL 8 HOUR ARTHRITIS PAIN) 650 MG extended release tablet Take 1 tablet by mouth every 6 hours as needed for Pain   Yes ProviderSusanna MD   polyvinyl alcohol (LIQUIFILM TEARS) 1.4 % ophthalmic solution Place 1 drop into both eyes as needed for Dry Eyes   Yes Susanna Callahan MD  Facility-Administered Medications   Medication Dose Route Frequency Provider Last Rate Last Admin    sodium chloride flush 0.9 % injection 5-40 mL  5-40 mL IntraVENous 2 times per day Elia Friedman MD   10 mL at 06/20/24 1431    sodium chloride flush 0.9 % injection 5-40 mL  5-40 mL IntraVENous PRN Elia Friedman MD        0.9 % sodium chloride infusion   IntraVENous PRN Elia Friedman MD        miconazole (MICOTIN) 2 % cream   Topical BID Danny Howard DO   Given at 06/20/24 1436    ampicillin-sulbactam (UNASYN) 3,000 mg in sodium chloride 0.9 % 100 mL IVPB  3,000 mg IntraVENous Q6H Elia Friedman MD   Stopped at 06/20/24 1140    lactated ringers IV soln infusion   IntraVENous Continuous Danny Howard  mL/hr at 06/18/24 1723 New Bag at 06/18/24 1723    sodium chloride flush 0.9 % injection 5-40 mL  5-40 mL IntraVENous 2 times per day Puma Agarwal MD   10 mL at 06/19/24 2336    sodium chloride flush 0.9 % injection 5-40 mL  5-40 mL IntraVENous PRN Puma Agarwal MD   10 mL at 06/19/24 1439    thiamine tablet 100 mg  100 mg Oral Daily Puma Agarwal MD   100 mg at 06/20/24 1056    0.9 % sodium chloride infusion   IntraVENous PRN Puma Agarwal MD        aspirin chewable tablet 81 mg  81 mg Oral Daily Puma Agarwal MD   81 mg at 06/20/24 1056    atorvastatin (LIPITOR) tablet 20 mg  20 mg Oral Nightly Puma Agarwal MD   20 mg at 06/19/24 2205    donepezil (ARICEPT) tablet 10 mg  10 mg Oral Nightly Puma Agarwal MD   10 mg at 06/19/24 2205    ezetimibe (ZETIA) tablet 10 mg  10 mg Oral Daily Puma Agarwal MD   10 mg at 06/20/24 1056    hydrOXYzine pamoate (VISTARIL) capsule 25 mg  25 mg Oral Daily Puma Agarwal MD   25 mg at 06/20/24 1056    metoprolol succinate (TOPROL XL) extended release tablet 37.5 mg  37.5 mg Oral Daily Puma Agarwal MD   37.5 mg at 06/20/24 1058    traZODone (DESYREL) tablet 150 mg  150 mg Oral Nightly Puma Agarwal MD   150 mg at 06/19/24 4366

## 2024-06-20 NOTE — PROGRESS NOTES
GENERAL SURGERY  DAILY PROGRESS NOTE  2024    Subjective:  Febrile ovenight. IR hepatic abscess drain placed with drainage of bloody purulent fluid. Reporting some mild R side discomfort on deep inspiration. Denies any nausea or vomiting.       Objective:  Last 24Hrs  Temp  Av.3 °F (37.4 °C)  Min: 97.1 °F (36.2 °C)  Max: 101.5 °F (38.6 °C)  Resp  Av.9  Min: 10  Max: 21  Pulse  Av.8  Min: 83  Max: 114  Systolic (24hrs), Av , Min:114 , Max:180     Diastolic (24hrs), Av, Min:54, Max:100    SpO2  Av.6 %  Min: 94 %  Max: 99 %    I/O last 3 completed shifts:  In: -   Out: 785 [Urine:275; Drains:160; Stool:350]      General: Lying in bed, NAD  Cardiovascular: Warm throughout  Respiratory: Equal chest rise bilaterally, no retractions, no audible wheezing  Abdomen: soft, NTTP throughout, ND. R IR drain w dark SS fluid. Ileostomy functioning  Skin: no obvious rashes or lesions appreciated  Extremities: atraumatic, no focal motor deficits, no open wounds      CBC  Recent Labs     24  0544 24  0647 24  0519   WBC 10.1 7.7 10.2   RBC 4.59 4.17 4.38   HGB 12.1* 11.1* 11.4*   HCT 38.0 34.5* 35.8*   MCV 82.8 82.7 81.7   MCH 26.4 26.6 26.0   MCHC 31.8* 32.2 31.8*   RDW 14.0 14.0 14.1    169 180   MPV 11.2 10.6 10.7       CMP  Recent Labs     24  1618 24  0544 24  0647    136 138   K 4.3 4.2 4.2   CL 97* 103 104   CO2 22 22 24   BUN 23* 21* 14   CREATININE 2.5* 1.8* 1.3*   GLUCOSE 295* 179* 152*   CALCIUM 9.3 8.7 8.7   BILITOT 1.2 0.9 0.7   ALKPHOS 141* 109 124   AST 77* 95* 91*   ALT 49* 57* 75*         Assessment/Plan:  59 y.o. male admitted with UTI, hepatic abscess, hx of recurrent diverticulitis     Doing well clinically   Started on Unasyn per ID and recommending PICC   Ok for diet as tolerated  No acute surgical plans      Arlin Starkey MD  General Surgery Resident, PGY-2    Electronically signed on 2024 at 7:09 AM

## 2024-06-20 NOTE — PROGRESS NOTES
[facility]  [facility]  Progress Note    SUBJECTIVE:  Chief Complaint   Patient presents with    Tachycardia     Per nursing home patient had elevated HR      The patient is tolerating antibiotic.  He admits to having some sharp stabbing pains when taking a deep breath.  No nausea or vomiting.  He had a fever yesterday    Review of systems:  As stated above in the chief complaint, otherwise negative.    Medications:  Scheduled Meds:   ampicillin-sulbactam  3,000 mg IntraVENous Q6H    sodium chloride flush  5-40 mL IntraVENous 2 times per day    thiamine  100 mg Oral Daily    aspirin  81 mg Oral Daily    atorvastatin  20 mg Oral Nightly    donepezil  10 mg Oral Nightly    ezetimibe  10 mg Oral Daily    hydrOXYzine pamoate  25 mg Oral Daily    metoprolol succinate  37.5 mg Oral Daily    traZODone  150 mg Oral Nightly    sodium chloride flush  5-40 mL IntraVENous 2 times per day    enoxaparin  30 mg SubCUTAneous BID     Continuous Infusions:   lactated ringers IV soln 150 mL/hr at 24 1723    sodium chloride      sodium chloride       PRN Meds:sodium chloride flush, sodium chloride, sodium chloride flush, sodium chloride, ondansetron **OR** ondansetron, polyethylene glycol, acetaminophen **OR** acetaminophen    OBJECTIVE:  BP (!) 141/95   Pulse 93   Temp 97.9 °F (36.6 °C) (Oral)   Resp 18   Ht 1.88 m (6' 2\")   Wt 127 kg (280 lb)   SpO2 94%   BMI 35.95 kg/m²   Temp  Av.1 °F (37.3 °C)  Min: 97.1 °F (36.2 °C)  Max: 101.5 °F (38.6 °C)  Constitutional: The patient is awake, alert, and oriented.  He is in no distress.  Skin: Warm and dry. No rashes were noted.   HEENT: Round and reactive pupils.  Moist mucous membranes.  No ulcerations or thrush.  Neck: Supple to movements.   Chest: No use of accessory muscles to breathe. Symmetrical expansion.  No wheezing, crackles or rhonchi.  Cardiovascular: S1 and S2 are rhythmic and regular. No murmurs appreciated.   Abdomen: Positive

## 2024-06-20 NOTE — PLAN OF CARE
Problem: Discharge Planning  Goal: Discharge to home or other facility with appropriate resources  6/20/2024 1111 by Denia Ascencio, RN  Outcome: Progressing     Problem: Safety - Adult  Goal: Free from fall injury  6/20/2024 1111 by Denia Ascencio, RN  Outcome: Progressing     Problem: Skin/Tissue Integrity  Goal: Absence of new skin breakdown  Description: 1.  Monitor for areas of redness and/or skin breakdown  2.  Assess vascular access sites hourly  3.  Every 4-6 hours minimum:  Change oxygen saturation probe site  4.  Every 4-6 hours:  If on nasal continuous positive airway pressure, respiratory therapy assess nares and determine need for appliance change or resting period.  6/20/2024 1111 by Denia Ascencio, RN  Outcome: Progressing

## 2024-06-20 NOTE — PROGRESS NOTES
[facility]  Progress Note    Chief Complaint   Patient presents with    Tachycardia     Per nursing home patient had elevated HR          Subjective:    Andres Staton was seen this morning.  Answering questions appropriately.  Only reporting right upper quadrant pain but no new symptoms.  No fevers overnight.  No shakiness.        Review of Systems   All other systems reviewed and are negative.        Objective:    BP (!) 149/93   Pulse 92   Temp 97.1 °F (36.2 °C) (Infrared)   Resp 18   Ht 1.88 m (6' 2\")   Wt 127 kg (280 lb)   SpO2 96%   BMI 35.95 kg/m²       Physical Exam  Constitutional:       General: He is not in acute distress.     Appearance: Normal appearance. He is not ill-appearing.   HENT:      Head: Normocephalic and atraumatic.   Eyes:      General: No scleral icterus.  Cardiovascular:      Rate and Rhythm: Normal rate and regular rhythm.      Heart sounds: No murmur heard.  Pulmonary:      Effort: No respiratory distress.      Breath sounds: No wheezing, rhonchi or rales.   Abdominal:      General: There is no distension.      Palpations: Abdomen is soft.      Tenderness: There is no abdominal tenderness. There is no guarding.      Comments: Right upper quadrant drain with some purulence and serosanguineous fluid.  Right lower quadrant ostomy with bag in place with liquidy stool output   Musculoskeletal:      Right lower leg: No edema.      Left lower leg: No edema.   Skin:     General: Skin is warm and dry.      Coloration: Skin is not jaundiced or pale.   Neurological:      Mental Status: He is alert. Mental status is at baseline.               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

## 2024-06-20 NOTE — CARE COORDINATION
Social Work/Discharge Planning:  Received notification from Physician that patient to get PICC line for IV at discharge.  Patient on IV unasyn per Infectious Disease note.  Confirmed with liaison Amelia with [place] that facility can accommodate IV unasyn.  No pre-cert needed.  Will continue to follow.  Electronically signed by EDWARD Perez on 6/20/2024 at 2:25 PM

## 2024-06-21 LAB
ALBUMIN SERPL-MCNC: 3.2 G/DL (ref 3.5–5.2)
ALP SERPL-CCNC: 161 U/L (ref 40–129)
ALT SERPL-CCNC: 73 U/L (ref 0–40)
ANION GAP SERPL CALCULATED.3IONS-SCNC: 10 MMOL/L (ref 7–16)
AST SERPL-CCNC: 53 U/L (ref 0–39)
BASOPHILS # BLD: 0.04 K/UL (ref 0–0.2)
BASOPHILS NFR BLD: 1 % (ref 0–2)
BILIRUB SERPL-MCNC: 0.6 MG/DL (ref 0–1.2)
BUN SERPL-MCNC: 11 MG/DL (ref 6–20)
CALCIUM SERPL-MCNC: 8.9 MG/DL (ref 8.6–10.2)
CHLORIDE SERPL-SCNC: 109 MMOL/L (ref 98–107)
CHROMOGRANIN A: 48 NG/ML (ref 0–187)
CHROMOGRANIN A: 53 NG/ML (ref 0–187)
CO2 SERPL-SCNC: 26 MMOL/L (ref 22–29)
CREAT SERPL-MCNC: 1.1 MG/DL (ref 0.7–1.2)
EOSINOPHIL # BLD: 0.33 K/UL (ref 0.05–0.5)
EOSINOPHILS RELATIVE PERCENT: 4 % (ref 0–6)
ERYTHROCYTE [DISTWIDTH] IN BLOOD BY AUTOMATED COUNT: 14.1 % (ref 11.5–15)
GFR, ESTIMATED: 79 ML/MIN/1.73M2
GLUCOSE BLD-MCNC: 115 MG/DL (ref 74–99)
GLUCOSE BLD-MCNC: 124 MG/DL (ref 74–99)
GLUCOSE BLD-MCNC: 175 MG/DL (ref 74–99)
GLUCOSE SERPL-MCNC: 143 MG/DL (ref 74–99)
HCT VFR BLD AUTO: 37.2 % (ref 37–54)
HGB BLD-MCNC: 11.9 G/DL (ref 12.5–16.5)
IMM GRANULOCYTES # BLD AUTO: 0.28 K/UL (ref 0–0.58)
IMM GRANULOCYTES NFR BLD: 3 % (ref 0–5)
LYMPHOCYTES NFR BLD: 0.39 K/UL (ref 1.5–4)
LYMPHOCYTES RELATIVE PERCENT: 5 % (ref 20–42)
MCH RBC QN AUTO: 26.5 PG (ref 26–35)
MCHC RBC AUTO-ENTMCNC: 32 G/DL (ref 32–34.5)
MCV RBC AUTO: 82.9 FL (ref 80–99.9)
MONOCYTES NFR BLD: 0.63 K/UL (ref 0.1–0.95)
MONOCYTES NFR BLD: 8 % (ref 2–12)
NEUTROPHILS NFR BLD: 80 % (ref 43–80)
NEUTS SEG NFR BLD: 6.58 K/UL (ref 1.8–7.3)
PLATELET # BLD AUTO: 206 K/UL (ref 130–450)
PMV BLD AUTO: 10.7 FL (ref 7–12)
POTASSIUM SERPL-SCNC: 4.3 MMOL/L (ref 3.5–5)
PROT SERPL-MCNC: 7.9 G/DL (ref 6.4–8.3)
RBC # BLD AUTO: 4.49 M/UL (ref 3.8–5.8)
RBC # BLD: ABNORMAL 10*6/UL
SODIUM SERPL-SCNC: 145 MMOL/L (ref 132–146)
WBC OTHER # BLD: 8.3 K/UL (ref 4.5–11.5)

## 2024-06-21 PROCEDURE — 36569 INSJ PICC 5 YR+ W/O IMAGING: CPT

## 2024-06-21 PROCEDURE — 99232 SBSQ HOSP IP/OBS MODERATE 35: CPT | Performed by: CLINICAL NURSE SPECIALIST

## 2024-06-21 PROCEDURE — 2580000003 HC RX 258

## 2024-06-21 PROCEDURE — 99232 SBSQ HOSP IP/OBS MODERATE 35: CPT | Performed by: STUDENT IN AN ORGANIZED HEALTH CARE EDUCATION/TRAINING PROGRAM

## 2024-06-21 PROCEDURE — 02HV33Z INSERTION OF INFUSION DEVICE INTO SUPERIOR VENA CAVA, PERCUTANEOUS APPROACH: ICD-10-PCS | Performed by: FAMILY MEDICINE

## 2024-06-21 PROCEDURE — 82962 GLUCOSE BLOOD TEST: CPT

## 2024-06-21 PROCEDURE — 99232 SBSQ HOSP IP/OBS MODERATE 35: CPT | Performed by: FAMILY MEDICINE

## 2024-06-21 PROCEDURE — 76937 US GUIDE VASCULAR ACCESS: CPT

## 2024-06-21 PROCEDURE — 2500000003 HC RX 250 WO HCPCS: Performed by: SPECIALIST

## 2024-06-21 PROCEDURE — 6370000000 HC RX 637 (ALT 250 FOR IP): Performed by: STUDENT IN AN ORGANIZED HEALTH CARE EDUCATION/TRAINING PROGRAM

## 2024-06-21 PROCEDURE — 2060000000 HC ICU INTERMEDIATE R&B

## 2024-06-21 PROCEDURE — 6370000000 HC RX 637 (ALT 250 FOR IP)

## 2024-06-21 PROCEDURE — 6360000002 HC RX W HCPCS: Performed by: SPECIALIST

## 2024-06-21 PROCEDURE — 80053 COMPREHEN METABOLIC PANEL: CPT

## 2024-06-21 PROCEDURE — 85025 COMPLETE CBC W/AUTO DIFF WBC: CPT

## 2024-06-21 PROCEDURE — 2580000003 HC RX 258: Performed by: SPECIALIST

## 2024-06-21 PROCEDURE — 6360000002 HC RX W HCPCS

## 2024-06-21 PROCEDURE — C1751 CATH, INF, PER/CENT/MIDLINE: HCPCS

## 2024-06-21 PROCEDURE — 36415 COLL VENOUS BLD VENIPUNCTURE: CPT

## 2024-06-21 RX ORDER — AMLODIPINE BESYLATE 5 MG/1
5 TABLET ORAL DAILY
Status: DISCONTINUED | OUTPATIENT
Start: 2024-06-21 | End: 2024-06-22

## 2024-06-21 RX ORDER — DEXTROSE MONOHYDRATE 100 MG/ML
INJECTION, SOLUTION INTRAVENOUS CONTINUOUS PRN
Status: DISCONTINUED | OUTPATIENT
Start: 2024-06-21 | End: 2024-06-24 | Stop reason: HOSPADM

## 2024-06-21 RX ORDER — INSULIN LISPRO 100 [IU]/ML
0-4 INJECTION, SOLUTION INTRAVENOUS; SUBCUTANEOUS NIGHTLY
Status: DISCONTINUED | OUTPATIENT
Start: 2024-06-21 | End: 2024-06-24 | Stop reason: HOSPADM

## 2024-06-21 RX ORDER — INSULIN LISPRO 100 [IU]/ML
0-4 INJECTION, SOLUTION INTRAVENOUS; SUBCUTANEOUS
Status: DISCONTINUED | OUTPATIENT
Start: 2024-06-21 | End: 2024-06-24 | Stop reason: HOSPADM

## 2024-06-21 RX ORDER — GLUCAGON 1 MG/ML
1 KIT INJECTION PRN
Status: DISCONTINUED | OUTPATIENT
Start: 2024-06-21 | End: 2024-06-24 | Stop reason: HOSPADM

## 2024-06-21 RX ORDER — LIDOCAINE HYDROCHLORIDE 10 MG/ML
5 INJECTION, SOLUTION EPIDURAL; INFILTRATION; INTRACAUDAL; PERINEURAL ONCE
Status: COMPLETED | OUTPATIENT
Start: 2024-06-21 | End: 2024-06-21

## 2024-06-21 RX ADMIN — SODIUM CHLORIDE, PRESERVATIVE FREE 10 ML: 5 INJECTION INTRAVENOUS at 21:08

## 2024-06-21 RX ADMIN — AMPICILLIN SODIUM, SULBACTAM SODIUM 3000 MG: 2; 1 INJECTION, POWDER, FOR SOLUTION INTRAMUSCULAR; INTRAVENOUS at 16:28

## 2024-06-21 RX ADMIN — DONEPEZIL HYDROCHLORIDE 10 MG: 10 TABLET, FILM COATED ORAL at 21:08

## 2024-06-21 RX ADMIN — EZETIMIBE 10 MG: 10 TABLET ORAL at 08:16

## 2024-06-21 RX ADMIN — SODIUM CHLORIDE, PRESERVATIVE FREE 10 ML: 5 INJECTION INTRAVENOUS at 08:14

## 2024-06-21 RX ADMIN — SODIUM CHLORIDE, PRESERVATIVE FREE 10 ML: 5 INJECTION INTRAVENOUS at 08:22

## 2024-06-21 RX ADMIN — SODIUM CHLORIDE, PRESERVATIVE FREE 10 ML: 5 INJECTION INTRAVENOUS at 21:54

## 2024-06-21 RX ADMIN — AMPICILLIN SODIUM, SULBACTAM SODIUM 3000 MG: 2; 1 INJECTION, POWDER, FOR SOLUTION INTRAMUSCULAR; INTRAVENOUS at 10:20

## 2024-06-21 RX ADMIN — AMPICILLIN SODIUM, SULBACTAM SODIUM 3000 MG: 2; 1 INJECTION, POWDER, FOR SOLUTION INTRAMUSCULAR; INTRAVENOUS at 04:02

## 2024-06-21 RX ADMIN — AMLODIPINE BESYLATE 5 MG: 5 TABLET ORAL at 13:00

## 2024-06-21 RX ADMIN — MICONAZOLE NITRATE: 20 CREAM TOPICAL at 21:53

## 2024-06-21 RX ADMIN — HYDROXYZINE PAMOATE 25 MG: 25 CAPSULE ORAL at 08:15

## 2024-06-21 RX ADMIN — MICONAZOLE NITRATE: 20 CREAM TOPICAL at 10:15

## 2024-06-21 RX ADMIN — ASPIRIN 81 MG CHEWABLE TABLET 81 MG: 81 TABLET CHEWABLE at 08:16

## 2024-06-21 RX ADMIN — TRAZODONE HYDROCHLORIDE 150 MG: 50 TABLET ORAL at 21:08

## 2024-06-21 RX ADMIN — Medication 100 MG: at 08:16

## 2024-06-21 RX ADMIN — ENOXAPARIN SODIUM 30 MG: 100 INJECTION SUBCUTANEOUS at 08:15

## 2024-06-21 RX ADMIN — ENOXAPARIN SODIUM 30 MG: 100 INJECTION SUBCUTANEOUS at 21:07

## 2024-06-21 RX ADMIN — METOPROLOL SUCCINATE 37.5 MG: 25 TABLET, EXTENDED RELEASE ORAL at 08:15

## 2024-06-21 RX ADMIN — LIDOCAINE HYDROCHLORIDE 2 ML: 10 SOLUTION INTRAVENOUS at 15:19

## 2024-06-21 RX ADMIN — ATORVASTATIN CALCIUM 20 MG: 20 TABLET, FILM COATED ORAL at 21:08

## 2024-06-21 RX ADMIN — AMPICILLIN SODIUM, SULBACTAM SODIUM 3000 MG: 2; 1 INJECTION, POWDER, FOR SOLUTION INTRAMUSCULAR; INTRAVENOUS at 21:13

## 2024-06-21 NOTE — PROCEDURES
PICC    Catheter insertion date: 6/21/2024     Product Number:  [id number]   Lot No: [id number]   Gauge: 17   Lumen: single   L Basilic    Vein Diameter: 0.48cm   Arm circumference at insertion site: 34cm   Catheter Length: 55cm   Internal Length: 55cm   External Catheter Length: 0cm   Ultrasound Used: yes  YVONNE Boateng confirms PICC tip is placed in the lower 1/3 of the SVC or at the Cavoatrial junction.  Floor nurse notified PICC is okay to use.   : MICHAEL Valdes RN [facility]

## 2024-06-21 NOTE — PLAN OF CARE
Problem: Discharge Planning  Goal: Discharge to home or other facility with appropriate resources  6/20/2024 2308 by Nikki Riggins RN  Outcome: Progressing  6/20/2024 1111 by Denia Ascencio RN  Outcome: Progressing     Problem: Safety - Adult  Goal: Free from fall injury  6/20/2024 2308 by Nikki Riggins RN  Outcome: Progressing  6/20/2024 1111 by Denia Ascencio RN  Outcome: Progressing     Problem: Skin/Tissue Integrity  Goal: Absence of new skin breakdown  Description: 1.  Monitor for areas of redness and/or skin breakdown  2.  Assess vascular access sites hourly  3.  Every 4-6 hours minimum:  Change oxygen saturation probe site  4.  Every 4-6 hours:  If on nasal continuous positive airway pressure, respiratory therapy assess nares and determine need for appliance change or resting period.  6/20/2024 2308 by Nikki Riggins RN  Outcome: Progressing  6/20/2024 1111 by Denia Ascencio RN  Outcome: Progressing

## 2024-06-21 NOTE — PROGRESS NOTES
[facility]  Progress Note    Chief Complaint   Patient presents with    Tachycardia     Per nursing home patient had elevated HR          Subjective:    No acute events overnight.  Patient tolerating p.o. without difficulty.  He is voiding although complaining that he is going a little bit more.  He is having back output in his ostomy.  He is complaining a little bit about chest discomfort close to where his drain is inserted, however it is not causing difficulty with deep breaths.  He otherwise has no complaints.  Recently no fevers, chills, shortness of breath, nausea, vomiting.    ROS unremarkable except as stated above      Objective:    BP (!) 142/85   Pulse 88   Temp 97.8 °F (36.6 °C) (Oral)   Resp 18   Ht 1.88 m (6' 2\")   Wt 130.6 kg (288 lb)   SpO2 98%   BMI 36.98 kg/m²       Physical Exam  Constitutional:       General: He is not in acute distress.     Appearance: Normal appearance. He is not ill-appearing.   HENT:      Head: Normocephalic and atraumatic.   Eyes:      General: No scleral icterus.  Cardiovascular:      Rate and Rhythm: Normal rate and regular rhythm.      Heart sounds: No murmur heard.  Pulmonary:      Effort: No respiratory distress.      Breath sounds: No wheezing, rhonchi or rales.   Abdominal:      General: There is no distension.      Palpations: Abdomen is soft.      Tenderness: There is no abdominal tenderness. There is no guarding.      Comments: Right upper quadrant drain with minimal serosanguineous fluid.  Right lower quadrant ostomy with bag in place with liquidy stool output   Musculoskeletal:      Right lower leg: No edema.      Left lower leg: No edema.   Skin:     General: Skin is warm and dry.      Coloration: Skin is not jaundiced or pale.   Neurological:      Mental Status: He is alert. Mental status is at baseline.               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  This lesion was not seen previously.  Metastatic liver disease considered.  Further evaluation with multiphase IV contrast CT or MRI of the liver recommended.  -Abscess microscopy shows several PMNs with no organisms  -Hepatobiliary consulted, appreciate recommendations  -Ordered CTA triphasic of the liver which demonstrated rim-enhancing lesion could potentially represent an abscess   -IR drainage of liver abscess 6/19; cultures pending    -Right upper quadrant drain in place with purulence and serosanguineous output   -6/21 pending cultures, low suspicion for an actual malignancy.  -ID consulted [6/21]   Cont IV Unasyn   PICC placed 6/21 left basilar   Awaiting final culture    Sepsis 2/2 UTI, sepsis criteria no longer met  UA positive for nitrite, leuk esterase, 3+ bacteria.  Pro-Osiel 2.62.  Reported fever up to 101.9 undocumented.  Received Rocephin 1 g x 1 in the emergency department  -ID consulted, appreciate recommendation   -Urine culture growing E. Coli with +sensitivities  -Placed patient on IV Unasyn, PICC plus final rec's   -Telemetry monitoring

## 2024-06-21 NOTE — PROGRESS NOTES
[facility]  [facility]  Progress Note    SUBJECTIVE:  Chief Complaint   Patient presents with    Tachycardia     Per nursing home patient had elevated HR      No new problems reported.  No fever.  Tolerating antibiotic.  No complaints of pain.    Review of systems:  As stated above in the chief complaint, otherwise negative.    Medications:  Scheduled Meds:   insulin lispro  0-4 Units SubCUTAneous TID WC    insulin lispro  0-4 Units SubCUTAneous Nightly    sodium chloride flush  5-40 mL IntraVENous 2 times per day    miconazole   Topical BID    ampicillin-sulbactam  3,000 mg IntraVENous Q6H    sodium chloride flush  5-40 mL IntraVENous 2 times per day    thiamine  100 mg Oral Daily    aspirin  81 mg Oral Daily    atorvastatin  20 mg Oral Nightly    donepezil  10 mg Oral Nightly    ezetimibe  10 mg Oral Daily    hydrOXYzine pamoate  25 mg Oral Daily    metoprolol succinate  37.5 mg Oral Daily    traZODone  150 mg Oral Nightly    sodium chloride flush  5-40 mL IntraVENous 2 times per day    enoxaparin  30 mg SubCUTAneous BID     Continuous Infusions:   dextrose      sodium chloride      lactated ringers IV soln 150 mL/hr at 24 1723    sodium chloride      sodium chloride       PRN Meds:dextrose bolus **OR** dextrose bolus, glucagon (rDNA), dextrose, Glucose, sodium chloride flush, sodium chloride, sodium chloride flush, sodium chloride, sodium chloride flush, sodium chloride, ondansetron **OR** ondansetron, polyethylene glycol, acetaminophen **OR** acetaminophen    OBJECTIVE:  BP (!) 156/83   Pulse 94   Temp 97.4 °F (36.3 °C) (Oral)   Resp 18   Ht 1.88 m (6' 2\")   Wt 130.6 kg (288 lb)   SpO2 95%   BMI 36.98 kg/m²   Temp  Av.7 °F (36.5 °C)  Min: 97.4 °F (36.3 °C)  Max: 97.9 °F (36.6 °C)  Constitutional: The patient is awake, alert, and in no distress.  Skin: Warm and dry. No rashes were noted.   HEENT: Round and reactive pupils.  Moist mucous membranes.  No ulcerations

## 2024-06-21 NOTE — PROGRESS NOTES
ET nurse: Stop in to see patient, ileostomy pouch clean, dry and intact with some lifting of upper right edge of skin barrier, patient declined changing of pouch commenting \"it drains downward\". Patient denies any further needs.

## 2024-06-21 NOTE — PROGRESS NOTES
Hepatobiliary and Pancreatic Surgery Progress Note    CC:   Chief Complaint   Patient presents with    Tachycardia       Per nursing home patient had elevated HR      Subjective: Patient states he is feeling \"better.\" States appetite is good. Reports he had a BM this am, formed. Denies n/v or abd pain.    OBJECTIVE      Physical    BP (!) 135/93   Pulse 90   Temp 97.9 °F (36.6 °C) (Axillary)   Resp 18   Ht 1.88 m (6' 2\")   Wt 130.6 kg (288 lb)   SpO2 96%   BMI 36.98 kg/m²       General appearance: appears in no acute distress  Lungs:respiratory effort normal without accessory numbers  Heart: no pedal edema  Abdomen:  Soft, nondistended, nontender, ostomy with green/tan watery output, KYLE with less purulent bloody drainage 50 ml out over past 24 hr, site with dry dressing, no guarding, no peritoneal signs   Extremities: ROM normal    ASSESSMENT/PLAN:  59-year-old male who presents with tachycardia. Intermittent left lower quadrant abdominal pain.    - Hx colon surgery, February 2022 Dr. Duarte performed exploratory laparotomy, right hemicolectomy with mucous fistula and end ileostomy for large bowel obstruction with ischemic right colon and a descending colon mass invading the retroperitoneum and abdominal sidewall with a colocutaneous fistula.  Pathology was benign consistent with dilated colon with vascular congestion, hemorrhage and mural thrombus tubular adenomas and 12 benign lymph nodes.  Apparently had a colonoscopy through his mucous fistula during the hospitalization that showed a stricture but not a mass.  This was felt to be from chronic diverticulitis. S/P IR drainage liver abscess, purulence was aspirated and sent for culture.   -Drain is less thick bloody purulent. Culture pending  -Will need to keep drain until it dries up. Flush BID.   - Antibiotics per ID  - CT triphasic liver protocol showed a rim-enhancing lesion with central necrosis and fluid consistent with an abscess with resolving

## 2024-06-21 NOTE — PLAN OF CARE
Problem: Discharge Planning  Goal: Discharge to home or other facility with appropriate resources  6/21/2024 0918 by Samina Hale, RN  Outcome: Progressing  6/20/2024 2308 by Nikki Riggins RN  Outcome: Progressing     Problem: Safety - Adult  Goal: Free from fall injury  6/21/2024 0918 by Samina Hale, RN  Outcome: Progressing  6/20/2024 2308 by Nikki Riggins RN  Outcome: Progressing     Problem: Skin/Tissue Integrity  Goal: Absence of new skin breakdown  Description: 1.  Monitor for areas of redness and/or skin breakdown  2.  Assess vascular access sites hourly  3.  Every 4-6 hours minimum:  Change oxygen saturation probe site  4.  Every 4-6 hours:  If on nasal continuous positive airway pressure, respiratory therapy assess nares and determine need for appliance change or resting period.  6/21/2024 0918 by Samina Hale, RN  Outcome: Progressing  6/20/2024 2308 by Nikki Riggins RN  Outcome: Progressing

## 2024-06-21 NOTE — PROGRESS NOTES
[facility]  Progress Note    Chief Complaint   Patient presents with    Tachycardia     Per nursing home patient had elevated HR          Subjective:    No acute events overnight.  He has no complaints.  Tolerating p.o. intake.  He is voiding.  He has having stool output.  Denies headache, fevers, chills, nausea, vomiting, chest pain, shortness of breath.      ROS unremarkable except as stated above      Objective:    BP (!) 162/86   Pulse 86   Temp 97.8 °F (36.6 °C) (Infrared)   Resp 18   Ht 1.88 m (6' 2\")   Wt 130.6 kg (288 lb)   SpO2 95%   BMI 36.98 kg/m²       Physical Exam  Constitutional:       General: He is not in acute distress.     Appearance: Normal appearance. He is not ill-appearing.   HENT:      Head: Normocephalic and atraumatic.   Eyes:      General: No scleral icterus.  Cardiovascular:      Rate and Rhythm: Normal rate and regular rhythm.      Heart sounds: No murmur heard.  Pulmonary:      Effort: No respiratory distress.      Breath sounds: No wheezing, rhonchi or rales.   Abdominal:      General: There is no distension.      Palpations: Abdomen is soft.      Tenderness: There is no abdominal tenderness. There is no guarding.      Comments: Right upper quadrant drain with some purulence and serosanguineous fluid.  Right lower quadrant ostomy with bag in place with liquidy stool output   Musculoskeletal:      Right lower leg: No edema.      Left lower leg: No edema.   Skin:     General: Skin is warm and dry.      Coloration: Skin is not jaundiced or pale.   Neurological:      Mental Status: He is alert. Mental status is at baseline.               Assessment:    Active Hospital Problems    Diagnosis Date Noted    Dementia (HCC) [F03.90] 01/17/2023     Priority: Medium    Heel callus [L84] 06/20/2024    Onychomycosis [B35.1] 06/20/2024    Tinea pedis of both feet [B35.3] 06/20/2024    Liver abscess [K75.0] 06/19/2024    Diverticulitis of large intestine  urine electrolyte  Continue normal saline at 150 cc/h  -Creatinine on 6/19 improved to 1.3 down from 1.8 the day before and 2.5 on admission      Type 2 diabetes, newly discovered  Hyperglycemic on presentation, Hemoglobin A1c 6.9 on 6/18  -Continue to monitor blood glucose  -Diabetic diet   -ACHS fingersticks  -LDSSI  -Hypoglycemia protocol    Hx CAD  -Continue home ASA 81  -Continue Lipitor 20 mg  -Continue home Zetia 10 mg  -Continue home Toprol XL 37.5 mg daily     Dementia  -Continue home Aricept 10 mg  -Continue home Vistaril 25 mg daily  -Continue home Trazodone 150 mg nightly  -patient's presentation more consistent with Warnicke's/Korsakoff encephalopathy than Alzheimer's dementia, appears to be confabulating     Alcohol use  Reported abstinence for many months  -Continue home thiamine 100 mg daily  -CIWA without Ativa   -patient's presentation more consistent with Warnicke's/Korsakoff encephalopathy than Alzheimer's dementia, appears to be confabulating    Elevated D-dimer  Patient had an elevated D-dimer in the presence of persistent tachycardia.  Was not placed on anticoagulation due to presence of liver mass.  -Ultrasound of the bilateral lower extremities negative for DVT  -Patient received a dose of contrast for the CTA triphasic of the liver and in his setting of MARBELLA did not want to administer more contrast.  Therefore obtained a VQ scan which was low risk  -PCD's     Pain Control:  Tylenol 650 mg Q6HR PRN for mild pain  DVT ppx: PCD's, start chemical prophylaxis after procedure  GI ppx: None  Code Status: Full  Diet: Diabetic diet    Consults: HPB, ID    PT/OT:     Disposition: Pending clinical course    Livan Ceja MD  Family Medicine Resident PGY-3

## 2024-06-22 PROBLEM — N30.00 ACUTE CYSTITIS WITHOUT HEMATURIA: Status: ACTIVE | Noted: 2021-05-11

## 2024-06-22 LAB
ALBUMIN SERPL-MCNC: 3.2 G/DL (ref 3.5–5.2)
ALP SERPL-CCNC: 147 U/L (ref 40–129)
ALT SERPL-CCNC: 57 U/L (ref 0–40)
ANION GAP SERPL CALCULATED.3IONS-SCNC: 8 MMOL/L (ref 7–16)
AST SERPL-CCNC: 32 U/L (ref 0–39)
BASOPHILS # BLD: 0 K/UL (ref 0–0.2)
BASOPHILS NFR BLD: 0 % (ref 0–2)
BILIRUB SERPL-MCNC: 0.5 MG/DL (ref 0–1.2)
BUN SERPL-MCNC: 9 MG/DL (ref 6–20)
CALCIUM SERPL-MCNC: 8.8 MG/DL (ref 8.6–10.2)
CHLORIDE SERPL-SCNC: 107 MMOL/L (ref 98–107)
CO2 SERPL-SCNC: 29 MMOL/L (ref 22–29)
CREAT SERPL-MCNC: 1.1 MG/DL (ref 0.7–1.2)
EOSINOPHIL # BLD: 0.52 K/UL (ref 0.05–0.5)
EOSINOPHILS RELATIVE PERCENT: 7 % (ref 0–6)
ERYTHROCYTE [DISTWIDTH] IN BLOOD BY AUTOMATED COUNT: 13.7 % (ref 11.5–15)
GFR, ESTIMATED: 76 ML/MIN/1.73M2
GLUCOSE BLD-MCNC: 118 MG/DL (ref 74–99)
GLUCOSE BLD-MCNC: 119 MG/DL (ref 74–99)
GLUCOSE BLD-MCNC: 157 MG/DL (ref 74–99)
GLUCOSE SERPL-MCNC: 129 MG/DL (ref 74–99)
HCT VFR BLD AUTO: 36.2 % (ref 37–54)
HGB BLD-MCNC: 11.4 G/DL (ref 12.5–16.5)
LYMPHOCYTES NFR BLD: 0.32 K/UL (ref 1.5–4)
LYMPHOCYTES RELATIVE PERCENT: 4 % (ref 20–42)
MCH RBC QN AUTO: 26.2 PG (ref 26–35)
MCHC RBC AUTO-ENTMCNC: 31.5 G/DL (ref 32–34.5)
MCV RBC AUTO: 83.2 FL (ref 80–99.9)
MICROORGANISM SPEC CULT: NORMAL
MICROORGANISM SPEC CULT: NORMAL
MONOCYTES NFR BLD: 0.32 K/UL (ref 0.1–0.95)
MONOCYTES NFR BLD: 4 % (ref 2–12)
MYELOCYTES ABSOLUTE COUNT: 0.19 K/UL
MYELOCYTES: 3 %
NEUTROPHILS NFR BLD: 81 % (ref 43–80)
NEUTS SEG NFR BLD: 5.94 K/UL (ref 1.8–7.3)
PLATELET # BLD AUTO: 226 K/UL (ref 130–450)
PMV BLD AUTO: 10.5 FL (ref 7–12)
POTASSIUM SERPL-SCNC: 4.1 MMOL/L (ref 3.5–5)
PROT SERPL-MCNC: 7.5 G/DL (ref 6.4–8.3)
RBC # BLD AUTO: 4.35 M/UL (ref 3.8–5.8)
RBC # BLD: ABNORMAL 10*6/UL
SERVICE CMNT-IMP: NORMAL
SERVICE CMNT-IMP: NORMAL
SODIUM SERPL-SCNC: 144 MMOL/L (ref 132–146)
SPECIMEN DESCRIPTION: NORMAL
SPECIMEN DESCRIPTION: NORMAL
WBC OTHER # BLD: 7.3 K/UL (ref 4.5–11.5)

## 2024-06-22 PROCEDURE — 80053 COMPREHEN METABOLIC PANEL: CPT

## 2024-06-22 PROCEDURE — 99231 SBSQ HOSP IP/OBS SF/LOW 25: CPT | Performed by: FAMILY MEDICINE

## 2024-06-22 PROCEDURE — 2580000003 HC RX 258: Performed by: SPECIALIST

## 2024-06-22 PROCEDURE — 2580000003 HC RX 258: Performed by: STUDENT IN AN ORGANIZED HEALTH CARE EDUCATION/TRAINING PROGRAM

## 2024-06-22 PROCEDURE — 6370000000 HC RX 637 (ALT 250 FOR IP)

## 2024-06-22 PROCEDURE — 6360000002 HC RX W HCPCS: Performed by: SPECIALIST

## 2024-06-22 PROCEDURE — 2060000000 HC ICU INTERMEDIATE R&B

## 2024-06-22 PROCEDURE — 85025 COMPLETE CBC W/AUTO DIFF WBC: CPT

## 2024-06-22 PROCEDURE — 6360000002 HC RX W HCPCS

## 2024-06-22 PROCEDURE — 2580000003 HC RX 258

## 2024-06-22 PROCEDURE — 82962 GLUCOSE BLOOD TEST: CPT

## 2024-06-22 RX ORDER — AMLODIPINE BESYLATE 10 MG/1
10 TABLET ORAL DAILY
Status: DISCONTINUED | OUTPATIENT
Start: 2024-06-22 | End: 2024-06-24 | Stop reason: HOSPADM

## 2024-06-22 RX ADMIN — SODIUM CHLORIDE, POTASSIUM CHLORIDE, SODIUM LACTATE AND CALCIUM CHLORIDE: 600; 310; 30; 20 INJECTION, SOLUTION INTRAVENOUS at 14:09

## 2024-06-22 RX ADMIN — ENOXAPARIN SODIUM 30 MG: 100 INJECTION SUBCUTANEOUS at 21:06

## 2024-06-22 RX ADMIN — AMPICILLIN SODIUM, SULBACTAM SODIUM 3000 MG: 2; 1 INJECTION, POWDER, FOR SOLUTION INTRAMUSCULAR; INTRAVENOUS at 17:56

## 2024-06-22 RX ADMIN — MICONAZOLE NITRATE: 20 CREAM TOPICAL at 09:53

## 2024-06-22 RX ADMIN — MICONAZOLE NITRATE: 20 CREAM TOPICAL at 21:07

## 2024-06-22 RX ADMIN — TRAZODONE HYDROCHLORIDE 150 MG: 50 TABLET ORAL at 21:06

## 2024-06-22 RX ADMIN — EZETIMIBE 10 MG: 10 TABLET ORAL at 09:52

## 2024-06-22 RX ADMIN — DONEPEZIL HYDROCHLORIDE 10 MG: 10 TABLET, FILM COATED ORAL at 21:06

## 2024-06-22 RX ADMIN — ENOXAPARIN SODIUM 30 MG: 100 INJECTION SUBCUTANEOUS at 09:52

## 2024-06-22 RX ADMIN — AMPICILLIN SODIUM, SULBACTAM SODIUM 3000 MG: 2; 1 INJECTION, POWDER, FOR SOLUTION INTRAMUSCULAR; INTRAVENOUS at 11:05

## 2024-06-22 RX ADMIN — HYDROXYZINE PAMOATE 25 MG: 25 CAPSULE ORAL at 09:52

## 2024-06-22 RX ADMIN — METOPROLOL SUCCINATE 37.5 MG: 25 TABLET, EXTENDED RELEASE ORAL at 09:52

## 2024-06-22 RX ADMIN — ASPIRIN 81 MG CHEWABLE TABLET 81 MG: 81 TABLET CHEWABLE at 09:52

## 2024-06-22 RX ADMIN — SODIUM CHLORIDE, PRESERVATIVE FREE 10 ML: 5 INJECTION INTRAVENOUS at 21:06

## 2024-06-22 RX ADMIN — Medication 100 MG: at 09:52

## 2024-06-22 RX ADMIN — AMLODIPINE BESYLATE 10 MG: 10 TABLET ORAL at 09:52

## 2024-06-22 RX ADMIN — ATORVASTATIN CALCIUM 20 MG: 20 TABLET, FILM COATED ORAL at 21:06

## 2024-06-22 RX ADMIN — SODIUM CHLORIDE, POTASSIUM CHLORIDE, SODIUM LACTATE AND CALCIUM CHLORIDE: 600; 310; 30; 20 INJECTION, SOLUTION INTRAVENOUS at 06:32

## 2024-06-22 RX ADMIN — AMPICILLIN SODIUM, SULBACTAM SODIUM 3000 MG: 2; 1 INJECTION, POWDER, FOR SOLUTION INTRAMUSCULAR; INTRAVENOUS at 06:33

## 2024-06-22 NOTE — PLAN OF CARE
Problem: Discharge Planning  Goal: Discharge to home or other facility with appropriate resources  6/22/2024 1228 by Macrina Caceres RN  Outcome: Progressing  6/22/2024 0333 by Nikki Riggins RN  Outcome: Progressing     Problem: Safety - Adult  Goal: Free from fall injury  6/22/2024 1228 by Macrina Caceres RN  Outcome: Progressing  6/22/2024 0333 by Nikki Riggins RN  Outcome: Progressing     Problem: Skin/Tissue Integrity  Goal: Absence of new skin breakdown  Description: 1.  Monitor for areas of redness and/or skin breakdown  2.  Assess vascular access sites hourly  3.  Every 4-6 hours minimum:  Change oxygen saturation probe site  4.  Every 4-6 hours:  If on nasal continuous positive airway pressure, respiratory therapy assess nares and determine need for appliance change or resting period.  6/22/2024 1228 by Macrina Caceres RN  Outcome: Progressing  6/22/2024 0333 by Nikki Riggins RN  Outcome: Progressing

## 2024-06-22 NOTE — PLAN OF CARE
Problem: Discharge Planning  Goal: Discharge to home or other facility with appropriate resources  6/22/2024 1230 by Oscar Perez RN  Outcome: Progressing  6/22/2024 1228 by Macrina Caceres RN  Outcome: Progressing  6/22/2024 0333 by Nikki Riggins RN  Outcome: Progressing     Problem: Safety - Adult  Goal: Free from fall injury  6/22/2024 1230 by Oscar Perez RN  Outcome: Progressing  6/22/2024 1228 by Macrina Caceres RN  Outcome: Progressing  6/22/2024 0333 by Nikki Riggins RN  Outcome: Progressing     Problem: Skin/Tissue Integrity  Goal: Absence of new skin breakdown  Description: 1.  Monitor for areas of redness and/or skin breakdown  2.  Assess vascular access sites hourly  3.  Every 4-6 hours minimum:  Change oxygen saturation probe site  4.  Every 4-6 hours:  If on nasal continuous positive airway pressure, respiratory therapy assess nares and determine need for appliance change or resting period.  6/22/2024 1230 by Oscar Perez RN  Outcome: Progressing  6/22/2024 1228 by Macrina Caceres RN  Outcome: Progressing  6/22/2024 0333 by Nikki Riggins RN  Outcome: Progressing

## 2024-06-22 NOTE — PROGRESS NOTES
[facility]  [facility]  Progress Note    SUBJECTIVE:  Chief Complaint   Patient presents with    Tachycardia     Per nursing home patient had elevated HR      Sitting up in bed, in no distress  No fevers or chills  No changes in ostomy output  Feeling good    Review of systems:  As stated above in the chief complaint, otherwise negative.    Medications:  Scheduled Meds:   amLODIPine  10 mg Oral Daily    insulin lispro  0-4 Units SubCUTAneous TID WC    insulin lispro  0-4 Units SubCUTAneous Nightly    sodium chloride flush  5-40 mL IntraVENous 2 times per day    miconazole   Topical BID    ampicillin-sulbactam  3,000 mg IntraVENous Q6H    sodium chloride flush  5-40 mL IntraVENous 2 times per day    thiamine  100 mg Oral Daily    aspirin  81 mg Oral Daily    atorvastatin  20 mg Oral Nightly    donepezil  10 mg Oral Nightly    ezetimibe  10 mg Oral Daily    hydrOXYzine pamoate  25 mg Oral Daily    metoprolol succinate  37.5 mg Oral Daily    traZODone  150 mg Oral Nightly    sodium chloride flush  5-40 mL IntraVENous 2 times per day    enoxaparin  30 mg SubCUTAneous BID     Continuous Infusions:   dextrose      sodium chloride      lactated ringers IV soln 150 mL/hr at 24 0632    sodium chloride      sodium chloride       PRN Meds:dextrose bolus **OR** dextrose bolus, glucagon (rDNA), dextrose, Glucose, sodium chloride flush, sodium chloride, sodium chloride flush, sodium chloride, sodium chloride flush, sodium chloride, ondansetron **OR** ondansetron, polyethylene glycol, acetaminophen **OR** acetaminophen    OBJECTIVE:  BP (!) 162/91   Pulse 73   Temp 97.4 °F (36.3 °C) (Temporal)   Resp 18   Ht 1.88 m (6' 2\")   Wt 130.6 kg (288 lb)   SpO2 96%   BMI 36.98 kg/m²   Temp  Av.1 °F (36.7 °C)  Min: 97.4 °F (36.3 °C)  Max: 99 °F (37.2 °C)  Constitutional: The patient is awake, alert, and in no distress.  Skin: Warm and dry. No rashes were noted.   HEENT: Round and reactive

## 2024-06-23 ENCOUNTER — APPOINTMENT (OUTPATIENT)
Dept: GENERAL RADIOLOGY | Age: 59
DRG: 871 | End: 2024-06-23
Payer: MEDICARE

## 2024-06-23 LAB
ALBUMIN SERPL-MCNC: 2.9 G/DL (ref 3.5–5.2)
ALP SERPL-CCNC: 140 U/L (ref 40–129)
ALT SERPL-CCNC: 47 U/L (ref 0–40)
ANION GAP SERPL CALCULATED.3IONS-SCNC: 9 MMOL/L (ref 7–16)
AST SERPL-CCNC: 27 U/L (ref 0–39)
BASOPHILS # BLD: 0.13 K/UL (ref 0–0.2)
BASOPHILS NFR BLD: 2 % (ref 0–2)
BILIRUB SERPL-MCNC: 0.5 MG/DL (ref 0–1.2)
BUN SERPL-MCNC: 8 MG/DL (ref 6–20)
CALCIUM SERPL-MCNC: 9 MG/DL (ref 8.6–10.2)
CHLORIDE SERPL-SCNC: 104 MMOL/L (ref 98–107)
CO2 SERPL-SCNC: 29 MMOL/L (ref 22–29)
CREAT SERPL-MCNC: 1 MG/DL (ref 0.7–1.2)
EOSINOPHIL # BLD: 0.26 K/UL (ref 0.05–0.5)
EOSINOPHILS RELATIVE PERCENT: 4 % (ref 0–6)
ERYTHROCYTE [DISTWIDTH] IN BLOOD BY AUTOMATED COUNT: 13.7 % (ref 11.5–15)
GFR, ESTIMATED: 89 ML/MIN/1.73M2
GLUCOSE BLD-MCNC: 101 MG/DL (ref 74–99)
GLUCOSE BLD-MCNC: 142 MG/DL (ref 74–99)
GLUCOSE BLD-MCNC: 165 MG/DL (ref 74–99)
GLUCOSE SERPL-MCNC: 125 MG/DL (ref 74–99)
HCT VFR BLD AUTO: 36.1 % (ref 37–54)
HGB BLD-MCNC: 11.2 G/DL (ref 12.5–16.5)
LYMPHOCYTES NFR BLD: 0.59 K/UL (ref 1.5–4)
LYMPHOCYTES RELATIVE PERCENT: 9 % (ref 20–42)
MCH RBC QN AUTO: 26.2 PG (ref 26–35)
MCHC RBC AUTO-ENTMCNC: 31 G/DL (ref 32–34.5)
MCV RBC AUTO: 84.5 FL (ref 80–99.9)
METAMYELOCYTES ABSOLUTE COUNT: 0.33 K/UL (ref 0–0.12)
METAMYELOCYTES: 5 % (ref 0–1)
MICROORGANISM SPEC CULT: ABNORMAL
MONOCYTES NFR BLD: 0.33 K/UL (ref 0.1–0.95)
MONOCYTES NFR BLD: 5 % (ref 2–12)
NEUTROPHILS NFR BLD: 75 % (ref 43–80)
NEUTS SEG NFR BLD: 4.95 K/UL (ref 1.8–7.3)
PLATELET # BLD AUTO: 234 K/UL (ref 130–450)
PMV BLD AUTO: 10.7 FL (ref 7–12)
POTASSIUM SERPL-SCNC: 3.9 MMOL/L (ref 3.5–5)
PROT SERPL-MCNC: 7 G/DL (ref 6.4–8.3)
RBC # BLD AUTO: 4.27 M/UL (ref 3.8–5.8)
RBC # BLD: ABNORMAL 10*6/UL
SERVICE CMNT-IMP: ABNORMAL
SODIUM SERPL-SCNC: 142 MMOL/L (ref 132–146)
SPECIMEN DESCRIPTION: ABNORMAL
WBC OTHER # BLD: 6.6 K/UL (ref 4.5–11.5)

## 2024-06-23 PROCEDURE — 82962 GLUCOSE BLOOD TEST: CPT

## 2024-06-23 PROCEDURE — 2060000000 HC ICU INTERMEDIATE R&B

## 2024-06-23 PROCEDURE — 6370000000 HC RX 637 (ALT 250 FOR IP)

## 2024-06-23 PROCEDURE — 6360000002 HC RX W HCPCS: Performed by: SPECIALIST

## 2024-06-23 PROCEDURE — 6360000002 HC RX W HCPCS

## 2024-06-23 PROCEDURE — 6360000002 HC RX W HCPCS: Performed by: FAMILY MEDICINE

## 2024-06-23 PROCEDURE — 85025 COMPLETE CBC W/AUTO DIFF WBC: CPT

## 2024-06-23 PROCEDURE — 2580000003 HC RX 258

## 2024-06-23 PROCEDURE — 99232 SBSQ HOSP IP/OBS MODERATE 35: CPT | Performed by: FAMILY MEDICINE

## 2024-06-23 PROCEDURE — 71046 X-RAY EXAM CHEST 2 VIEWS: CPT

## 2024-06-23 PROCEDURE — 2580000003 HC RX 258: Performed by: SPECIALIST

## 2024-06-23 PROCEDURE — 80053 COMPREHEN METABOLIC PANEL: CPT

## 2024-06-23 RX ORDER — HEPARIN 100 UNIT/ML
300 SYRINGE INTRAVENOUS 2 TIMES DAILY
Status: DISCONTINUED | OUTPATIENT
Start: 2024-06-23 | End: 2024-06-24 | Stop reason: HOSPADM

## 2024-06-23 RX ORDER — HEPARIN 100 UNIT/ML
300 SYRINGE INTRAVENOUS PRN
Status: DISCONTINUED | OUTPATIENT
Start: 2024-06-23 | End: 2024-06-24 | Stop reason: HOSPADM

## 2024-06-23 RX ADMIN — DONEPEZIL HYDROCHLORIDE 10 MG: 10 TABLET, FILM COATED ORAL at 20:32

## 2024-06-23 RX ADMIN — SODIUM CHLORIDE, PRESERVATIVE FREE 10 ML: 5 INJECTION INTRAVENOUS at 20:33

## 2024-06-23 RX ADMIN — EZETIMIBE 10 MG: 10 TABLET ORAL at 09:09

## 2024-06-23 RX ADMIN — HYDROXYZINE PAMOATE 25 MG: 25 CAPSULE ORAL at 09:09

## 2024-06-23 RX ADMIN — SODIUM CHLORIDE, PRESERVATIVE FREE 10 ML: 5 INJECTION INTRAVENOUS at 09:16

## 2024-06-23 RX ADMIN — AMPICILLIN SODIUM, SULBACTAM SODIUM 3000 MG: 2; 1 INJECTION, POWDER, FOR SOLUTION INTRAMUSCULAR; INTRAVENOUS at 11:38

## 2024-06-23 RX ADMIN — HEPARIN 300 UNITS: 100 SYRINGE at 12:12

## 2024-06-23 RX ADMIN — ENOXAPARIN SODIUM 30 MG: 100 INJECTION SUBCUTANEOUS at 09:09

## 2024-06-23 RX ADMIN — ATORVASTATIN CALCIUM 20 MG: 20 TABLET, FILM COATED ORAL at 20:32

## 2024-06-23 RX ADMIN — ENOXAPARIN SODIUM 30 MG: 100 INJECTION SUBCUTANEOUS at 20:32

## 2024-06-23 RX ADMIN — MICONAZOLE NITRATE: 20 CREAM TOPICAL at 20:34

## 2024-06-23 RX ADMIN — AMPICILLIN SODIUM, SULBACTAM SODIUM 3000 MG: 2; 1 INJECTION, POWDER, FOR SOLUTION INTRAMUSCULAR; INTRAVENOUS at 17:29

## 2024-06-23 RX ADMIN — AMPICILLIN SODIUM, SULBACTAM SODIUM 3000 MG: 2; 1 INJECTION, POWDER, FOR SOLUTION INTRAMUSCULAR; INTRAVENOUS at 06:14

## 2024-06-23 RX ADMIN — TRAZODONE HYDROCHLORIDE 150 MG: 50 TABLET ORAL at 20:31

## 2024-06-23 RX ADMIN — MICONAZOLE NITRATE: 20 CREAM TOPICAL at 09:07

## 2024-06-23 RX ADMIN — AMPICILLIN SODIUM, SULBACTAM SODIUM 3000 MG: 2; 1 INJECTION, POWDER, FOR SOLUTION INTRAMUSCULAR; INTRAVENOUS at 00:08

## 2024-06-23 RX ADMIN — METOPROLOL SUCCINATE 37.5 MG: 25 TABLET, EXTENDED RELEASE ORAL at 09:09

## 2024-06-23 RX ADMIN — ASPIRIN 81 MG CHEWABLE TABLET 81 MG: 81 TABLET CHEWABLE at 09:09

## 2024-06-23 RX ADMIN — AMPICILLIN SODIUM, SULBACTAM SODIUM 3000 MG: 2; 1 INJECTION, POWDER, FOR SOLUTION INTRAMUSCULAR; INTRAVENOUS at 23:53

## 2024-06-23 RX ADMIN — HEPARIN 300 UNITS: 100 SYRINGE at 20:33

## 2024-06-23 RX ADMIN — SODIUM CHLORIDE, PRESERVATIVE FREE 10 ML: 5 INJECTION INTRAVENOUS at 20:34

## 2024-06-23 RX ADMIN — Medication 100 MG: at 09:09

## 2024-06-23 RX ADMIN — AMLODIPINE BESYLATE 10 MG: 10 TABLET ORAL at 09:09

## 2024-06-23 ASSESSMENT — PAIN SCALES - GENERAL
PAINLEVEL_OUTOF10: 0
PAINLEVEL_OUTOF10: 0

## 2024-06-23 NOTE — PROGRESS NOTES
[facility]  [facility]  Progress Note    SUBJECTIVE:  Chief Complaint   Patient presents with    Tachycardia     Per nursing home patient had elevated HR      Sitting up in bed, in no distress  Getting ready to go down for xray - endorsed some exertional dyspnea today  No fevers  Tolerating antibiotics     Review of systems:  As stated above in the chief complaint, otherwise negative.    Medications:  Scheduled Meds:   heparin (PF)  300 Units IntraCATHeter BID    amLODIPine  10 mg Oral Daily    insulin lispro  0-4 Units SubCUTAneous TID WC    insulin lispro  0-4 Units SubCUTAneous Nightly    sodium chloride flush  5-40 mL IntraVENous 2 times per day    miconazole   Topical BID    ampicillin-sulbactam  3,000 mg IntraVENous Q6H    sodium chloride flush  5-40 mL IntraVENous 2 times per day    thiamine  100 mg Oral Daily    aspirin  81 mg Oral Daily    atorvastatin  20 mg Oral Nightly    donepezil  10 mg Oral Nightly    ezetimibe  10 mg Oral Daily    hydrOXYzine pamoate  25 mg Oral Daily    metoprolol succinate  37.5 mg Oral Daily    traZODone  150 mg Oral Nightly    sodium chloride flush  5-40 mL IntraVENous 2 times per day    enoxaparin  30 mg SubCUTAneous BID     Continuous Infusions:   dextrose      sodium chloride      sodium chloride      sodium chloride       PRN Meds:heparin (PF), dextrose bolus **OR** dextrose bolus, glucagon (rDNA), dextrose, Glucose, sodium chloride flush, sodium chloride, sodium chloride flush, sodium chloride, sodium chloride flush, sodium chloride, ondansetron **OR** ondansetron, polyethylene glycol, acetaminophen **OR** acetaminophen    OBJECTIVE:  BP (!) 140/84   Pulse 82   Temp 97.5 °F (36.4 °C) (Oral)   Resp 15   Ht 1.88 m (6' 2\")   Wt 130.6 kg (288 lb)   SpO2 94%   BMI 36.98 kg/m²   Temp  Av.7 °F (36.5 °C)  Min: 97.4 °F (36.3 °C)  Max: 98.3 °F (36.8 °C)  Constitutional: The patient is awake, alert, and in no distress.   Skin: Warm and dry.

## 2024-06-23 NOTE — PROGRESS NOTES
[facility]  Progress Note    Chief Complaint   Patient presents with    Tachycardia     Per nursing home patient had elevated HR          Subjective:    No acute events overnight.  Patient's speech much more appropriate today, not acting as juvenile as previously.  He does repeat questions about what the tube in his right chest is for and does not appear to be retaining the information provided.  Otherwise reports some shortness of breath when he gets up to use the restroom.  No shortness of breath at rest.  Does have some right-sided chest wall pain where the IR drain is.  No abdominal pain.  Ostomy functioning well.    Objective:    BP (!) 150/92   Pulse 71   Temp 97.6 °F (36.4 °C) (Infrared)   Resp 18   Ht 1.88 m (6' 2\")   Wt 130.6 kg (288 lb)   SpO2 95%   BMI 36.98 kg/m²       Physical Exam  Constitutional:       General: He is not in acute distress.     Appearance: Normal appearance. He is not ill-appearing.   HENT:      Head: Normocephalic and atraumatic.   Eyes:      General: No scleral icterus.  Cardiovascular:      Rate and Rhythm: Normal rate and regular rhythm.      Heart sounds: No murmur heard.  Pulmonary:      Effort: No respiratory distress.      Breath sounds: No wheezing, rhonchi or rales.   Abdominal:      General: There is no distension.      Palpations: Abdomen is soft.      Tenderness: There is no abdominal tenderness. There is no guarding.      Comments: Right upper quadrant drain with minimal serosanguineous fluid.  Right lower quadrant ostomy with bag in place with liquidy stool output   Musculoskeletal:      Right lower leg: No edema.      Left lower leg: No edema.   Skin:     General: Skin is warm and dry.      Coloration: Skin is not jaundiced or pale.      Findings: No erythema.   Neurological:      Mental Status: He is alert. Mental status is at baseline.               Assessment:    Active Hospital Problems    Diagnosis Date Noted    Dementia

## 2024-06-24 VITALS
HEART RATE: 77 BPM | WEIGHT: 288 LBS | BODY MASS INDEX: 36.96 KG/M2 | RESPIRATION RATE: 20 BRPM | SYSTOLIC BLOOD PRESSURE: 154 MMHG | DIASTOLIC BLOOD PRESSURE: 91 MMHG | TEMPERATURE: 97.6 F | HEIGHT: 74 IN | OXYGEN SATURATION: 94 %

## 2024-06-24 PROBLEM — K75.0 LIVER ABSCESS: Status: RESOLVED | Noted: 2024-06-19 | Resolved: 2024-06-24

## 2024-06-24 PROBLEM — R00.0 TACHYCARDIA: Status: RESOLVED | Noted: 2024-06-17 | Resolved: 2024-06-24

## 2024-06-24 PROBLEM — N30.00 ACUTE CYSTITIS WITHOUT HEMATURIA: Status: RESOLVED | Noted: 2021-05-11 | Resolved: 2024-06-24

## 2024-06-24 PROBLEM — R16.0 LIVER MASS: Status: RESOLVED | Noted: 2024-06-17 | Resolved: 2024-06-24

## 2024-06-24 PROBLEM — K63.2 COLONIC FISTULA: Status: RESOLVED | Noted: 2022-02-23 | Resolved: 2024-06-24

## 2024-06-24 PROBLEM — A41.9 SEPTICEMIA (HCC): Status: RESOLVED | Noted: 2024-06-17 | Resolved: 2024-06-24

## 2024-06-24 PROBLEM — R79.89 POSITIVE D DIMER: Status: RESOLVED | Noted: 2024-06-18 | Resolved: 2024-06-24

## 2024-06-24 PROBLEM — N28.9 RENAL INSUFFICIENCY: Status: RESOLVED | Noted: 2024-06-17 | Resolved: 2024-06-24

## 2024-06-24 LAB
ALBUMIN SERPL-MCNC: 3.8 G/DL (ref 3.5–5.2)
ALP SERPL-CCNC: 41 U/L (ref 40–129)
ALT SERPL-CCNC: 44 U/L (ref 0–40)
ANION GAP SERPL CALCULATED.3IONS-SCNC: 7 MMOL/L (ref 7–16)
AST SERPL-CCNC: 53 U/L (ref 0–39)
ATYPICAL LYMPHOCYTE ABSOLUTE COUNT: 0.12 K/UL (ref 0–0.46)
ATYPICAL LYMPHOCYTES: 2 % (ref 0–4)
BASOPHILS # BLD: 0.06 K/UL (ref 0–0.2)
BASOPHILS NFR BLD: 1 % (ref 0–2)
BILIRUB SERPL-MCNC: 0.7 MG/DL (ref 0–1.2)
BUN SERPL-MCNC: 5 MG/DL (ref 6–20)
CALCIUM SERPL-MCNC: 8.9 MG/DL (ref 8.6–10.2)
CHLORIDE SERPL-SCNC: 104 MMOL/L (ref 98–107)
CO2 SERPL-SCNC: 28 MMOL/L (ref 22–29)
CREAT SERPL-MCNC: 1 MG/DL (ref 0.7–1.2)
EOSINOPHIL # BLD: 0.36 K/UL (ref 0.05–0.5)
EOSINOPHILS RELATIVE PERCENT: 5 % (ref 0–6)
ERYTHROCYTE [DISTWIDTH] IN BLOOD BY AUTOMATED COUNT: 13.7 % (ref 11.5–15)
GFR, ESTIMATED: 87 ML/MIN/1.73M2
GLUCOSE BLD-MCNC: 108 MG/DL (ref 74–99)
GLUCOSE BLD-MCNC: 129 MG/DL (ref 74–99)
GLUCOSE SERPL-MCNC: 101 MG/DL (ref 74–99)
HCT VFR BLD AUTO: 37.5 % (ref 37–54)
HGB BLD-MCNC: 11.6 G/DL (ref 12.5–16.5)
LYMPHOCYTES NFR BLD: 0.59 K/UL (ref 1.5–4)
LYMPHOCYTES RELATIVE PERCENT: 9 % (ref 20–42)
MCH RBC QN AUTO: 25.9 PG (ref 26–35)
MCHC RBC AUTO-ENTMCNC: 30.9 G/DL (ref 32–34.5)
MCV RBC AUTO: 83.7 FL (ref 80–99.9)
MONOCYTES NFR BLD: 0.54 K/UL (ref 0.1–0.95)
MONOCYTES NFR BLD: 8 % (ref 2–12)
MYELOCYTES ABSOLUTE COUNT: 0.12 K/UL
MYELOCYTES: 2 %
NEUTROPHILS NFR BLD: 74 % (ref 43–80)
NEUTS SEG NFR BLD: 5.12 K/UL (ref 1.8–7.3)
NUCLEATED RED BLOOD CELLS: 1 PER 100 WBC
PLATELET # BLD AUTO: 245 K/UL (ref 130–450)
PMV BLD AUTO: 10.2 FL (ref 7–12)
POTASSIUM SERPL-SCNC: 3.8 MMOL/L (ref 3.5–5)
PROT SERPL-MCNC: 6.6 G/DL (ref 6.4–8.3)
RBC # BLD AUTO: 4.48 M/UL (ref 3.8–5.8)
RBC # BLD: ABNORMAL 10*6/UL
SODIUM SERPL-SCNC: 139 MMOL/L (ref 132–146)
WBC OTHER # BLD: 6.9 K/UL (ref 4.5–11.5)

## 2024-06-24 PROCEDURE — 6360000002 HC RX W HCPCS: Performed by: SPECIALIST

## 2024-06-24 PROCEDURE — 82962 GLUCOSE BLOOD TEST: CPT

## 2024-06-24 PROCEDURE — 2580000003 HC RX 258

## 2024-06-24 PROCEDURE — 2580000003 HC RX 258: Performed by: SPECIALIST

## 2024-06-24 PROCEDURE — 6370000000 HC RX 637 (ALT 250 FOR IP)

## 2024-06-24 PROCEDURE — 99238 HOSP IP/OBS DSCHRG MGMT 30/<: CPT | Performed by: FAMILY MEDICINE

## 2024-06-24 PROCEDURE — 6360000002 HC RX W HCPCS

## 2024-06-24 PROCEDURE — 80053 COMPREHEN METABOLIC PANEL: CPT

## 2024-06-24 PROCEDURE — 85025 COMPLETE CBC W/AUTO DIFF WBC: CPT

## 2024-06-24 PROCEDURE — 6360000002 HC RX W HCPCS: Performed by: FAMILY MEDICINE

## 2024-06-24 RX ORDER — AMPICILLIN AND SULBACTAM 2; 1 G/1; G/1
2000 INJECTION, POWDER, FOR SOLUTION INTRAMUSCULAR; INTRAVENOUS
Qty: 144 EACH | Refills: 0 | DISCHARGE
Start: 2024-06-24 | End: 2024-07-30

## 2024-06-24 RX ORDER — LANOLIN ALCOHOL/MO/W.PET/CERES
100 CREAM (GRAM) TOPICAL DAILY
Qty: 30 TABLET | Refills: 3 | Status: CANCELLED | OUTPATIENT
Start: 2024-06-25

## 2024-06-24 RX ORDER — AMLODIPINE BESYLATE 10 MG/1
10 TABLET ORAL DAILY
Qty: 30 TABLET | Refills: 3 | DISCHARGE
Start: 2024-06-25

## 2024-06-24 RX ADMIN — METOPROLOL SUCCINATE 37.5 MG: 25 TABLET, EXTENDED RELEASE ORAL at 08:03

## 2024-06-24 RX ADMIN — HEPARIN 300 UNITS: 100 SYRINGE at 08:05

## 2024-06-24 RX ADMIN — EZETIMIBE 10 MG: 10 TABLET ORAL at 08:03

## 2024-06-24 RX ADMIN — SODIUM CHLORIDE, PRESERVATIVE FREE 10 ML: 5 INJECTION INTRAVENOUS at 08:04

## 2024-06-24 RX ADMIN — SODIUM CHLORIDE, PRESERVATIVE FREE 10 ML: 5 INJECTION INTRAVENOUS at 08:05

## 2024-06-24 RX ADMIN — Medication 100 MG: at 08:03

## 2024-06-24 RX ADMIN — ENOXAPARIN SODIUM 30 MG: 100 INJECTION SUBCUTANEOUS at 08:05

## 2024-06-24 RX ADMIN — MICONAZOLE NITRATE: 20 CREAM TOPICAL at 08:04

## 2024-06-24 RX ADMIN — AMPICILLIN SODIUM, SULBACTAM SODIUM 3000 MG: 2; 1 INJECTION, POWDER, FOR SOLUTION INTRAMUSCULAR; INTRAVENOUS at 06:30

## 2024-06-24 RX ADMIN — AMLODIPINE BESYLATE 10 MG: 10 TABLET ORAL at 08:03

## 2024-06-24 RX ADMIN — HYDROXYZINE PAMOATE 25 MG: 25 CAPSULE ORAL at 08:03

## 2024-06-24 RX ADMIN — ASPIRIN 81 MG CHEWABLE TABLET 81 MG: 81 TABLET CHEWABLE at 08:03

## 2024-06-24 RX ADMIN — AMPICILLIN SODIUM, SULBACTAM SODIUM 3000 MG: 2; 1 INJECTION, POWDER, FOR SOLUTION INTRAMUSCULAR; INTRAVENOUS at 13:26

## 2024-06-24 ASSESSMENT — PAIN SCALES - GENERAL
PAINLEVEL_OUTOF10: 0
PAINLEVEL_OUTOF10: 0

## 2024-06-24 NOTE — PROGRESS NOTES
[facility]  [facility]  Progress Note    SUBJECTIVE:  Chief Complaint   Patient presents with    Tachycardia     Per nursing home patient had elevated HR      No new complaints.  Tolerating antibiotic.    Review of systems:  As stated above in the chief complaint, otherwise negative.    Medications:  Scheduled Meds:   heparin (PF)  300 Units IntraCATHeter BID    amLODIPine  10 mg Oral Daily    insulin lispro  0-4 Units SubCUTAneous TID WC    insulin lispro  0-4 Units SubCUTAneous Nightly    sodium chloride flush  5-40 mL IntraVENous 2 times per day    miconazole   Topical BID    ampicillin-sulbactam  3,000 mg IntraVENous Q6H    sodium chloride flush  5-40 mL IntraVENous 2 times per day    thiamine  100 mg Oral Daily    aspirin  81 mg Oral Daily    atorvastatin  20 mg Oral Nightly    donepezil  10 mg Oral Nightly    ezetimibe  10 mg Oral Daily    hydrOXYzine pamoate  25 mg Oral Daily    metoprolol succinate  37.5 mg Oral Daily    traZODone  150 mg Oral Nightly    sodium chloride flush  5-40 mL IntraVENous 2 times per day    enoxaparin  30 mg SubCUTAneous BID     Continuous Infusions:   dextrose      sodium chloride      sodium chloride      sodium chloride       PRN Meds:heparin (PF), dextrose bolus **OR** dextrose bolus, glucagon (rDNA), dextrose, Glucose, sodium chloride flush, sodium chloride, sodium chloride flush, sodium chloride, sodium chloride flush, sodium chloride, ondansetron **OR** ondansetron, polyethylene glycol, acetaminophen **OR** acetaminophen    OBJECTIVE:  BP (!) 147/98   Pulse 81   Temp 98.4 °F (36.9 °C) (Axillary)   Resp 20   Ht 1.88 m (6' 2\")   Wt 130.6 kg (288 lb)   SpO2 93%   BMI 36.98 kg/m²   Temp  Av.1 °F (36.7 °C)  Min: 97.8 °F (36.6 °C)  Max: 98.7 °F (37.1 °C)  Constitutional: The patient is awake, alert, and in no distress.   Skin: Warm and dry. No rashes were noted.   HEENT: Round and reactive pupils.  Moist mucous membranes.  No ulcerations  or thrush.  Neck: Supple to movements.   Chest: No difficulty breathing.  No crackles.  Cardiovascular: heart sounds rhythmic and regular. No murmurs appreciated.   Abdomen: Positive bowel sounds to auscultation. Benign to palpation. No masses felt.  Right upper quadrant pigtail catheter with bloody fluid.  Colostomy.  Extremities: No edema.  Lines: Peripheral.  PICC line 6/21/24 left arm 55cm.    Laboratory and Tests:  Lab Results   Component Value Date    .0 (H) 06/20/2024    CRP 5.2 (H) 10/15/2021     Lab Results   Component Value Date    SEDRATE 118 (H) 06/20/2024    SEDRATE 56 (H) 10/15/2021    SEDRATE 20 (H) 01/07/2011       Radiology:  Reviewed     Microbiology:   Rapid influenza: Negative  Rapid SARS-CoV-2: Negative  Respiratory panel: Negative  Urine culture 6/17/2024: >100 K quinolone intermediate E. coli  Hepatic fluid 6/20/2024: Negative so far      ASSESSMENT:  Right hepatic abscess.  Status post CT-guided drainage.  Cultures grew Fusobacterium nucleatum  Elevation of transaminases associated to hepatic abscess  Fever associated to the above  E. coli asymptomatic bacteriuria    PLAN:  Continue Unasyn for total of 6 weeks or until 7/30/2024.  Reconciled  The patient can be discharged to [facility]  Follow-up in the office    Spoke with nursing.    Elia Friedman MD  12:14 PM  6/24/2024

## 2024-06-24 NOTE — CARE COORDINATION
Chart reviewed  In anticipation of discharge,  Non emergency transportation has been arranged with Jules damico,  time 4PM.  Patient, facility liaison and bedside nurse notified of scheduled  time.  CLAUDIA initiated, envelope completed with evan Liu, MSN RN  [facility] Case Management  [phone]

## 2024-06-24 NOTE — DISCHARGE INSTR - COC
Continuity of Care Form    Patient Name: Andres Staton   :  1965  MRN:  [id number]    Admit date:  2024  Discharge date:  24    Code Status Order: Full Code   Advance Directives:     Admitting Physician:  Jacquelyn Calix MD  PCP: Woody Parkinson DO    Discharging Nurse: Luz Biggs RN  Discharging Hospital Unit/Room#: 0404/0404-A  Discharging Unit Phone Number: [phone]    Emergency Contact:   Extended Emergency Contact Information  Primary Emergency Contact: Nirav Staton  Address: [address]  Home Phone: [phone]  Mobile Phone: [phone]  Relation: Brother/Sister  Preferred language: English   needed? No  Secondary Emergency Contact: Tate Pedersen  Address: [address]  Home Phone: [phone]  Mobile Phone: [phone]  Relation: Friend  Low vision? Yes  Preferred language: English   needed? No    Past Surgical History:  Past Surgical History:   Procedure Laterality Date    ABDOMEN SURGERY Left 10/14/2021    ABDOMEN INCISION AND DRAINAGE performed by Tin Duarte MD at [facility] OR    COLONOSCOPY N/A 3/1/2022    COLONOSCOPY DIAGNOSTIC performed by Demetrio Jalloh MD at [facility] ENDOSCOPY    CYSTOSCOPY N/A 2022    CYSTOSCOPY, urethral dilation, perry insertion performed by Tin Duarte MD at [facility] OR    LAPAROTOMY N/A 2022    exploratory laparotomy, extended right hemicolectomy, creation of mucus fistula and end ileostomy performed by Tin Duarte MD at [facility] OR       Immunization History:     There is no immunization history on file for this patient.    Active Problems:  Patient Active Problem List   Diagnosis Code    Lightheadedness R42    Acute cystitis without hematuria N30.00    Chest pain R07.9    Acute psychosis (HCC) F23    Cognitive disorder F09    CAD (coronary artery disease) I25.10

## 2024-06-24 NOTE — PLAN OF CARE
Problem: Discharge Planning  Goal: Discharge to home or other facility with appropriate resources  6/23/2024 2432 by Marilyn Kennedy RN  Outcome: Progressing     Problem: Safety - Adult  Goal: Free from fall injury  Outcome: Progressing     Problem: Skin/Tissue Integrity  Goal: Absence of new skin breakdown  Description: 1.  Monitor for areas of redness and/or skin breakdown  2.  Assess vascular access sites hourly  3.  Every 4-6 hours minimum:  Change oxygen saturation probe site  4.  Every 4-6 hours:  If on nasal continuous positive airway pressure, respiratory therapy assess nares and determine need for appliance change or resting period.  Outcome: Progressing

## 2024-06-24 NOTE — PROGRESS NOTES
[facility]  Progress Note    Chief Complaint   Patient presents with    Tachycardia     Per nursing home patient had elevated HR          Subjective:    No acute events overnight.  Patient reports feeling the same today as yesterday.  Has some minor right upper quadrant pain but then said actually was not hurting currently.  No chest pain, no cough. no nausea or vomiting.  Denies any dysuria.     Objective:    BP (!) 149/82   Pulse 82   Temp 97.8 °F (36.6 °C) (Oral)   Resp 18   Ht 1.88 m (6' 2\")   Wt 130.6 kg (288 lb)   SpO2 94%   BMI 36.98 kg/m²       Physical Exam  Constitutional:       General: He is not in acute distress.     Appearance: Normal appearance. He is not ill-appearing.   HENT:      Head: Normocephalic and atraumatic.   Eyes:      General: No scleral icterus.  Cardiovascular:      Rate and Rhythm: Normal rate and regular rhythm.      Heart sounds: No murmur heard.  Pulmonary:      Effort: No respiratory distress.      Breath sounds: No wheezing, rhonchi or rales.   Abdominal:      General: There is no distension.      Palpations: Abdomen is soft.      Tenderness: There is no abdominal tenderness. There is no guarding.      Comments: Right upper quadrant drain with minimal serosanguineous fluid.  Right lower quadrant ostomy with bag in place with liquidy stool output   Musculoskeletal:      Right lower leg: No edema.      Left lower leg: No edema.   Skin:     General: Skin is warm and dry.      Coloration: Skin is not jaundiced or pale.      Findings: No erythema.   Neurological:      Mental Status: He is alert. Mental status is at baseline.               Assessment:    Active Hospital Problems    Diagnosis Date Noted    Dementia (HCC) [F03.90] 01/17/2023     Priority: Medium    Heel callus [L84] 06/20/2024    Onychomycosis [B35.1] 06/20/2024    Tinea pedis of both feet [B35.3] 06/20/2024    Liver abscess [K75.0] 06/19/2024    Diverticulitis of large  intestine with abscess without bleeding [K57.20] 06/19/2024    Presence of ostomy (HCC) [Z93.9] 06/18/2024    Positive D dimer [R79.89] 06/18/2024    Septicemia (HCC) [A41.9] 06/17/2024    Tachycardia [R00.0] 06/17/2024    Renal insufficiency [N28.9] 06/17/2024    Liver mass [R16.0] 06/17/2024    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  This lesion was not seen previously.  Metastatic liver disease considered.  Further evaluation with multiphase IV contrast CT or MRI of the liver recommended.  -Hepatobiliary consulted, appreciate recommendations  -Ordered CTA triphasic of the liver which demonstrated rim-enhancing lesion could potentially represent an abscess   -IR drainage of liver abscess 6/19    -Abscess now growing Fusobacterium nucleatum   -Right upper quadrant drain in place with purulence and serosanguineous output  -ID consulted  -Cont IV Unasyn   -PICC placed 6/21 left basilar   -Awaiting final culture    Sepsis 2/2 UTI, sepsis criteria no longer met  UA positive for nitrite, leuk esterase, 3+ bacteria.  Pro-Osiel 2.62.  Reported fever up to 101.9 undocumented.  Received Rocephin 1 g x 1 in the emergency department  -ID consulted, appreciate recommendation   -Urine culture growing E. Coli with +sensitivities  -Placed patient on IV Unasyn, PICC plus final rec's   -Telemetry monitoring     Tachycardia - improved  Likely secondary to above.  -Continue home Toprol XL 37.5 mg daily, consider dose increase  -Infectious disease consulted, appreciate recommendation    Renal insufficiency, resolved  Past baseline appears to be around 1,

## 2024-06-24 NOTE — FLOWSHEET NOTE
ET Nurse (follow up) [id number]  Admit Date: 6/17/2024  2:55 PM    Reason for consult:  leaking ileostomy    Stoma assessment:     06/24/24 0903   Ileostomy Ileostomy RLQ   Placement Date: 02/24/22   Pre-existing: No  Inserted by: /dr. Duarte  Ileostomy Type: Ileostomy  Location: RLQ   Stomal Appliance 1 piece;Flat;Leaking   Flange Size (inches)   (45mm)   Stoma  Assessment Red;Moist;Protrudes  (slightly protrudes)   Peristomal Assessment Clean, dry & intact  (white intact)   Mucocutaneous Junction Intact   Treatment Barrier ring;Site care;Pouch change  (1pc flat, elastic barrier strips, Cavilon skin prep)   Stool Appearance Loose   Stool Color Yellow;Brown   Stool Amount Small   Output (mL) 50 ml     Having difficulty keeping pouch to adhere to skin, attempt Cavilon skin prep instead of Medline sure prep and flat bag instead of convex.     Plan:  Change bag, will follow     Bonita Butler RN 6/24/2024 10:51 AM

## 2024-06-24 NOTE — DISCHARGE SUMMARY
[facility]  Discharge Summary      Patient ID:  Andres Staton  [id number]  59 y.o.  1965    Admit date: 6/17/2024    Discharge date and time: 6/24/24    Location of discharge: [facility]     Admitting Physician: Jacquelyn Calix MD     Discharge Physician: Danny Macias MD    Consults: HPB, infectious disease, general surgery, podiatry    Admission Diagnoses: Septicemia (HCC) [A41.9]  MARBELLA (acute kidney injury) (HCC) [N17.9]  Sepsis secondary to UTI (HCC) [A41.9, N39.0]  Acute cystitis without hematuria [N30.00]    Discharge Diagnoses: Principal Problem:    Septicemia (HCC)  Active Problems:    Dementia (HCC)    Acute cystitis without hematuria    Cognitive disorder    CAD (coronary artery disease)    MARLEE (obstructive sleep apnea)    Colonic fistula    Tachycardia    Renal insufficiency    Liver mass    Presence of ostomy (HCC)    Positive D dimer    Liver abscess    Diverticulitis of large intestine with abscess without bleeding    Heel callus    Onychomycosis    Tinea pedis of both feet  Resolved Problems:    * No resolved hospital problems. *      Hospital Course:     Patient is a 59-year-old male with a pertinent PMHx of dementia, CAD, HLD, ETOH abuse who presented to the ER from [facility] with chief complaint of tachycardia.  Per nurse and ED providers, patient was sent in from facility after being found to have tachycardia.  Per my discussion with the patient, he has been at baseline health without any symptoms and without any complaints. Vitals were remarkable for initial tachycardia 125 with improvement down to , blood pressure normotensive. Labs were remarkable for BUN 23, creatinine 2.5, EGFR 29, lactic acid 2.4 with repeat 0.8, alkaline phosphatase 141, ALT 49, AST 77, UA nitrite positive, moderate leukocyte esterase, 3+ bacteria, 3-5 RBC, 21-50 WBC.  Chest x-ray shows no acute process.  CT abdomen pelvis    surface of the right lobe liver segments 8/7.  This lesion was not seen   previously.  Metastatic liver disease considered.  Further evaluation with   multiphase IV contrast CT or MRI of the liver recommended.      2.  No kidney stones.  No signs for obstructive uropathy.         XR CHEST PORTABLE   Final Result   No acute process.              Treatments: IR drainage of hepatic abscess, toenail debridement, IV antibiotics    Disposition: long term care facility    Patient Instructions:        Medication List        START taking these medications      ampicillin-sulbactam 3 (2-1) g Solr  Commonly known as: UNASYN 3GM  Infuse 3,000 mg intravenously every 6 hours            ASK your doctor about these medications      albuterol sulfate  (90 Base) MCG/ACT inhaler  Commonly known as: PROVENTIL;VENTOLIN;PROAIR     aspirin 81 MG chewable tablet  Take 1 tablet by mouth daily     atorvastatin 20 MG tablet  Commonly known as: LIPITOR  Ask about: Which instructions should I use?     donepezil 10 MG tablet  Commonly known as: ARICEPT     eucerin cream     ezetimibe 10 MG tablet  Commonly known as: ZETIA     hydrOXYzine pamoate 25 MG capsule  Commonly known as: VISTARIL     metoprolol succinate 25 MG extended release tablet  Commonly known as: TOPROL XL  Ask about: Which instructions should I use?     polyvinyl alcohol 1.4 % ophthalmic solution  Commonly known as: LIQUIFILM TEARS     PSYLLIUM PO     therapeutic multivitamin-minerals tablet     traZODone 150 MG tablet  Commonly known as: DESYREL     Tylenol 8 Hour Arthritis Pain 650 MG extended release tablet  Generic drug: acetaminophen  Ask about: Which instructions should I use?     vitamin B-1 100 MG tablet  Commonly known as: THIAMINE  Take 1 tablet by mouth daily               Where to Get Your Medications        Information about where to get these medications is not yet available    Ask your nurse or doctor about these medications  ampicillin-sulbactam 3 (2-1) g

## 2024-06-24 NOTE — PROGRESS NOTES
Attempted to call report multiple times, left message on answering machine, awaiting a phone call back to give report.

## 2024-06-25 ENCOUNTER — TELEPHONE (OUTPATIENT)
Dept: HEMATOLOGY | Age: 59
End: 2024-06-25

## 2024-06-25 NOTE — TELEPHONE ENCOUNTER
I received a call from [facility] and she made patient a hospital follow up with Dr. Rebolledo on 7/17/24 at 9:00am at [facility] clinic.  I gave her directions to the office and she confirmed this appt.      Electronically signed by Teresa Castillo RN on 6/25/2024 at 10:41 AM

## 2024-07-05 ENCOUNTER — HOSPITAL ENCOUNTER (OUTPATIENT)
Dept: CT IMAGING | Age: 59
End: 2024-07-05
Payer: MEDICAID

## 2024-07-05 PROCEDURE — 76000 FLUOROSCOPY <1 HR PHYS/QHP: CPT

## 2024-07-05 PROCEDURE — 6360000004 HC RX CONTRAST MEDICATION: Performed by: RADIOLOGY

## 2024-07-05 RX ADMIN — IOPAMIDOL 1 ML: 755 INJECTION, SOLUTION INTRAVENOUS at 12:52

## 2024-07-05 NOTE — FLOWSHEET NOTE
Patient arrived from home for liver abscess tube check with CT imaging. Instructions given, questions answered and understanding expressed. Ordered contrast injected, images taken and reviewed by Dr. Mcclain. Patient tolerated well. Per Dr. Mcclain, tube removed. Site cleansed and dry dressing placed. Site care instructions reviewed with patient. Patient discharged back to facility.

## 2024-07-05 NOTE — DISCHARGE INSTRUCTIONS
Clean area with soap and water only, pat dry.   Keep dressing dry for 24 hours, then may remove. May shower after dressing is removed.   No baths for 14 days.   No alcohol, neosporin, or peroxide to site.   Site may leak for a few days until healed, may apply new dressing if needed.

## 2024-07-17 ENCOUNTER — OFFICE VISIT (OUTPATIENT)
Dept: HEMATOLOGY | Age: 59
End: 2024-07-17
Payer: MEDICAID

## 2024-07-17 VITALS
BODY MASS INDEX: 38 KG/M2 | OXYGEN SATURATION: 92 % | DIASTOLIC BLOOD PRESSURE: 73 MMHG | HEIGHT: 74 IN | WEIGHT: 296.1 LBS | HEART RATE: 84 BPM | SYSTOLIC BLOOD PRESSURE: 102 MMHG | TEMPERATURE: 97.9 F

## 2024-07-17 DIAGNOSIS — F03.90 DEMENTIA, UNSPECIFIED DEMENTIA SEVERITY, UNSPECIFIED DEMENTIA TYPE, UNSPECIFIED WHETHER BEHAVIORAL, PSYCHOTIC, OR MOOD DISTURBANCE OR ANXIETY (HCC): ICD-10-CM

## 2024-07-17 DIAGNOSIS — K75.0 LIVER ABSCESS: Primary | ICD-10-CM

## 2024-07-17 DIAGNOSIS — K57.92 DIVERTICULITIS: ICD-10-CM

## 2024-07-17 PROCEDURE — 3074F SYST BP LT 130 MM HG: CPT | Performed by: STUDENT IN AN ORGANIZED HEALTH CARE EDUCATION/TRAINING PROGRAM

## 2024-07-17 PROCEDURE — 1036F TOBACCO NON-USER: CPT | Performed by: STUDENT IN AN ORGANIZED HEALTH CARE EDUCATION/TRAINING PROGRAM

## 2024-07-17 PROCEDURE — 3078F DIAST BP <80 MM HG: CPT | Performed by: STUDENT IN AN ORGANIZED HEALTH CARE EDUCATION/TRAINING PROGRAM

## 2024-07-17 PROCEDURE — 99214 OFFICE O/P EST MOD 30 MIN: CPT | Performed by: STUDENT IN AN ORGANIZED HEALTH CARE EDUCATION/TRAINING PROGRAM

## 2024-07-17 PROCEDURE — 1111F DSCHRG MED/CURRENT MED MERGE: CPT | Performed by: STUDENT IN AN ORGANIZED HEALTH CARE EDUCATION/TRAINING PROGRAM

## 2024-07-17 PROCEDURE — G8417 CALC BMI ABV UP PARAM F/U: HCPCS | Performed by: STUDENT IN AN ORGANIZED HEALTH CARE EDUCATION/TRAINING PROGRAM

## 2024-07-17 PROCEDURE — G8427 DOCREV CUR MEDS BY ELIG CLIN: HCPCS | Performed by: STUDENT IN AN ORGANIZED HEALTH CARE EDUCATION/TRAINING PROGRAM

## 2024-07-17 PROCEDURE — 3017F COLORECTAL CA SCREEN DOC REV: CPT | Performed by: STUDENT IN AN ORGANIZED HEALTH CARE EDUCATION/TRAINING PROGRAM

## 2024-07-17 RX ORDER — HEPARIN SODIUM (PORCINE) LOCK FLUSH IV SOLN 100 UNIT/ML 100 UNIT/ML
500 SOLUTION INTRAVENOUS EVERY 6 HOURS
COMMUNITY

## 2024-07-17 ASSESSMENT — ENCOUNTER SYMPTOMS
ALLERGIC/IMMUNOLOGIC NEGATIVE: 1
GASTROINTESTINAL NEGATIVE: 1
RESPIRATORY NEGATIVE: 1
EYES NEGATIVE: 1

## 2024-07-17 NOTE — PROGRESS NOTES
recurrent sigmoid diverticulitis with liver abscess status post IR drain placement, IV and antibiotics with infectious disease  -He has seen ID as an outpatient and plan is to continue his Unasyn until 7/30  -Drain is out and he denies any new symptoms of fever or abdominal pain  -Will plan to rescan him in 4 weeks after he is completed his antibiotics and follow-up with me then.  -Needs follow-up with his general surgeon regarding recurrent episodes of diverticulitis with stricture.           Thank you for the consultation allowing me to take part in Mr. Staton' care.   60 Minutes of which greater than 50% was spent counseling or coordinating his care.      Oliva Rebolledo MD

## 2024-07-19 ENCOUNTER — TELEPHONE (OUTPATIENT)
Dept: HEMATOLOGY | Age: 59
End: 2024-07-19

## 2024-07-19 NOTE — TELEPHONE ENCOUNTER
Andres is scheduled for a CT scan 8/13/24 at [facility], Arrive 430pm, 5pm scan, NPO 3 hours.   Called [facility] and was sent to \"3A\" no answer, left detailed message requesting a return call to clinic.     Prior auth Pending: Tracking# [id number]

## 2024-07-24 NOTE — TELEPHONE ENCOUNTER
Called and spoke to nurse Sydni at [place]. She confirmed appt information for CT scan and instructions. A follow up appt was scheduled with Dr. Rebolledo.

## 2024-08-01 DIAGNOSIS — K75.0 LIVER ABSCESS: ICD-10-CM

## 2024-08-13 ENCOUNTER — HOSPITAL ENCOUNTER (OUTPATIENT)
Age: 59
Discharge: HOME | End: 2024-08-13
Attending: STUDENT IN AN ORGANIZED HEALTH CARE EDUCATION/TRAINING PROGRAM
Payer: MEDICARE

## 2024-08-13 ENCOUNTER — HOSPITAL ENCOUNTER (OUTPATIENT)
Dept: CT IMAGING | Age: 59
Discharge: HOME | End: 2024-08-15
Attending: STUDENT IN AN ORGANIZED HEALTH CARE EDUCATION/TRAINING PROGRAM
Payer: MEDICARE

## 2024-08-13 DIAGNOSIS — K75.0 LIVER ABSCESS: ICD-10-CM

## 2024-08-13 LAB
BUN SERPL-MCNC: 12 MG/DL (ref 6–20)
CREAT SERPL-MCNC: 1.3 MG/DL (ref 0.7–1.2)
GFR, ESTIMATED: 66 ML/MIN/1.73M2

## 2024-08-13 PROCEDURE — 82565 ASSAY OF CREATININE: CPT

## 2024-08-13 PROCEDURE — 6360000004 HC RX CONTRAST MEDICATION: Performed by: RADIOLOGY

## 2024-08-13 PROCEDURE — 36415 COLL VENOUS BLD VENIPUNCTURE: CPT

## 2024-08-13 PROCEDURE — 84520 ASSAY OF UREA NITROGEN: CPT

## 2024-08-13 PROCEDURE — 74177 CT ABD & PELVIS W/CONTRAST: CPT

## 2024-08-13 RX ADMIN — IOPAMIDOL 75 ML: 755 INJECTION, SOLUTION INTRAVENOUS at 16:16

## 2024-08-21 ENCOUNTER — OFFICE VISIT (OUTPATIENT)
Dept: HEMATOLOGY | Age: 59
End: 2024-08-21
Payer: MEDICARE

## 2024-08-21 VITALS
DIASTOLIC BLOOD PRESSURE: 80 MMHG | OXYGEN SATURATION: 94 % | TEMPERATURE: 97.8 F | BODY MASS INDEX: 38.33 KG/M2 | HEART RATE: 105 BPM | WEIGHT: 298.7 LBS | HEIGHT: 74 IN | SYSTOLIC BLOOD PRESSURE: 130 MMHG

## 2024-08-21 DIAGNOSIS — F03.90 DEMENTIA, UNSPECIFIED DEMENTIA SEVERITY, UNSPECIFIED DEMENTIA TYPE, UNSPECIFIED WHETHER BEHAVIORAL, PSYCHOTIC, OR MOOD DISTURBANCE OR ANXIETY (HCC): ICD-10-CM

## 2024-08-21 DIAGNOSIS — K75.0 LIVER ABSCESS: Primary | ICD-10-CM

## 2024-08-21 DIAGNOSIS — K57.92 DIVERTICULITIS: ICD-10-CM

## 2024-08-21 PROCEDURE — G8417 CALC BMI ABV UP PARAM F/U: HCPCS | Performed by: STUDENT IN AN ORGANIZED HEALTH CARE EDUCATION/TRAINING PROGRAM

## 2024-08-21 PROCEDURE — 3075F SYST BP GE 130 - 139MM HG: CPT | Performed by: STUDENT IN AN ORGANIZED HEALTH CARE EDUCATION/TRAINING PROGRAM

## 2024-08-21 PROCEDURE — 3079F DIAST BP 80-89 MM HG: CPT | Performed by: STUDENT IN AN ORGANIZED HEALTH CARE EDUCATION/TRAINING PROGRAM

## 2024-08-21 PROCEDURE — G8427 DOCREV CUR MEDS BY ELIG CLIN: HCPCS | Performed by: STUDENT IN AN ORGANIZED HEALTH CARE EDUCATION/TRAINING PROGRAM

## 2024-08-21 PROCEDURE — 99213 OFFICE O/P EST LOW 20 MIN: CPT | Performed by: STUDENT IN AN ORGANIZED HEALTH CARE EDUCATION/TRAINING PROGRAM

## 2024-08-21 PROCEDURE — 3017F COLORECTAL CA SCREEN DOC REV: CPT | Performed by: STUDENT IN AN ORGANIZED HEALTH CARE EDUCATION/TRAINING PROGRAM

## 2024-08-21 PROCEDURE — 1036F TOBACCO NON-USER: CPT | Performed by: STUDENT IN AN ORGANIZED HEALTH CARE EDUCATION/TRAINING PROGRAM

## 2024-08-21 NOTE — PROGRESS NOTES
Hepatobiliary and Pancreatic Surgery Progress Note    CC: Liver abscess    Subjective:   7/17/24: Mr. Staton is a 59-year-old male with past medical history HTN, HLD, MARLEE, CAD, dementia living in a nursing home who was recently hospitalized with abdominal pain. CT scans showed a large liver mass. A triphasic scan showed liver abscess in the right lobe. Tumor markers were normal. He has had an extensive abdominal surgical history including exploratory laparotomy, right hemicolectomy with mucous fistula and end ileostomy for large bowel obstruction with ischemic right colon and descending colon mass invading the retroperitoneum and abdominal sidewall with colocutaneous fistula. Pathology from his surgery was benign consistent with ischemia. He had colonoscopies through his mucous fistula that showed a stricture but no mass. Was felt to have chronic diverticulitis. His CT scan while hospitalized also showed inflammation about the sigmoid colon consistent with diverticulitis with a liver abscess. He had a drain placed in the abscess and cultures were growing Fusobacterium. Infectious disease was consulted and he was placed on IV antibiotics. He was eventually discharged back to the nursing facility with a PICC and IV Unasyn. He recently had a drain study on 7/5 which showed improvement in the abscess and his drain was removed. He denies any recurrent abdominal pains, nausea or vomiting. Denies any fevers or chills. His biggest complaint is fatigue and the fact that his ileostomy bag does not always stay in place.     8/21/24: Patient presents today for follow-up with new CT scan.  CT shows almost complete resolution of the right hepatic abscess.  He has been off of antibiotics.  He presents today from his nursing home with an aide.    OBJECTIVE      Physical    /80   Pulse (!) 105   Temp 97.8 °F (36.6 °C)   Ht 1.88 m (6' 2\")   Wt 135.5 kg (298 lb 11.2 oz)   SpO2 94%   BMI 38.35 kg/m²       General

## 2025-03-14 ENCOUNTER — APPOINTMENT (OUTPATIENT)
Dept: CT IMAGING | Age: 60
DRG: 919 | End: 2025-03-14
Payer: MEDICARE

## 2025-03-14 ENCOUNTER — HOSPITAL ENCOUNTER (INPATIENT)
Age: 60
LOS: 1 days | Discharge: ANOTHER HOSPITAL | DRG: 919 | End: 2025-03-15
Attending: EMERGENCY MEDICINE | Admitting: INTERNAL MEDICINE
Payer: MEDICARE

## 2025-03-14 ENCOUNTER — APPOINTMENT (OUTPATIENT)
Dept: GENERAL RADIOLOGY | Age: 60
DRG: 919 | End: 2025-03-14
Payer: MEDICARE

## 2025-03-14 DIAGNOSIS — R93.5 ABNORMAL CT OF THE ABDOMEN: ICD-10-CM

## 2025-03-14 DIAGNOSIS — K65.1 INTRA-ABDOMINAL ABSCESS (HCC): Primary | ICD-10-CM

## 2025-03-14 PROBLEM — T81.49XA ABDOMINAL WALL ABSCESS AT SITE OF SURGICAL WOUND: Status: ACTIVE | Noted: 2025-03-14

## 2025-03-14 LAB
ABO + RH BLD: NORMAL
ALBUMIN SERPL-MCNC: 3.3 G/DL (ref 3.5–5.2)
ALP SERPL-CCNC: 175 U/L (ref 40–129)
ALT SERPL-CCNC: 87 U/L (ref 0–40)
ANION GAP SERPL CALCULATED.3IONS-SCNC: 13 MMOL/L (ref 7–16)
ARM BAND NUMBER: NORMAL
AST SERPL-CCNC: 40 U/L (ref 0–39)
BASOPHILS # BLD: 0 K/UL (ref 0–0.2)
BASOPHILS NFR BLD: 0 % (ref 0–2)
BILIRUB SERPL-MCNC: 0.6 MG/DL (ref 0–1.2)
BLOOD BANK SAMPLE EXPIRATION: NORMAL
BLOOD GROUP ANTIBODIES SERPL: NEGATIVE
BUN SERPL-MCNC: 29 MG/DL (ref 6–23)
CALCIUM SERPL-MCNC: 9.2 MG/DL (ref 8.6–10.2)
CHLORIDE SERPL-SCNC: 98 MMOL/L (ref 98–107)
CO2 SERPL-SCNC: 25 MMOL/L (ref 22–29)
CREAT SERPL-MCNC: 1.4 MG/DL (ref 0.7–1.2)
EKG ATRIAL RATE: 94 BPM
EKG P AXIS: 32 DEGREES
EKG P-R INTERVAL: 152 MS
EKG Q-T INTERVAL: 368 MS
EKG QRS DURATION: 80 MS
EKG QTC CALCULATION (BAZETT): 460 MS
EKG R AXIS: -55 DEGREES
EKG T AXIS: 40 DEGREES
EKG VENTRICULAR RATE: 94 BPM
EOSINOPHIL # BLD: 0.41 K/UL (ref 0.05–0.5)
EOSINOPHILS RELATIVE PERCENT: 4 % (ref 0–6)
ERYTHROCYTE [DISTWIDTH] IN BLOOD BY AUTOMATED COUNT: 15.5 % (ref 11.5–15)
GFR, ESTIMATED: 56 ML/MIN/1.73M2
GLUCOSE SERPL-MCNC: 137 MG/DL (ref 74–99)
HCT VFR BLD AUTO: 42.9 % (ref 37–54)
HGB BLD-MCNC: 13.6 G/DL (ref 12.5–16.5)
INR PPP: 1.3
LACTATE BLDV-SCNC: 1.4 MMOL/L (ref 0.5–1.9)
LYMPHOCYTES NFR BLD: 0.82 K/UL (ref 1.5–4)
LYMPHOCYTES RELATIVE PERCENT: 7 % (ref 20–42)
MCH RBC QN AUTO: 25.6 PG (ref 26–35)
MCHC RBC AUTO-ENTMCNC: 31.7 G/DL (ref 32–34.5)
MCV RBC AUTO: 80.6 FL (ref 80–99.9)
MONOCYTES NFR BLD: 1.23 K/UL (ref 0.1–0.95)
MONOCYTES NFR BLD: 10 % (ref 2–12)
NEUTROPHILS NFR BLD: 77 % (ref 43–80)
NEUTS SEG NFR BLD: 9.13 K/UL (ref 1.8–7.3)
PARTIAL THROMBOPLASTIN TIME: 27 SEC (ref 24.5–35.1)
PLATELET # BLD AUTO: 271 K/UL (ref 130–450)
PMV BLD AUTO: 10.7 FL (ref 7–12)
POTASSIUM SERPL-SCNC: 4 MMOL/L (ref 3.5–5)
PROCALCITONIN SERPL-MCNC: 1.55 NG/ML (ref 0–0.08)
PROMYELOCYTES ABSOLUTE COUNT: 0.21 K/UL
PROMYELOCYTES: 2 %
PROT SERPL-MCNC: 8.9 G/DL (ref 6.4–8.3)
PROTHROMBIN TIME: 14.3 SEC (ref 9.3–12.4)
RBC # BLD AUTO: 5.32 M/UL (ref 3.8–5.8)
RBC # BLD: NORMAL 10*6/UL
SODIUM SERPL-SCNC: 136 MMOL/L (ref 132–146)
WBC OTHER # BLD: 11.8 K/UL (ref 4.5–11.5)

## 2025-03-14 PROCEDURE — 6360000004 HC RX CONTRAST MEDICATION: Performed by: RADIOLOGY

## 2025-03-14 PROCEDURE — 1200000000 HC SEMI PRIVATE

## 2025-03-14 PROCEDURE — 2580000003 HC RX 258: Performed by: EMERGENCY MEDICINE

## 2025-03-14 PROCEDURE — 74177 CT ABD & PELVIS W/CONTRAST: CPT

## 2025-03-14 PROCEDURE — 99285 EMERGENCY DEPT VISIT HI MDM: CPT

## 2025-03-14 PROCEDURE — 87040 BLOOD CULTURE FOR BACTERIA: CPT

## 2025-03-14 PROCEDURE — 86901 BLOOD TYPING SEROLOGIC RH(D): CPT

## 2025-03-14 PROCEDURE — 85025 COMPLETE CBC W/AUTO DIFF WBC: CPT

## 2025-03-14 PROCEDURE — 87205 SMEAR GRAM STAIN: CPT

## 2025-03-14 PROCEDURE — 6360000002 HC RX W HCPCS: Performed by: EMERGENCY MEDICINE

## 2025-03-14 PROCEDURE — 85730 THROMBOPLASTIN TIME PARTIAL: CPT

## 2025-03-14 PROCEDURE — 87070 CULTURE OTHR SPECIMN AEROBIC: CPT

## 2025-03-14 PROCEDURE — 99223 1ST HOSP IP/OBS HIGH 75: CPT | Performed by: INTERNAL MEDICINE

## 2025-03-14 PROCEDURE — 85610 PROTHROMBIN TIME: CPT

## 2025-03-14 PROCEDURE — 84145 PROCALCITONIN (PCT): CPT

## 2025-03-14 PROCEDURE — 93010 ELECTROCARDIOGRAM REPORT: CPT | Performed by: INTERNAL MEDICINE

## 2025-03-14 PROCEDURE — 96365 THER/PROPH/DIAG IV INF INIT: CPT

## 2025-03-14 PROCEDURE — 71045 X-RAY EXAM CHEST 1 VIEW: CPT

## 2025-03-14 PROCEDURE — 86900 BLOOD TYPING SEROLOGIC ABO: CPT

## 2025-03-14 PROCEDURE — 93005 ELECTROCARDIOGRAM TRACING: CPT | Performed by: EMERGENCY MEDICINE

## 2025-03-14 PROCEDURE — 86850 RBC ANTIBODY SCREEN: CPT

## 2025-03-14 PROCEDURE — 83605 ASSAY OF LACTIC ACID: CPT

## 2025-03-14 PROCEDURE — 80053 COMPREHEN METABOLIC PANEL: CPT

## 2025-03-14 PROCEDURE — 87077 CULTURE AEROBIC IDENTIFY: CPT

## 2025-03-14 RX ORDER — SODIUM CHLORIDE 9 MG/ML
INJECTION, SOLUTION INTRAVENOUS PRN
Status: CANCELLED | OUTPATIENT
Start: 2025-03-14

## 2025-03-14 RX ORDER — SODIUM CHLORIDE 0.9 % (FLUSH) 0.9 %
10 SYRINGE (ML) INJECTION PRN
Status: CANCELLED | OUTPATIENT
Start: 2025-03-14

## 2025-03-14 RX ORDER — EZETIMIBE 10 MG/1
10 TABLET ORAL DAILY
Status: CANCELLED | OUTPATIENT
Start: 2025-03-14

## 2025-03-14 RX ORDER — MICONAZOLE NITRATE 20 MG/G
CREAM TOPICAL 2 TIMES DAILY
Status: CANCELLED | OUTPATIENT
Start: 2025-03-14

## 2025-03-14 RX ORDER — ASPIRIN 81 MG/1
81 TABLET, CHEWABLE ORAL DAILY
Status: CANCELLED | OUTPATIENT
Start: 2025-03-14

## 2025-03-14 RX ORDER — POLYETHYLENE GLYCOL 3350 17 G/17G
17 POWDER, FOR SOLUTION ORAL DAILY PRN
Status: CANCELLED | OUTPATIENT
Start: 2025-03-14

## 2025-03-14 RX ORDER — IOPAMIDOL 755 MG/ML
75 INJECTION, SOLUTION INTRAVASCULAR
Status: COMPLETED | OUTPATIENT
Start: 2025-03-14 | End: 2025-03-14

## 2025-03-14 RX ORDER — DONEPEZIL HYDROCHLORIDE 5 MG/1
10 TABLET, FILM COATED ORAL NIGHTLY
Status: CANCELLED | OUTPATIENT
Start: 2025-03-14

## 2025-03-14 RX ORDER — HYDROXYZINE PAMOATE 25 MG/1
25 CAPSULE ORAL DAILY
Status: CANCELLED | OUTPATIENT
Start: 2025-03-14

## 2025-03-14 RX ORDER — METOPROLOL SUCCINATE 25 MG/1
37.5 TABLET, EXTENDED RELEASE ORAL DAILY
Status: CANCELLED | OUTPATIENT
Start: 2025-03-14

## 2025-03-14 RX ORDER — RIVASTIGMINE 13.3 MG/24H
1 PATCH, EXTENDED RELEASE TRANSDERMAL DAILY
COMMUNITY

## 2025-03-14 RX ORDER — POTASSIUM CHLORIDE 1500 MG/1
40 TABLET, EXTENDED RELEASE ORAL PRN
Status: CANCELLED | OUTPATIENT
Start: 2025-03-14

## 2025-03-14 RX ORDER — MAGNESIUM SULFATE IN WATER 40 MG/ML
2000 INJECTION, SOLUTION INTRAVENOUS PRN
Status: CANCELLED | OUTPATIENT
Start: 2025-03-14

## 2025-03-14 RX ORDER — SODIUM CHLORIDE 0.9 % (FLUSH) 0.9 %
5-40 SYRINGE (ML) INJECTION EVERY 12 HOURS SCHEDULED
Status: CANCELLED | OUTPATIENT
Start: 2025-03-14

## 2025-03-14 RX ORDER — THIAMINE MONONITRATE (VIT B1) 100 MG
100 TABLET ORAL DAILY
Status: CANCELLED | OUTPATIENT
Start: 2025-03-14

## 2025-03-14 RX ORDER — M-VIT,TX,IRON,MINS/CALC/FOLIC 27MG-0.4MG
1 TABLET ORAL DAILY
Status: CANCELLED | OUTPATIENT
Start: 2025-03-14

## 2025-03-14 RX ORDER — HEPARIN SODIUM 5000 [USP'U]/ML
5000 INJECTION, SOLUTION INTRAVENOUS; SUBCUTANEOUS EVERY 8 HOURS SCHEDULED
Status: CANCELLED | OUTPATIENT
Start: 2025-03-14

## 2025-03-14 RX ORDER — MIRTAZAPINE 15 MG/1
15 TABLET, FILM COATED ORAL NIGHTLY
COMMUNITY

## 2025-03-14 RX ORDER — POLYVINYL ALCOHOL 14 MG/ML
1 SOLUTION/ DROPS OPHTHALMIC PRN
Status: CANCELLED | OUTPATIENT
Start: 2025-03-14

## 2025-03-14 RX ORDER — PIPERACILLIN SODIUM, TAZOBACTAM SODIUM 4; .5 G/20ML; G/20ML
4500 INJECTION, POWDER, LYOPHILIZED, FOR SOLUTION INTRAVENOUS EVERY 6 HOURS
Status: DISCONTINUED | OUTPATIENT
Start: 2025-03-14 | End: 2025-03-15 | Stop reason: HOSPADM

## 2025-03-14 RX ORDER — ONDANSETRON 4 MG/1
4 TABLET, ORALLY DISINTEGRATING ORAL EVERY 8 HOURS PRN
Status: CANCELLED | OUTPATIENT
Start: 2025-03-14

## 2025-03-14 RX ORDER — TRAZODONE HYDROCHLORIDE 50 MG/1
150 TABLET ORAL NIGHTLY
Status: CANCELLED | OUTPATIENT
Start: 2025-03-14

## 2025-03-14 RX ORDER — ACETAMINOPHEN 650 MG/1
650 SUPPOSITORY RECTAL EVERY 6 HOURS PRN
Status: CANCELLED | OUTPATIENT
Start: 2025-03-14

## 2025-03-14 RX ORDER — SODIUM CHLORIDE 9 MG/ML
20 INJECTION, SOLUTION INTRAMUSCULAR; INTRAVENOUS; SUBCUTANEOUS EVERY 6 HOURS
Status: DISCONTINUED | OUTPATIENT
Start: 2025-03-14 | End: 2025-03-15 | Stop reason: HOSPADM

## 2025-03-14 RX ORDER — ALBUTEROL SULFATE 90 UG/1
2 INHALANT RESPIRATORY (INHALATION) EVERY 4 HOURS PRN
Status: CANCELLED | OUTPATIENT
Start: 2025-03-14

## 2025-03-14 RX ORDER — ACETAMINOPHEN 325 MG/1
650 TABLET ORAL EVERY 6 HOURS PRN
Status: CANCELLED | OUTPATIENT
Start: 2025-03-14

## 2025-03-14 RX ORDER — AMLODIPINE BESYLATE 5 MG/1
10 TABLET ORAL DAILY
Status: CANCELLED | OUTPATIENT
Start: 2025-03-14

## 2025-03-14 RX ORDER — ONDANSETRON 2 MG/ML
4 INJECTION INTRAMUSCULAR; INTRAVENOUS EVERY 6 HOURS PRN
Status: CANCELLED | OUTPATIENT
Start: 2025-03-14

## 2025-03-14 RX ORDER — ATORVASTATIN CALCIUM 20 MG/1
20 TABLET, FILM COATED ORAL NIGHTLY
Status: CANCELLED | OUTPATIENT
Start: 2025-03-14

## 2025-03-14 RX ORDER — POTASSIUM CHLORIDE 7.45 MG/ML
10 INJECTION INTRAVENOUS PRN
Status: CANCELLED | OUTPATIENT
Start: 2025-03-14

## 2025-03-14 RX ADMIN — PIPERACILLIN AND TAZOBACTAM 4500 MG: 4; .5 INJECTION, POWDER, FOR SOLUTION INTRAVENOUS at 22:58

## 2025-03-14 RX ADMIN — PIPERACILLIN AND TAZOBACTAM 4500 MG: 4; .5 INJECTION, POWDER, FOR SOLUTION INTRAVENOUS at 16:17

## 2025-03-14 RX ADMIN — IOPAMIDOL 75 ML: 755 INJECTION, SOLUTION INTRAVENOUS at 13:32

## 2025-03-14 ASSESSMENT — LIFESTYLE VARIABLES
HOW MANY STANDARD DRINKS CONTAINING ALCOHOL DO YOU HAVE ON A TYPICAL DAY: PATIENT DOES NOT DRINK
HOW OFTEN DO YOU HAVE A DRINK CONTAINING ALCOHOL: NEVER

## 2025-03-14 ASSESSMENT — PAIN - FUNCTIONAL ASSESSMENT: PAIN_FUNCTIONAL_ASSESSMENT: NONE - DENIES PAIN

## 2025-03-14 NOTE — PROGRESS NOTES
Database initiated. Patient is alert but refuses to answer the orientation questions. He comes in from [place]. Says he's independent with no devices and is RA at baseline. He has a colostomy RLQ. Medications verified using facility paperwork.

## 2025-03-14 NOTE — CONSULTS
General Surgery   Consult Note      Patient's Name/Date of Birth: Andres Staton / 1965    Date: March 14, 2025     PCP: Woody Parkinson DO     Reason for consult:: Abdominal wall abscess     HPI:   Andres Staton is a 60 y.o. male who presents for evaluation of abdominal wall abscess. Patient is known to gen surg service. Received an extended R amrina with ileostomy formation and mucus fistula. He shows up today for abdominal wall abscess. He received a cscope recently by [facility]. He seems to have had a micro preformation but did well and was sent back home. He is currently not having any complains.       Patient Active Problem List   Diagnosis    Lightheadedness    Chest pain    Acute psychosis (HCC)    Cognitive disorder    CAD (coronary artery disease)    Hypertensive urgency    MARLEE (obstructive sleep apnea)    Class 1 obesity in adult    Abnormal stress test    LV dysfunction    Thrombocytopenia    Hyperbilirubinemia    ETOH abuse    HLD (hyperlipidemia)    Sigmoid diverticulitis    Mild protein-calorie malnutrition    Status post incision and drainage    Dementia (HCC)    Presence of ostomy (HCC)    Diverticulitis of large intestine with abscess without bleeding    Heel callus    Onychomycosis    Tinea pedis of both feet    Abdominal wall abscess at site of surgical wound       No Known Allergies    Past Medical History:   Diagnosis Date    CAD (coronary artery disease)     HTN (hypertension)     Hypercholesteremia     Hypertension     Sleep apnea        Past Surgical History:   Procedure Laterality Date    ABDOMEN SURGERY Left 10/14/2021    ABDOMEN INCISION AND DRAINAGE performed by Tin Duarte MD [facility] OR    COLONOSCOPY N/A 03/01/2022    COLONOSCOPY DIAGNOSTIC performed by Demetrio Jalloh MD at [facility] ENDOSCOPY    CYSTOSCOPY N/A 02/24/2022    CYSTOSCOPY, urethral dilation, perry insertion performed by Tin Duarte MD at [facility] OR    LAPAROTOMY N/A 02/24/2022    exploratory

## 2025-03-14 NOTE — H&P
[facility]   History and Physical      CHIEF COMPLAINT:  abdominal pain    History of Present Illness:  60 y.o. male with a history of HTN, hypercholesteremia, CAD, MARLEE, dementia presents from with abdominal pain. Patient is a poor historian and unable to communicate why he came to ED. Patient recently had colonoscopy which was complicated with a perforation at [facility]. CXR showed no acute process. CTAP showed diffuse inflammatory changes and left upper quad colostomy with small abscesses at colostomy, possible phlegmon-abscess, sigmoid diverticulosis, and unremarkable appearance of right lower quadrant ileostomy. Pertinent abnormal labs: WBC 11.8, glucose 137, BUN 29, creat 1.4, GFR 56, total protein 8.9, albumin 3.3, alkaline phos 175, ALT 87, AST 40, procal 1.55 Blood cultures and wound cultures pending. ED course included Zosyn 4500mg.     Informant(s) for H&P: Patient and EMR    REVIEW OF SYSTEMS:  no fevers, chills, cp, sob, n/v, ha, vision/hearing changes, wt changes, hot/cold flashes, other open skin lesions, diarrhea, constipation, dysuria/hematuria unless noted in HPI. Complete ROS performed with the patient and is otherwise negative.      PMH:  Past Medical History:   Diagnosis Date    CAD (coronary artery disease)     HTN (hypertension)     Hypercholesteremia     Hypertension     Sleep apnea        Surgical History:  Past Surgical History:   Procedure Laterality Date    ABDOMEN SURGERY Left 10/14/2021    ABDOMEN INCISION AND DRAINAGE performed by Tin Duarte MD at [facility] OR    COLONOSCOPY N/A 03/01/2022    COLONOSCOPY DIAGNOSTIC performed by Demetrio Jalloh MD at [facility] ENDOSCOPY    CYSTOSCOPY N/A 02/24/2022    CYSTOSCOPY, urethral dilation, perry insertion performed by Tin Duarte MD at [facility] OR    LAPAROTOMY N/A 02/24/2022    exploratory laparotomy, extended right hemicolectomy, creation of mucus fistula and end ileostomy performed by Tin Duarte,  cigarettes. He started smoking about 30 years ago. He has a 6 pack-year smoking history. He has never used smokeless tobacco. He reports current alcohol use of about 5.0 standard drinks of alcohol per week. He reports that he does not currently use drugs.      Family History:   family history is not on file.   Unable to assess at this time. Patient A&O X1.     PHYSICAL EXAM:  Vitals:  BP (!) 118/97   Pulse 97   Temp 97.9 °F (36.6 °C) (Oral)   Resp 11   Ht 1.88 m (6' 2\")   Wt 117.9 kg (260 lb)   SpO2 94%   BMI 33.38 kg/m²     General Appearance: alert and oriented to person. Disoriented to place and time and in no acute distress  Skin: warm and dry  Head: normocephalic and atraumatic  Eyes: pupils equal, round, and reactive to light, extraocular eye movements intact, conjunctivae normal  Neck: neck supple and non tender without mass   Pulmonary/Chest: clear to auscultation bilaterally- no wheezes, rales or rhonchi, normal air movement, no respiratory distress  Cardiovascular: normal rate, normal S1 and S2 and no carotid bruits  Abdomen: soft, non-tender, distended, normal bowel sounds, no organomegaly. Ileostomy present. Wound present to left abdomen (see below) with drainage.   Extremities: no cyanosis, no clubbing and no edema  Neurologic: no cranial nerve deficit and speech normal      LABS:  Recent Labs     03/14/25  1112      K 4.0   CL 98   CO2 25   BUN 29*   CREATININE 1.4*   GLUCOSE 137*   CALCIUM 9.2       Recent Labs     03/14/25  1112   WBC 11.8*   RBC 5.32   HGB 13.6   HCT 42.9   MCV 80.6   MCH 25.6*   MCHC 31.7*   RDW 15.5*      MPV 10.7       No results for input(s): \"POCGLU\" in the last 72 hours.        Radiology: CT ABDOMEN PELVIS W IV CONTRAST Additional Contrast? None  Result Date: 3/14/2025  EXAMINATION: CT OF THE ABDOMEN AND PELVIS WITH CONTRAST3/14/2025 1:33 pm TECHNIQUE: CT of the abdomen and pelvis was performed with the administration of intravenous contrast. Multiplanar

## 2025-03-14 NOTE — DISCHARGE INSTRUCTIONS
CT ABDOMEN PELVIS W IV CONTRAST Additional Contrast? None   Final Result   1. Diffuse inflammatory changes at the left upper quadrant colostomy with   small abscesses at the colostomy.   2. 8 segment in length circumferential wall thickening of the colon leading   to the colostomy with adjacent multifocal extraluminal air and complex fluid   consistent with phlegmon-abscess as above.   3. Sigmoid diverticulosis without diverticulitis.   4. Stable unremarkable appearance of the right lower quadrant ileostomy.         XR CHEST PORTABLE   Final Result   No acute process.

## 2025-03-14 NOTE — ED PROVIDER NOTES
[facility] EMERGENCY DEPARTMENT  EMERGENCY DEPARTMENT ENCOUNTER        Pt Name: Andres Staton  MRN: [id number]  Birthdate 1965  Date of evaluation: 3/14/2025  Provider: Jose Eduardo Fajardo DO  PCP: Woody Parkinson DO  Note Started: 10:48 AM EDT 3/14/25    CHIEF COMPLAINT       Chief Complaint   Patient presents with    Abdominal Pain     Leaking colostomy and left sided abdominal drainage.       HISTORY OF PRESENT ILLNESS: 1 or more Elements   History From: patient, EMR     Limitations to history : None    Andres Staton is a 60 y.o. male who presents via EMS from [facility].  History of colostomy status, his colostomy started leaking at an unclear time he also started have drainage from an old left abdominal wound.  History significant for previous liver abscess and sepsis.  Patient himself denies complaints such as nausea vomiting or abdominal pain.  He is unsure of when the leaking on his abdominal wound started but thinks it was within the last few days.  Records that accompany the patient indicate he may have had a CAT scan recently that was concerning for free air.  No record within our system    Nursing Notes were all reviewed and agreed with or any disagreements were addressed in the HPI.      REVIEW OF EXTERNAL NOTE :       Most recent imaging within our system:   IMPRESSION:  Lateral right hepatic dome fluid collection has decreased in size and  measures 2.5 x 1.7 cm as compared to 6.1 x 4.6 cm on 06/17/2024.     Left superior urinary bladder wall focal thickening with bridging tissue  extending to adjacent chronic sigmoid diverticular changes.  There is no  evidence of air within the urinary bladder lumen but colovesical fistula  cannot be excluded.           Exam Ended: 08/13/24 16:17 EDT     Hepatobiliary pancreatic surgery progress note from 8/21/2024, was seen in follow-up.      REVIEW OF SYSTEMS :           Positives and Pertinent  limits   CULTURE, BLOOD 1   CULTURE, BLOOD 2   CULTURE, WOUND (WITH GRAM STAIN)   LACTATE, SEPSIS   APTT   URINALYSIS   TYPE AND SCREEN       As interpreted by me, the above displayed labs are abnormal. All other labs obtained during this visit were within normal range or not returned as of this dictation.      EKG Interpretation  Interpreted by emergency department physician, Jose Eduardo Fajardo DO           EKG @ 1115:  This EKG is signed and interpreted by Dr Jose Eduardo Fajardo DO.    Rate: 94  Rhythm: Sinus3  Interpretation: Sinus rhythm, left axis, criteria for LVH, MO is 152, cures 8 0, QTc is 460, does not meet STEMI criteria, nonspecific T changes, generally appears stable compared to 6/17/2024  Comparison: stable as compared to patient's most recent EKG      RADIOLOGY:   Non-plain film images such as CT, Ultrasound and MRI are read by the radiologist. Plain radiographic images are visualized and preliminarily interpreted by the ED Provider with the below findings:     This X-Ray is independently viewed and interpreted by me:   - Study: Chest X-Ray   - Number of Views: 1  - Findings: No pneumothorax and no obvious free air      Interpretation per the Radiologist below, if available at the time of this note:    CT ABDOMEN PELVIS W IV CONTRAST Additional Contrast? None   Final Result   1. Diffuse inflammatory changes at the left upper quadrant colostomy with   small abscesses at the colostomy.   2. 8 segment in length circumferential wall thickening of the colon leading   to the colostomy with adjacent multifocal extraluminal air and complex fluid   consistent with phlegmon-abscess as above.   3. Sigmoid diverticulosis without diverticulitis.   4. Stable unremarkable appearance of the right lower quadrant ileostomy.         XR CHEST PORTABLE   Final Result   No acute process.           No results found.    No results found.    PROCEDURES   Unless otherwise noted below, none             PAST MEDICAL

## 2025-03-15 VITALS
HEART RATE: 100 BPM | BODY MASS INDEX: 33.37 KG/M2 | TEMPERATURE: 97.9 F | DIASTOLIC BLOOD PRESSURE: 88 MMHG | WEIGHT: 260 LBS | RESPIRATION RATE: 18 BRPM | SYSTOLIC BLOOD PRESSURE: 142 MMHG | OXYGEN SATURATION: 95 % | HEIGHT: 74 IN

## 2025-03-16 ENCOUNTER — RESULTS FOLLOW-UP (OUTPATIENT)
Dept: EMERGENCY DEPT | Age: 60
End: 2025-03-16

## 2025-03-16 LAB
MICROORGANISM SPEC CULT: ABNORMAL
MICROORGANISM SPEC CULT: NORMAL
MICROORGANISM SPEC CULT: NORMAL
MICROORGANISM/AGENT SPEC: ABNORMAL
SERVICE CMNT-IMP: NORMAL
SERVICE CMNT-IMP: NORMAL
SPECIMEN DESCRIPTION: ABNORMAL
SPECIMEN DESCRIPTION: NORMAL
SPECIMEN DESCRIPTION: NORMAL

## 2025-03-19 LAB
MICROORGANISM SPEC CULT: NORMAL
MICROORGANISM SPEC CULT: NORMAL
SERVICE CMNT-IMP: NORMAL
SERVICE CMNT-IMP: NORMAL
SPECIMEN DESCRIPTION: NORMAL
SPECIMEN DESCRIPTION: NORMAL

## 2025-03-26 ENCOUNTER — HOSPITAL ENCOUNTER (EMERGENCY)
Age: 60
Discharge: NURSING, REHAB OR LONG-TERM CARE | End: 2025-03-27
Attending: EMERGENCY MEDICINE
Payer: MEDICARE

## 2025-03-26 VITALS
HEART RATE: 99 BPM | DIASTOLIC BLOOD PRESSURE: 73 MMHG | TEMPERATURE: 99.8 F | WEIGHT: 265 LBS | HEIGHT: 74 IN | BODY MASS INDEX: 34.01 KG/M2 | SYSTOLIC BLOOD PRESSURE: 103 MMHG | OXYGEN SATURATION: 94 % | RESPIRATION RATE: 16 BRPM

## 2025-03-26 DIAGNOSIS — Z71.1 NO PROBLEM, FEARED COMPLAINT UNFOUNDED: Primary | ICD-10-CM

## 2025-03-26 PROCEDURE — 99283 EMERGENCY DEPT VISIT LOW MDM: CPT

## 2025-03-26 ASSESSMENT — PAIN - FUNCTIONAL ASSESSMENT: PAIN_FUNCTIONAL_ASSESSMENT: NONE - DENIES PAIN

## 2025-03-27 ENCOUNTER — HOSPITAL ENCOUNTER (OUTPATIENT)
Age: 60
Setting detail: OBSERVATION
Discharge: NURSING, REHAB OR LONG-TERM CARE | End: 2025-03-31
Attending: EMERGENCY MEDICINE | Admitting: HOSPITALIST
Payer: MEDICARE

## 2025-03-27 ENCOUNTER — APPOINTMENT (OUTPATIENT)
Dept: CT IMAGING | Age: 60
End: 2025-03-27
Payer: MEDICARE

## 2025-03-27 DIAGNOSIS — K65.1 INTRA-ABDOMINAL ABSCESS (HCC): ICD-10-CM

## 2025-03-27 DIAGNOSIS — N17.9 AKI (ACUTE KIDNEY INJURY): Primary | ICD-10-CM

## 2025-03-27 PROBLEM — R73.03 PREDIABETES: Status: ACTIVE | Noted: 2025-03-27

## 2025-03-27 PROBLEM — I10 ESSENTIAL HYPERTENSION: Status: ACTIVE | Noted: 2025-03-27

## 2025-03-27 LAB
ALBUMIN SERPL-MCNC: 3.4 G/DL (ref 3.5–5.2)
ALP SERPL-CCNC: 86 U/L (ref 40–129)
ALT SERPL-CCNC: 50 U/L (ref 0–40)
ANION GAP SERPL CALCULATED.3IONS-SCNC: 15 MMOL/L (ref 7–16)
AST SERPL-CCNC: 35 U/L (ref 0–39)
BASOPHILS # BLD: 0.02 K/UL (ref 0–0.2)
BASOPHILS NFR BLD: 0 % (ref 0–2)
BILIRUB SERPL-MCNC: 0.6 MG/DL (ref 0–1.2)
BUN SERPL-MCNC: 18 MG/DL (ref 6–23)
CALCIUM SERPL-MCNC: 9.1 MG/DL (ref 8.6–10.2)
CHLORIDE SERPL-SCNC: 103 MMOL/L (ref 98–107)
CO2 SERPL-SCNC: 24 MMOL/L (ref 22–29)
CREAT SERPL-MCNC: 2.5 MG/DL (ref 0.7–1.2)
EOSINOPHIL # BLD: 0.15 K/UL (ref 0.05–0.5)
EOSINOPHILS RELATIVE PERCENT: 2 % (ref 0–6)
ERYTHROCYTE [DISTWIDTH] IN BLOOD BY AUTOMATED COUNT: 16.6 % (ref 11.5–15)
GFR, ESTIMATED: 28 ML/MIN/1.73M2
GLUCOSE SERPL-MCNC: 155 MG/DL (ref 74–99)
HCT VFR BLD AUTO: 29 % (ref 37–54)
HGB BLD-MCNC: 8.9 G/DL (ref 12.5–16.5)
IMM GRANULOCYTES # BLD AUTO: <0.03 K/UL (ref 0–0.58)
IMM GRANULOCYTES NFR BLD: 0 % (ref 0–5)
LACTATE BLDV-SCNC: 1.8 MMOL/L (ref 0.5–2.2)
LYMPHOCYTES NFR BLD: 0.48 K/UL (ref 1.5–4)
LYMPHOCYTES RELATIVE PERCENT: 8 % (ref 20–42)
MCH RBC QN AUTO: 25.6 PG (ref 26–35)
MCHC RBC AUTO-ENTMCNC: 30.7 G/DL (ref 32–34.5)
MCV RBC AUTO: 83.6 FL (ref 80–99.9)
MONOCYTES NFR BLD: 0.45 K/UL (ref 0.1–0.95)
MONOCYTES NFR BLD: 7 % (ref 2–12)
NEUTROPHILS NFR BLD: 82 % (ref 43–80)
NEUTS SEG NFR BLD: 5.24 K/UL (ref 1.8–7.3)
PLATELET # BLD AUTO: 228 K/UL (ref 130–450)
PMV BLD AUTO: 10.5 FL (ref 7–12)
POTASSIUM SERPL-SCNC: 4.1 MMOL/L (ref 3.5–5)
PROT SERPL-MCNC: 7.7 G/DL (ref 6.4–8.3)
RBC # BLD AUTO: 3.47 M/UL (ref 3.8–5.8)
RBC # BLD: ABNORMAL 10*6/UL
RBC # BLD: ABNORMAL 10*6/UL
SODIUM SERPL-SCNC: 142 MMOL/L (ref 132–146)
WBC OTHER # BLD: 6.4 K/UL (ref 4.5–11.5)

## 2025-03-27 PROCEDURE — 83550 IRON BINDING TEST: CPT

## 2025-03-27 PROCEDURE — 85025 COMPLETE CBC W/AUTO DIFF WBC: CPT

## 2025-03-27 PROCEDURE — 83540 ASSAY OF IRON: CPT

## 2025-03-27 PROCEDURE — 96361 HYDRATE IV INFUSION ADD-ON: CPT

## 2025-03-27 PROCEDURE — 6360000002 HC RX W HCPCS: Performed by: EMERGENCY MEDICINE

## 2025-03-27 PROCEDURE — 74176 CT ABD & PELVIS W/O CONTRAST: CPT

## 2025-03-27 PROCEDURE — 82728 ASSAY OF FERRITIN: CPT

## 2025-03-27 PROCEDURE — 85652 RBC SED RATE AUTOMATED: CPT

## 2025-03-27 PROCEDURE — 83605 ASSAY OF LACTIC ACID: CPT

## 2025-03-27 PROCEDURE — 96365 THER/PROPH/DIAG IV INF INIT: CPT

## 2025-03-27 PROCEDURE — 99285 EMERGENCY DEPT VISIT HI MDM: CPT

## 2025-03-27 PROCEDURE — 80053 COMPREHEN METABOLIC PANEL: CPT

## 2025-03-27 PROCEDURE — 82550 ASSAY OF CK (CPK): CPT

## 2025-03-27 PROCEDURE — 2580000003 HC RX 258: Performed by: EMERGENCY MEDICINE

## 2025-03-27 PROCEDURE — 99223 1ST HOSP IP/OBS HIGH 75: CPT | Performed by: HOSPITALIST

## 2025-03-27 PROCEDURE — G0378 HOSPITAL OBSERVATION PER HR: HCPCS

## 2025-03-27 PROCEDURE — 86140 C-REACTIVE PROTEIN: CPT

## 2025-03-27 RX ORDER — LANOLIN ALCOHOL/MO/W.PET/CERES
100 CREAM (GRAM) TOPICAL DAILY
Status: DISCONTINUED | OUTPATIENT
Start: 2025-03-28 | End: 2025-03-31 | Stop reason: HOSPADM

## 2025-03-27 RX ORDER — SENNOSIDES A AND B 8.6 MG/1
1 TABLET, FILM COATED ORAL DAILY PRN
Status: DISCONTINUED | OUTPATIENT
Start: 2025-03-27 | End: 2025-03-31 | Stop reason: HOSPADM

## 2025-03-27 RX ORDER — ACETAMINOPHEN 650 MG/1
650 SUPPOSITORY RECTAL EVERY 6 HOURS PRN
Status: DISCONTINUED | OUTPATIENT
Start: 2025-03-27 | End: 2025-03-31 | Stop reason: HOSPADM

## 2025-03-27 RX ORDER — ACETAMINOPHEN 325 MG/1
650 TABLET ORAL EVERY 6 HOURS PRN
Status: DISCONTINUED | OUTPATIENT
Start: 2025-03-27 | End: 2025-03-31 | Stop reason: HOSPADM

## 2025-03-27 RX ORDER — ASPIRIN 81 MG/1
81 TABLET, CHEWABLE ORAL DAILY
Status: DISCONTINUED | OUTPATIENT
Start: 2025-03-28 | End: 2025-03-31 | Stop reason: HOSPADM

## 2025-03-27 RX ORDER — SODIUM CHLORIDE 9 MG/ML
INJECTION, SOLUTION INTRAVENOUS PRN
Status: DISCONTINUED | OUTPATIENT
Start: 2025-03-27 | End: 2025-03-31 | Stop reason: HOSPADM

## 2025-03-27 RX ORDER — IOPAMIDOL 755 MG/ML
75 INJECTION, SOLUTION INTRAVASCULAR
Status: DISCONTINUED | OUTPATIENT
Start: 2025-03-27 | End: 2025-03-31 | Stop reason: HOSPADM

## 2025-03-27 RX ORDER — AMLODIPINE BESYLATE 5 MG/1
5 TABLET ORAL DAILY
Status: DISCONTINUED | OUTPATIENT
Start: 2025-03-28 | End: 2025-03-31 | Stop reason: HOSPADM

## 2025-03-27 RX ORDER — SODIUM CHLORIDE 0.9 % (FLUSH) 0.9 %
5-40 SYRINGE (ML) INJECTION PRN
Status: DISCONTINUED | OUTPATIENT
Start: 2025-03-27 | End: 2025-03-31 | Stop reason: HOSPADM

## 2025-03-27 RX ORDER — RIVASTIGMINE 13.3 MG/24H
1 PATCH, EXTENDED RELEASE TRANSDERMAL DAILY
Status: DISCONTINUED | OUTPATIENT
Start: 2025-03-28 | End: 2025-03-31 | Stop reason: HOSPADM

## 2025-03-27 RX ORDER — ATORVASTATIN CALCIUM 20 MG/1
20 TABLET, FILM COATED ORAL NIGHTLY
Status: DISCONTINUED | OUTPATIENT
Start: 2025-03-27 | End: 2025-03-31 | Stop reason: HOSPADM

## 2025-03-27 RX ORDER — 0.9 % SODIUM CHLORIDE 0.9 %
1000 INTRAVENOUS SOLUTION INTRAVENOUS ONCE
Status: COMPLETED | OUTPATIENT
Start: 2025-03-27 | End: 2025-03-27

## 2025-03-27 RX ORDER — ONDANSETRON 2 MG/ML
4 INJECTION INTRAMUSCULAR; INTRAVENOUS EVERY 6 HOURS PRN
Status: DISCONTINUED | OUTPATIENT
Start: 2025-03-27 | End: 2025-03-31 | Stop reason: HOSPADM

## 2025-03-27 RX ORDER — METOPROLOL SUCCINATE 25 MG/1
25 TABLET, EXTENDED RELEASE ORAL DAILY
Status: DISCONTINUED | OUTPATIENT
Start: 2025-03-28 | End: 2025-03-31 | Stop reason: HOSPADM

## 2025-03-27 RX ORDER — SODIUM CHLORIDE 9 MG/ML
INJECTION, SOLUTION INTRAVENOUS CONTINUOUS
Status: ACTIVE | OUTPATIENT
Start: 2025-03-27 | End: 2025-03-29

## 2025-03-27 RX ORDER — VITS A,C,E/LUTEIN/MINERALS 300MCG-200
1 TABLET ORAL DAILY
Status: DISCONTINUED | OUTPATIENT
Start: 2025-03-28 | End: 2025-03-31 | Stop reason: HOSPADM

## 2025-03-27 RX ORDER — GLUCAGON 1 MG/ML
1 KIT INJECTION PRN
Status: DISCONTINUED | OUTPATIENT
Start: 2025-03-27 | End: 2025-03-31 | Stop reason: HOSPADM

## 2025-03-27 RX ORDER — SODIUM CHLORIDE 9 MG/ML
INJECTION, SOLUTION INTRAVENOUS CONTINUOUS
Status: DISCONTINUED | OUTPATIENT
Start: 2025-03-27 | End: 2025-03-27

## 2025-03-27 RX ORDER — ONDANSETRON 4 MG/1
4 TABLET, ORALLY DISINTEGRATING ORAL EVERY 8 HOURS PRN
Status: DISCONTINUED | OUTPATIENT
Start: 2025-03-27 | End: 2025-03-31 | Stop reason: HOSPADM

## 2025-03-27 RX ORDER — SODIUM CHLORIDE 0.9 % (FLUSH) 0.9 %
5-40 SYRINGE (ML) INJECTION EVERY 12 HOURS SCHEDULED
Status: DISCONTINUED | OUTPATIENT
Start: 2025-03-28 | End: 2025-03-31 | Stop reason: HOSPADM

## 2025-03-27 RX ORDER — MAGNESIUM HYDROXIDE/ALUMINUM HYDROXICE/SIMETHICONE 120; 1200; 1200 MG/30ML; MG/30ML; MG/30ML
30 SUSPENSION ORAL EVERY 6 HOURS PRN
Status: DISCONTINUED | OUTPATIENT
Start: 2025-03-27 | End: 2025-03-31 | Stop reason: HOSPADM

## 2025-03-27 RX ORDER — DEXTROSE MONOHYDRATE 100 MG/ML
INJECTION, SOLUTION INTRAVENOUS CONTINUOUS PRN
Status: DISCONTINUED | OUTPATIENT
Start: 2025-03-27 | End: 2025-03-31 | Stop reason: HOSPADM

## 2025-03-27 RX ORDER — INSULIN LISPRO 100 [IU]/ML
0-4 INJECTION, SOLUTION INTRAVENOUS; SUBCUTANEOUS
Status: DISCONTINUED | OUTPATIENT
Start: 2025-03-27 | End: 2025-03-31 | Stop reason: HOSPADM

## 2025-03-27 RX ORDER — MIRTAZAPINE 15 MG/1
15 TABLET, FILM COATED ORAL NIGHTLY
Status: DISCONTINUED | OUTPATIENT
Start: 2025-03-27 | End: 2025-03-31 | Stop reason: HOSPADM

## 2025-03-27 RX ADMIN — SODIUM CHLORIDE 1000 ML: 0.9 INJECTION, SOLUTION INTRAVENOUS at 19:41

## 2025-03-27 RX ADMIN — SODIUM CHLORIDE: 0.9 INJECTION, SOLUTION INTRAVENOUS at 19:46

## 2025-03-27 RX ADMIN — PIPERACILLIN AND TAZOBACTAM 4500 MG: 4; .5 INJECTION, POWDER, LYOPHILIZED, FOR SOLUTION INTRAVENOUS at 21:44

## 2025-03-27 ASSESSMENT — PAIN - FUNCTIONAL ASSESSMENT
PAIN_FUNCTIONAL_ASSESSMENT: NONE - DENIES PAIN
PAIN_FUNCTIONAL_ASSESSMENT: NONE - DENIES PAIN

## 2025-03-27 NOTE — ED PROVIDER NOTES
HPI:  3/27/25,   Time: 3:22 PM EDT       Andres Staton is a 60 y.o. male presenting to the ED for inc wbc/recent abd abscess, beginning unk ago.  The complaint has been persistent, moderate in severity, and worsened by nothing.  Brought in by EMS from nursing home.  Recent surgery [facility] for intra-abdominal abscess.  Has ostomy around drain site.  Leaking.  Increased white count MARBELLA per records.  Patient difficult historian    Review of Systems:   Pertinent positives and negatives are stated within HPI, all other systems reviewed and are negative.          --------------------------------------------- PAST HISTORY ---------------------------------------------  Past Medical History:  has a past medical history of CAD (coronary artery disease), HTN (hypertension), Hypercholesteremia, Hypertension, and Sleep apnea.    Past Surgical History:  has a past surgical history that includes Abdomen surgery (Left, 10/14/2021); laparotomy (N/A, 02/24/2022); Cystoscopy (N/A, 02/24/2022); and Colonoscopy (N/A, 03/01/2022).    Social History:  reports that he quit smoking about 24 years ago. His smoking use included cigarettes. He started smoking about 30 years ago. He has a 6 pack-year smoking history. He has never used smokeless tobacco. He reports current alcohol use of about 5.0 standard drinks of alcohol per week. He reports that he does not currently use drugs.    Family History: family history is not on file.     The patient’s home medications have been reviewed.    Allergies: Patient has no known allergies.        ---------------------------------------------------PHYSICAL EXAM--------------------------------------    Constitutional/General: Alert and oriented x3, chronically ill apperaing  Head: Normocephalic and atraumatic  Eyes: PERRL, EOMI, conjunctive normal, sclera non icteric  Mouth: Oropharynx clear, handling secretions, no trismus, no asymmetry of the posterior oropharynx or uvular edema  Neck: Supple,

## 2025-03-28 LAB
ANION GAP SERPL CALCULATED.3IONS-SCNC: 12 MMOL/L (ref 7–16)
BASOPHILS # BLD: 0 K/UL (ref 0–0.2)
BASOPHILS NFR BLD: 0 % (ref 0–2)
BUN SERPL-MCNC: 14 MG/DL (ref 6–23)
CALCIUM SERPL-MCNC: 8.4 MG/DL (ref 8.6–10.2)
CHLORIDE SERPL-SCNC: 108 MMOL/L (ref 98–107)
CHLORIDE UR-SCNC: 67 MMOL/L
CK SERPL-CCNC: 84 U/L (ref 20–200)
CO2 SERPL-SCNC: 22 MMOL/L (ref 22–29)
CREAT SERPL-MCNC: 2.4 MG/DL (ref 0.7–1.2)
CREAT UR-MCNC: 105.1 MG/DL (ref 40–278)
CREAT UR-MCNC: 116.8 MG/DL (ref 40–278)
CRP SERPL HS-MCNC: 27 MG/L (ref 0–5)
EOSINOPHIL # BLD: 0.34 K/UL (ref 0.05–0.5)
EOSINOPHILS RELATIVE PERCENT: 6 % (ref 0–6)
ERYTHROCYTE [DISTWIDTH] IN BLOOD BY AUTOMATED COUNT: 16.1 % (ref 11.5–15)
ERYTHROCYTE [SEDIMENTATION RATE] IN BLOOD BY WESTERGREN METHOD: 87 MM/HR (ref 0–15)
FERRITIN SERPL-MCNC: 185 NG/ML
GFR, ESTIMATED: 30 ML/MIN/1.73M2
GLUCOSE BLD-MCNC: 101 MG/DL (ref 74–99)
GLUCOSE BLD-MCNC: 114 MG/DL (ref 74–99)
GLUCOSE BLD-MCNC: 120 MG/DL (ref 74–99)
GLUCOSE BLD-MCNC: 122 MG/DL (ref 74–99)
GLUCOSE BLD-MCNC: 125 MG/DL (ref 74–99)
GLUCOSE SERPL-MCNC: 131 MG/DL (ref 74–99)
HCT VFR BLD AUTO: 27.4 % (ref 37–54)
HGB BLD-MCNC: 8.4 G/DL (ref 12.5–16.5)
IRON SATN MFR SERPL: 15 % (ref 20–55)
IRON SERPL-MCNC: 39 UG/DL (ref 59–158)
LYMPHOCYTES NFR BLD: 0.19 K/UL (ref 1.5–4)
LYMPHOCYTES RELATIVE PERCENT: 4 % (ref 20–42)
MCH RBC QN AUTO: 25.9 PG (ref 26–35)
MCHC RBC AUTO-ENTMCNC: 30.7 G/DL (ref 32–34.5)
MCV RBC AUTO: 84.6 FL (ref 80–99.9)
MONOCYTES NFR BLD: 0.1 K/UL (ref 0.1–0.95)
MONOCYTES NFR BLD: 2 % (ref 2–12)
NEUTROPHILS NFR BLD: 89 % (ref 43–80)
NEUTS SEG NFR BLD: 4.87 K/UL (ref 1.8–7.3)
OSMOLALITY UR: 322 MOSM/KG (ref 300–900)
PARTIAL THROMBOPLASTIN TIME: 29.2 SEC (ref 24.5–35.1)
PLATELET # BLD AUTO: 192 K/UL (ref 130–450)
PMV BLD AUTO: 10.8 FL (ref 7–12)
POTASSIUM SERPL-SCNC: 4.1 MMOL/L (ref 3.5–5)
RBC # BLD AUTO: 3.24 M/UL (ref 3.8–5.8)
RBC # BLD: ABNORMAL 10*6/UL
SODIUM SERPL-SCNC: 142 MMOL/L (ref 132–146)
SODIUM UR-SCNC: 68 MMOL/L
TIBC SERPL-MCNC: 262 UG/DL (ref 250–450)
TOTAL PROTEIN, URINE: 16 MG/DL (ref 0–12)
URINE TOTAL PROTEIN CREATININE RATIO: 0.15 (ref 0–0.2)
WBC OTHER # BLD: 5.5 K/UL (ref 4.5–11.5)

## 2025-03-28 PROCEDURE — 6370000000 HC RX 637 (ALT 250 FOR IP): Performed by: HOSPITALIST

## 2025-03-28 PROCEDURE — 96361 HYDRATE IV INFUSION ADD-ON: CPT

## 2025-03-28 PROCEDURE — 85025 COMPLETE CBC W/AUTO DIFF WBC: CPT

## 2025-03-28 PROCEDURE — 96366 THER/PROPH/DIAG IV INF ADDON: CPT

## 2025-03-28 PROCEDURE — 85730 THROMBOPLASTIN TIME PARTIAL: CPT

## 2025-03-28 PROCEDURE — 6360000002 HC RX W HCPCS: Performed by: EMERGENCY MEDICINE

## 2025-03-28 PROCEDURE — 2580000003 HC RX 258: Performed by: HOSPITALIST

## 2025-03-28 PROCEDURE — 83935 ASSAY OF URINE OSMOLALITY: CPT

## 2025-03-28 PROCEDURE — 82436 ASSAY OF URINE CHLORIDE: CPT

## 2025-03-28 PROCEDURE — 84156 ASSAY OF PROTEIN URINE: CPT

## 2025-03-28 PROCEDURE — 82962 GLUCOSE BLOOD TEST: CPT

## 2025-03-28 PROCEDURE — 84300 ASSAY OF URINE SODIUM: CPT

## 2025-03-28 PROCEDURE — 82570 ASSAY OF URINE CREATININE: CPT

## 2025-03-28 PROCEDURE — 80048 BASIC METABOLIC PNL TOTAL CA: CPT

## 2025-03-28 PROCEDURE — 2580000003 HC RX 258: Performed by: EMERGENCY MEDICINE

## 2025-03-28 PROCEDURE — 87205 SMEAR GRAM STAIN: CPT

## 2025-03-28 PROCEDURE — G0378 HOSPITAL OBSERVATION PER HR: HCPCS

## 2025-03-28 PROCEDURE — 2500000003 HC RX 250 WO HCPCS: Performed by: HOSPITALIST

## 2025-03-28 PROCEDURE — 99233 SBSQ HOSP IP/OBS HIGH 50: CPT | Performed by: STUDENT IN AN ORGANIZED HEALTH CARE EDUCATION/TRAINING PROGRAM

## 2025-03-28 RX ADMIN — AMLODIPINE BESYLATE 5 MG: 5 TABLET ORAL at 09:04

## 2025-03-28 RX ADMIN — ASPIRIN 81 MG CHEWABLE TABLET 81 MG: 81 TABLET CHEWABLE at 09:04

## 2025-03-28 RX ADMIN — PIPERACILLIN AND TAZOBACTAM 4500 MG: 4; .5 INJECTION, POWDER, LYOPHILIZED, FOR SOLUTION INTRAVENOUS at 05:06

## 2025-03-28 RX ADMIN — SODIUM CHLORIDE, PRESERVATIVE FREE 10 ML: 5 INJECTION INTRAVENOUS at 09:01

## 2025-03-28 RX ADMIN — ATORVASTATIN CALCIUM 20 MG: 20 TABLET, FILM COATED ORAL at 20:45

## 2025-03-28 RX ADMIN — Medication 1 TABLET: at 09:04

## 2025-03-28 RX ADMIN — Medication 6 MG: at 20:45

## 2025-03-28 RX ADMIN — MIRTAZAPINE 15 MG: 15 TABLET, FILM COATED ORAL at 20:45

## 2025-03-28 RX ADMIN — ACETAMINOPHEN 650 MG: 325 TABLET ORAL at 20:45

## 2025-03-28 RX ADMIN — PIPERACILLIN AND TAZOBACTAM 4500 MG: 4; .5 INJECTION, POWDER, LYOPHILIZED, FOR SOLUTION INTRAVENOUS at 18:50

## 2025-03-28 RX ADMIN — PIPERACILLIN AND TAZOBACTAM 4500 MG: 4; .5 INJECTION, POWDER, LYOPHILIZED, FOR SOLUTION INTRAVENOUS at 12:44

## 2025-03-28 RX ADMIN — SODIUM CHLORIDE: 0.9 INJECTION, SOLUTION INTRAVENOUS at 13:45

## 2025-03-28 RX ADMIN — Medication 100 MG: at 09:04

## 2025-03-28 RX ADMIN — SODIUM CHLORIDE: 0.9 INJECTION, SOLUTION INTRAVENOUS at 00:19

## 2025-03-28 RX ADMIN — METOPROLOL SUCCINATE 25 MG: 25 TABLET, EXTENDED RELEASE ORAL at 09:04

## 2025-03-28 NOTE — ED NOTES
ED to Inpatient Handoff Report    Notified Liudmila that electronic handoff available and patient ready for transport to room 522.    Safety Risks: Risk of falls    Patient in Restraints: no    Constant Observer or Patient : no    Telemetry Monitoring Ordered: No          Order to transfer to unit without monitor: YES    Last MEWS: 1 Time completed: 2235    Deterioration Index: 18.17    Vitals:    03/27/25 1743 03/27/25 1947 03/27/25 2145 03/27/25 2235   BP: 103/65 106/68 115/71 (!) 132/97   Pulse: 88 87 93 95   Resp: 16 16 16 16   Temp:    98.6 °F (37 °C)   TempSrc:    Oral   SpO2: 96% 95% 95% 97%   Weight:       Height:           Opportunity for questions and clarification was provided.

## 2025-03-28 NOTE — CARE COORDINATION
Social Work discharge planning  Pt is from [place], where he lives for long term care. Per liadriss Cisneros, pt is a medicaid bed hold. He does NOT need precert to return.  N17 started in [facility], transport forms in folder.  Electronically signed by TAYLOR Lloyd on 3/28/2025 at 9:14 AM

## 2025-03-28 NOTE — CONSULTS
[facility]  MD JOE Pagan MD .  Consultation  Patient's Name: Andres Staton  2:39 PM  3/28/2025    Nephrologist: Joe Gray MD    Reason for Consult: Acute kidney injury  Requesting Physician:  Woody Parkinson DO    Chief Complaint: Abscess    History Obtained From: Patient record and staff    History of Present Ilness:      60-year-old presenting with chief complaint of leakage from his ostomy  He had recent admission to [facility] for intra-abdominal abscess with surgical intervention resulting in ostomy and drain placement  He has a history of coronary artery disease hypertension hyperlipidemia sleep apnea and obesity  Nephrology asked to see for acute kidney injury  Patient seen in his room he is on room air he appears euvolemic to mildly hypervolemic his body habitus prohibits accurate volume assessment he does have an ostomy bag he said he emptied it 5-6 times a day he report not eating well for the last few days    Past Medical History:   Diagnosis Date    CAD (coronary artery disease)     HTN (hypertension)     Hypercholesteremia     Hypertension     Sleep apnea        Past Surgical History:   Procedure Laterality Date    ABDOMEN SURGERY Left 10/14/2021    ABDOMEN INCISION AND DRAINAGE performed by Tin Duarte MD at [facility] OR    COLONOSCOPY N/A 03/01/2022    COLONOSCOPY DIAGNOSTIC performed by Demetrio Jalloh MD at [facility] ENDOSCOPY    CYSTOSCOPY N/A 02/24/2022    CYSTOSCOPY, urethral dilation, perry insertion performed by Tin Duarte MD at [facility] OR    LAPAROTOMY N/A 02/24/2022    exploratory laparotomy, extended right hemicolectomy, creation of mucus fistula and end ileostomy performed by Tin Duarte MD at [facility] OR       No family history on file.     reports that he quit smoking about 24 years ago. His smoking use included cigarettes. He started smoking about 30 years ago. He has a 6 pack-year smoking history. He has never used

## 2025-03-28 NOTE — DISCHARGE INSTR - COC
applicable)   Name:  Address:  Dialysis Schedule:  Phone:  Fax:    / signature: {Esignature:[id number]}    PHYSICIAN SECTION    Prognosis: {Prognosis:0005631695}    Condition at Discharge: { Patient Condition:848862935}    Rehab Potential (if transferring to Rehab): {Prognosis:7733182974}    Recommended Labs or Other Treatments After Discharge: ***    Physician Certification: I certify the above information and transfer of Andres Staton  is necessary for the continuing treatment of the diagnosis listed and that he requires {Admit to Appropriate Level of Care:33799} for {GREATER/LESS:881157387} 30 days.     Update Admission H&P: {CHP DME Changes in HandP:851029468}    PHYSICIAN SIGNATURE:  {Esignature:749785569}  
PAST SURGICAL HISTORY:  History of lower leg fracture s/p Left Tib/fib fracture repair on 5/5 @ [facility] with hardware in place    S/P ORIF (open reduction internal fixation) fracture tib/fib at [facility]

## 2025-03-28 NOTE — ACP (ADVANCE CARE PLANNING)
Advance Care Planning   Healthcare Decision Maker:    Primary Decision Maker: Nirav Staton - Brother/Sister - [phone]    Click here to complete Healthcare Decision Makers including selection of the Healthcare Decision Maker Relationship (ie \"Primary\").

## 2025-03-28 NOTE — H&P
[facility] Hospitalist Group History and Physical    PATIENT DEMOGRAPHICS  Name: Andres Staton  Age: 60 y.o.  Sex: male      ASSESSMENT  Principal Problem:    Abscess of abdominal cavity (HCC)  Active Problems:    CAD (coronary artery disease)    Presence of ostomy (HCC)    Dementia (HCC)    Essential hypertension    Class 1 obesity in adult  Resolved Problems:    * No resolved hospital problems. *         PLAN  Residual Left subphrenic abscess: s/p image guided drain placement at [facility] on 3/16. CT showed residual abscess with air fluid level distal to drain. Multiple Risk Factors for pseudomonas. Continue Zosyn. Gen Surg consulted. Consult IR for drain repositioning.  MARBELLA: Present on admission, KDIGO Stage II. BL Cr ~1, add on CK, IVF, daily weights/strict UOP. Daily BMPs. Consult Nephrology.    Moderate Normocytic Anemia: trend downward,   Dementia: continue Rivastigmine.   CAD: GDMT  Diabetes: 6.9%. SSI of Accu-cheks.   HTN: Cont home antihypertensives.    Class I Obesity:  Body mass index is 34.67 kg/m².,  on diet & lifestyle modifications.             CARE COORDINATION  Consultations: IP CONSULT TO GENERAL SURGERY  VTE Prophylaxis: Lovenox SQ Daily  Code Status: Full Code  Disposition Plan: obs med surg       CHIEF COMPLAINT  had no chief complaint listed for this encounter.    HISTORY OF PRESENT ILLNESS    60-year-old male with PMH of CAD, HTN, hyperlipidemia & MARLEE presented with leakage from ostomy & elevated WBC. Patient was recently treated at [facility] for intra-abdominal abscess with surgical intervention resulting in an ostomy & drain placement. Patient was brought from nursing home by EMS due to leakage from his left-sided stoma & reported increased WBC & MARBELLA per facility records.    VS showed /71, HR 93, RR 16, T 98.6F, SpO2 95% on RA. Physical exam was notable for leakage around left stoma site. Labs showed Cr 2.5 (elevated), eGFR 28, Hgb 8.9, Hct 29.0, with normal

## 2025-03-28 NOTE — FLOWSHEET NOTE
Inpatient Wound Care (initial consult)    Admit Date: 3/27/2025  2:00 PM    Reason for consult:  Ilestomy, mid abd wound     Significant history:  ad, abscess abd cavity  Dementia  Ileostomy-Daniel Houser  Resides [facility]  Past Medical History:   Diagnosis Date    CAD (coronary artery disease)     HTN (hypertension)     Hypercholesteremia     Hypertension     Sleep apnea        Wound history:  POA    Findings:  awake, verbal. Does not answer questions appropriately     03/28/25 1200   Ileostomy Ileostomy RLQ   Placement Date: 02/24/22   Pre-existing: No  Inserted by: /dr. Duarte  Ileostomy Type: Ileostomy  Location: RLQ   Stomal Appliance Changed   Flange Size (inches)   (one piece flat pouch , barrier ring and strips)   Stoma  Assessment Red   Peristomal Assessment Red  (open areas. dried red crusted pieces)   Treatment Stoma powder;Liquid skin barrier   Stool Color   (liquid)   Output (mL) 200 ml       **Informed Consent**    The patient has given verbal consent to have photos taken of abd/ostomy and inserted into their chart as part of their permanent medical record for purposes of documentation, treatment management and/or medical review.   All Images taken on 3/28/25 of patient name: Andres Staton were transmitted and stored on secured Epic  Site located within Media Folder Tab by a registered Epic-[employer] Mobile Application Device.     Ab dressing changed. Moderate amount thick yellow  Small open area abd . Dry gauze applied  Loop ileostomy below. Did not want pouch changed and does not like our pouches. States it will never stick.      Ostomy pouch changed  Rosales/red drainage on wafer and peristomal skin.      Flor Munoz RN 3/28/2025 1:42 PM

## 2025-03-28 NOTE — CONSULTS
General Surgery   Consult Note      Patient's Name/Date of Birth: Andres Staton / 1965    Date: March 28, 2025     PCP: Woody Parkinson DO     Chief Complaint:     HPI:   Andres Staton is a 60 y.o. male who presents for evaluation of intraabdominal abscess. Patient is known to gen surg service. Received an extended R marina with ileostomy formation and mucus fistula.     Says he feels a little nauseated but othewise no complaints.       Patient Active Problem List   Diagnosis    Lightheadedness    Chest pain    Acute psychosis (HCC)    Cognitive disorder    CAD (coronary artery disease)    Hypertensive urgency    MARLEE (obstructive sleep apnea)    Class 1 obesity in adult    Abnormal stress test    LV dysfunction    Thrombocytopenia    Hyperbilirubinemia    ETOH abuse    HLD (hyperlipidemia)    Sigmoid diverticulitis    Mild protein-calorie malnutrition    Status post incision and drainage    Dementia (HCC)    Presence of ostomy (HCC)    Diverticulitis of large intestine with abscess without bleeding    Heel callus    Onychomycosis    Tinea pedis of both feet    Abdominal wall abscess at site of surgical wound    Essential hypertension    Prediabetes    Abscess of abdominal cavity (HCC)    MARBELLA (acute kidney injury)       No Known Allergies    Past Medical History:   Diagnosis Date    CAD (coronary artery disease)     HTN (hypertension)     Hypercholesteremia     Hypertension     Sleep apnea        Past Surgical History:   Procedure Laterality Date    ABDOMEN SURGERY Left 10/14/2021    ABDOMEN INCISION AND DRAINAGE performed by Tin Duarte MD at [facility] OR    COLONOSCOPY N/A 03/01/2022    COLONOSCOPY DIAGNOSTIC performed by Demetrio Jalloh MD at [facility] ENDOSCOPY    CYSTOSCOPY N/A 02/24/2022    CYSTOSCOPY, urethral dilation, perry insertion performed by Tin Duarte MD at [facility] OR    LAPAROTOMY N/A 02/24/2022    exploratory laparotomy, extended right hemicolectomy, creation of mucus fistula and

## 2025-03-28 NOTE — PATIENT CARE CONFERENCE
P Quality Flow/Interdisciplinary Rounds Progress Note        Quality Flow Rounds held on March 28, 2025    Disciplines Attending:  Bedside Nurse, , , and Nursing Unit Leadership    Andres Staton was admitted on 3/27/2025  2:00 PM    Anticipated Discharge Date:       Disposition:    Ben Score:  Ben Scale Score: 20    [facility] RISK OF UNPLANNED READMISSION 2.0             0 Total Score        Discussed patient goal for the day, patient clinical progression, and barriers to discharge.  The following Goal(s) of the Day/Commitment(s) have been identified:  Labs - Report Results      Fara Olivarez RN  March 28, 2025

## 2025-03-29 LAB
ANION GAP SERPL CALCULATED.3IONS-SCNC: 7 MMOL/L (ref 7–16)
BASOPHILS # BLD: 0.02 K/UL (ref 0–0.2)
BASOPHILS NFR BLD: 1 % (ref 0–2)
BUN SERPL-MCNC: 7 MG/DL (ref 6–23)
CALCIUM SERPL-MCNC: 8.4 MG/DL (ref 8.6–10.2)
CHLORIDE SERPL-SCNC: 110 MMOL/L (ref 98–107)
CO2 SERPL-SCNC: 25 MMOL/L (ref 22–29)
CREAT SERPL-MCNC: 1.8 MG/DL (ref 0.7–1.2)
EOSINOPHIL # BLD: 0.2 K/UL (ref 0.05–0.5)
EOSINOPHILS PERCENT, URINE: 0 % (ref 0–1)
EOSINOPHILS RELATIVE PERCENT: 5 % (ref 0–6)
ERYTHROCYTE [DISTWIDTH] IN BLOOD BY AUTOMATED COUNT: 15.9 % (ref 11.5–15)
GFR, ESTIMATED: 43 ML/MIN/1.73M2
GLUCOSE BLD-MCNC: 113 MG/DL (ref 74–99)
GLUCOSE BLD-MCNC: 114 MG/DL (ref 74–99)
GLUCOSE BLD-MCNC: 114 MG/DL (ref 74–99)
GLUCOSE BLD-MCNC: 191 MG/DL (ref 74–99)
GLUCOSE SERPL-MCNC: 165 MG/DL (ref 74–99)
HCT VFR BLD AUTO: 28.7 % (ref 37–54)
HGB BLD-MCNC: 8.8 G/DL (ref 12.5–16.5)
IMM GRANULOCYTES # BLD AUTO: <0.03 K/UL (ref 0–0.58)
IMM GRANULOCYTES NFR BLD: 1 % (ref 0–5)
LYMPHOCYTES NFR BLD: 0.32 K/UL (ref 1.5–4)
LYMPHOCYTES RELATIVE PERCENT: 8 % (ref 20–42)
MCH RBC QN AUTO: 26.3 PG (ref 26–35)
MCHC RBC AUTO-ENTMCNC: 30.7 G/DL (ref 32–34.5)
MCV RBC AUTO: 85.7 FL (ref 80–99.9)
MONOCYTES NFR BLD: 0.3 K/UL (ref 0.1–0.95)
MONOCYTES NFR BLD: 8 % (ref 2–12)
NEUTROPHILS NFR BLD: 79 % (ref 43–80)
NEUTS SEG NFR BLD: 3.12 K/UL (ref 1.8–7.3)
PLATELET # BLD AUTO: 190 K/UL (ref 130–450)
PMV BLD AUTO: 9.9 FL (ref 7–12)
POTASSIUM SERPL-SCNC: 4.4 MMOL/L (ref 3.5–5)
RBC # BLD AUTO: 3.35 M/UL (ref 3.8–5.8)
RBC # BLD: ABNORMAL 10*6/UL
SODIUM SERPL-SCNC: 142 MMOL/L (ref 132–146)
WBC OTHER # BLD: 4 K/UL (ref 4.5–11.5)

## 2025-03-29 PROCEDURE — 85025 COMPLETE CBC W/AUTO DIFF WBC: CPT

## 2025-03-29 PROCEDURE — 80048 BASIC METABOLIC PNL TOTAL CA: CPT

## 2025-03-29 PROCEDURE — 36415 COLL VENOUS BLD VENIPUNCTURE: CPT

## 2025-03-29 PROCEDURE — 99233 SBSQ HOSP IP/OBS HIGH 50: CPT | Performed by: STUDENT IN AN ORGANIZED HEALTH CARE EDUCATION/TRAINING PROGRAM

## 2025-03-29 PROCEDURE — 2580000003 HC RX 258: Performed by: EMERGENCY MEDICINE

## 2025-03-29 PROCEDURE — 6360000002 HC RX W HCPCS: Performed by: EMERGENCY MEDICINE

## 2025-03-29 PROCEDURE — G0378 HOSPITAL OBSERVATION PER HR: HCPCS

## 2025-03-29 PROCEDURE — 2580000003 HC RX 258: Performed by: HOSPITALIST

## 2025-03-29 PROCEDURE — 96361 HYDRATE IV INFUSION ADD-ON: CPT

## 2025-03-29 PROCEDURE — 96366 THER/PROPH/DIAG IV INF ADDON: CPT

## 2025-03-29 PROCEDURE — 82962 GLUCOSE BLOOD TEST: CPT

## 2025-03-29 PROCEDURE — 6370000000 HC RX 637 (ALT 250 FOR IP): Performed by: HOSPITALIST

## 2025-03-29 RX ADMIN — SODIUM CHLORIDE: 0.9 INJECTION, SOLUTION INTRAVENOUS at 06:23

## 2025-03-29 RX ADMIN — PIPERACILLIN AND TAZOBACTAM 4500 MG: 4; .5 INJECTION, POWDER, LYOPHILIZED, FOR SOLUTION INTRAVENOUS at 21:34

## 2025-03-29 RX ADMIN — ASPIRIN 81 MG CHEWABLE TABLET 81 MG: 81 TABLET CHEWABLE at 09:07

## 2025-03-29 RX ADMIN — SODIUM CHLORIDE: 0.9 INJECTION, SOLUTION INTRAVENOUS at 15:45

## 2025-03-29 RX ADMIN — PIPERACILLIN AND TAZOBACTAM 4500 MG: 4; .5 INJECTION, POWDER, LYOPHILIZED, FOR SOLUTION INTRAVENOUS at 02:32

## 2025-03-29 RX ADMIN — METOPROLOL SUCCINATE 25 MG: 25 TABLET, EXTENDED RELEASE ORAL at 09:08

## 2025-03-29 RX ADMIN — AMLODIPINE BESYLATE 5 MG: 5 TABLET ORAL at 09:08

## 2025-03-29 RX ADMIN — PIPERACILLIN AND TAZOBACTAM 4500 MG: 4; .5 INJECTION, POWDER, LYOPHILIZED, FOR SOLUTION INTRAVENOUS at 09:04

## 2025-03-29 RX ADMIN — INSULIN LISPRO 1 UNITS: 100 INJECTION, SOLUTION INTRAVENOUS; SUBCUTANEOUS at 16:52

## 2025-03-29 RX ADMIN — ONDANSETRON 4 MG: 4 TABLET, ORALLY DISINTEGRATING ORAL at 15:51

## 2025-03-29 RX ADMIN — Medication 1 TABLET: at 09:07

## 2025-03-29 RX ADMIN — ATORVASTATIN CALCIUM 20 MG: 20 TABLET, FILM COATED ORAL at 21:32

## 2025-03-29 RX ADMIN — PIPERACILLIN AND TAZOBACTAM 4500 MG: 4; .5 INJECTION, POWDER, LYOPHILIZED, FOR SOLUTION INTRAVENOUS at 15:46

## 2025-03-29 RX ADMIN — Medication 100 MG: at 09:07

## 2025-03-29 RX ADMIN — MIRTAZAPINE 15 MG: 15 TABLET, FILM COATED ORAL at 21:32

## 2025-03-29 NOTE — PLAN OF CARE
Problem: Skin/Tissue Integrity - Adult  Goal: Skin integrity remains intact  3/29/2025 1144 by Ghassan Meadows RN  Outcome: Progressing  3/29/2025 0319 by Liudmila Duran RN  Outcome: Progressing  3/29/2025 0315 by Liudmila Duran RN  Outcome: Progressing  Goal: Incisions, wounds, or drain sites healing without S/S of infection  3/29/2025 1144 by Ghassan Meadows RN  Outcome: Progressing  3/29/2025 0319 by Liudmila Duran RN  Outcome: Progressing  3/29/2025 0315 by Liudmila Duran RN  Outcome: Progressing     Problem: Musculoskeletal - Adult  Goal: Return mobility to safest level of function  3/29/2025 1144 by Ghassan Meadows RN  Outcome: Progressing  3/29/2025 0319 by Liudmila Duran RN  Outcome: Progressing  3/29/2025 0315 by Liudmila Duran RN  Outcome: Progressing

## 2025-03-30 LAB
ANION GAP SERPL CALCULATED.3IONS-SCNC: 8 MMOL/L (ref 7–16)
BASOPHILS # BLD: 0.02 K/UL (ref 0–0.2)
BASOPHILS NFR BLD: 1 % (ref 0–2)
BUN SERPL-MCNC: 4 MG/DL (ref 6–23)
CALCIUM SERPL-MCNC: 8.5 MG/DL (ref 8.6–10.2)
CHLORIDE SERPL-SCNC: 112 MMOL/L (ref 98–107)
CO2 SERPL-SCNC: 25 MMOL/L (ref 22–29)
CREAT SERPL-MCNC: 1.5 MG/DL (ref 0.7–1.2)
CREAT UR-MCNC: 74.1 MG/DL
CREATINE 24H UR-MRATE: 1519 MG/24 H (ref 980–2200)
EOSINOPHIL # BLD: 0.24 K/UL (ref 0.05–0.5)
EOSINOPHILS RELATIVE PERCENT: 7 % (ref 0–6)
ERYTHROCYTE [DISTWIDTH] IN BLOOD BY AUTOMATED COUNT: 15.6 % (ref 11.5–15)
GFR, ESTIMATED: 53 ML/MIN/1.73M2
GLUCOSE BLD-MCNC: 114 MG/DL (ref 74–99)
GLUCOSE BLD-MCNC: 122 MG/DL (ref 74–99)
GLUCOSE BLD-MCNC: 126 MG/DL (ref 74–99)
GLUCOSE BLD-MCNC: 133 MG/DL (ref 74–99)
GLUCOSE SERPL-MCNC: 119 MG/DL (ref 74–99)
HCT VFR BLD AUTO: 30.6 % (ref 37–54)
HGB BLD-MCNC: 9 G/DL (ref 12.5–16.5)
HOURS COLLECTED: 24 H
IMM GRANULOCYTES # BLD AUTO: <0.03 K/UL (ref 0–0.58)
IMM GRANULOCYTES NFR BLD: 0 % (ref 0–5)
LYMPHOCYTES NFR BLD: 0.47 K/UL (ref 1.5–4)
LYMPHOCYTES RELATIVE PERCENT: 13 % (ref 20–42)
MCH RBC QN AUTO: 25.3 PG (ref 26–35)
MCHC RBC AUTO-ENTMCNC: 29.4 G/DL (ref 32–34.5)
MCV RBC AUTO: 86 FL (ref 80–99.9)
MICROALBUMIN EXCRETION RATE: 25 MCG/MIN (ref 0–30)
MICROALBUMIN UR-MCNC: 18 MG/L
MICROALBUMIN/CREAT UR-RTO: 24 MG/G
MONOCYTES NFR BLD: 0.26 K/UL (ref 0.1–0.95)
MONOCYTES NFR BLD: 7 % (ref 2–12)
NEUTROPHILS NFR BLD: 73 % (ref 43–80)
NEUTS SEG NFR BLD: 2.72 K/UL (ref 1.8–7.3)
PLATELET # BLD AUTO: 213 K/UL (ref 130–450)
PMV BLD AUTO: 10.1 FL (ref 7–12)
POTASSIUM SERPL-SCNC: 4.3 MMOL/L (ref 3.5–5)
RBC # BLD AUTO: 3.56 M/UL (ref 3.8–5.8)
RBC # BLD: ABNORMAL 10*6/UL
RBC # BLD: ABNORMAL 10*6/UL
SODIUM SERPL-SCNC: 145 MMOL/L (ref 132–146)
SPECIMEN VOL 24H UR: 2050 ML
WBC OTHER # BLD: 3.7 K/UL (ref 4.5–11.5)

## 2025-03-30 PROCEDURE — 6360000002 HC RX W HCPCS: Performed by: EMERGENCY MEDICINE

## 2025-03-30 PROCEDURE — 99233 SBSQ HOSP IP/OBS HIGH 50: CPT | Performed by: STUDENT IN AN ORGANIZED HEALTH CARE EDUCATION/TRAINING PROGRAM

## 2025-03-30 PROCEDURE — G0378 HOSPITAL OBSERVATION PER HR: HCPCS

## 2025-03-30 PROCEDURE — 80048 BASIC METABOLIC PNL TOTAL CA: CPT

## 2025-03-30 PROCEDURE — 96361 HYDRATE IV INFUSION ADD-ON: CPT

## 2025-03-30 PROCEDURE — 85025 COMPLETE CBC W/AUTO DIFF WBC: CPT

## 2025-03-30 PROCEDURE — 82570 ASSAY OF URINE CREATININE: CPT

## 2025-03-30 PROCEDURE — 82043 UR ALBUMIN QUANTITATIVE: CPT

## 2025-03-30 PROCEDURE — 82962 GLUCOSE BLOOD TEST: CPT

## 2025-03-30 PROCEDURE — 6370000000 HC RX 637 (ALT 250 FOR IP): Performed by: HOSPITALIST

## 2025-03-30 PROCEDURE — 2500000003 HC RX 250 WO HCPCS: Performed by: HOSPITALIST

## 2025-03-30 PROCEDURE — 2580000003 HC RX 258: Performed by: EMERGENCY MEDICINE

## 2025-03-30 PROCEDURE — 96366 THER/PROPH/DIAG IV INF ADDON: CPT

## 2025-03-30 RX ADMIN — ONDANSETRON 4 MG: 4 TABLET, ORALLY DISINTEGRATING ORAL at 08:44

## 2025-03-30 RX ADMIN — PIPERACILLIN AND TAZOBACTAM 4500 MG: 4; .5 INJECTION, POWDER, LYOPHILIZED, FOR SOLUTION INTRAVENOUS at 15:09

## 2025-03-30 RX ADMIN — ACETAMINOPHEN 650 MG: 325 TABLET ORAL at 21:36

## 2025-03-30 RX ADMIN — METOPROLOL SUCCINATE 25 MG: 25 TABLET, EXTENDED RELEASE ORAL at 08:47

## 2025-03-30 RX ADMIN — PIPERACILLIN AND TAZOBACTAM 4500 MG: 4; .5 INJECTION, POWDER, LYOPHILIZED, FOR SOLUTION INTRAVENOUS at 02:56

## 2025-03-30 RX ADMIN — ATORVASTATIN CALCIUM 20 MG: 20 TABLET, FILM COATED ORAL at 21:36

## 2025-03-30 RX ADMIN — PIPERACILLIN AND TAZOBACTAM 4500 MG: 4; .5 INJECTION, POWDER, LYOPHILIZED, FOR SOLUTION INTRAVENOUS at 08:53

## 2025-03-30 RX ADMIN — AMLODIPINE BESYLATE 5 MG: 5 TABLET ORAL at 08:47

## 2025-03-30 RX ADMIN — Medication 6 MG: at 21:36

## 2025-03-30 RX ADMIN — MIRTAZAPINE 15 MG: 15 TABLET, FILM COATED ORAL at 21:36

## 2025-03-30 RX ADMIN — Medication 1 TABLET: at 08:45

## 2025-03-30 RX ADMIN — SODIUM CHLORIDE, PRESERVATIVE FREE 10 ML: 5 INJECTION INTRAVENOUS at 08:47

## 2025-03-30 RX ADMIN — ASPIRIN 81 MG CHEWABLE TABLET 81 MG: 81 TABLET CHEWABLE at 08:45

## 2025-03-30 RX ADMIN — Medication 100 MG: at 08:45

## 2025-03-30 RX ADMIN — PIPERACILLIN AND TAZOBACTAM 4500 MG: 4; .5 INJECTION, POWDER, LYOPHILIZED, FOR SOLUTION INTRAVENOUS at 21:40

## 2025-03-30 NOTE — PLAN OF CARE
Problem: Skin/Tissue Integrity - Adult  Goal: Skin integrity remains intact  Outcome: Progressing  Goal: Incisions, wounds, or drain sites healing without S/S of infection  Outcome: Progressing     Problem: Musculoskeletal - Adult  Goal: Return mobility to safest level of function  Outcome: Progressing

## 2025-03-30 NOTE — PLAN OF CARE
Problem: Skin/Tissue Integrity - Adult  Goal: Skin integrity remains intact  3/30/2025 0958 by Ghassan Meadows RN  Outcome: Progressing  3/30/2025 0447 by Lorena Goldberg RN  Outcome: Progressing  Goal: Incisions, wounds, or drain sites healing without S/S of infection  3/30/2025 0958 by Ghassan Meadows RN  Outcome: Progressing  3/30/2025 0447 by Lorena Goldberg RN  Outcome: Progressing     Problem: Musculoskeletal - Adult  Goal: Return mobility to safest level of function  3/30/2025 0958 by Ghassan Meadows RN  Outcome: Progressing  3/30/2025 0447 by Lorena Goldberg RN  Outcome: Progressing

## 2025-03-31 VITALS
HEIGHT: 74 IN | BODY MASS INDEX: 33.66 KG/M2 | WEIGHT: 262.3 LBS | HEART RATE: 86 BPM | SYSTOLIC BLOOD PRESSURE: 153 MMHG | TEMPERATURE: 97.7 F | RESPIRATION RATE: 18 BRPM | OXYGEN SATURATION: 95 % | DIASTOLIC BLOOD PRESSURE: 89 MMHG

## 2025-03-31 LAB
ANION GAP SERPL CALCULATED.3IONS-SCNC: 10 MMOL/L (ref 7–16)
BASOPHILS # BLD: 0.02 K/UL (ref 0–0.2)
BASOPHILS NFR BLD: 1 % (ref 0–2)
BUN SERPL-MCNC: 2 MG/DL (ref 6–23)
CALCIUM SERPL-MCNC: 8.7 MG/DL (ref 8.6–10.2)
CHLORIDE SERPL-SCNC: 110 MMOL/L (ref 98–107)
CO2 SERPL-SCNC: 25 MMOL/L (ref 22–29)
CREAT SERPL-MCNC: 1.4 MG/DL (ref 0.7–1.2)
EOSINOPHIL # BLD: 0.23 K/UL (ref 0.05–0.5)
EOSINOPHILS RELATIVE PERCENT: 7 % (ref 0–6)
ERYTHROCYTE [DISTWIDTH] IN BLOOD BY AUTOMATED COUNT: 15.6 % (ref 11.5–15)
GFR, ESTIMATED: 60 ML/MIN/1.73M2
GLUCOSE BLD-MCNC: 117 MG/DL (ref 74–99)
GLUCOSE BLD-MCNC: 130 MG/DL (ref 74–99)
GLUCOSE SERPL-MCNC: 175 MG/DL (ref 74–99)
HCT VFR BLD AUTO: 28.2 % (ref 37–54)
HGB BLD-MCNC: 8.5 G/DL (ref 12.5–16.5)
IMM GRANULOCYTES # BLD AUTO: <0.03 K/UL (ref 0–0.58)
IMM GRANULOCYTES NFR BLD: 0 % (ref 0–5)
LYMPHOCYTES NFR BLD: 0.4 K/UL (ref 1.5–4)
LYMPHOCYTES RELATIVE PERCENT: 12 % (ref 20–42)
MCH RBC QN AUTO: 25.4 PG (ref 26–35)
MCHC RBC AUTO-ENTMCNC: 30.1 G/DL (ref 32–34.5)
MCV RBC AUTO: 84.4 FL (ref 80–99.9)
MONOCYTES NFR BLD: 0.21 K/UL (ref 0.1–0.95)
MONOCYTES NFR BLD: 6 % (ref 2–12)
NEUTROPHILS NFR BLD: 75 % (ref 43–80)
NEUTS SEG NFR BLD: 2.55 K/UL (ref 1.8–7.3)
PLATELET # BLD AUTO: 196 K/UL (ref 130–450)
PMV BLD AUTO: 10.1 FL (ref 7–12)
POTASSIUM SERPL-SCNC: 4.2 MMOL/L (ref 3.5–5)
RBC # BLD AUTO: 3.34 M/UL (ref 3.8–5.8)
RBC # BLD: ABNORMAL 10*6/UL
SODIUM SERPL-SCNC: 145 MMOL/L (ref 132–146)
WBC OTHER # BLD: 3.4 K/UL (ref 4.5–11.5)

## 2025-03-31 PROCEDURE — 6370000000 HC RX 637 (ALT 250 FOR IP): Performed by: HOSPITALIST

## 2025-03-31 PROCEDURE — 96376 TX/PRO/DX INJ SAME DRUG ADON: CPT

## 2025-03-31 PROCEDURE — 6360000002 HC RX W HCPCS: Performed by: EMERGENCY MEDICINE

## 2025-03-31 PROCEDURE — 36415 COLL VENOUS BLD VENIPUNCTURE: CPT

## 2025-03-31 PROCEDURE — 82962 GLUCOSE BLOOD TEST: CPT

## 2025-03-31 PROCEDURE — 2580000003 HC RX 258: Performed by: EMERGENCY MEDICINE

## 2025-03-31 PROCEDURE — G0378 HOSPITAL OBSERVATION PER HR: HCPCS

## 2025-03-31 PROCEDURE — 2500000003 HC RX 250 WO HCPCS: Performed by: HOSPITALIST

## 2025-03-31 PROCEDURE — 85025 COMPLETE CBC W/AUTO DIFF WBC: CPT

## 2025-03-31 PROCEDURE — 99239 HOSP IP/OBS DSCHRG MGMT >30: CPT | Performed by: STUDENT IN AN ORGANIZED HEALTH CARE EDUCATION/TRAINING PROGRAM

## 2025-03-31 PROCEDURE — 96366 THER/PROPH/DIAG IV INF ADDON: CPT

## 2025-03-31 PROCEDURE — 80048 BASIC METABOLIC PNL TOTAL CA: CPT

## 2025-03-31 RX ADMIN — PIPERACILLIN AND TAZOBACTAM 4500 MG: 4; .5 INJECTION, POWDER, LYOPHILIZED, FOR SOLUTION INTRAVENOUS at 03:30

## 2025-03-31 RX ADMIN — ASPIRIN 81 MG CHEWABLE TABLET 81 MG: 81 TABLET CHEWABLE at 09:23

## 2025-03-31 RX ADMIN — ONDANSETRON 4 MG: 4 TABLET, ORALLY DISINTEGRATING ORAL at 09:23

## 2025-03-31 RX ADMIN — PIPERACILLIN AND TAZOBACTAM 4500 MG: 4; .5 INJECTION, POWDER, LYOPHILIZED, FOR SOLUTION INTRAVENOUS at 09:29

## 2025-03-31 RX ADMIN — SODIUM CHLORIDE, PRESERVATIVE FREE 10 ML: 5 INJECTION INTRAVENOUS at 09:24

## 2025-03-31 RX ADMIN — Medication 100 MG: at 09:23

## 2025-03-31 RX ADMIN — METOPROLOL SUCCINATE 25 MG: 25 TABLET, EXTENDED RELEASE ORAL at 09:23

## 2025-03-31 RX ADMIN — PIPERACILLIN AND TAZOBACTAM 4500 MG: 4; .5 INJECTION, POWDER, LYOPHILIZED, FOR SOLUTION INTRAVENOUS at 14:04

## 2025-03-31 RX ADMIN — AMLODIPINE BESYLATE 5 MG: 5 TABLET ORAL at 09:23

## 2025-03-31 RX ADMIN — Medication 1 TABLET: at 09:24

## 2025-03-31 NOTE — DISCHARGE SUMMARY
[facility] Hospitalist Physician Discharge Summary       [facility]  [address] [place] [address]  [phone]          Activity level: As tolerated     Dispo: SNF       Condition on discharge: Stable     Patient ID:  Andres Staton  [id number]  60 y.o.  1965    Admit date: 3/27/2025    Discharge date and time:  3/31/2025  1:45 PM    Admission Diagnoses: Principal Problem:    Abscess of abdominal cavity (HCC)  Active Problems:    Dementia (HCC)    CAD (coronary artery disease)    Class 1 obesity in adult    Presence of ostomy (HCC)    Essential hypertension    MARBELLA (acute kidney injury)  Resolved Problems:    * No resolved hospital problems. *      Discharge Diagnoses: Principal Problem:    Abscess of abdominal cavity (HCC)  Active Problems:    Dementia (HCC)    CAD (coronary artery disease)    Class 1 obesity in adult    Presence of ostomy (HCC)    Essential hypertension    MARBELLA (acute kidney injury)  Resolved Problems:    * No resolved hospital problems. *      Consults:  IP CONSULT TO GENERAL SURGERY  IP CONSULT TO CASE MANAGEMENT  IP CONSULT TO VASCULAR ACCESS TEAM  IP CONSULT TO NEPHROLOGY  IP CONSULT TO VASCULAR ACCESS TEAM  IP CONSULT TO VASCULAR ACCESS TEAM  IP CONSULT TO VASCULAR ACCESS TEAM    Procedures:     Hospital Course:   This is a 60-year-old male with a recent intra-abdominal abscess with surgical intervention resulted with ostomy in place presented with increased leakage from ostomy.  General surgery consult.  The drain was checked by general surgery and no acute intervention warranted the drain is functioning well he and did have MARBELLA on CKD we consult nephrology patient started with IV fluid his output from ostomy acute condyle and also MARBELLA resolved medically stable discharge today.  This patient supposed to finish the course of Zosyn in the nursing home he lives continue Zosyn at discharge  Discharge Exam:    General Appearance: alert and

## 2025-03-31 NOTE — PROGRESS NOTES
[facility] [place] Hospitalist Progress Note    Admitting Date and Time: 3/27/2025  2:00 PM  Admit Dx: Intra-abdominal abscess (HCC) [K65.1]  MARBELLA (acute kidney injury) [N17.9]    Subjective:  Patient is being followed for Intra-abdominal abscess (HCC) [K65.1]  MARBELLA (acute kidney injury) [N17.9]   Pt was seen and e xamined.     ROS: denies fever, chills, cp, sob, n/v, HA unless stated above.      piperacillin-tazobactam  4,500 mg IntraVENous Q6H    amLODIPine  5 mg Oral Daily    thiamine  100 mg Oral Daily    rivastigmine  1 patch TransDERmal Daily    ocuvite-lutein  1 tablet Oral Daily    mirtazapine  15 mg Oral Nightly    metoprolol succinate  25 mg Oral Daily    atorvastatin  20 mg Oral Nightly    aspirin  81 mg Oral Daily    psyllium  1 packet Oral Daily    sodium chloride flush  5-40 mL IntraVENous 2 times per day    insulin lispro  0-4 Units SubCUTAneous 4x Daily AC & HS     iopamidol, 75 mL, ONCE PRN  sodium chloride flush, 5-40 mL, PRN  sodium chloride, , PRN  ondansetron, 4 mg, Q8H PRN   Or  ondansetron, 4 mg, Q6H PRN  melatonin, 6 mg, Nightly PRN  senna, 1 tablet, Daily PRN  aluminum & magnesium hydroxide-simethicone, 30 mL, Q6H PRN  acetaminophen, 650 mg, Q6H PRN   Or  acetaminophen, 650 mg, Q6H PRN  glucose, 4 tablet, PRN  dextrose bolus, 125 mL, PRN   Or  dextrose bolus, 250 mL, PRN  glucagon (rDNA), 1 mg, PRN  dextrose, , Continuous PRN         Objective:    BP (!) 142/91   Pulse 82   Temp 97.7 °F (36.5 °C) (Axillary)   Resp 16   Ht 1.88 m (6' 2\")   Wt 119.1 kg (262 lb 9.6 oz)   SpO2 92%   BMI 33.72 kg/m²     General Appearance: alert and oriented to person, place and time and in no acute distress  Skin: warm and dry  Head: normocephalic and atraumatic  Eyes: pupils equal, round, and reactive to light, extraocular eye movements intact, conjunctivae normal  Neck: neck supple and non tender without mass   Pulmonary/Chest: clear to auscultation bilaterally- no wheezes, rales or rhonchi, normal air 
       [facility] Hospitalist Progress Note    Admitting Date and Time: 3/27/2025  2:00 PM  Admit Dx: Intra-abdominal abscess (HCC) [K65.1]  MARBELLA (acute kidney injury) [N17.9]    Subjective:  Patient is being followed for Intra-abdominal abscess (HCC) [K65.1]  MARBELLA (acute kidney injury) [N17.9]   Pt was seen and e xamined.     ROS: denies fever, chills, cp, sob, n/v, HA unless stated above.      piperacillin-tazobactam  4,500 mg IntraVENous Q6H    amLODIPine  5 mg Oral Daily    thiamine  100 mg Oral Daily    rivastigmine  1 patch TransDERmal Daily    ocuvite-lutein  1 tablet Oral Daily    mirtazapine  15 mg Oral Nightly    metoprolol succinate  25 mg Oral Daily    atorvastatin  20 mg Oral Nightly    aspirin  81 mg Oral Daily    psyllium  1 packet Oral Daily    sodium chloride flush  5-40 mL IntraVENous 2 times per day    insulin lispro  0-4 Units SubCUTAneous 4x Daily AC & HS     iopamidol, 75 mL, ONCE PRN  sodium chloride flush, 5-40 mL, PRN  sodium chloride, , PRN  ondansetron, 4 mg, Q8H PRN   Or  ondansetron, 4 mg, Q6H PRN  melatonin, 6 mg, Nightly PRN  senna, 1 tablet, Daily PRN  aluminum & magnesium hydroxide-simethicone, 30 mL, Q6H PRN  acetaminophen, 650 mg, Q6H PRN   Or  acetaminophen, 650 mg, Q6H PRN  glucose, 4 tablet, PRN  dextrose bolus, 125 mL, PRN   Or  dextrose bolus, 250 mL, PRN  glucagon (rDNA), 1 mg, PRN  dextrose, , Continuous PRN         Objective:    /73   Pulse 88   Temp 98.1 °F (36.7 °C) (Oral)   Resp 18   Ht 1.88 m (6' 2\")   Wt 122.5 kg (270 lb)   SpO2 97%   BMI 34.67 kg/m²     General Appearance: alert and oriented to person, place and time and in no acute distress  Skin: warm and dry  Head: normocephalic and atraumatic  Eyes: pupils equal, round, and reactive to light, extraocular eye movements intact, conjunctivae normal  Neck: neck supple and non tender without mass   Pulmonary/Chest: clear to auscultation bilaterally- no wheezes, rales or rhonchi, normal air movement, no 
4 Eyes Skin Assessment     NAME:  Andres Staton  YOB: 1965  MEDICAL RECORD NUMBER:  [id number]    The patient is being assessed for  Admission    I agree that at least one RN has performed a thorough Head to Toe Skin Assessment on the patient. ALL assessment sites listed below have been assessed.      Areas assessed by both nurses:    Head, Face, Ears, Shoulders, Back, Chest, Arms, Elbows, Hands, Sacrum. Buttock, Coccyx, Ischium, Legs. Feet and Heels, and Under Medical Devices         Does the Patient have a Wound? Yes wound(s) were present on assessment. LDA wound assessment was Initiated and completed by RN       Ben Prevention initiated by RN: Yes  Wound Care Orders initiated by RN: Yes    Pressure Injury (Stage 3,4, Unstageable, DTI, NWPT, and Complex wounds) if present, place Wound referral order by RN under : No    New Ostomies, if present place, Ostomy referral order under : No     Nurse 1 eSignature: Electronically signed by Liudmila Duran RN on 3/28/25 at 2:06 AM EDT    **SHARE this note so that the co-signing nurse can place an eSignature**    Nurse 2 eSignature: Electronically signed by Maggie Marin RN on 3/28/25 at 2:14 AM EDT        
@9150 access center notified of consult and [facility]  Intra abdominal abscess  Consult with colorectal surgeon Dr Bismark Gruber  
[facility]  MD GILBERTO Pagan MD .  Consultation  Patient's Name: Andres Staton  2:20 PM  3/29/2025        3/29 : Seen in his room on room air clinically doing okay verbalize no issues    History of Present Ilness:      60-year-old presenting with chief complaint of leakage from his ostomy  He had recent admission to [facility] for intra-abdominal abscess with surgical intervention resulting in ostomy and drain placement  He has a history of coronary artery disease hypertension hyperlipidemia sleep apnea and obesity  Nephrology asked to see for acute kidney injury  Patient seen in his room he is on room air he appears euvolemic to mildly hypervolemic his body habitus prohibits accurate volume assessment he does have an ostomy bag he said he emptied it 5-6 times a day he report not eating well for the last few days    Past Medical History:   Diagnosis Date    CAD (coronary artery disease)     HTN (hypertension)     Hypercholesteremia     Hypertension     Sleep apnea        Past Surgical History:   Procedure Laterality Date    ABDOMEN SURGERY Left 10/14/2021    ABDOMEN INCISION AND DRAINAGE performed by Tin Duarte MD at [facility] [place]    COLONOSCOPY N/A 03/01/2022    COLONOSCOPY DIAGNOSTIC performed by Demetrio Jalloh MD at [facility] ENDOSCOPY    CYSTOSCOPY N/A 02/24/2022    CYSTOSCOPY, urethral dilation, perry insertion performed by Tin Duarte MD at [facility] OR    LAPAROTOMY N/A 02/24/2022    exploratory laparotomy, extended right hemicolectomy, creation of mucus fistula and end ileostomy performed by Tin Duarte MD at [facility] OR       No family history on file.     reports that he quit smoking about 24 years ago. His smoking use included cigarettes. He started smoking about 30 years ago. He has a 6 pack-year smoking history. He has never used smokeless tobacco. He reports current alcohol use of about 5.0 standard drinks of alcohol per week. He reports that he 
[facility]  MD GILBERTO Pagan MD .  Consultation  Patient's Name: Andres Staton  5:21 PM  3/31/2025        3/31 : on room air comfortable good p.o. intake    History of Present Ilness:      60-year-old presenting with chief complaint of leakage from his ostomy  He had recent admission to [facility] for intra-abdominal abscess with surgical intervention resulting in ostomy and drain placement  He has a history of coronary artery disease hypertension hyperlipidemia sleep apnea and obesity  Nephrology asked to see for acute kidney injury  Patient seen in his room he is on room air he appears euvolemic to mildly hypervolemic his body habitus prohibits accurate volume assessment he does have an ostomy bag he said he emptied it 5-6 times a day he report not eating well for the last few days    Past Medical History:   Diagnosis Date    CAD (coronary artery disease)     HTN (hypertension)     Hypercholesteremia     Hypertension     Sleep apnea        Past Surgical History:   Procedure Laterality Date    ABDOMEN SURGERY Left 10/14/2021    ABDOMEN INCISION AND DRAINAGE performed by Tin Duarte MD at [facility] [place]    COLONOSCOPY N/A 03/01/2022    COLONOSCOPY DIAGNOSTIC performed by Demetrio Jalloh MD at [facility] ENDOSCOPY    CYSTOSCOPY N/A 02/24/2022    CYSTOSCOPY, urethral dilation, perry insertion performed by Tin Duarte MD at [facility] OR    LAPAROTOMY N/A 02/24/2022    exploratory laparotomy, extended right hemicolectomy, creation of mucus fistula and end ileostomy performed by Tin Duarte MD at [facility] OR       No family history on file.     reports that he quit smoking about 24 years ago. His smoking use included cigarettes. He started smoking about 30 years ago. He has a 6 pack-year smoking history. He has never used smokeless tobacco. He reports current alcohol use of about 5.0 standard drinks of alcohol per week. He reports that he does not currently use 
Patients med rec and data base complete - bedside RN spoke to Vianca at [facility]. Patient has been there since 0ct. 2021. She stated patient is confused and he does like to pull at lines/tubes. He pulled his midline out this past week so he got a new one placed.    Electronically signed by Liudmila Duran RN on 3/28/2025 at 12:44 AM    
Reviewed CT Images with DR Alberto Interventional Radiologist per request of repositioning of drain.  \"Drain is in good placement at this time.\"    Also this drain was not placed by IR and care of drain should revert to the physician that placed it.    Zackery VARELA.  IR Tech    [phone]  
Spiritual Health History and Assessment/Progress Note  [facility]    Initial Encounter,  ,  ,      Name: Andres Staton MRN: [id number]    Age: 60 y.o.     Sex: male   Language: English   Mu-ism: [employer]ology   Abscess of abdominal cavity (HCC)     Date: 3/28/2025                           Spiritual Assessment began in [address] MED SURG        Referral/Consult From: Rounding   Encounter Overview/Reason: Initial Encounter  Service Provided For: Patient    Aicha, Belief, Meaning:   Patient has beliefs or practices that help with coping during difficult times  Family/Friends No family/friends present      Importance and Influence:  Patient has spiritual/personal beliefs that influence decisions regarding their health  Family/Friends No family/friends present    Community:  Patient feels well-supported. Support system includes: Extended family  Family/Friends No family/friends present    Assessment and Plan of Care:     Patient Interventions include: Facilitated expression of thoughts and feelings  Family/Friends Interventions include: Facilitated expression of thoughts and feelings    Patient Plan of Care: Spiritual Care available upon further referral  Family/Friends Plan of Care: No family/friends present    Electronically signed by Chaplain David on 3/28/2025 at 7:21 PM   
Spoke with IR nurse Courtney. She stated that the drain was in the correct position and there was no need for repositioning. Notified surgical resident Dr. Zaidi via perfect serve. Patient is ok for regular diet, see new order. Electronically signed by Alfredo Murphy RN on 3/28/2025 at 8:37 AM    
no respiratory distress  Cardiovascular: normal rate, normal S1 and S2 and no carotid bruits  Abdomenostomy in place.    Extremities: no cyanosis, no clubbing and no edema  Neurologic: no cranial nerve deficit and speech normal        Recent Labs     03/27/25  1525 03/28/25  1050 03/29/25  0600    142 142   K 4.1 4.1 4.4    108* 110*   CO2 24 22 25   BUN 18 14 7   CREATININE 2.5* 2.4* 1.8*   GLUCOSE 155* 131* 165*   CALCIUM 9.1 8.4* 8.4*       Recent Labs     03/27/25  1525 03/28/25  1050 03/29/25  0600   WBC 6.4 5.5 4.0*   RBC 3.47* 3.24* 3.35*   HGB 8.9* 8.4* 8.8*   HCT 29.0* 27.4* 28.7*   MCV 83.6 84.6 85.7   MCH 25.6* 25.9* 26.3   MCHC 30.7* 30.7* 30.7*   RDW 16.6* 16.1* 15.9*    192 190   MPV 10.5 10.8 9.9       Radiology:     Assessment:    Principal Problem:    Abscess of abdominal cavity (HCC)  Active Problems:    Dementia (HCC)    CAD (coronary artery disease)    Class 1 obesity in adult    Presence of ostomy (HCC)    Essential hypertension    MARBELLA (acute kidney injury)  Resolved Problems:    * No resolved hospital problems. *    Residual Left subphrenic abscess: s/p image guided drain placement at [facility] on 3/16. CT showed residual abscess with air fluid level distal to drain. Multiple Risk Factors for pseudomonas. Continue Zosyn. Gen Surg consulted. Consult IR for drain repositioning.  MARBELLA: Present on admission, KDIGO Stage II. BL Cr ~1, add on CK, IVF, daily weights/strict UOP. Daily BMPs. Consult Nephrology.    Moderate Normocytic Anemia: trend downward,   Dementia: continue Rivastigmine.   CAD: GDMT  Diabetes: 6.9%. SSI of Accu-cheks.   HTN: Cont home antihypertensives.    Class I Obesity:  Body mass ind      Kidneys improving with IV fluid  NOTE: This report was transcribed using voice recognition software. Every effort was made to ensure accuracy; however, inadvertent computerized transcription errors may be present.  Electronically signed by Fito Paula DO on 3/29/2025 at 
Consider surgical consultation for further management and follow-up.         IR Interventional Radiology Procedure Request    (Results Pending)       Assessment    -Acute kidney injury appeared to be related to decreased effective volume in the setting of high ostomy output poor p.o. intake.  Need to keep in mind AIN in differential diagnoses due to history of antibiotic usage    -Chronic kidney disease stage III baseline creatinine 1.3-1.4    -Intra-abdominal abscesses    -Hypertension    -Anemia of chronic disease    -Mineral bone disease/secondary hyperparathyroidism    Plan    Azotemia improving with volume replacement    -Continue volume replacement utilizing normal saline at 125 cc an hour till the morning  -CT abdomen no issues with the kidneys we will hold on ultrasound of the kidney  -Guide all antibiotic by pharmacy dose  -Follow-up serial basic metabolic panel urine output  -Check phosphorus PTH vitamin D in the morning      Thank you     Electronically signed by Joe Gray MD on 3/30/2025 at 1:31 PM

## 2025-03-31 NOTE — CARE COORDINATION
Social Work discharge planning   Pt is returning to [facility] today between 3-5p via Physician's (spoke to Hang). Amelia with [facility]. RN here, brother Nirav [phone] notified.  Electronically signed by TAYLOR Lloyd on 3/31/2025 at 1:38 PM

## 2025-03-31 NOTE — PATIENT CARE CONFERENCE
P Quality Flow/Interdisciplinary Rounds Progress Note        Quality Flow Rounds held on March 31, 2025    Disciplines Attending:  Bedside Nurse, , , and Nursing Unit Leadership    Andres Staton was admitted on 3/27/2025  2:00 PM    Anticipated Discharge Date:       Disposition:    Ben Score:  Ben Scale Score: 21    [facility] RISK OF UNPLANNED READMISSION 2.0             0 Total Score        Discussed patient goal for the day, patient clinical progression, and barriers to discharge.  The following Goal(s) of the Day/Commitment(s) have been identified:  dc planning      Fara Olivarez RN  March 31, 2025

## (undated) DEVICE — SWAB SPEC COLL SHFT L5.25IN POLYUR FOAM TIP SFT DBL MEDIA

## (undated) DEVICE — Device

## (undated) DEVICE — BLADE CLIPPER GEN PURP NS

## (undated) DEVICE — TOTAL TRAY, 16FR 10ML SIL FOLEY, URN: Brand: MEDLINE

## (undated) DEVICE — WIPE,PROTECTANT,SKIN,SUREPREP: Brand: MEDLINE

## (undated) DEVICE — COVER HNDL LT DISP

## (undated) DEVICE — DRAPE THER FLUID WARMING 66X44 IN FLAT SLUSH DBL DISC ORS

## (undated) DEVICE — RELOAD STPL L75MM OPN H3.8MM CLS 1.5MM WIRE DIA0.2MM REG

## (undated) DEVICE — ELECTRODE PT RET AD L9FT HI MOIST COND ADH HYDRGEL CORDED

## (undated) DEVICE — GUIDEWIRE URO L150CM DIA0.035IN PTFE NIT HYDRPHLC ANG TIP

## (undated) DEVICE — 4-PORT MANIFOLD: Brand: NEPTUNE 2

## (undated) DEVICE — TELFA ADHESIVE ISLAND DRESSING: Brand: TELFA

## (undated) DEVICE — CONTAINER VACUTAINER ANAER CULTURE SWAB

## (undated) DEVICE — GOWN,SIRUS,FABRNF,XL,20/CS: Brand: MEDLINE

## (undated) DEVICE — NEEDLE FLTR 18GA L1.5IN MEM THK5UM BLNT DISP

## (undated) DEVICE — DRAINBAG,ANTI-REFLUX TOWER,L/F,2000ML,LL: Brand: MEDLINE

## (undated) DEVICE — BANDAGE,GAUZE,4.5"X4.1YD,STERILE,LF: Brand: MEDLINE

## (undated) DEVICE — GRADUATE TRIANG MEASURE 1000ML BLK PRNT

## (undated) DEVICE — SPONGE GZ W4XL4IN RAYON POLY FILL CVR W/ NONWOVEN FAB

## (undated) DEVICE — GOWN,SIRUS,FABRNF,2XL,18/CS: Brand: MEDLINE

## (undated) DEVICE — CATHETER URETH 14FR BLLN 5CC SIL HYDRGEL 2 W F SPEC TIEM M

## (undated) DEVICE — CATHETER URETH BLLN 5CC 20FR F 2 W SPEC COUNCL MOD SHT OPN

## (undated) DEVICE — BANDAGE,GAUZE,CONFORMING,3"X75",STRL,LF: Brand: MEDLINE

## (undated) DEVICE — INSTRUMENT REPROC SEAL/DIVIDE OPEN LIGASURE IMPACT CRV LG JAW NANO-COAT 18CM

## (undated) DEVICE — SET INSTRUMENT LAP II

## (undated) DEVICE — GLOVE ORANGE PI 7 1/2   MSG9075

## (undated) DEVICE — SYRINGE 20ML LL S/C 50

## (undated) DEVICE — SOLUTION IV IRRIG WATER 1000ML POUR BRL 2F7114

## (undated) DEVICE — APPLICATOR MEDICATED 26 CC SOLUTION HI LT ORNG CHLORAPREP

## (undated) DEVICE — MARKER,SKIN,WI/RULER AND LABELS: Brand: MEDLINE

## (undated) DEVICE — CATHETER URETH 16FR BLLN 5CC STD LTX 2 W F SGL DRNGE EYE M

## (undated) DEVICE — DOUBLE BASIN SET: Brand: MEDLINE INDUSTRIES, INC.

## (undated) DEVICE — SPONGE LAP W18XL18IN WHT COT 4 PLY FLD STRUNG RADPQ DISP ST

## (undated) DEVICE — NEEDLE HYPO 25GA L1.5IN BLU POLYPR HUB S STL REG BVL STR

## (undated) DEVICE — GAUZE,SPONGE,4"X4",8PLY,STRL,LF,10/TRAY: Brand: MEDLINE

## (undated) DEVICE — DRAPE,LAP,CHOLE,W/TROUGHS,STERILE: Brand: MEDLINE

## (undated) DEVICE — BASIC SINGLE BASIN 1-LF: Brand: MEDLINE INDUSTRIES, INC.

## (undated) DEVICE — DRAPE,LAPAROTOMY,PCH,STERILE: Brand: MEDLINE

## (undated) DEVICE — PACK,HEAVY DRAINAGE,STERILE: Brand: MEDLINE INDUSTRIES, INC.

## (undated) DEVICE — SET INST MINOR PROCEDURE

## (undated) DEVICE — SOLUTION SURG PREP ANTIMICROBIAL 4 OZ SKIN WND EXIDINE

## (undated) DEVICE — TAPE,WATERPROOF,CURAD,2"X10YD,LF,72/CS: Brand: CURAD

## (undated) DEVICE — DRESSING BORDERED ADH GZ UNIV GEN USE 8INX4IN AND 6INX2IN

## (undated) DEVICE — PACK PROCEDURE SURG GEN CUST

## (undated) DEVICE — Y-TYPE TUR/BLADDER IRRIGATION SET, REGULATING CLAMP

## (undated) DEVICE — TUBING, SUCTION, 9/32" X 10', STRAIGHT: Brand: MEDLINE

## (undated) DEVICE — TOWEL,OR,DSP,ST,BLUE,STD,6/PK,12PK/CS: Brand: MEDLINE

## (undated) DEVICE — UNIVERSAL DRAPE: Brand: MEDLINE INDUSTRIES, INC.

## (undated) DEVICE — STAPLER INT L75MM CUT LN L73MM STPL LN L77MM BLU B FRM 8

## (undated) DEVICE — TAPE ADH CLTH SILK H2O REPELLENT CURAD

## (undated) DEVICE — SET SURG BASIN MAYO REUSABLE

## (undated) DEVICE — APPLICATOR PREP 26ML 0.7% IOD POVACRYLEX 74% ISO ALC ST

## (undated) DEVICE — INTENDED FOR TISSUE SEPARATION, AND OTHER PROCEDURES THAT REQUIRE A SHARP SURGICAL BLADE TO PUNCTURE OR CUT.: Brand: BARD-PARKER ® STAINLESS STEEL BLADES

## (undated) DEVICE — TRAP,MUCUS SPECIMEN,40CC: Brand: MEDLINE

## (undated) DEVICE — SET INSTRUMENT LAP I

## (undated) DEVICE — TOWEL,OR,DSP,ST,GREEN,DLX,XR,4/PK,20PK/C: Brand: MEDLINE

## (undated) DEVICE — YANKAUER,POOLE TIP,STERILE,50/CS: Brand: MEDLINE

## (undated) DEVICE — GLOVE ORTHO 8   MSG9480

## (undated) DEVICE — 1-PIECE DRAINABLE OSTOMY POUCH, CERAPLUS: Brand: PREMIER